# Patient Record
Sex: FEMALE | Race: BLACK OR AFRICAN AMERICAN | Employment: UNEMPLOYED | URBAN - METROPOLITAN AREA
[De-identification: names, ages, dates, MRNs, and addresses within clinical notes are randomized per-mention and may not be internally consistent; named-entity substitution may affect disease eponyms.]

---

## 2017-09-13 ENCOUNTER — OFFICE VISIT (OUTPATIENT)
Dept: FAMILY MEDICINE CLINIC | Age: 47
End: 2017-09-13
Payer: COMMERCIAL

## 2017-09-13 VITALS
HEART RATE: 94 BPM | TEMPERATURE: 98.2 F | HEIGHT: 59 IN | DIASTOLIC BLOOD PRESSURE: 84 MMHG | BODY MASS INDEX: 29.88 KG/M2 | WEIGHT: 148.2 LBS | SYSTOLIC BLOOD PRESSURE: 127 MMHG

## 2017-09-13 DIAGNOSIS — Z79.4 DIABETES MELLITUS TYPE 2, INSULIN DEPENDENT (HCC): Primary | ICD-10-CM

## 2017-09-13 DIAGNOSIS — E11.9 DIABETES MELLITUS TYPE 2, INSULIN DEPENDENT (HCC): Primary | ICD-10-CM

## 2017-09-13 DIAGNOSIS — E78.00 HYPERCHOLESTEREMIA: ICD-10-CM

## 2017-09-13 DIAGNOSIS — K21.9 GASTROESOPHAGEAL REFLUX DISEASE WITHOUT ESOPHAGITIS: ICD-10-CM

## 2017-09-13 DIAGNOSIS — Z00.00 HEALTH CARE MAINTENANCE: ICD-10-CM

## 2017-09-13 DIAGNOSIS — I10 HTN (HYPERTENSION), BENIGN: ICD-10-CM

## 2017-09-13 LAB
CREATININE URINE POCT: NORMAL
HBA1C MFR BLD: 14 %
MICROALBUMIN/CREAT 24H UR: NORMAL MG/G{CREAT}
MICROALBUMIN/CREAT UR-RTO: NORMAL

## 2017-09-13 PROCEDURE — 90471 IMMUNIZATION ADMIN: CPT | Performed by: FAMILY MEDICINE

## 2017-09-13 PROCEDURE — 83036 HEMOGLOBIN GLYCOSYLATED A1C: CPT | Performed by: FAMILY MEDICINE

## 2017-09-13 PROCEDURE — 99214 OFFICE O/P EST MOD 30 MIN: CPT | Performed by: FAMILY MEDICINE

## 2017-09-13 PROCEDURE — 90688 IIV4 VACCINE SPLT 0.5 ML IM: CPT | Performed by: FAMILY MEDICINE

## 2017-09-13 PROCEDURE — 82044 UR ALBUMIN SEMIQUANTITATIVE: CPT | Performed by: FAMILY MEDICINE

## 2017-09-13 RX ORDER — UBIQUINOL 100 MG
CAPSULE ORAL
Qty: 100 EACH | Refills: 11 | Status: SHIPPED | OUTPATIENT
Start: 2017-09-13 | End: 2021-04-02 | Stop reason: SDUPTHER

## 2017-09-13 RX ORDER — IBUPROFEN 200 MG
200 TABLET ORAL EVERY 8 HOURS PRN
Qty: 120 TABLET | Refills: 3 | Status: SHIPPED | OUTPATIENT
Start: 2017-09-13 | End: 2018-02-13 | Stop reason: ALTCHOICE

## 2017-09-13 RX ORDER — INSULIN GLARGINE 100 [IU]/ML
50 INJECTION, SOLUTION SUBCUTANEOUS NIGHTLY
Qty: 1 VIAL | Refills: 3 | Status: SHIPPED | OUTPATIENT
Start: 2017-09-13 | End: 2017-09-20 | Stop reason: ALTCHOICE

## 2017-09-13 RX ORDER — ASPIRIN 81 MG/1
81 TABLET ORAL DAILY
Qty: 30 TABLET | Refills: 11 | Status: SHIPPED | OUTPATIENT
Start: 2017-09-13 | End: 2021-05-05 | Stop reason: SDUPTHER

## 2017-09-13 RX ORDER — GABAPENTIN 300 MG/1
300 CAPSULE ORAL 3 TIMES DAILY
Qty: 90 CAPSULE | Refills: 11 | Status: SHIPPED | OUTPATIENT
Start: 2017-09-13 | End: 2021-04-02 | Stop reason: SDUPTHER

## 2017-09-13 RX ORDER — ALBUTEROL SULFATE 90 UG/1
2 AEROSOL, METERED RESPIRATORY (INHALATION) EVERY 6 HOURS PRN
Qty: 1 INHALER | Refills: 11 | Status: SHIPPED | OUTPATIENT
Start: 2017-09-13 | End: 2021-04-02 | Stop reason: SDUPTHER

## 2017-09-13 RX ORDER — SYRINGE-NEEDLE,INSULIN,0.5 ML 27GX1/2"
SYRINGE, EMPTY DISPOSABLE MISCELLANEOUS
Qty: 100 EACH | Refills: 11 | Status: ON HOLD | OUTPATIENT
Start: 2017-09-13 | End: 2018-02-27 | Stop reason: SDUPTHER

## 2017-09-13 RX ORDER — LOSARTAN POTASSIUM 50 MG/1
50 TABLET ORAL DAILY
Qty: 30 TABLET | Refills: 6 | Status: SHIPPED | OUTPATIENT
Start: 2017-09-13 | End: 2021-04-02 | Stop reason: SDUPTHER

## 2017-09-13 RX ORDER — SIMVASTATIN 80 MG
80 TABLET ORAL NIGHTLY
Qty: 30 TABLET | Refills: 6 | Status: SHIPPED | OUTPATIENT
Start: 2017-09-13 | End: 2021-04-02 | Stop reason: SDUPTHER

## 2017-09-13 RX ORDER — LANSOPRAZOLE 30 MG/1
30 CAPSULE, DELAYED RELEASE ORAL DAILY
Qty: 30 CAPSULE | Refills: 3 | Status: SHIPPED | OUTPATIENT
Start: 2017-09-13 | End: 2018-03-19 | Stop reason: SDUPTHER

## 2017-09-13 ASSESSMENT — ENCOUNTER SYMPTOMS
SHORTNESS OF BREATH: 0
WHEEZING: 0
GASTROINTESTINAL NEGATIVE: 1

## 2017-09-14 ENCOUNTER — TELEPHONE (OUTPATIENT)
Dept: FAMILY MEDICINE CLINIC | Age: 47
End: 2017-09-14

## 2017-09-14 NOTE — TELEPHONE ENCOUNTER
Hello we recevied a PA , do not have all the info, like the key number PA paper, could not answer the question, thanks writer.

## 2017-09-25 ENCOUNTER — TELEPHONE (OUTPATIENT)
Dept: FAMILY MEDICINE CLINIC | Age: 47
End: 2017-09-25

## 2017-10-03 DIAGNOSIS — E11.9 DIABETES MELLITUS TYPE 2, INSULIN DEPENDENT (HCC): Primary | ICD-10-CM

## 2017-10-03 DIAGNOSIS — Z79.4 DIABETES MELLITUS TYPE 2, INSULIN DEPENDENT (HCC): Primary | ICD-10-CM

## 2017-10-12 ENCOUNTER — OFFICE VISIT (OUTPATIENT)
Dept: OBGYN | Age: 47
End: 2017-10-12
Payer: COMMERCIAL

## 2017-10-12 ENCOUNTER — HOSPITAL ENCOUNTER (OUTPATIENT)
Age: 47
Setting detail: SPECIMEN
Discharge: HOME | End: 2017-10-12
Payer: COMMERCIAL

## 2017-10-12 VITALS
RESPIRATION RATE: 16 BRPM | WEIGHT: 149.1 LBS | TEMPERATURE: 98.2 F | HEART RATE: 95 BPM | HEIGHT: 59 IN | SYSTOLIC BLOOD PRESSURE: 127 MMHG | BODY MASS INDEX: 30.06 KG/M2 | DIASTOLIC BLOOD PRESSURE: 79 MMHG

## 2017-10-12 DIAGNOSIS — N93.9 ABNORMAL UTERINE BLEEDING (AUB): ICD-10-CM

## 2017-10-12 DIAGNOSIS — Z12.39 BREAST CANCER SCREENING: ICD-10-CM

## 2017-10-12 DIAGNOSIS — Z01.419 WOMEN'S ANNUAL ROUTINE GYNECOLOGICAL EXAMINATION: Primary | ICD-10-CM

## 2017-10-12 LAB
ABSOLUTE EOS #: 0.1 K/UL (ref 0–0.4)
ABSOLUTE LYMPH #: 2.1 K/UL (ref 1–4.8)
ABSOLUTE MONO #: 0.3 K/UL (ref 0.1–1.2)
BASOPHILS # BLD: 0 %
BASOPHILS ABSOLUTE: 0 K/UL (ref 0–0.2)
DIFFERENTIAL TYPE: ABNORMAL
EOSINOPHILS RELATIVE PERCENT: 1 %
HCT VFR BLD CALC: 38.1 % (ref 36–46)
HEMOGLOBIN: 12.4 G/DL (ref 12–16)
LYMPHOCYTES # BLD: 23 %
MCH RBC QN AUTO: 26.6 PG (ref 26–34)
MCHC RBC AUTO-ENTMCNC: 32.4 G/DL (ref 31–37)
MCV RBC AUTO: 81.9 FL (ref 80–100)
MONOCYTES # BLD: 4 %
PDW BLD-RTO: 16.1 % (ref 12.5–15.4)
PLATELET # BLD: 297 K/UL (ref 140–450)
PLATELET ESTIMATE: ABNORMAL
PMV BLD AUTO: 9.3 FL (ref 6–12)
RBC # BLD: 4.66 M/UL (ref 4–5.2)
RBC # BLD: ABNORMAL 10*6/UL
SEG NEUTROPHILS: 72 %
SEGMENTED NEUTROPHILS ABSOLUTE COUNT: 6.5 K/UL (ref 1.8–7.7)
TSH SERPL DL<=0.05 MIU/L-ACNC: 0.97 MIU/L (ref 0.3–5)
WBC # BLD: 9.1 K/UL (ref 3.5–11)
WBC # BLD: ABNORMAL 10*3/UL

## 2017-10-12 PROCEDURE — G0145 SCR C/V CYTO,THINLAYER,RESCR: HCPCS

## 2017-10-12 PROCEDURE — 87624 HPV HI-RISK TYP POOLED RSLT: CPT

## 2017-10-12 PROCEDURE — 36415 COLL VENOUS BLD VENIPUNCTURE: CPT

## 2017-10-12 PROCEDURE — 85025 COMPLETE CBC W/AUTO DIFF WBC: CPT

## 2017-10-12 PROCEDURE — 84443 ASSAY THYROID STIM HORMONE: CPT

## 2017-10-12 PROCEDURE — 99396 PREV VISIT EST AGE 40-64: CPT | Performed by: OBSTETRICS & GYNECOLOGY

## 2017-10-12 NOTE — PROGRESS NOTES
OB/GYN Annual Visit    Arvin Wilson  10/12/2017                       Primary Care Physician: Andee Babinski, MD    CC:   Annual Gyn exam and heavy menses      HPI: Arvin Wilson is a 42 y.o. female [id number] Patient's last menstrual period was 10/08/2017. she reports menses have been monthly for more than 1 year but period are heavy lasting 7 -14 days. Uses 10 pads or more daily, passes small clots at times. There is pelvic pain prior to and during menses. Denies CP, SOB, dizziness or palpitations. There is a hx of tubal ligation for permanent sterilization. Denies urinary or GI complaints. Denies vaginal discharge.              REVIEW OF SYSTEMS:   An 11 point review of systems was completed    Constitutional: negative fever, negative chills  HEENT: negative visual disturbances, negative headaches  Respiratory: negative dyspnea, negative cough  Cardiovascular: negative chest pain,  negative palpitations  Gastrointestinal: positive abdominal pain, negative RUQ pain, negative N/V/D, negative constipation  Genitourinary: negative dysuria, negative vaginal discharge  Dermatological: negative rash, negative wounds  Hematologic: negative bleeding/clotting disorder  Immunologic: negative recent illness, negative recent sick contact,  Lymphatic: negative lymph nodes  Musculoskeletal: negative back pain, negative myalgias, negative arthralgias  Neurological:  negative dizziness, negative weakness  Behavior/Psych: negative depression  ________________________________________________________________________    GYNECOLOGICAL HISTORY:  Age of Menarche: 17  Age of Menopause: n/a     Sexually Active: has sex with one male  STD History: no past history    Pap History: Patient does not recall date of the last Pap test but reports the results as normal.  Colposcopy History: n/a    Permanent Sterilization: yes - tubal ligation 5/2008  Reversible Birth Control: no  Hormone Replacement Exposure: no    OBSTETRICAL HISTORY:  OB History    Para Term  AB Living   6 5 3 2 1 5   SAB TAB Ectopic Molar Multiple Live Births   1         5      # Outcome Date GA Lbr Jose Eduardo/2nd Weight Sex Delivery Anes PTL Lv   6 Term 08   6 lb 10 oz (3.005 kg) M Vag-Spont   RUBEN   5  06   4 lb (1.814 kg) M    RUBEN   4 SAB 2006           3  02   3 lb 3 oz (1.446 kg) M Vag-Spont   RUBEN   2 Term 01 38w0d  6 lb 4 oz (2.835 kg) M Vag-Spont   RUBEN   1 Term 93 40w0d  5 lb 6 oz (2.438 kg) M Vag-Spont   RUBEN          PAST MEDICAL HISTORY:  Past Medical History:   Diagnosis Date    Asthma     Chronic back pain     GERD (gastroesophageal reflux disease)     HLD (hyperlipidemia) 2014    Hypertension     Neuropathy ([facility] [address]     Obesity     Type II or unspecified type diabetes mellitus without mention of complication, not stated as uncontrolled      PAST SURGICAL HISTORY:                                                                Past Surgical History:   Procedure Laterality Date    TUBAL LIGATION       MEDICATIONS:  Current Outpatient Prescriptions on File Prior to Visit   Medication Sig Dispense Refill    insulin glargine (BASAGLAR KWIKPEN) 100 UNIT/ML injection pen Inject 50 Units into the skin nightly 5 Pen 3    albuterol sulfate  (90 Base) MCG/ACT inhaler Inhale 2 puffs into the lungs every 6 hours as needed for Wheezing or Shortness of Breath 1 Inhaler 11    Alcohol Swabs (ALCOHOL PREP) 70 % PADS Use__3___times per day Diagnosis:  Diabetes ___Insulin-Dependent_ 100 each 11    aspirin EC 81 MG EC tablet Take 1 tablet by mouth daily 30 tablet 11    beclomethasone (QVAR) 80 MCG/ACT inhaler Inhale 1 puff into the lungs 2 times daily 1 Inhaler 3    gabapentin (NEURONTIN) 300 MG capsule Take 1 capsule by mouth 3 times daily 90 capsule 11    glucose blood VI test strips (TRUE METRIX BLOOD GLUCOSE TEST) strip 1 each by In Vitro route daily As needed.  100 each 3    ibuprofen Diabetes Maternal Grandmother      SOCIAL HISTORY:  Social History     Social History    Marital status: Single     Spouse name: N/A    Number of children: N/A    Years of education: N/A     Occupational History    Day Care      [employer] - [employer]     Social History Main Topics    Smoking status: Never Smoker    Smokeless tobacco: Never Used      Comment: pt is around smokers a lot    Alcohol use Yes      Comment: occasionally    Drug use: No    Sexual activity: Yes     Partners: Male     Other Topics Concern    Not on file     Social History Narrative    No narrative on file       HEALTH MAINTENANCE:  Immunization status: up to date and documented  Garidisil immunization: n/a    Date of Last Mammogram:   Date of Last Colonoscopy: denies  Date of Last Bone Density: denies    Last pap: 2014 BRUNILDA  Colposcopy ECC, EMB negative    VITALS:  There were no vitals filed for this visit. PHYSICAL EXAM:     General Appearance: Appears healthy. Alert; in no acute distress. Pleasant. Skin: Skin color, texture, turgor normal. No rashes or lesions. Lymphatic: No abnormally enlarged lymph nodes. Neck and EENT: normal atraumatic, no neck masses, normal thyroid  Respiratory: Normal expansion. Clear to auscultation. No rales, rhonchi, or wheezing. Cardiovascular: normal rate, regular rhythm, normal S1 and S2 and no murmurs  Breast:  (Chest): normal appearance, no masses or tenderness of soft tissue. Reproducible pain with rib palpation. Abdomen: soft, non-tender; bowel sounds normal; no masses,  no organomegaly. No inguinal hernias palpated with standing and lying positions. Abdominal Scars: Yes, suprapubic  Pelvic Exam:   External genitalia: normal general appearance, decreased hair.  , perineum inflamed  Urinary system: urethral meatus normal, tenderness with palpation to bladder  Vaginal: menstrual blood  Cervix: nabothian cyst at 10 o'clock, tenderness in left adnexa with cervical deviation to right  Adnexa : not palpable  Uterus: Enlarged, approx 10 cm  Musculosk: negative  Extremities: non-tender BLE and non-edematous  Psych:  mood and affect are within normal limits     ASSESSMENT & PLAN:    Cristal Leon is a 42 y.o. female [id number]  1. Women's annual routine gynecological examination    - PAP Smear    2. Abnormal uterine bleeding (AUB)    - US Pelvis Complete; Future  - CBC Auto Differential; Future  - TSH with Reflex; Future  - return for office H-scope and EMG in about  2 weeks. 3. Breast cancer screening    - [facility] DIGITAL SCREEN W CAD BILATERAL; Future      Patient Active Problem List    Diagnosis Date Noted    Abnormal glandular Papanicolaou smear of cervix - NOS 11/15/2014     Priority: High     Negative HPV  ECC & Colposcopy w/ biopsy performed 11/17/2014; pathology negative for malignancy. Will perform Pap w/ co-testing and 12 and 24 months. Approx. 11/2015.  Abnormal uterine bleeding 11/03/2014     Priority: High     Irregular bleeding, heavy and intermenstrual bleeding for 2 years. Gc/C negative from ED visit 10/12/14. Risk factors: HTN, Obesity, DM. TV US 10/12/14: ovaries normal, endometrial stripe 10-19mm  TV US 2013: 1.2mm anterior uterine fibroid    EMB performed 11/17/2014; pathology negative for malignancy; Will repeat Pap w/ co-testing in 12 and 24 months. Consider cyclic progesterone, discuss at follow up visit. Provera 10mg x 10 days monthly given 12/08/2014. Will have patient repeat TSH; re-ordered 04/17/2015. Poorly controlled DM likely contributory, ordered hgb a1c 04/17/2015 and recommended follow up with IM. Patient to follow up in 3 months with possible discussion of Mirena at that time.        HTN (hypertension), benign 04/16/2011     Priority: High    [facility] type [facility], [facility] 04/16/2011     Priority: High    Abdominal pain 04/16/2011     Priority: High     Pelvic pain, left and right inguinal area, No hernia appreciated. Bladder area on pelvic exam tender. Referred to PCP for possible lumbar spine pathology as pain is bilateral and exacerbated by walking/movement in setting of low back pain. UA positive for Klebsiella, treated. Urine culture performed 12/08/2014: negative    Pap and ultrasound records not available from Dr. Renita Garza as office has closed.  Breast pain, left 11/03/2014     Priority: Medium     Last mammo 2014 wnl. Soft tissue is non-tender. Reproducible over ribs and patient states her pain is exacerbated by coughing. She denies other cardiac symptoms. Recommended ice/hot or icy hot liniment therapy and stretching. If pain does not resolve with this treatment recommended f/u with PCP for possible PT.  Annual physical exam 10/25/2016    Screening for condition 10/25/2016     Updating deleted diagnoses      Immunization due 10/25/2016    HLD (hyperlipidemia) 01/27/2014    Back pain 12/01/2011    Peripheral neuropathy 12/01/2011    Obesity 04/16/2011    GERD (gastroesophageal reflux disease) 04/16/2011    RAD (reactive airway disease) 04/16/2011    Bilateral knee pain 04/16/2011    Pain in joint of left shoulder 04/16/2011    Patellofemoral syndrome 04/16/2011       No Follow-up on file. Counseling Completed:    discussed need for repeat pap as per American Society for Colposcopy and Cervical Pathology guidelines. discussed need for mammograms every 1 year, If >44 yo and last mammogram was negative. discussed Calcium and Vitamin D dosing. discussed need for colonoscopy screening as well as onset for bone density testing. discussed birth control and barrier recommendations. discussed STD counseling and prevention.   Gardisil counseling for all patients 9-25 yo n/a  Hereditary Breast, Ovarian, Colon and Uterine Cancer

## 2017-10-12 NOTE — PATIENT INSTRUCTIONS
Patient Education        Hysteroscopy: Before Your Procedure  What is a hysteroscopy? A hysteroscopy is a procedure to find and treat problems with your uterus. It may be done to remove growths from the uterus. Or it may be done to diagnose or treat fertility problems. It can also be done to diagnose or treat abnormal bleeding. The doctor will guide a lighted tube through the cervix and into the uterus. This tube is called a hysteroscope, or scope. It lets your doctor see inside your body. The doctor will fill your uterus with air or liquid. This makes it easier to see the inside of your uterus with the scope. The doctor may also put tools through the scope to treat a problem. During this procedure, the doctor may take out a small piece of tissue for study. This is called a biopsy. Or the doctor may gently scrape tissue from the inner wall of the uterus. This is called a dilation and curettage, or D&C. If your doctor filled your uterus with liquid, most it will flow out when the scope is removed. You will most likely go home the same day. Many women are able to go back to work the next day. But it depends on what was done and the type of work you do. Follow-up care is a key part of your treatment and safety. Be sure to make and go to all appointments, and call your doctor if you are having problems. It's also a good idea to know your test results and keep a list of the medicines you take. What happens before the procedure? Procedures can be stressful. This information will help you understand what you can expect. And it will help you safely prepare for your procedure. Preparing for the procedure  · Understand exactly what procedure is planned, along with the risks, benefits, and other options. · Tell your doctors ALL the medicines, vitamins, supplements, and herbal remedies you take. Some of these can increase the risk of bleeding or interact with anesthesia.   · If you take aspirin or some other blood thinner, be sure to talk to your doctor. He or she will tell you if you should stop taking it before your procedure. Make sure that you understand exactly what your doctor wants you to do. · Your doctor will tell you which medicines to take or stop before your procedure. You may need to stop taking certain medicines a week or more before the procedure. So talk to your doctor as soon as you can. · If you have an advance directive, let your doctor know. It may include a living will and a durable power of  for health care. Bring a copy to the hospital. If you don't have one, you may want to prepare one. It lets your doctor and loved ones know your health care wishes. Doctors advise that everyone prepare these papers before any type of surgery or procedure. What happens on the day of the procedure? · Follow the instructions exactly about when to stop eating and drinking. If you don't, your procedure may be canceled. If your doctor has instructed you to take your medicines on the day of the procedure, take them with only a sip of water. · Take a bath or shower before you come in for your procedure. Do not apply lotions, perfumes, deodorants, or nail polish. · Take off all jewelry and piercings. And take out contact lenses, if you wear them. At the hospital or surgery center  · Bring a picture ID. · You will be kept comfortable and safe by your anesthesia provider. The anesthesia may make you sleep. Or it may just numb the area being worked on. · The procedure usually takes less than 30 minutes. Going home  · Be sure you have someone to drive you home. Anesthesia and pain medicine make it unsafe for you to drive. · You will be given more specific instructions about recovering from your procedure. They will cover things like diet, wound care, follow-up care, driving, and getting back to your normal routine. When should you call your doctor? · You have questions or concerns.   · You don't understand how good idea to keep a list of the medicines you take. Ask your doctor when you can expect to have your test results. Where can you learn more? Go to https://[email or web address].[email or web address]. org and sign in to your [employer] account. Enter [phone] in the [facility] box to learn more about \"Endometrial Biopsy: About This Test.\"     If you do not have an account, please click on the \"Sign Up Now\" link. Current as of: October 13, 2016  Content Version: 11.3  © 4466-9311 [employer]. Care instructions adapted under license by [facility] If you have questions about a medical condition or this instruction, always ask your healthcare professional. [address] any warranty or liability for your use of this information.

## 2017-10-13 LAB
HPV SAMPLE: NORMAL
HPV SOURCE: NORMAL
HPV, GENOTYPE 16: NOT DETECTED
HPV, GENOTYPE 18: NOT DETECTED
HPV, HIGH RISK OTHER: NOT DETECTED
HPV, INTERPRETATION: NORMAL

## 2017-10-17 ENCOUNTER — TELEPHONE (OUTPATIENT)
Dept: PHARMACY | Age: 47
End: 2017-10-17

## 2017-10-17 ENCOUNTER — OFFICE VISIT (OUTPATIENT)
Dept: FAMILY MEDICINE CLINIC | Age: 47
End: 2017-10-17
Payer: COMMERCIAL

## 2017-10-17 VITALS
BODY MASS INDEX: 31 KG/M2 | TEMPERATURE: 97.9 F | SYSTOLIC BLOOD PRESSURE: 112 MMHG | WEIGHT: 153.8 LBS | HEIGHT: 59 IN | HEART RATE: 94 BPM | DIASTOLIC BLOOD PRESSURE: 78 MMHG

## 2017-10-17 DIAGNOSIS — Z00.00 PERIODIC HEALTH ASSESSMENT, GENERAL SCREENING, ADULT: ICD-10-CM

## 2017-10-17 DIAGNOSIS — Z79.4 DIABETES MELLITUS TYPE 2, INSULIN DEPENDENT (HCC): Primary | ICD-10-CM

## 2017-10-17 DIAGNOSIS — E11.9 DIABETES MELLITUS TYPE 2, INSULIN DEPENDENT (HCC): Primary | ICD-10-CM

## 2017-10-17 PROCEDURE — 99213 OFFICE O/P EST LOW 20 MIN: CPT | Performed by: FAMILY MEDICINE

## 2017-10-17 ASSESSMENT — ENCOUNTER SYMPTOMS
ABDOMINAL PAIN: 0
DIARRHEA: 0
VOMITING: 0
NAUSEA: 1
SHORTNESS OF BREATH: 0
BLOOD IN STOOL: 0

## 2017-10-17 NOTE — PROGRESS NOTES
benefit, and side effects of prescribed medications. Barriers to medication compliance addressed. Patient given educational materials - see patient instructions  Was a self-tracking handout given in paper form or via [employer]? No:     Requested Prescriptions      No prescriptions requested or ordered in this encounter       All patient questions answered. Patient voiced understanding. Quality Measures    Body mass index is 31.42 kg/m². Elevated. Weight control planned discussed Healthy diet and regular exercise. BP: 112/78 Blood pressure is normal. Treatment plan consists of No treatment change needed. Lab Results   Component Value Date    LDLCHOLESTEROL 171 (H) 04/28/2015    (goal LDL reduction with dx if diabetes is 50% LDL reduction)      PHQ Scores 8/28/2014   PHQ2 Score 0   PHQ9 Score 0     Interpretation of Total Score Depression Severity: 1-4 = Minimal depression, 5-9 = Mild depression, 10-14 = Moderate depression, 15-19 = Moderately severe depression, 20-27 = Severe depression    Requested Prescriptions      No prescriptions requested or ordered in this encounter       There are no discontinued medications.

## 2017-10-17 NOTE — PATIENT INSTRUCTIONS
Thank you for letting us take care of you today. We hope all your questions were addressed. If a question was overlooked or something else comes to mind after you return home, please contact a member of your Care Team listed below. Please make sure you have a routine office visit set up to follow-up on [address]. Your Care Team at [place] [address] is Team #2  Swapna Dorsey,  (Faculty)  Pradip Harrison MD (Resindent)  Paulina Neri MD (Resident)  Kimmy Garcia MD (Resident)  Shawnee Badillo MD (Resident)  Beatriz Blanton MD (Resident)  Silvia Zarate, PETER Araiza., A  Kayleigh Barksdale, NEDA Garcia ([address] [facility])  Yvette Cervantes RN, ([id number] [facility])  Delio Cueva, Ph.D., (Behavioral Services)  Griselda Nanas, [facility] (Clinical Pharmacist)     Office phone number: [phone]    If you need to get in right away due to illness, please be advised we have \"Same Day\" appointments available Monday-Friday. Please call us at [phone] option #1 to schedule your \"Same Day\" appointment.

## 2017-10-18 ENCOUNTER — HOSPITAL ENCOUNTER (OUTPATIENT)
Age: 47
Discharge: HOME | End: 2017-10-18
Payer: COMMERCIAL

## 2017-10-18 DIAGNOSIS — Z00.00 PERIODIC HEALTH ASSESSMENT, GENERAL SCREENING, ADULT: ICD-10-CM

## 2017-10-18 LAB
CHOLESTEROL/HDL RATIO: 4.1
CHOLESTEROL: 226 MG/DL
HDLC SERPL-MCNC: 55 MG/DL
HIV AG/AB: NONREACTIVE
LDL CHOLESTEROL: 151 MG/DL (ref 0–130)
TRIGL SERPL-MCNC: 100 MG/DL
VLDLC SERPL CALC-MCNC: ABNORMAL MG/DL (ref 1–30)

## 2017-10-18 PROCEDURE — 80061 LIPID PANEL: CPT

## 2017-10-18 PROCEDURE — 36415 COLL VENOUS BLD VENIPUNCTURE: CPT

## 2017-10-18 PROCEDURE — 87389 HIV-1 AG W/HIV-1&-2 AB AG IA: CPT

## 2017-10-23 LAB — CYTOLOGY REPORT: NORMAL

## 2017-11-14 ENCOUNTER — HOSPITAL ENCOUNTER (OUTPATIENT)
Dept: MAMMOGRAPHY | Age: 47
Discharge: HOME | End: 2017-11-14
Payer: COMMERCIAL

## 2017-11-14 DIAGNOSIS — Z12.39 BREAST CANCER SCREENING: ICD-10-CM

## 2017-11-14 PROCEDURE — G0202 SCR MAMMO BI INCL CAD: HCPCS

## 2017-11-28 ENCOUNTER — PROCEDURE VISIT (OUTPATIENT)
Dept: OBGYN | Age: 47
End: 2017-11-28
Payer: COMMERCIAL

## 2017-11-28 ENCOUNTER — HOSPITAL ENCOUNTER (OUTPATIENT)
Age: 47
Setting detail: SPECIMEN
Discharge: HOME | End: 2017-11-28
Payer: COMMERCIAL

## 2017-11-28 VITALS
HEIGHT: 59 IN | RESPIRATION RATE: 16 BRPM | TEMPERATURE: 98.1 F | WEIGHT: 152 LBS | DIASTOLIC BLOOD PRESSURE: 91 MMHG | HEART RATE: 93 BPM | BODY MASS INDEX: 30.64 KG/M2 | SYSTOLIC BLOOD PRESSURE: 121 MMHG

## 2017-11-28 DIAGNOSIS — B37.31 CANDIDAL VULVOVAGINITIS: ICD-10-CM

## 2017-11-28 DIAGNOSIS — N93.9 ABNORMAL UTERINE BLEEDING (AUB): Primary | ICD-10-CM

## 2017-11-28 DIAGNOSIS — R87.615 UNSATISFACTORY CERVICAL PAPANICOLAOU SMEAR: ICD-10-CM

## 2017-11-28 LAB
CONTROL: NORMAL
PREGNANCY TEST URINE, POC: NEGATIVE

## 2017-11-28 PROCEDURE — 81025 URINE PREGNANCY TEST: CPT | Performed by: OBSTETRICS & GYNECOLOGY

## 2017-11-28 PROCEDURE — 58558 HYSTEROSCOPY BIOPSY: CPT | Performed by: OBSTETRICS & GYNECOLOGY

## 2017-11-28 RX ORDER — FLUCONAZOLE 150 MG/1
150 TABLET ORAL ONCE
Qty: 1 TABLET | Refills: 2 | Status: SHIPPED | OUTPATIENT
Start: 2017-11-28 | End: 2017-11-28

## 2017-11-28 RX ORDER — CLOTRIMAZOLE AND BETAMETHASONE DIPROPIONATE 10; .64 MG/G; MG/G
CREAM TOPICAL
Qty: 1 TUBE | Refills: 1 | Status: SHIPPED | OUTPATIENT
Start: 2017-11-28 | End: 2018-02-13 | Stop reason: ALTCHOICE

## 2017-11-28 NOTE — PROGRESS NOTES
METRIX AIR GLUCOSE METER) ANA Use daily 1 Device 0    Insulin Syringes, Disposable, U-100 0.3 ML MISC 1 each by Does not apply route daily 100 each 3    Lancets MISC Use 3 times daily Insulin dependent diabetes mellitus 100 each 11    albuterol (PROVENTIL) (5 MG/ML) 0.5% nebulizer solution Take 0.5 mLs by nebulization every 6 hours as needed for Wheezing. 120 Bottle 11    ibuprofen (ADVIL;MOTRIN) 200 MG tablet Take 1 tablet by mouth every 8 hours as needed for Pain 120 tablet 3     No current facility-administered medications on file prior to visit. ALLERGIES:  Allergies as of 11/28/2017 - Review Complete 11/28/2017   Allergen Reaction Noted    Morphine Swelling 12/01/2011       Blood pressure (!) 121/91, pulse 93, temperature 98.1 °F (36.7 °C), temperature source Oral, resp. rate 16, height 4' 11\" (1.499 m), weight 152 lb (68.9 kg), last menstrual period 11/14/2017, not currently breastfeeding. Vulva inflamed consistent with candida      The hysteroscopy with the Endosee device and endometrial biopsy were explained to the patient. Indications, risk and alternatives reviewed. Informed consent was obtained. A preoperative timeout was taken. The patient was placed in the lithotomy position on the exam table. Bimanual exam was performed. The uterus was found to be 10 cn. A sterile speculum was placed in the vagina and the cervix was visualized. The cervix was cleansed with Betadine. Paracervical block with 10 ml of 1% Lidocaine administered. The cervix was  grasped with a tenaculum. The cervix was not dilated. The uterus was sound with a plastic sound to 10 cm. .  The Endosee catheter was prepped with normal saline in the usual fashion. The catheter was inserted into the external cervical and advanced into the endometrial cavity. The catheter was attached to the monitor. The cavity was distended with normal saline in the usual fashion.  The endometrial cavity was examined and representative images captured. The endometrium was lush in appearance. The tubal ostia were not visualized. Upon completion of the hysteroscopy exam the saline was evacuated from the uterus. The endometrial sampler was placed into the external cervical os and advanced into endometrial cavity and biopsy was performed in the usual fashion. The specimen was submitted for routine histology. The tenaculum was removed from the cervix , hemostasis was affirmed on the cervix. The speculum was removed and the patient was given post procedural instructions. The hysteroscopic findings were discussed with the patient. She will return to the clinic to discuss the results of the histology of the endometrial biopsy and treatment options        Lab Results:  Results for orders placed or performed during the hospital encounter of 10/18/17   Lipid Panel   Result Value Ref Range    Cholesterol 226 (H) <200 mg/dL    HDL 55 >40 mg/dL    LDL Cholesterol 151 (H) 0 - 130 mg/dL    Chol/HDL Ratio 4.1 <5    Triglycerides 100 <150 mg/dL    VLDL NOT REPORTED 1 - 30 mg/dL   HIV Screen   Result Value Ref Range    HIV Ag/Ab NONREACTIVE NR         Assessment:  1. Abnormal uterine bleeding (AUB)  POCT urine pregnancy    SC HYSTEROSCOPY,W/ENDO BX   2. Candidal vulvovaginitis  fluconazole (DIFLUCAN) 150 MG tablet    clotrimazole-betamethasone (LOTRISONE) 1-0.05 % cream   3. Unsatisfactory cervical Papanicolaou smear             PLAN:  1. Abnormal uterine bleeding (AUB)    - POCT urine pregnancy  - SC HYSTEROSCOPY,W/ENDO BX  - return in 1 week for results and to discuss treatment options    2. Candidal vulvovaginitis    - fluconazole (DIFLUCAN) 150 MG tablet; Take 1 tablet by mouth once for 1 dose  Dispense: 1 tablet; Refill: 2  - clotrimazole-betamethasone (LOTRISONE) 1-0.05 % cream; Apply to affected area bid externally x 4 days then daily for 3 days  Dispense: 1 Tube; Refill: 1    3.  Unsatisfactory cervical Papanicolaou smear  - repeat pap at follow

## 2017-11-28 NOTE — PATIENT INSTRUCTIONS
Patient Education        Hysteroscopy: What to Expect at [facility] 31 hysteroscopy is a procedure to find and treat problems with your uterus. It may have been done to remove growths from the uterus. Or it may have been done to diagnose or treat fertility problems. It can also be used to diagnose or treat abnormal bleeding. If the doctor filled your uterus with air, you may have gas pains or your belly may feel full. You may have cramps and light vaginal bleeding for a few days to several weeks. The cramps and bleeding may last longer if the hysteroscopy was used for treatment. You may also have shoulder pain right after the procedure. If the doctor filled your uterus with liquid, you may have watery vaginal discharge for several weeks. This care sheet gives you a general idea about how long it will take for you to recover. But each person recovers at a different pace. Follow the steps below to get better as quickly as possible. How can you care for yourself at home? Activity  · Rest when you feel tired. · You can do your normal activities when it feels okay to do so. · You may shower and take baths as usual.  · Ask your doctor when it is okay for you to have sex. Diet  · You can eat your normal diet. If your stomach is upset, try bland, low-fat foods like plain rice, broiled chicken, toast, and yogurt. · If your bowel movements are not regular right after surgery, try to avoid constipation and straining. Drink plenty of water. Your doctor may suggest fiber, a stool softener, or a mild laxative. Medicines  · Your doctor will tell you if and when you can restart your medicines. He or she will also give you instructions about taking any new medicines. · If you take aspirin or some other blood thinner, be sure to talk to your doctor. He or she will tell you if and when to start taking this medicine again. Make sure that you understand exactly what your doctor wants you to do.   · Be safe with medicines. Read and follow all instructions on the label. ¨ If the doctor gave you a prescription medicine for pain, take it as prescribed. ¨ If you are not taking a prescription pain medicine, ask your doctor if you can take an over-the-counter medicine. · If your doctor prescribed antibiotics, take them as directed. Do not stop taking them just because you feel better. You need to take the full course of antibiotics. Other instructions  · You may have some light vaginal bleeding. Wear sanitary pads if needed. Do not use tampons until your doctor says it is okay. · You may want to use a heating pad on your belly to help with pain. Use a low heat setting. · Talk about birth control with your doctor. Do not try to become pregnant until your doctor says it is okay. Follow-up care is a key part of your treatment and safety. Be sure to make and go to all appointments, and call your doctor if you are having problems. It's also a good idea to know your test results and keep a list of the medicines you take. When should you call for help? Call 911 anytime you think you may need emergency care. For example, call if:  · You passed out (lost consciousness). · You have symptoms of a blood clot in your lung (called a pulmonary embolism). These may include:  ¨ Sudden chest pain. ¨ Trouble breathing. ¨ Coughing up blood. · You have sudden, severe pain in your belly. Call your doctor now or seek immediate medical care if:  · You have severe vaginal bleeding. This means that you are soaking through your usual pads every hour for 2 or more hours. · You are too sick to your stomach to drink any fluids. · You have new or worse pain. · You have a fever. · You have new or worse vaginal symptoms, such as pain, itching, or a discharge. · You have trouble passing urine or stool, especially if you have pain or swelling in your lower belly.   · You have symptoms of a blood clot in your leg (called a deep vein thrombosis), call your doctor if you are having problems. It's also a good idea to keep a list of the medicines you take. Ask your doctor when you can expect to have your test results. Where can you learn more? Go to https://rosio.[employer]. org and sign in to your Invested.in account. Enter [phone] in the [facility] box to learn more about \"Endometrial Biopsy: About This Test.\"     If you do not have an account, please click on the \"Sign Up Now\" link. Current as of: October 13, 2016  Content Version: 11.3  © 1264-4799 [employer], Incorporated. Care instructions adapted under license by [facility] ([facility]). If you have questions about a medical condition or this instruction, always ask your healthcare professional. [address] any warranty or liability for your use of this information.

## 2017-11-30 LAB — SURGICAL PATHOLOGY REPORT: NORMAL

## 2017-12-06 ENCOUNTER — OFFICE VISIT (OUTPATIENT)
Dept: OBGYN | Age: 47
End: 2017-12-06
Payer: COMMERCIAL

## 2017-12-06 VITALS
DIASTOLIC BLOOD PRESSURE: 89 MMHG | TEMPERATURE: 97.8 F | WEIGHT: 151.2 LBS | BODY MASS INDEX: 30.48 KG/M2 | SYSTOLIC BLOOD PRESSURE: 129 MMHG | HEART RATE: 90 BPM | HEIGHT: 59 IN

## 2017-12-06 DIAGNOSIS — N93.9 ABNORMAL UTERINE BLEEDING (AUB): Primary | ICD-10-CM

## 2017-12-06 DIAGNOSIS — N71.0 ACUTE ENDOMETRITIS: ICD-10-CM

## 2017-12-06 PROCEDURE — 1036F TOBACCO NON-USER: CPT | Performed by: OBSTETRICS & GYNECOLOGY

## 2017-12-06 PROCEDURE — G8417 CALC BMI ABV UP PARAM F/U: HCPCS | Performed by: OBSTETRICS & GYNECOLOGY

## 2017-12-06 PROCEDURE — G8484 FLU IMMUNIZE NO ADMIN: HCPCS | Performed by: OBSTETRICS & GYNECOLOGY

## 2017-12-06 PROCEDURE — G8427 DOCREV CUR MEDS BY ELIG CLIN: HCPCS | Performed by: OBSTETRICS & GYNECOLOGY

## 2017-12-06 PROCEDURE — 99213 OFFICE O/P EST LOW 20 MIN: CPT | Performed by: OBSTETRICS & GYNECOLOGY

## 2017-12-06 RX ORDER — DOXYCYCLINE HYCLATE 100 MG
100 TABLET ORAL 2 TIMES DAILY
Qty: 14 TABLET | Refills: 0 | Status: SHIPPED | OUTPATIENT
Start: 2017-12-06 | End: 2017-12-13

## 2017-12-06 RX ORDER — METRONIDAZOLE 500 MG/1
TABLET ORAL
Qty: 14 TABLET | Refills: 0 | Status: SHIPPED | OUTPATIENT
Start: 2017-12-06 | End: 2018-02-13 | Stop reason: ALTCHOICE

## 2017-12-06 NOTE — PROGRESS NOTES
Kathleen Sharp  2017    YOB: 1970          The patient was seen today. She is here regarding follow up after office H-scope and EMB . Her bowels are regular and she is voiding without difficulty. HPI:  Kathleen Sharp is a 42 y.o. female [id number] Patient's last menstrual period was 2017. She report spotting since her procedure. There has also been cramping that has been present since then. Cramping is relieved with tylenol. Denies N/V/F/C. No voiding complaints.       Obstetric History       T3      L5     SAB0   TAB0   Ectopic0   Molar0   Multiple0   Live Births5       # Outcome Date GA Lbr Jose Eduardo/2nd Weight Sex Delivery Anes PTL Lv   6 Term 08   6 lb 10 oz (3.005 kg) M Vag-Spont   RUBEN   5  06   4 lb (1.814 kg) M    RUBEN   4 SAB 2006           3  02   3 lb 3 oz (1.446 kg) M Vag-Spont   RUBEN   2 Term 01 38w0d  6 lb 4 oz (2.835 kg) M Vag-Spont   RUBEN   1 Term 93 40w0d  5 lb 6 oz (2.438 kg) M Vag-Spont   RUBEN          Past Medical History:   Diagnosis Date    Asthma     Chronic back pain     GERD (gastroesophageal reflux disease)     HLD (hyperlipidemia) 2014    Hypertension     Neuropathy ([address]     Obesity     Type II or unspecified type diabetes mellitus without mention of complication, not stated as uncontrolled        Past Surgical History:   Procedure Laterality Date    TUBAL LIGATION         Family History   Problem Relation Age of Onset    Diabetes Mother     High Cholesterol Mother     High Blood Pressure Mother     Asthma Mother     Diabetes Maternal Aunt     Diabetes Maternal Uncle     Diabetes Maternal Grandmother        Social History     Social History    Marital status: Single     Spouse name: N/A    Number of children: N/A    Years of education: N/A     Occupational History    Day Care      [employer]     Social History Main Topics    Smoking status: Never Smoker    Smokeless tobacco: Never Used      Comment: pt is around smokers a lot    Alcohol use Yes      Comment: occasionally    Drug use: No    Sexual activity: Yes     Partners: Male     Other Topics Concern    Not on file     Social History Narrative    No narrative on file         MEDICATIONS:  Current Outpatient Prescriptions on File Prior to Visit   Medication Sig Dispense Refill    clotrimazole-betamethasone (LOTRISONE) 1-0.05 % cream Apply to affected area bid externally x 4 days then daily for 3 days 1 Tube 1    insulin glargine (BASAGLAR KWIKPEN) 100 UNIT/ML injection pen Inject 50 Units into the skin nightly 5 Pen 3    albuterol sulfate  (90 Base) MCG/ACT inhaler Inhale 2 puffs into the lungs every 6 hours as needed for Wheezing or Shortness of Breath 1 Inhaler 11    Alcohol Swabs (ALCOHOL PREP) 70 % PADS Use__3___times per day Diagnosis:  Diabetes ___Insulin-Dependent_ 100 each 11    aspirin EC 81 MG EC tablet Take 1 tablet by mouth daily 30 tablet 11    beclomethasone (QVAR) 80 MCG/ACT inhaler Inhale 1 puff into the lungs 2 times daily 1 Inhaler 3    gabapentin (NEURONTIN) 300 MG capsule Take 1 capsule by mouth 3 times daily 90 capsule 11    glucose blood VI test strips (TRUE METRIX BLOOD GLUCOSE TEST) strip 1 each by In Vitro route daily As needed. 100 each 3    ibuprofen (ADVIL;MOTRIN) 200 MG tablet Take 1 tablet by mouth every 8 hours as needed for Pain 120 tablet 3    insulin aspart (NOVOLOG) 100 UNIT/ML injection vial Less than 70-----0 units and hypoglycemia protocol      -----2 units,151-200-----6 units, 201-250-----8 units, 251-300-----10 units, 301-350-----12 units, 351-400-----14 units, Greater than 400--15 units and contact physician. 2 vial 3    Insulin Syringe-Needle U-100 (INSULIN SYRINGE 1CC/30GX1/2\") 30G X 1/2\" 1 ML MISC Use as directed, Dx; DM 2 insulin dependent.  100 each 11    Insulin Syringe-Needle U-100 30G X 1/2\" 1 ML MISC 1 each by Does not apply route daily Rec: [id number]  Path Number: [id number]  Collected: 11/28/2017  Received: 11/29/2017  Reported: 11/30/2017 13:57    -- Diagnosis --    ENDOMETRIAL BIOPSY:        DISORDERED PROLIFERATIVE ENDOMETRIUM WITHOUT ATYPIA. GILDA Rodas  **Electronically Signed Out**         rdd/11/30/2017      Clinical Information  Pre-op Diagnosis:  ABNORMAL UTERINE BLEEDING    Operative Findings:  EMB  Operation Performed:  HYSTEROSCOPY, W/ENDO BX     Source of Specimen  1: EMB- ENDOMETRIAL BIOPSY    Gross Description  \"TEJ Ceola Emperor, EMB\" Tan-brown fragments, 5.0 x 4.0 x up to 0.3 cm in  aggregate. Entirely 2cs. jg tm      Microscopic Description  Microscopic examination performed. Assessment:  1. Abnormal uterine bleeding (AUB)     2. Acute endometritis  doxycycline hyclate (VIBRA-TABS) 100 MG tablet    metroNIDAZOLE (FLAGYL) 500 MG tablet         PLAN:    1. Abnormal uterine bleeding (AUB)  - I reviewed the results of EMB and H-scope  - treatment options including hormonal therapy, oral progestin, progesterone IUD, minimally invasive surgery with ablation. The option of no treatment was also discussed. 2. Acute endometritis    - Considering uterine tenderness I feel it is prudent to start treatment with oral antibiotics. - doxycycline hyclate (VIBRA-TABS) 100 MG tablet; Take 1 tablet by mouth 2 times daily for 7 days  Dispense: 14 tablet; Refill: 0  - metroNIDAZOLE (FLAGYL) 500 MG tablet; 1 tablet twice daily for 7 days  Dispense: 14 tablet; Refill: 0      Return to the office in 4 weeks. Counseled on preventative health maintenance follow-up. No orders of the defined types were placed in this encounter. Patient was seen with total face to face time of 15 minutes. More than 50% of this visit was counseling and education regarding The primary encounter diagnosis was Abnormal uterine bleeding (AUB). A diagnosis of Acute endometritis was also pertinent to this visit.  and Follow-up   as well as counseling on preventative health maintenance follow-up.

## 2017-12-06 NOTE — PATIENT INSTRUCTIONS
Patient Education          levonorgestrel intrauterine system  Pronunciation:  OSIRIS villarreal IN tra UE ter ine SIS tem  Brand:  Barby Marin  What is the most important information I should know about levonorgestrel intrauterine system? You should not use this intrauterine device if you have abnormal vaginal bleeding, a pelvic infection, certain other problems with your uterus or cervix, or if you have breast or uterine cancer, liver disease or liver tumor, or a weak immune system. Do not use during pregnancy. What is levonorgestrel intrauterine system? Levonorgestrel is a female hormone that can cause changes in your cervix, making it harder for sperm to reach the uterus and harder for a fertilized egg to attach to the uterus. Levonorgestrel intrauterine system is a plastic device that is placed in the uterus where it slowly releases the hormone to prevent pregnancy for 3 to 5 years. Mirena is meant for use in a woman who has had at least one child. Priscilla or Alonso Kussmaul can be used whether you have children or not. Mirena is also used in women who have heavy menstrual bleeding and choose to use an intrauterine form of birth control. Levonorgestrel is a progestin hormone and does not contain estrogen. The intrauterine device (IUD) releases levonorgestrel in the uterus, but only small amounts of the hormone reach the bloodstream. Levonorgestrel intrauterine system should not be used as emergency birth control. Levonorgestrel intrauterine system may also be used for purposes not listed in this medication guide. What should I discuss with my healthcare provider before taking levonorgestrel intrauterine system? An intrauterine device can increase your risk of developing a serious pelvic infection, which may threaten your life or your future ability to have children. Ask your doctor about your personal risk.   You should not use this device if you are allergic to levonorgestrel, silicone, silica, silver, barium, iron oxide, or polyethylene, or if you have:  · abnormal vaginal bleeding that has not been checked by a doctor;  · an untreated or uncontrolled pelvic infection (vaginal, cervical uterine, or bladder);  · endometriosis or a serious pelvic infection following a pregnancy or  within the past 3 months;  · a history of pelvic inflammatory disease (PID), unless you have had a normal pregnancy after the infection was treated and cleared;  · uterine fibroid tumors or other conditions that affect the shape of the uterus;  · past or present breast cancer, known or suspected cervical or uterine cancer;  · liver disease or liver tumor (benign or malignant);  · a recent abnormal Pap smear that has not yet been diagnosed or treated;  · a disease or condition that weakens your immune system, such as AIDS, leukemia, or IV drug abuse; or  · if you have another intrauterine device (IUD) in place. You may need special tests to safely use this device if you have:  · high blood pressure, heart disease or a heart valve disorder;  · history of heart attack or stroke;  · a bleeding or blood-clotting disorder;  · migraine headaches;  · a vaginal infection, pelvic infection, or sexually transmitted disease; or  · diabetes. Do not use this IUD during pregnancy. This device can cause severe infection, miscarriage, premature birth, or death of the mother if left in place during pregnancy. Tell your doctor right away if you become pregnant. If you choose to continue a pregnancy that occurs while using a levonorgestrel intrauterine system, watch for signs of infection such as fever, chills, flu symptoms, cramps, vaginal bleeding or discharge. You should not use this IUD if you are breast-feeding a baby younger than 7 weeks old. This IUD may be more likely to form a hole or get embedded in the wall of your uterus if you have the device inserted while you are breast-feeding.   How is levonorgestrel intrauterine the end of the 5-year wearing time. The Priscilla or Liletta device must be removed after 3 years. Your doctor can insert a new device at that time if you wish to continue using this form of birth control. Only your doctor should remove the IUD. Do not attempt to remove the device yourself. If you wish to continue preventing pregnancy, you may need to start using another birth control method a week before your levonorgestrel intrauterine system is removed. What happens if I miss a dose? Since the IUD continuously releases a low dose of levonorgestrel, missing a dose does not occur when using this form of levonorgestrel. What happens if I overdose? An overdose of levonorgestrel released from the intrauterine system is very unlikely to occur. What should I avoid while using levonorgestrel intrauterine system? Avoid having more than one sexual partner. The IUD can increase your risk of developing a serious pelvic infection, which is often caused by sexually transmitted disease. Levonorgestrel intrauterine system will not protect you from sexually transmitted diseases, including HIV and AIDS. Using a condom is the only way to help protect yourself from these diseases. Call your doctor if your sexual partner develops HIV or a sexually transmitted disease, or if you have any change in sexual relationships. What are the possible side effects of levonorgestrel intrauterine system? Get emergency medical help if you have severe pain in your lower stomach or side. This could be a sign of a tubal pregnancy (a pregnancy that implants in the fallopian tube instead of the uterus). A tubal pregnancy is a medical emergency. The levonorgestrel IUD may become embedded into the wall of the uterus, or may perforate (form a hole) in the uterus. If this occurs, the device may no longer prevent pregnancy, or it may move outside the uterus and cause scarring, infection, or damage to other organs.  Your doctor may need to surgically aren't sure if your sex partner might have an STI, use a condom to protect against disease. · The shot may cause bone loss in some women. You shouldn't use this method for more than 2 years without talking to your doctor first about the risks and benefits. · Any side effects may last up to 3 months. ¨ The shot may cause irregular periods, or you may have spotting between periods. You may also stop getting a period. Some women see having no period as an advantage. ¨ It may cause mood changes, less interest in sex, or weight gain. · You must go to the doctor every 3 months to get the shot. · If you want to get pregnant, it may take 9 to 10 months after you stop getting the shot. This is because the hormones the shot provided have to leave your system, and your body has to readjust.  · If you have severe side effects, you have to wait for the hormones to get out of your system. This may take up to 3 months. Where can you learn more? Go to https://Gradible (formerly gradsavers).[employer]. org and sign in to your My Single Point account. Enter Q473 in the [employer] box to learn more about \"Learning About Birth Control: The Shot. \"     If you do not have an account, please click on the \"Sign Up Now\" link. Current as of: March 16, 2017  Content Version: 11.4  © 7617-1355 [employer]. Care instructions adapted under license by [facility] ([facility]). If you have questions about a medical condition or this instruction, always ask your healthcare professional. [place] [address] any warranty or liability for your use of this information. Patient Education        Endometrial Ablation: Before Your Procedure  What is endometrial ablation? Endometrial ablation is a type of procedure that's often used to treat heavy menstrual bleeding. It can also be used for other types of bleeding in the uterus. It's not recommended if you plan to get pregnant.   Ablation works by destroying the lining of your a living will and a durable power of  for health care. Bring a copy to the hospital. If you don't have one, you may want to prepare one. It lets your doctor and loved ones know your health care wishes. Doctors advise that everyone prepare these papers before any type of surgery or procedure. Procedures can be stressful. This information will help you understand what you can expect. And it will help you safely prepare for your procedure. What happens on the day of the procedure? · Follow the instructions exactly about when to stop eating and drinking. If you don't, your procedure may be canceled. If your doctor told you to take your medicines on the day of the procedure, take them with only a sip of water. ? · Take a bath or shower before you come in for your procedure. Do not apply lotions, perfumes, deodorants, or nail polish. ? · Take off all jewelry and piercings. And take out contact lenses, if you wear them. ? At the [address] [place] [address] or hospital   · Bring a picture ID. ? · You will be kept comfortable and safe by your anesthesia provider. You may get medicine that relaxes you or puts you in a light sleep. ? · The procedure will take less than an hour. Going home   · Be sure you have someone to drive you home. Anesthesia and pain medicine make it unsafe for you to drive. ? · You will be given more specific instructions about recovering from your procedure. They will cover things like diet, wound care, follow-up care, driving, and getting back to your normal routine. When should you call your doctor? · You have questions or concerns. ? · You do not understand how to prepare for your procedure. ? · You become ill before the procedure (such as fever, flu, or a cold). ? · You need to reschedule or have changed your mind about having the procedure. Where can you learn more? Go to https://rosio.FastPay. org and sign in to your [employer] account.  Enter Q832 in the Search Health Information box to learn more about \"Endometrial Ablation: Before Your Procedure. \"     If you do not have an account, please click on the \"Sign Up Now\" link. Current as of: October 13, 2016  Content Version: 11.4  © 4127-2400 Healthwise, Incorporated. Care instructions adapted under license by [facility] ([facility]). If you have questions about a medical condition or this instruction, always ask your healthcare professional. [address] any warranty or liability for your use of this information.

## 2017-12-15 NOTE — PROGRESS NOTES
Please notify patient that request for basaglar which is accepted by his insurance was e scribed to his pharmacy. Thank you.
Strong peripheral pulses

## 2017-12-19 ENCOUNTER — OFFICE VISIT (OUTPATIENT)
Dept: FAMILY MEDICINE CLINIC | Age: 47
End: 2017-12-19
Payer: COMMERCIAL

## 2017-12-19 VITALS
BODY MASS INDEX: 29.92 KG/M2 | HEART RATE: 107 BPM | HEIGHT: 59 IN | TEMPERATURE: 97.9 F | DIASTOLIC BLOOD PRESSURE: 88 MMHG | WEIGHT: 148.4 LBS | SYSTOLIC BLOOD PRESSURE: 128 MMHG

## 2017-12-19 DIAGNOSIS — E11.9 DIABETES MELLITUS TYPE 2, INSULIN DEPENDENT (HCC): Primary | ICD-10-CM

## 2017-12-19 DIAGNOSIS — Z79.4 DIABETES MELLITUS TYPE 2, INSULIN DEPENDENT (HCC): Primary | ICD-10-CM

## 2017-12-19 DIAGNOSIS — E78.00 HYPERCHOLESTEREMIA: ICD-10-CM

## 2017-12-19 DIAGNOSIS — I10 HTN (HYPERTENSION), BENIGN: ICD-10-CM

## 2017-12-19 LAB — HBA1C MFR BLD: 14 %

## 2017-12-19 PROCEDURE — G8417 CALC BMI ABV UP PARAM F/U: HCPCS | Performed by: HOSPITALIST

## 2017-12-19 PROCEDURE — 83036 HEMOGLOBIN GLYCOSYLATED A1C: CPT | Performed by: HOSPITALIST

## 2017-12-19 PROCEDURE — G8427 DOCREV CUR MEDS BY ELIG CLIN: HCPCS | Performed by: HOSPITALIST

## 2017-12-19 PROCEDURE — 3046F HEMOGLOBIN A1C LEVEL >9.0%: CPT | Performed by: HOSPITALIST

## 2017-12-19 PROCEDURE — 99213 OFFICE O/P EST LOW 20 MIN: CPT | Performed by: HOSPITALIST

## 2017-12-19 PROCEDURE — G8484 FLU IMMUNIZE NO ADMIN: HCPCS | Performed by: HOSPITALIST

## 2017-12-19 PROCEDURE — 1036F TOBACCO NON-USER: CPT | Performed by: HOSPITALIST

## 2017-12-19 ASSESSMENT — ENCOUNTER SYMPTOMS
WHEEZING: 0
COUGH: 0
SHORTNESS OF BREATH: 0
BACK PAIN: 0
ABDOMINAL PAIN: 0
SORE THROAT: 0
RHINORRHEA: 0
NAUSEA: 0
CONSTIPATION: 0
VOMITING: 0
DIARRHEA: 0

## 2017-12-19 NOTE — PATIENT INSTRUCTIONS
Thank you for letting us take care of you today. We hope all your questions were addressed. If a question was overlooked or something else comes to mind after you return home, please contact a member of your Care Team listed below. Please make sure you have a routine office visit set up to follow-up on [address]. Your Care Team at [place] [address] is Team #2  Lindsey Wise DO (Faculty)  Spencer Orozco MD (Resident)  Renetta Rodriguez MD (Resident)  Paddy Garcia MD (Resident)  Iván Sanabria MD (Resident)  Nadege Bolden MD (Resident)  PETER Tejadaie Meth., RMA  Jeferson Moncada, NEDA Mccain No ([facility])  Bahman Benavidez RN, ([id number] )  Tim Mixon, Ph.D., (Behavioral Services)  Cash Spann, [address] [place] [address] (Clinical Pharmacist)     Office phone number: [phone]    If you need to get in right away due to illness, please be advised we have \"Same Day\" appointments available Monday-Friday. Please call us at [phone] option #1 to schedule your \"Same Day\" appointment.

## 2017-12-19 NOTE — PROGRESS NOTES
Attending Physician Statement  I have discussed the care of Diane Chavez, including pertinent history and exam findings,  with the resident. I have reviewed the key elements of all parts of the encounter with the resident. I agree with the assessment, plan and orders as documented by the resident.   (GE Modifier)    Trevor Guerin
Visit Information    Have you changed or started any medications since your last visit including any over-the-counter medicines, vitamins, or herbal medicines? no   Have you stopped taking any of your medications? Is so, why? -  no  Are you having any side effects from any of your medications? - no    Have you seen any other physician or provider since your last visit? yes  Have you had any other diagnostic tests since your last visit? Yes had an infection dx from OB  Have you been seen in the emergency room and/or had an admission in a hospital since we last saw you?  no   Have you had your routine dental cleaning in the past 6 months?  no     Do you have an active MyChart account? If no, what is the barrier?   No:     Patient Care Team:  Shayla Mendiola MD as PCP - General (Family Medicine)  Mirna Blas (Optometry)  Timmy Christine MD as Consulting Physician (Endocrinology)  Yaneli Ibarra DO as Resident (Obstetrics & Gynecology)    Medical History Review  Past Medical, Family, and Social History reviewed and does not contribute to the patient presenting condition    Health Maintenance   Topic Date Due    Diabetic retinal exam  05/17/2015    Diabetic foot exam  10/25/2017    Diabetic hemoglobin A1C test  12/13/2017    Diabetic microalbuminuria test  09/13/2018    Lipid screen  10/18/2018    Cervical cancer screen  10/12/2022    DTaP/Tdap/Td vaccine (2 - Td) 01/27/2024    Flu vaccine  Completed    Pneumococcal med risk  Completed    HIV screen  Completed
injection pen Reorder       Return in about 4 weeks (around 1/16/2018) for T2DM.

## 2018-01-09 ENCOUNTER — TELEPHONE (OUTPATIENT)
Dept: OBGYN | Age: 48
End: 2018-01-09

## 2018-01-09 ENCOUNTER — OFFICE VISIT (OUTPATIENT)
Dept: OBGYN | Age: 48
End: 2018-01-09
Payer: COMMERCIAL

## 2018-01-09 VITALS
RESPIRATION RATE: 16 BRPM | DIASTOLIC BLOOD PRESSURE: 94 MMHG | SYSTOLIC BLOOD PRESSURE: 128 MMHG | WEIGHT: 148.7 LBS | TEMPERATURE: 98.4 F | HEART RATE: 90 BPM | BODY MASS INDEX: 29.98 KG/M2 | HEIGHT: 59 IN

## 2018-01-09 DIAGNOSIS — Z01.818 PREOP TESTING: ICD-10-CM

## 2018-01-09 DIAGNOSIS — N93.9 ABNORMAL UTERINE BLEEDING (AUB): ICD-10-CM

## 2018-01-09 DIAGNOSIS — Z01.818 PREOP EXAMINATION: Primary | ICD-10-CM

## 2018-01-09 NOTE — PROGRESS NOTES
 lansoprazole (PREVACID) 30 MG delayed release capsule Take 1 capsule by mouth daily 30 capsule 3    metFORMIN (GLUCOPHAGE) 500 MG tablet Take 1 tablet by mouth 2 times daily (with meals) 60 tablet 3    Blood Glucose Monitoring Suppl (TRUE METRIX AIR GLUCOSE METER) ANA Use daily 1 Device 0    Insulin Syringes, Disposable, U-100 0.3 ML MISC 1 each by Does not apply route daily 100 each 3    Lancets MISC Use 3 times daily Insulin dependent diabetes mellitus 100 each 11    albuterol (PROVENTIL) (5 MG/ML) 0.5% nebulizer solution Take 0.5 mLs by nebulization every 6 hours as needed for Wheezing. 120 Bottle 11    metroNIDAZOLE (FLAGYL) 500 MG tablet 1 tablet twice daily for 7 days 14 tablet 0    clotrimazole-betamethasone (LOTRISONE) 1-0.05 % cream Apply to affected area bid externally x 4 days then daily for 3 days 1 Tube 1    gabapentin (NEURONTIN) 300 MG capsule Take 1 capsule by mouth 3 times daily 90 capsule 11    ibuprofen (ADVIL;MOTRIN) 200 MG tablet Take 1 tablet by mouth every 8 hours as needed for Pain 120 tablet 3    simvastatin (ZOCOR) 80 MG tablet Take 1 tablet by mouth nightly 30 tablet 6    losartan (COZAAR) 50 MG tablet Take 1 tablet by mouth daily 30 tablet 6     No current facility-administered medications on file prior to visit. ALLERGIES:  Allergies as of 01/09/2018 - Review Complete 01/09/2018   Allergen Reaction Noted    Morphine Swelling 12/01/2011           REVIEW OF SYSTEMS:       General appearance:Appears healthy. Alert; in no acute distress. Pleasant. CARDIOVASCULAR: Denies: chest pain, dyspnea on exertion, palpitations  RESPIRATORY: Denies: cough, shortness of breath, wheezing  GI: Denies: abdominal pain, flank pain, nausea, vomiting, diarrhea  : Denies: dysuria, frequency/urgency, hematuria, pelvic pain,  .    vaginal bleeding;  Yes  MUSCULOSKELETAL: Denies: back pain, joint swelling, muscle pain  SKIN: Denies: rash, hives,   Hematologic: Denies Bleeding Disorder,

## 2018-01-11 ENCOUNTER — OFFICE VISIT (OUTPATIENT)
Dept: FAMILY MEDICINE CLINIC | Age: 48
End: 2018-01-11
Payer: COMMERCIAL

## 2018-01-11 VITALS
SYSTOLIC BLOOD PRESSURE: 123 MMHG | WEIGHT: 148 LBS | HEIGHT: 59 IN | TEMPERATURE: 96.3 F | DIASTOLIC BLOOD PRESSURE: 82 MMHG | BODY MASS INDEX: 29.84 KG/M2 | HEART RATE: 98 BPM

## 2018-01-11 DIAGNOSIS — I10 HTN (HYPERTENSION), BENIGN: ICD-10-CM

## 2018-01-11 DIAGNOSIS — Z79.4 DIABETES MELLITUS TYPE 2, INSULIN DEPENDENT (HCC): Primary | ICD-10-CM

## 2018-01-11 DIAGNOSIS — Z23 IMMUNIZATION DUE: ICD-10-CM

## 2018-01-11 DIAGNOSIS — E11.9 DIABETES MELLITUS TYPE 2, INSULIN DEPENDENT (HCC): Primary | ICD-10-CM

## 2018-01-11 PROCEDURE — 90471 IMMUNIZATION ADMIN: CPT | Performed by: HOSPITALIST

## 2018-01-11 PROCEDURE — G8427 DOCREV CUR MEDS BY ELIG CLIN: HCPCS | Performed by: HOSPITALIST

## 2018-01-11 PROCEDURE — G8484 FLU IMMUNIZE NO ADMIN: HCPCS | Performed by: HOSPITALIST

## 2018-01-11 PROCEDURE — 3046F HEMOGLOBIN A1C LEVEL >9.0%: CPT | Performed by: HOSPITALIST

## 2018-01-11 PROCEDURE — 1036F TOBACCO NON-USER: CPT | Performed by: HOSPITALIST

## 2018-01-11 PROCEDURE — 90715 TDAP VACCINE 7 YRS/> IM: CPT | Performed by: HOSPITALIST

## 2018-01-11 PROCEDURE — 99213 OFFICE O/P EST LOW 20 MIN: CPT | Performed by: HOSPITALIST

## 2018-01-11 PROCEDURE — G8417 CALC BMI ABV UP PARAM F/U: HCPCS | Performed by: HOSPITALIST

## 2018-01-11 ASSESSMENT — ENCOUNTER SYMPTOMS
WHEEZING: 0
DIARRHEA: 0
CONSTIPATION: 0
COUGH: 0
VOMITING: 0
SHORTNESS OF BREATH: 0
BACK PAIN: 0
ABDOMINAL PAIN: 0
NAUSEA: 0

## 2018-01-11 NOTE — PROGRESS NOTES
Subjective:      Patient ID: Stephani Leigh is a 41 y.o. female. Pt here for form to be filled out and immunizations to be given. 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 Diabetes   She presents for her follow-up diabetic visit. She has type 2 diabetes mellitus. There are no hypoglycemic associated symptoms. Pertinent negatives for hypoglycemia include no confusion, dizziness, headaches, nervousness/anxiousness or speech difficulty. There are no diabetic associated symptoms. Pertinent negatives for diabetes include no chest pain, no fatigue, no polydipsia and no polyphagia. There are no hypoglycemic complications. There are no diabetic complications. Current diabetic treatment includes insulin injections and oral agent (monotherapy). She is compliant with treatment some of the time. She is following a generally healthy diet. When asked about meal planning, she reported none. She has not had a previous visit with a dietitian. She participates in exercise intermittently. Her overall blood glucose range is 70-90 mg/dl. She does not see a podiatrist.Eye exam is not current. Review of Systems   Constitutional: Negative for activity change, appetite change, diaphoresis, fatigue and fever. Respiratory: Negative for cough, shortness of breath and wheezing. Cardiovascular: Negative for chest pain, palpitations and leg swelling. Gastrointestinal: Negative for abdominal pain, constipation, diarrhea, nausea and vomiting. Endocrine: Negative for cold intolerance, heat intolerance, polydipsia and polyphagia. Genitourinary: Negative for difficulty urinating, dysuria and flank pain. Musculoskeletal: Negative for arthralgias, back pain, myalgias and neck pain. Neurological: Negative for dizziness, syncope, speech difficulty, numbness and headaches. Psychiatric/Behavioral: Negative for confusion, decreased concentration, self-injury, sleep disturbance and suicidal ideas.  The patient is not
Visit Information    Have you changed or started any medications since your last visit including any over-the-counter medicines, vitamins, or herbal medicines? yes - see med list please thanks   Have you stopped taking any of your medications? Is so, why? -  yes - see med list please thanks  Are you having any side effects from any of your medications? - no    Have you seen any other physician or provider since your last visit? yes - Ob GYN   Have you had any other diagnostic tests since your last visit? yes - labs    Have you been seen in the emergency room and/or had an admission in a hospital since we last saw you?  no   Have you had your routine dental cleaning in the past 6 months?  no     Do you have an active MyChart account? If no, what is the barrier?   Yes    Patient Care Team:  Jyotsna Reynolds MD as PCP - General (Family Medicine)  Shruthi Morales (Optometry)  Dara Estes MD as Consulting Physician (Endocrinology)  Elizabeth Mcginnis DO as Resident (Obstetrics & Gynecology)    Medical History Review  Past Medical, Family, and Social History reviewed and does contribute to the patient presenting condition    Health Maintenance   Topic Date Due    Diabetic retinal exam  05/17/2015    Potassium monitoring  12/21/2016    Creatinine monitoring  12/21/2016    Diabetic foot exam  10/25/2017    A1C test (Diabetic or Prediabetic)  03/19/2018    Diabetic microalbuminuria test  09/13/2018    Lipid screen  10/18/2018    Cervical cancer screen  10/12/2022    DTaP/Tdap/Td vaccine (2 - Td) 01/27/2024    Flu vaccine  Completed    Pneumococcal med risk  Completed    HIV screen  Completed       Lab Frequency Next Occurrence   Basic Metabolic Panel Once 17/62/1453   CBC Auto Differential Once 01/30/2018   EKG 12 lead unit performed Once 01/23/2018
reviewed and Tdap given. Return in about 2 months (around 3/11/2018) for a1c.    (Valeri Bhat ) Dr. Nhi MD

## 2018-01-15 ENCOUNTER — TELEPHONE (OUTPATIENT)
Dept: OBGYN | Age: 48
End: 2018-01-15

## 2018-01-15 ENCOUNTER — TELEPHONE (OUTPATIENT)
Dept: FAMILY MEDICINE CLINIC | Age: 48
End: 2018-01-15

## 2018-01-15 NOTE — TELEPHONE ENCOUNTER
----- Message from Chandrakant Zhang MD sent at 1/9/2018  9:22 AM EST -----  Schedule endometrial ablation with Aleja  Pt will need preop medical clearance .  She will be see [facility] 1/18/18

## 2018-01-15 NOTE — LETTER
[facility]  [address] [id number]  Phone: [phone]  Fax: [phone]    Radames Riuz      January 15, 2018      Ms. Mahamed Franklin is medically cleared for surgery-endometrial ablation and anesthesia. Pt will be seen post operatively with us.     Sincerely,    Electronically signed by Su Lee MD on 1/15/2018 at 2:54 PM  Su Lee MD

## 2018-01-15 NOTE — TELEPHONE ENCOUNTER
Lee Ann from Dr Marlo Ledbetter office calling pt is in need of medical clearance for endometrial ablation can not schedule without clearance Need a not saying she is cleared or not just seen on 1/111/18? Can you please review and advise a plan. Thanks if you want to see the pt at a office appt send us a message.  Thanks

## 2018-01-16 NOTE — TELEPHONE ENCOUNTER
Called pt left  for pt to call office. Patient surgery appointment's has been scheduled on 2/12/18 at 8:40am, PAT appointment scheduled on 1/29/18 at 9:00am, and post operative appointment scheduled on 2/28/18 at 10:30am. Surgery instructions mailed to patient.

## 2018-01-23 NOTE — TELEPHONE ENCOUNTER
Blayne Montgomery MD  Jf Rolling             The patient needs to return for repeat pap due to last testing was unsatisfactory for evaluation. Please call pt and schedule appointment. She was seen by FP on 1/11/18 . Her medical condition are under control and stable. Ok to proceed with ablation as planned.

## 2018-02-13 ENCOUNTER — HOSPITAL ENCOUNTER (OUTPATIENT)
Dept: PREADMISSION TESTING | Age: 48
Discharge: HOME | End: 2018-02-17
Payer: COMMERCIAL

## 2018-02-13 VITALS
RESPIRATION RATE: 18 BRPM | HEART RATE: 94 BPM | DIASTOLIC BLOOD PRESSURE: 94 MMHG | SYSTOLIC BLOOD PRESSURE: 136 MMHG | WEIGHT: 148 LBS | HEIGHT: 59 IN | OXYGEN SATURATION: 100 % | TEMPERATURE: 98.3 F | BODY MASS INDEX: 29.84 KG/M2

## 2018-02-13 LAB
ABSOLUTE EOS #: 0.15 K/UL (ref 0–0.44)
ABSOLUTE IMMATURE GRANULOCYTE: 0.04 K/UL (ref 0–0.3)
ABSOLUTE LYMPH #: 2.63 K/UL (ref 1.1–3.7)
ABSOLUTE MONO #: 0.51 K/UL (ref 0.1–1.2)
ANION GAP SERPL CALCULATED.3IONS-SCNC: 10 MMOL/L (ref 9–17)
BASOPHILS # BLD: 0 % (ref 0–2)
BASOPHILS ABSOLUTE: 0.04 K/UL (ref 0–0.2)
BUN BLDV-MCNC: 8 MG/DL (ref 6–20)
BUN/CREAT BLD: ABNORMAL (ref 9–20)
CALCIUM SERPL-MCNC: 8.7 MG/DL (ref 8.6–10.4)
CHLORIDE BLD-SCNC: 98 MMOL/L (ref 98–107)
CO2: 28 MMOL/L (ref 20–31)
CREAT SERPL-MCNC: 0.52 MG/DL (ref 0.5–0.9)
DIFFERENTIAL TYPE: ABNORMAL
EKG ATRIAL RATE: 92 BPM
EKG P AXIS: 49 DEGREES
EKG P-R INTERVAL: 134 MS
EKG Q-T INTERVAL: 364 MS
EKG QRS DURATION: 72 MS
EKG QTC CALCULATION (BAZETT): 450 MS
EKG R AXIS: 10 DEGREES
EKG T AXIS: 34 DEGREES
EKG VENTRICULAR RATE: 92 BPM
EOSINOPHILS RELATIVE PERCENT: 1 % (ref 1–4)
GFR AFRICAN AMERICAN: >60 ML/MIN
GFR NON-AFRICAN AMERICAN: >60 ML/MIN
GFR SERPL CREATININE-BSD FRML MDRD: ABNORMAL ML/MIN/{1.73_M2}
GFR SERPL CREATININE-BSD FRML MDRD: ABNORMAL ML/MIN/{1.73_M2}
GLUCOSE BLD-MCNC: 253 MG/DL (ref 70–99)
HCT VFR BLD CALC: 38.7 % (ref 36.3–47.1)
HEMOGLOBIN: 12 G/DL (ref 11.9–15.1)
IMMATURE GRANULOCYTES: 0 %
LYMPHOCYTES # BLD: 23 % (ref 24–43)
MCH RBC QN AUTO: 26.4 PG (ref 25.2–33.5)
MCHC RBC AUTO-ENTMCNC: 31 G/DL (ref 28.4–34.8)
MCV RBC AUTO: 85.1 FL (ref 82.6–102.9)
MONOCYTES # BLD: 5 % (ref 3–12)
NRBC AUTOMATED: 0 PER 100 WBC
PDW BLD-RTO: 14.6 % (ref 11.8–14.4)
PLATELET # BLD: 331 K/UL (ref 138–453)
PLATELET ESTIMATE: ABNORMAL
PMV BLD AUTO: 10.4 FL (ref 8.1–13.5)
POTASSIUM SERPL-SCNC: 4.1 MMOL/L (ref 3.7–5.3)
RBC # BLD: 4.55 M/UL (ref 3.95–5.11)
RBC # BLD: ABNORMAL 10*6/UL
SEG NEUTROPHILS: 71 % (ref 36–65)
SEGMENTED NEUTROPHILS ABSOLUTE COUNT: 8 K/UL (ref 1.5–8.1)
SODIUM BLD-SCNC: 136 MMOL/L (ref 135–144)
WBC # BLD: 11.4 K/UL (ref 3.5–11.3)
WBC # BLD: ABNORMAL 10*3/UL

## 2018-02-13 PROCEDURE — 93005 ELECTROCARDIOGRAM TRACING: CPT

## 2018-02-13 PROCEDURE — 36415 COLL VENOUS BLD VENIPUNCTURE: CPT

## 2018-02-13 PROCEDURE — 85025 COMPLETE CBC W/AUTO DIFF WBC: CPT

## 2018-02-13 PROCEDURE — 80048 BASIC METABOLIC PNL TOTAL CA: CPT

## 2018-02-13 RX ORDER — SODIUM CHLORIDE, SODIUM LACTATE, POTASSIUM CHLORIDE, CALCIUM CHLORIDE 600; 310; 30; 20 MG/100ML; MG/100ML; MG/100ML; MG/100ML
1000 INJECTION, SOLUTION INTRAVENOUS CONTINUOUS
Status: CANCELLED | OUTPATIENT
Start: 2018-02-13

## 2018-02-13 ASSESSMENT — PAIN DESCRIPTION - LOCATION: LOCATION: ABDOMEN

## 2018-02-13 ASSESSMENT — PAIN DESCRIPTION - PAIN TYPE: TYPE: CHRONIC PAIN

## 2018-02-13 ASSESSMENT — PAIN DESCRIPTION - PROGRESSION: CLINICAL_PROGRESSION: GRADUALLY WORSENING

## 2018-02-13 ASSESSMENT — PAIN DESCRIPTION - ORIENTATION: ORIENTATION: LOWER

## 2018-02-13 ASSESSMENT — PAIN SCALES - GENERAL: PAINLEVEL_OUTOF10: 7

## 2018-02-13 ASSESSMENT — PAIN DESCRIPTION - FREQUENCY: FREQUENCY: CONTINUOUS

## 2018-02-13 ASSESSMENT — PAIN DESCRIPTION - DESCRIPTORS: DESCRIPTORS: CONSTANT;CRAMPING

## 2018-02-13 ASSESSMENT — PAIN DESCRIPTION - ONSET: ONSET: ON-GOING

## 2018-02-13 NOTE — ANESTHESIA PRE-OP
Anesthesia Focused Assessment    STOP-BANG Sleep Apnea Questionnaire    Obstructive Sleep Apnea:                                                                 No     Machine used:                                                                                  No            SNORE loudly (heard through closed doors)? No      TIRED, fatigued, sleepy during daytime? No      OBSERVED stopping breathing during sleep? No      High blood PRESSURE being treated? Yes      BMI over 35? No  AGE over 48? No  NECK circumference over 16\"? No  GENDER (male)? No                High risk 5-8  Intermediate risk 3-4  Low risk 0-2    Total 3  High risk 5-8  Intermediate risk 3-4  Low risk 0-2      Type 1 DM:                                                                                        No    TYPE 2:                                                                                             Yes    If yes, insulin dependent? Yes    Coronary Artery Disease :                                                                No        Active smoker                                                                                   No    Drinks Alcohol                                                                                   Yes    Dentition: Dentures                                                                            No              Partial                                                                                                 No    Any loose or missing teeth                                                                 Yes    Defib / AICD / Pacemaker:                                                               No    Renal Failure: No    If Yes, On dialysis?       Hx of anesthesia complications with Patient                               No    Hx of anesthesia complications with family

## 2018-02-27 ENCOUNTER — HOSPITAL ENCOUNTER (OUTPATIENT)
Age: 48
Setting detail: OUTPATIENT SURGERY
Discharge: HOME | End: 2018-02-27
Attending: OBSTETRICS & GYNECOLOGY | Admitting: OBSTETRICS & GYNECOLOGY
Payer: COMMERCIAL

## 2018-02-27 ENCOUNTER — TELEPHONE (OUTPATIENT)
Dept: OBGYN | Age: 48
End: 2018-02-27

## 2018-02-27 ENCOUNTER — ANESTHESIA EVENT (OUTPATIENT)
Dept: OPERATING ROOM | Age: 48
End: 2018-02-27
Payer: COMMERCIAL

## 2018-02-27 ENCOUNTER — ANESTHESIA (OUTPATIENT)
Dept: OPERATING ROOM | Age: 48
End: 2018-02-27
Payer: COMMERCIAL

## 2018-02-27 VITALS
BODY MASS INDEX: 29.84 KG/M2 | WEIGHT: 148 LBS | SYSTOLIC BLOOD PRESSURE: 145 MMHG | OXYGEN SATURATION: 100 % | HEIGHT: 59 IN | RESPIRATION RATE: 14 BRPM | HEART RATE: 78 BPM | TEMPERATURE: 97 F | DIASTOLIC BLOOD PRESSURE: 102 MMHG

## 2018-02-27 VITALS — OXYGEN SATURATION: 95 % | TEMPERATURE: 95.5 F | DIASTOLIC BLOOD PRESSURE: 72 MMHG | SYSTOLIC BLOOD PRESSURE: 116 MMHG

## 2018-02-27 PROBLEM — Z98.890 S/P ENDOMETRIAL ABLATION: Status: ACTIVE | Noted: 2018-02-27

## 2018-02-27 LAB
GLUCOSE BLD-MCNC: 241 MG/DL (ref 65–105)
GLUCOSE BLD-MCNC: 306 MG/DL (ref 65–105)
GLUCOSE BLD-MCNC: 315 MG/DL (ref 65–105)
GLUCOSE BLD-MCNC: 337 MG/DL (ref 65–105)
HCG, PREGNANCY URINE (POC): NEGATIVE

## 2018-02-27 PROCEDURE — 7100000010 HC PHASE II RECOVERY - FIRST 15 MIN: Performed by: OBSTETRICS & GYNECOLOGY

## 2018-02-27 PROCEDURE — 3600000004 HC SURGERY LEVEL 4 BASE: Performed by: OBSTETRICS & GYNECOLOGY

## 2018-02-27 PROCEDURE — 7100000000 HC PACU RECOVERY - FIRST 15 MIN: Performed by: OBSTETRICS & GYNECOLOGY

## 2018-02-27 PROCEDURE — 7100000001 HC PACU RECOVERY - ADDTL 15 MIN: Performed by: OBSTETRICS & GYNECOLOGY

## 2018-02-27 PROCEDURE — 2580000003 HC RX 258: Performed by: ANESTHESIOLOGY

## 2018-02-27 PROCEDURE — 82947 ASSAY GLUCOSE BLOOD QUANT: CPT

## 2018-02-27 PROCEDURE — 6360000002 HC RX W HCPCS: Performed by: NURSE ANESTHETIST, CERTIFIED REGISTERED

## 2018-02-27 PROCEDURE — 2500000003 HC RX 250 WO HCPCS: Performed by: NURSE ANESTHETIST, CERTIFIED REGISTERED

## 2018-02-27 PROCEDURE — 3700000000 HC ANESTHESIA ATTENDED CARE: Performed by: OBSTETRICS & GYNECOLOGY

## 2018-02-27 PROCEDURE — 3700000001 HC ADD 15 MINUTES (ANESTHESIA): Performed by: OBSTETRICS & GYNECOLOGY

## 2018-02-27 PROCEDURE — 84703 CHORIONIC GONADOTROPIN ASSAY: CPT

## 2018-02-27 PROCEDURE — 2720000010 HC SURG SUPPLY STERILE: Performed by: OBSTETRICS & GYNECOLOGY

## 2018-02-27 PROCEDURE — 3600000014 HC SURGERY LEVEL 4 ADDTL 15MIN: Performed by: OBSTETRICS & GYNECOLOGY

## 2018-02-27 PROCEDURE — 6370000000 HC RX 637 (ALT 250 FOR IP)

## 2018-02-27 PROCEDURE — 6370000000 HC RX 637 (ALT 250 FOR IP): Performed by: ANESTHESIOLOGY

## 2018-02-27 RX ORDER — HYDROCODONE BITARTRATE AND ACETAMINOPHEN 5; 325 MG/1; MG/1
2 TABLET ORAL PRN
Status: COMPLETED | OUTPATIENT
Start: 2018-02-27 | End: 2018-02-27

## 2018-02-27 RX ORDER — MIDAZOLAM HYDROCHLORIDE 1 MG/ML
INJECTION INTRAMUSCULAR; INTRAVENOUS PRN
Status: DISCONTINUED | OUTPATIENT
Start: 2018-02-27 | End: 2018-02-27 | Stop reason: SDUPTHER

## 2018-02-27 RX ORDER — PROPOFOL 10 MG/ML
INJECTION, EMULSION INTRAVENOUS PRN
Status: DISCONTINUED | OUTPATIENT
Start: 2018-02-27 | End: 2018-02-27 | Stop reason: SDUPTHER

## 2018-02-27 RX ORDER — DEXAMETHASONE SODIUM PHOSPHATE 10 MG/ML
INJECTION INTRAMUSCULAR; INTRAVENOUS PRN
Status: DISCONTINUED | OUTPATIENT
Start: 2018-02-27 | End: 2018-02-27 | Stop reason: SDUPTHER

## 2018-02-27 RX ORDER — LIDOCAINE HYDROCHLORIDE 10 MG/ML
INJECTION, SOLUTION EPIDURAL; INFILTRATION; INTRACAUDAL; PERINEURAL PRN
Status: DISCONTINUED | OUTPATIENT
Start: 2018-02-27 | End: 2018-02-27 | Stop reason: SDUPTHER

## 2018-02-27 RX ORDER — KETOROLAC TROMETHAMINE 30 MG/ML
INJECTION, SOLUTION INTRAMUSCULAR; INTRAVENOUS PRN
Status: DISCONTINUED | OUTPATIENT
Start: 2018-02-27 | End: 2018-02-27 | Stop reason: SDUPTHER

## 2018-02-27 RX ORDER — ONDANSETRON 2 MG/ML
INJECTION INTRAMUSCULAR; INTRAVENOUS PRN
Status: DISCONTINUED | OUTPATIENT
Start: 2018-02-27 | End: 2018-02-27 | Stop reason: SDUPTHER

## 2018-02-27 RX ORDER — IBUPROFEN 600 MG/1
600 TABLET ORAL EVERY 6 HOURS PRN
Qty: 30 TABLET | Refills: 0 | Status: SHIPPED | OUTPATIENT
Start: 2018-02-27 | End: 2021-04-02

## 2018-02-27 RX ORDER — HYDROCODONE BITARTRATE AND ACETAMINOPHEN 5; 325 MG/1; MG/1
1 TABLET ORAL PRN
Status: COMPLETED | OUTPATIENT
Start: 2018-02-27 | End: 2018-02-27

## 2018-02-27 RX ORDER — FENTANYL CITRATE 50 UG/ML
INJECTION, SOLUTION INTRAMUSCULAR; INTRAVENOUS PRN
Status: DISCONTINUED | OUTPATIENT
Start: 2018-02-27 | End: 2018-02-27 | Stop reason: SDUPTHER

## 2018-02-27 RX ORDER — FENTANYL CITRATE 50 UG/ML
25 INJECTION, SOLUTION INTRAMUSCULAR; INTRAVENOUS EVERY 5 MIN PRN
Status: DISCONTINUED | OUTPATIENT
Start: 2018-02-27 | End: 2018-02-27 | Stop reason: HOSPADM

## 2018-02-27 RX ORDER — SODIUM CHLORIDE, SODIUM LACTATE, POTASSIUM CHLORIDE, CALCIUM CHLORIDE 600; 310; 30; 20 MG/100ML; MG/100ML; MG/100ML; MG/100ML
1000 INJECTION, SOLUTION INTRAVENOUS CONTINUOUS
Status: DISCONTINUED | OUTPATIENT
Start: 2018-02-27 | End: 2018-02-27 | Stop reason: HOSPADM

## 2018-02-27 RX ADMIN — INSULIN LISPRO 3 UNITS: 100 INJECTION, SOLUTION INTRAVENOUS; SUBCUTANEOUS at 06:58

## 2018-02-27 RX ADMIN — SODIUM CHLORIDE, POTASSIUM CHLORIDE, SODIUM LACTATE AND CALCIUM CHLORIDE: 600; 310; 30; 20 INJECTION, SOLUTION INTRAVENOUS at 07:29

## 2018-02-27 RX ADMIN — HYDROCODONE BITARTRATE AND ACETAMINOPHEN 2 TABLET: 5; 325 TABLET ORAL at 08:38

## 2018-02-27 RX ADMIN — LIDOCAINE HYDROCHLORIDE 50 MG: 10 INJECTION, SOLUTION EPIDURAL; INFILTRATION; INTRACAUDAL; PERINEURAL at 07:32

## 2018-02-27 RX ADMIN — ONDANSETRON 4 MG: 2 INJECTION INTRAMUSCULAR; INTRAVENOUS at 07:40

## 2018-02-27 RX ADMIN — FENTANYL CITRATE 5 MCG: 50 INJECTION INTRAMUSCULAR; INTRAVENOUS at 07:32

## 2018-02-27 RX ADMIN — KETOROLAC TROMETHAMINE 30 MG: 30 INJECTION, SOLUTION INTRAMUSCULAR; INTRAVENOUS at 08:10

## 2018-02-27 RX ADMIN — INSULIN HUMAN 3 UNITS: 100 INJECTION, SOLUTION PARENTERAL at 08:43

## 2018-02-27 RX ADMIN — DEXAMETHASONE SODIUM PHOSPHATE 10 MG: 10 INJECTION INTRAMUSCULAR; INTRAVENOUS at 07:40

## 2018-02-27 RX ADMIN — PROPOFOL 200 MG: 10 INJECTION, EMULSION INTRAVENOUS at 07:32

## 2018-02-27 RX ADMIN — MIDAZOLAM HYDROCHLORIDE 2 MG: 1 INJECTION, SOLUTION INTRAMUSCULAR; INTRAVENOUS at 07:29

## 2018-02-27 RX ADMIN — SODIUM CHLORIDE, POTASSIUM CHLORIDE, SODIUM LACTATE AND CALCIUM CHLORIDE 1000 ML: 600; 310; 30; 20 INJECTION, SOLUTION INTRAVENOUS at 06:56

## 2018-02-27 RX ADMIN — LIDOCAINE HYDROCHLORIDE 50 MG: 10 INJECTION, SOLUTION EPIDURAL; INFILTRATION; INTRACAUDAL; PERINEURAL at 08:19

## 2018-02-27 ASSESSMENT — PULMONARY FUNCTION TESTS
PIF_VALUE: 17
PIF_VALUE: 22
PIF_VALUE: 17
PIF_VALUE: 19
PIF_VALUE: 17
PIF_VALUE: 18
PIF_VALUE: 2
PIF_VALUE: 17
PIF_VALUE: 2
PIF_VALUE: 2
PIF_VALUE: 0
PIF_VALUE: 18
PIF_VALUE: 19
PIF_VALUE: 17
PIF_VALUE: 2
PIF_VALUE: 18
PIF_VALUE: 18
PIF_VALUE: 19
PIF_VALUE: 15
PIF_VALUE: 19
PIF_VALUE: 19
PIF_VALUE: 0
PIF_VALUE: 19
PIF_VALUE: 4
PIF_VALUE: 17
PIF_VALUE: 18
PIF_VALUE: 17
PIF_VALUE: 17
PIF_VALUE: 16
PIF_VALUE: 19
PIF_VALUE: 18
PIF_VALUE: 18
PIF_VALUE: 19
PIF_VALUE: 19
PIF_VALUE: 21
PIF_VALUE: 18
PIF_VALUE: 20
PIF_VALUE: 18
PIF_VALUE: 17
PIF_VALUE: 18
PIF_VALUE: 17
PIF_VALUE: 2
PIF_VALUE: 19
PIF_VALUE: 19
PIF_VALUE: 17
PIF_VALUE: 17
PIF_VALUE: 19

## 2018-02-27 ASSESSMENT — PAIN SCALES - GENERAL
PAINLEVEL_OUTOF10: 7
PAINLEVEL_OUTOF10: 7
PAINLEVEL_OUTOF10: 10

## 2018-02-27 ASSESSMENT — ENCOUNTER SYMPTOMS: SHORTNESS OF BREATH: 0

## 2018-02-27 ASSESSMENT — PAIN SCALES - WONG BAKER: WONGBAKER_NUMERICALRESPONSE: 4

## 2018-02-27 ASSESSMENT — PAIN DESCRIPTION - DESCRIPTORS: DESCRIPTORS: CRAMPING

## 2018-02-27 ASSESSMENT — PAIN DESCRIPTION - LOCATION: LOCATION: VAGINA

## 2018-02-27 ASSESSMENT — PAIN DESCRIPTION - PAIN TYPE
TYPE: SURGICAL PAIN
TYPE: SURGICAL PAIN

## 2018-02-27 NOTE — ANESTHESIA POSTPROCEDURE EVALUATION
Department of Anesthesiology  Postprocedure Note    Patient: Andrey Lechuga  MRN: [id number]  [place]: 1970  Date of evaluation: 2/27/2018  Time:  11:58 AM     Procedure Summary     Date:  02/27/18 Room / Location:  [facility] OR  / [facility] OR    Anesthesia Start:  0596 Anesthesia Stop:  0827    Procedure:  ENDOMETRIAL ABLATION WITH SHAHEED (N/A Vagina ) Diagnosis:  (ABNORMAL UTERINE BLEEDING )    Surgeon:  Chloe Cha MD Responsible Provider:  Joshua Skiff, MD    Anesthesia Type:  general ASA Status:  2          Anesthesia Type: general    Dominique Phase I: Dominique Score: 9    Dominique Phase II: Dominique Score: 9    Last vitals: Reviewed and per EMR flowsheets.        Anesthesia Post Evaluation    Patient location during evaluation: PACU  Patient participation: complete - patient participated  Level of consciousness: awake and alert  Pain score: 1  Airway patency: patent  Nausea & Vomiting: no nausea and no vomiting  Complications: no  Cardiovascular status: hemodynamically stable  Respiratory status: acceptable  Hydration status: euvolemic

## 2018-02-27 NOTE — ANESTHESIA PRE PROCEDURE
intravenous. Anesthetic plan and risks discussed with patient. Plan discussed with CRNA.                   Luis Parrish MD   2/27/2018

## 2018-03-13 ENCOUNTER — OFFICE VISIT (OUTPATIENT)
Dept: OBGYN | Age: 48
End: 2018-03-13
Payer: COMMERCIAL

## 2018-03-13 VITALS
SYSTOLIC BLOOD PRESSURE: 133 MMHG | WEIGHT: 146.4 LBS | HEART RATE: 92 BPM | DIASTOLIC BLOOD PRESSURE: 94 MMHG | TEMPERATURE: 97.3 F | BODY MASS INDEX: 29.52 KG/M2 | HEIGHT: 59 IN | RESPIRATION RATE: 16 BRPM

## 2018-03-13 DIAGNOSIS — B37.31 CANDIDAL VAGINITIS: ICD-10-CM

## 2018-03-13 DIAGNOSIS — Z09 POSTOPERATIVE EXAMINATION: Primary | ICD-10-CM

## 2018-03-13 PROCEDURE — 99212 OFFICE O/P EST SF 10 MIN: CPT

## 2018-03-13 PROCEDURE — 99024 POSTOP FOLLOW-UP VISIT: CPT | Performed by: OBSTETRICS & GYNECOLOGY

## 2018-03-13 RX ORDER — CLOTRIMAZOLE AND BETAMETHASONE DIPROPIONATE 10; .64 MG/G; MG/G
CREAM TOPICAL
Qty: 1 TUBE | Refills: 1 | Status: SHIPPED | OUTPATIENT
Start: 2018-03-13 | End: 2021-03-29

## 2018-03-13 RX ORDER — FLUCONAZOLE 150 MG/1
TABLET ORAL
Qty: 2 TABLET | Refills: 1 | Status: SHIPPED | OUTPATIENT
Start: 2018-03-13 | End: 2021-03-29

## 2018-03-13 NOTE — PATIENT INSTRUCTIONS
Patient Education        Endometrial Ablation: What to Expect at Home  Your Recovery  Endometrial ablation is a procedure to treat very heavy menstrual bleeding or other abnormal bleeding in the uterus. During ablation, the lining of the uterus is destroyed. The lining heals by scarring. The scarring reduces or prevents bleeding. You may have cramps and vaginal bleeding for several days. You may also have watery vaginal discharge mixed with blood for a few days. It may take a few days to 2 weeks to recover. This care sheet gives you a general idea about how long it will take for you to recover. But each person recovers at a different pace. Follow the steps below to feel better as quickly as possible. How can you care for yourself at home? Activity  ? · Rest when you feel tired. Getting enough sleep will help you recover. ? · Most women are able to return to work on the day after the procedure. ? · You may shower and take baths as usual.   ? · Ask your doctor when it is okay for you to have sex. Diet  ? · You can eat your normal diet. If your stomach is upset, try bland, low-fat foods like plain rice, broiled chicken, toast, and yogurt. ? · You may notice that your bowel movements are not regular right after the procedure. This is common. Try to avoid constipation and straining with bowel movements. You may want to take a fiber supplement every day. If you have not had a bowel movement after a couple of days, ask your doctor about taking a mild laxative. Medicines  ? · Your doctor will tell you if and when you can restart your medicines. He or she will also give you instructions about taking any new medicines. ? · If you take blood thinners, such as warfarin (Coumadin), clopidogrel (Plavix), or aspirin, be sure to talk to your doctor. He or she will tell you if and when to start taking those medicines again. Make sure that you understand exactly what your doctor wants you to do.    ? · Take pain

## 2018-03-13 NOTE — PROGRESS NOTES
Mer Gar  3/13/2018  3:00 PM      Mer Gar  Procedure: Endometrial ablation with Vicente Gr is a 41 y.o. female [id number]      The patient was seen, she denies any complaints. She denied any shortness of breath, chest pain or dizziness. She denied any nausea, vomiting, or diarrhea. There is no fever, chills, or rigors. The patient denies any vaginal bleeding, discharge or odor. All of her pre-operative complaints are now resolved. Blood pressure (!) 133/94, pulse 92, temperature 97.3 °F (36.3 °C), temperature source Oral, resp. rate 16, height 4' 11\" (1.499 m), weight 146 lb 6.4 oz (66.4 kg), last menstrual period 02/04/2018, not currently breastfeeding. Abdominal Exam: soft non-tender. Good bowel sounds. No guarding, rebound or rigidity. No costal vertebral angle tenderness bilateral. No hernias        Extremities: No edema or calf pain noted bilaterally. Pelvic Exam:External genitalia: inflamed. Small amount of white discharge. Cervix normal. Cervix probed with graduated plastic dilator to confirm patency of internal os. Os is patent. Assessment:     POD# 14      Stable   Pathology reviewed and found to be benign. NA    Plan:  1. Postoperative examination      2. Candidal vaginitis    - fluconazole (DIFLUCAN) 150 MG tablet; Take on tablet by mouth today,. Repeat second dose on 3/16/18  Dispense: 2 tablet;  Refill: 1  - clotrimazole-betamethasone (LOTRISONE) 1-0.05 % cream; Apply to affected area bid externally x 4 days then daily for 3 days  Dispense: 1 Tube; Refill: 1       Return in about 7 months (around 10/13/2018) for Annual exam.   resume regular activities

## 2018-03-19 DIAGNOSIS — Z79.4 DIABETES MELLITUS TYPE 2, INSULIN DEPENDENT (HCC): ICD-10-CM

## 2018-03-19 DIAGNOSIS — E11.9 DIABETES MELLITUS TYPE 2, INSULIN DEPENDENT (HCC): ICD-10-CM

## 2018-03-19 DIAGNOSIS — K21.9 GASTROESOPHAGEAL REFLUX DISEASE WITHOUT ESOPHAGITIS: ICD-10-CM

## 2018-03-19 NOTE — TELEPHONE ENCOUNTER
Pharmacy requesting refill on Prevacid 30mg #30    Next Visit Date:  No future appointments. Health Maintenance   Topic Date Due    Diabetic retinal exam  05/17/2015    Diabetic foot exam  10/25/2017    A1C test (Diabetic or Prediabetic)  03/19/2018    Diabetic microalbuminuria test  09/13/2018    Lipid screen  10/18/2018    Potassium monitoring  02/13/2019    Creatinine monitoring  02/13/2019    Cervical cancer screen  10/12/2022    DTaP/Tdap/Td vaccine (3 - Td) 01/11/2028    Flu vaccine  Completed    Pneumococcal med risk  Completed    HIV screen  Completed       Hemoglobin A1C (%)   Date Value   12/19/2017 14.0   09/13/2017 14.0 (A)   10/07/2016 12.6             ( goal A1C is < 7)   Microalb/Crt.  Ratio (mcg/mg creat)   Date Value   04/28/2015 10     LDL Cholesterol (mg/dL)   Date Value   10/18/2017 151 (H)       (goal LDL is <100)   AST (U/L)   Date Value   04/28/2015 11     ALT (U/L)   Date Value   04/28/2015 13     BUN (mg/dL)   Date Value   02/13/2018 8     BP Readings from Last 3 Encounters:   03/13/18 (!) 133/94   02/27/18 (!) 145/102   02/27/18 116/72          (goal 120/80)    All Future Testing planned in [facility]  Lab Frequency Next Occurrence   Basic Metabolic Panel Once 67/26/2330   CBC Auto Differential Once 01/09/2019   EKG 12 lead unit performed Once 01/09/2019               Patient Active Problem List:     Obesity     GERD (gastroesophageal reflux disease)     HTN (hypertension), benign     RAD (reactive airway disease)     Diabetes mellitus type 2, insulin dependent (HCC)     Abdominal pain     Bilateral knee pain     Pain in joint of left shoulder     Patellofemoral syndrome     Back pain     Peripheral neuropathy     Hypercholesteremia     Abnormal uterine bleeding (AUB)     Breast pain, left     Abnormal glandular Papanicolaou smear of cervix - NOS     Annual physical exam     Screening for condition     Immunization due     Periodic health assessment, general screening, adult

## 2018-03-26 RX ORDER — LANSOPRAZOLE 30 MG/1
30 CAPSULE, DELAYED RELEASE ORAL DAILY
Qty: 30 CAPSULE | Refills: 3 | Status: SHIPPED | OUTPATIENT
Start: 2018-03-26 | End: 2021-04-02 | Stop reason: SDUPTHER

## 2018-04-12 PROBLEM — Z00.00 PERIODIC HEALTH ASSESSMENT, GENERAL SCREENING, ADULT: Status: RESOLVED | Noted: 2017-10-17 | Resolved: 2018-04-12

## 2020-03-30 ENCOUNTER — TELEPHONE (OUTPATIENT)
Dept: FAMILY MEDICINE CLINIC | Age: 50
End: 2020-03-30

## 2020-03-30 NOTE — TELEPHONE ENCOUNTER
Pt is calling to have asthma meds refilled-is going to another office, bur won't get in until [facility]-ER like a call back

## 2020-03-30 NOTE — TELEPHONE ENCOUNTER
Unable to give patient any medication refills unless seen in office has not been seen since 2018, advised patient to make an appointment.

## 2021-03-29 ENCOUNTER — OFFICE VISIT (OUTPATIENT)
Dept: OBGYN | Age: 51
End: 2021-03-29
Payer: COMMERCIAL

## 2021-03-29 ENCOUNTER — HOSPITAL ENCOUNTER (OUTPATIENT)
Age: 51
Setting detail: SPECIMEN
Discharge: HOME | End: 2021-03-29
Payer: COMMERCIAL

## 2021-03-29 VITALS
SYSTOLIC BLOOD PRESSURE: 143 MMHG | HEART RATE: 100 BPM | WEIGHT: 163.2 LBS | BODY MASS INDEX: 32.9 KG/M2 | DIASTOLIC BLOOD PRESSURE: 91 MMHG | HEIGHT: 59 IN

## 2021-03-29 DIAGNOSIS — N95.1 PERIMENOPAUSAL: ICD-10-CM

## 2021-03-29 DIAGNOSIS — Z01.818 PREOP TESTING: ICD-10-CM

## 2021-03-29 DIAGNOSIS — N93.9 ABNORMAL UTERINE BLEEDING (AUB): ICD-10-CM

## 2021-03-29 DIAGNOSIS — N76.3 CHRONIC VULVITIS: ICD-10-CM

## 2021-03-29 DIAGNOSIS — N88.2 CERVICAL OS STENOSIS: ICD-10-CM

## 2021-03-29 DIAGNOSIS — Z12.4 CERVICAL CANCER SCREENING: ICD-10-CM

## 2021-03-29 DIAGNOSIS — Z12.31 SCREENING MAMMOGRAM FOR HIGH-RISK PATIENT: ICD-10-CM

## 2021-03-29 DIAGNOSIS — Z01.419 WELL WOMAN EXAM WITH ROUTINE GYNECOLOGICAL EXAM: Primary | ICD-10-CM

## 2021-03-29 LAB
ABSOLUTE EOS #: 0.12 K/UL (ref 0–0.44)
ABSOLUTE IMMATURE GRANULOCYTE: 0.08 K/UL (ref 0–0.3)
ABSOLUTE LYMPH #: 3.13 K/UL (ref 1.1–3.7)
ABSOLUTE MONO #: 0.42 K/UL (ref 0.1–1.2)
BASOPHILS # BLD: 1 % (ref 0–2)
BASOPHILS ABSOLUTE: 0.06 K/UL (ref 0–0.2)
DIFFERENTIAL TYPE: ABNORMAL
EOSINOPHILS RELATIVE PERCENT: 1 % (ref 1–4)
ESTRADIOL LEVEL: 29 PG/ML (ref 27–314)
FOLLICLE STIMULATING HORMONE: 17.1 U/L (ref 1.7–21.5)
HCT VFR BLD CALC: 38.9 % (ref 36.3–47.1)
HEMOGLOBIN: 11.8 G/DL (ref 11.9–15.1)
IMMATURE GRANULOCYTES: 1 %
LYMPHOCYTES # BLD: 30 % (ref 24–43)
MCH RBC QN AUTO: 26.7 PG (ref 25.2–33.5)
MCHC RBC AUTO-ENTMCNC: 30.3 G/DL (ref 28.4–34.8)
MCV RBC AUTO: 88 FL (ref 82.6–102.9)
MONOCYTES # BLD: 4 % (ref 3–12)
NRBC AUTOMATED: 0 PER 100 WBC
PDW BLD-RTO: 13.9 % (ref 11.8–14.4)
PLATELET # BLD: 483 K/UL (ref 138–453)
PLATELET ESTIMATE: ABNORMAL
PMV BLD AUTO: 10.1 FL (ref 8.1–13.5)
RBC # BLD: 4.42 M/UL (ref 3.95–5.11)
RBC # BLD: ABNORMAL 10*6/UL
SEG NEUTROPHILS: 63 % (ref 36–65)
SEGMENTED NEUTROPHILS ABSOLUTE COUNT: 6.81 K/UL (ref 1.5–8.1)
TSH SERPL DL<=0.05 MIU/L-ACNC: 1.8 MIU/L (ref 0.3–5)
WBC # BLD: 10.6 K/UL (ref 3.5–11.3)
WBC # BLD: ABNORMAL 10*3/UL

## 2021-03-29 PROCEDURE — 99211 OFF/OP EST MAY X REQ PHY/QHP: CPT | Performed by: OBSTETRICS & GYNECOLOGY

## 2021-03-29 PROCEDURE — 99396 PREV VISIT EST AGE 40-64: CPT | Performed by: OBSTETRICS & GYNECOLOGY

## 2021-03-29 RX ORDER — CLOTRIMAZOLE AND BETAMETHASONE DIPROPIONATE 10; .64 MG/G; MG/G
CREAM TOPICAL
Qty: 1 TUBE | Refills: 1 | Status: SHIPPED | OUTPATIENT
Start: 2021-03-29 | End: 2021-04-02

## 2021-03-29 NOTE — PROGRESS NOTES
Christine Bettencourt  3/29/2021              52 y.o. Chief Complaint   Patient presents with   [employer] Gynecologic Exam     c/o abnormal uterine bleeding       Patient's last menstrual period was 2021 (approximate). Primary Care Physician: Melany Elam MD    The patient was seen and examined. She has no chief complaint today and is here for her annual exam.  Her bowels are regular. There are no voiding complaints. She denies any bloating. She denies vaginal discharge and was counseled on STD's and the need for barrier contraception. HPI : Christine Bettencourt is a 52 y.o. female [id number]  S/P endometrial ablation , no bleeding until last summer. LMP 3/1/2021.  first week of January. Bleeding has been coming every 2 weeks or so during the past 6 months and lasting for 7 -14 days. Bleeding is heavy at times, changes many times per hours, not sure how many. Admits that she goes 2 months at times without period. Cramps are sever when they come on. Uses tylenol  For cramps.  Does not use motrin to upset GI.     ________________________________________________________________________  OB History    Para Term  AB Living   6 5 3 2 1 5   SAB TAB Ectopic Molar Multiple Live Births   1 0 0 0 0 5      # Outcome Date GA Lbr Jose Eduardo/2nd Weight Sex Delivery Anes PTL Lv   6 Term 08   6 lb 10 oz (3.005 kg) M Vag-Spont   RUBEN   5  06   4 lb (1.814 kg) M    RUBEN   4 SAB 2006           3  02   3 lb 3 oz (1.446 kg) M Vag-Spont   RUBEN   2 Term 01 38w0d  6 lb 4 oz (2.835 kg) M Vag-Spont   RUBEN   1 Term 93 40w0d  5 lb 6 oz (2.438 kg) M Vag-Spont   RUBEN     Past Medical History:   Diagnosis Date    Asthma     Chronic back pain     GERD (gastroesophageal reflux disease)     HLD (hyperlipidemia) 2014    Hypertension     Irregular periods/menstrual cycles 2017    Neuropathy     Obesity     Type II or unspecified type diabetes mellitus without mention of complication, not stated as uncontrolled 2007    On Insulin, Lantus nightly & Novolog 3 times with meals    Wears glasses                                                                    Past Surgical History:   Procedure Laterality Date    ENDOMETRIAL ABLATION  02/27/2018    AK HYSTEROSCOPY,W/ENDOMETRIAL ABLATION N/A 2/27/2018    ENDOMETRIAL ABLATION WITH SHAHEED performed by Alex Bishop MD at [address] [place] [address]  2008     Family History   Problem Relation Age of Onset    Diabetes Mother     High Cholesterol Mother     High Blood Pressure Mother     Asthma Mother     No Known Problems Father     Diabetes Maternal Aunt     Diabetes Maternal Uncle     Diabetes Maternal Grandmother     Asthma Sister     No Known Problems Paternal Grandmother     No Known Problems Paternal Grandfather     Asthma Son      Social History     Socioeconomic History    Marital status:      Spouse name: Not on file    Number of children: 11    Years of education: Not on file    Highest education level: Not on file   Occupational History    Occupation: Day Care     Comment: DAY CARE - [employer]   Social Needs    Financial resource strain: Not on file    Food insecurity     Worry: Not on file     Inability: Not on file    Transportation needs     Medical: Not on file     Non-medical: Not on file   Tobacco Use    Smoking status: Never Smoker    Smokeless tobacco: Never Used    Tobacco comment: pt is around smokers a lot   Substance and Sexual Activity    Alcohol use: Not Currently     Comment: occasionally    Drug use: No    Sexual activity: Yes     Partners: Male   Lifestyle    Physical activity     Days per week: Not on file     Minutes per session: Not on file    Stress: Not on file   Relationships    Social connections     Talks on phone: Not on file     Gets together: Not on file     Attends Yazidism service: Not on file     Active member of club or organization: Not on file Attends meetings of clubs or organizations: Not on file     Relationship status: Not on file    Intimate partner violence     Fear of current or ex partner: Not on file     Emotionally abused: Not on file     Physically abused: Not on file     Forced sexual activity: Not on file   Other Topics Concern    Not on file   Social History Narrative    Not on file       MEDICATIONS:  Current Outpatient Medications on File Prior to Visit   Medication Sig Dispense Refill    albuterol (PROVENTIL) (5 MG/ML) 0.5% nebulizer solution Take 0.5 mLs by nebulization every 6 hours as needed for Wheezing 120 Bottle 11    lansoprazole (PREVACID) 30 MG delayed release capsule Take 1 capsule by mouth daily 30 capsule 3    insulin glargine (BASAGLAR KWIKPEN) 100 UNIT/ML injection pen Inject 60 Units into the skin nightly 5 pen 3    albuterol sulfate  (90 Base) MCG/ACT inhaler Inhale 2 puffs into the lungs every 6 hours as needed for Wheezing or Shortness of Breath 1 Inhaler 11    Alcohol Swabs (ALCOHOL PREP) 70 % PADS Use__3___times per day Diagnosis:  Diabetes ___Insulin-Dependent_ 100 each 11    aspirin EC 81 MG EC tablet Take 1 tablet by mouth daily 30 tablet 11    beclomethasone (QVAR) 80 MCG/ACT inhaler Inhale 1 puff into the lungs 2 times daily 1 Inhaler 3    gabapentin (NEURONTIN) 300 MG capsule Take 1 capsule by mouth 3 times daily 90 capsule 11    glucose blood VI test strips (TRUE METRIX BLOOD GLUCOSE TEST) strip 1 each by In Vitro route daily As needed. 100 each 3    insulin aspart (NOVOLOG) 100 UNIT/ML injection vial Less than 70-----0 units and hypoglycemia protocol      -----2 units,151-200-----6 units, 201-250-----8 units, 251-300-----10 units, 301-350-----12 units, 351-400-----14 units, Greater than 400--15 units and contact physician.  (Patient taking differently: Inject into the skin 3 times daily (before meals) Less than 70-----0 units and hypoglycemia protocol      -----2 units,151-200-----6 units, 201-250-----8 units, 251-300-----10 units, 301-350-----12 units, 351-400-----14 units, Greater than 400--15 units and contact physician.) 2 vial 3    Insulin Syringe-Needle U-100 30G X 1/2\" 1 ML MISC 1 each by Does not apply route daily 100 each 3    simvastatin (ZOCOR) 80 MG tablet Take 1 tablet by mouth nightly 30 tablet 6    losartan (COZAAR) 50 MG tablet Take 1 tablet by mouth daily 30 tablet 6    Blood Glucose Monitoring Suppl (TRUE METRIX AIR GLUCOSE METER) ANA Use daily 1 Device 0    ibuprofen (ADVIL;MOTRIN) 600 MG tablet Take 1 tablet by mouth every 6 hours as needed for Pain (Patient not taking: Reported on 3/29/2021) 30 tablet 0     No current facility-administered medications on file prior to visit. ALLERGIES:  Allergies as of 03/29/2021 - Review Complete 03/29/2021   Allergen Reaction Noted    Morphine Swelling 12/01/2011     Immunization status: stated as current, COVID vaccine discussed      Gynecologic History:  Menarche: 17 yo       Patient's last menstrual period was 03/01/2021 (approximate). Sexually Active: Yes,  of 1 years. STD History: No     Permanent Sterilization: Yes BTL   Reversible Birth Control: no    Hormone Replacement Exposure: No      Genetic Qualified Family History of Breast, Ovarian , Colon or Uterine Cancer: No     If YES see scanned worksheet. Preventative Health Testing:  Date of Last Pap Smear: 2018 unsat, HPV neg  Abnormal Pap Smear History: no  Colposcopy History: na  Date of Last Mammogram: 2017  Date of Last Colonoscopy: no  Date of Last Bone Density:      ________________________________________________________________________  REVIEW OF SYSTEMS:      A minimum of an eleven point review of systems was completed. Review Of Systems (11 point):  Constitutional: No fever, chills or malaise;  No weight change or fatigue  Head and Eyes: No vision, Headache, Dizziness or trauma in last 12 months  ENT ROS: No hearing, Tinnitis, sinus or taste problems  Hematological and Lymphatic ROS:No Lymphoma, Von Willebrand's, Hemophillia or Bleeding History  Psych ROS: No Depression, Homicidal thoughts,suicidal thoughts, or anxiety  Breast ROS: No prior breast abnormalities or lumps  Respiratory ROS: No SOB, Pneumoniae,Cough, or Pulmonary Embolism History  Cardiovascular ROS: No Chest Pain with Exertion, Palpitations, Syncope, Edema, Arrhythmia  Gastrointestinal ROS: No Indigestion, Heartburn, Nausea, vomiting, Diarrhea, Constipation,or Bowel Changes; No Bloody Stools or melena  Genito-Urinary ROS: No Dysuria, Hematuria or Nocturia. No Urinary Incontinence or Vaginal Discharge  Musculoskeletal ROS: No Arthralgia, Arthritis,Gout,Osteoporosis or Rheumatism  Neurological ROS: No CVA, Migraines, Epilepsy, Seizure Hx, or Limb Weakness  Dermatological ROS: No Rash, Itching, Hives, Mole Changes or Cancer                                                                                                                                                                                                                                  PHYSICAL Exam:     Constitutional:  Blood pressure (!) 143/91, pulse 100, height 4' 11\" (1.499 m), weight 163 lb 3.2 oz (74 kg), last menstrual period 03/01/2021, not currently breastfeeding. General Appearance: This  is a well Developed, well Nourished, well groomed female. Her BMI was reviewed. Nutritional decision making was discussed. Skin:  There was a Normal Inspection of the skin without rashes or lesions. There were no rashes. (Papular, Maculopapular, Hives, Pustular, Macular)     There were no lesions (Ulcers, Erythema, Abn. Appearing Nevi)            Lymphatic:  No Lymph Nodes were Palpable in the neck , axilla or groin. Neck and EENT:  The neck was supple. There were no masses   The thyroid was not enlarged and had no masses.    Throat inspected-No exudates or Masses, Nares Patent No Masses Respiratory: The lungs were auscultated and found to be clear. There were no rales, rhonchi or wheezes. There was a good respiratory effort. Cardiovascular: The heart was in a regular rate and rhythm. . No S3 or S4. There was no murmur appreciated. Extremities: The patients extremities were without calf tenderness, edema, or varicosities. Abdomen: The abdomen was soft and non-tender. There were good bowel sounds in all quadrants and there was no guarding, rebound or rigidity. On evaluation there was no evidence of hepatosplenomegaly and there was no costal vertebral gypsy tenderness bilaterally. No hernias were appreciated. Psych: The patient had a normal Orientation to: Time, Place, Person, and Situation  There is no Mood / Affect changes    Breast:  (Chest)  normal appearance, no masses or tenderness      Pelvic Exam:  Vulva : mild;y inflamed  Vagina normal   Cervix without visible lesions, with the patient's permission the internal os was probed with a plastic sound. Sound did not pass through the internal os  Uterus not grossly enlarged  Adnexa  Not palpable    Chaperone for Intimate Exam   Chaperone: Je RED          Rectovaginal exam: nagative            Musculosk:  Normal Gait and station was noted. Digits were evaluated without abnormal findings. ASSESSMENT/ PLAN:      52 y.o. Annual  1. Well woman exam with routine gynecological exam    - PAP Smear; Future    2. Abnormal uterine bleeding (AUB)    - US PELVIS COMPLETE; Future  - TSH with Reflex; Future  - CBC Auto Differential; Future  - Needs H-scope and D&C in OR due to cervical stenosis secondary to prior ablation  - consent for H-scope D&C  - pt will need medical clearance    3. Cervical os stenosis  - needs dilation of cervix in OR with H-scope and endometrial sampling to evaluate AUB    4. Cervical cancer screening    - PAP Smear; Future    5.  Screening mammogram for high-risk patient    - [facility] DIGITAL SCREEN W OR WO CAD BILATERAL; Future    6. Perimenopausal    - Follicle Stimulating Hormone; Future  - Estradiol; Future    7.  Chronic vulvitis    - clotrimazole-betamethasone (LOTRISONE) 1-0.05 % cream; Apply to affected area bid externally x 4 days then daily for 3 days  Dispense: 1 Tube; Refill: 1           Chief Complaint   Patient presents with    Gynecologic Exam     c/o abnormal uterine bleeding          Past Medical History:   Diagnosis Date    Asthma     Chronic back pain     GERD (gastroesophageal reflux disease)     HLD (hyperlipidemia) 1/27/2014    Hypertension     Irregular periods/menstrual cycles 2017    Neuropathy     Obesity     Type II or unspecified type diabetes mellitus without mention of complication, not stated as uncontrolled 2007    On Insulin, Lantus nightly & Novolog 3 times with meals    Wears glasses          Patient Active Problem List   Diagnosis    Obesity    GERD (gastroesophageal reflux disease)    HTN (hypertension), benign    RAD (reactive airway disease)    Diabetes mellitus type 2, insulin dependent (HCC)    Abdominal pain    Bilateral knee pain    Pain in joint of left shoulder    Patellofemoral syndrome    Back pain    Peripheral neuropathy    Hypercholesteremia    Abnormal uterine bleeding (AUB)    Breast pain, left    Abnormal glandular Papanicolaou smear of cervix - NOS    Immunization due    S/p Endometrial Ablation 2/27/18

## 2021-03-29 NOTE — PATIENT INSTRUCTIONS
Patient Education        Hysteroscopy: Before Your Procedure  What is a hysteroscopy? A hysteroscopy is a procedure to find and treat problems with your uterus. It may be done to remove growths from the uterus, such as fibroids or polyps. It may also be used to diagnose and treat abnormal bleeding or fertility problems. The doctor will guide a lighted tube through the cervix and into the uterus. This tube is called a hysteroscope, or scope. The doctor will fill your uterus with air or liquid. This makes it easier to see the inside of your uterus with the scope. The doctor may also put tools through the scope to treat a problem. During this procedure, the doctor may take out a small piece of tissue for study. This is called a biopsy. Or the doctor may gently scrape tissue from the inner wall of the uterus. This is called a dilation and curettage, or D&C. If your doctor filled your uterus with liquid, most of it will flow out when the scope is removed. You will most likely go home the same day. Many women are able to go back to work the next day. But it depends on what was done and the type of work you do. Follow-up care is a key part of your treatment and safety. Be sure to make and go to all appointments, and call your doctor if you are having problems. It's also a good idea to know your test results and keep a list of the medicines you take. How do you prepare for the procedure? Preparing for the procedure  · You may be asked not to douche, use tampons, or use vaginal medicines for 24 hours before the hysteroscopy. · Be sure you have someone to take you home. Anesthesia and pain medicine will make it unsafe for you to drive or get home on your own. · Understand exactly what procedure is planned, along with the risks, benefits, and other options. · Tell your doctor ALL the medicines, vitamins, supplements, and herbal remedies you take. Some may increase the risk of problems during your procedure. Your doctor will tell you if you should stop taking any of them before the procedure and how soon to do it. · If you take aspirin or some other blood thinner, ask your doctor if you should stop taking it before your procedure. Make sure that you understand exactly what your doctor wants you to do. These medicines increase the risk of bleeding. · Make sure your doctor and the hospital have a copy of your advance directive. If you don't have one, you may want to prepare one. It lets others know your health care wishes. It's a good thing to have before any type of surgery or procedure. What happens on the day of the procedure? · Follow the instructions exactly about when to stop eating and drinking. If you don't, your procedure may be canceled. If your doctor has instructed you to take your medicines on the day of the procedure, take them with only a sip of water.     · Take a bath or shower before you come in for your procedure. Do not apply lotions, perfumes, deodorants, or nail polish.     · Take off all jewelry and piercings. And take out contact lenses, if you wear them. At the hospital or surgery center   · Bring a picture ID.     · You will be kept comfortable and safe by your anesthesia provider. The anesthesia may make you sleep. Or it may just numb the area being worked on.     · The procedure usually takes less than 30 minutes. When should you call your doctor? · You have questions or concerns.     · You don't understand how to prepare for your procedure.     · You become ill before the procedure (such as fever, flu, or a cold).     · You need to reschedule or have changed your mind about having the procedure. Where can you learn more? Go to [email or web address] org and sign in to your [employer] account. Enter [id number] in the [employer] box to learn more about \"Hysteroscopy: Before Your Procedure. \"     If you do not have an account, please click on the \"Sign Up Now\" link.  Current as of: July 17, 2020               Content Version: 12.8  © 7092-1659 [employer]. Care instructions adapted under license by [facility] ([facility]). If you have questions about a medical condition or this instruction, always ask your healthcare professional. [address] any warranty or liability for your use of this information.

## 2021-03-29 NOTE — Clinical Note
Surgeon: Dr. Emanuel Cushing    Resident: ANY if possible. Schedule in April if possible.     2nd provider needed: No    OR time needed: 1 hour    Procedure(s): Hysteroscopy D&C    Diagnosis: AUB    PATs needed: Yes    Labs needed: EKG,BMP     Clearance needed: Medical    Is patient aware they need clearance and/or referral sent: Yes    Surgery consent: Surgical consent done in office today    Pre op appt needed: None needed    Post op appt needed: 2 weeks    Proposed surgery date: April 2021

## 2021-03-30 DIAGNOSIS — N93.9 ABNORMAL UTERINE BLEEDING (AUB): Primary | Chronic | ICD-10-CM

## 2021-03-30 NOTE — PROGRESS NOTES
Patient is scheduled for pcp appointment on 04.01.21 and needs clearance for surgery. Referral placed for clearance.

## 2021-03-31 LAB
HPV SAMPLE: NORMAL
HPV, GENOTYPE 16: NOT DETECTED
HPV, GENOTYPE 18: NOT DETECTED
HPV, HIGH RISK OTHER: NOT DETECTED
HPV, INTERPRETATION: NORMAL
SPECIMEN DESCRIPTION: NORMAL

## 2021-04-01 LAB — CYTOLOGY REPORT: NORMAL

## 2021-04-02 ENCOUNTER — HOSPITAL ENCOUNTER (OUTPATIENT)
Age: 51
Discharge: HOME | End: 2021-04-02
Payer: COMMERCIAL

## 2021-04-02 ENCOUNTER — OFFICE VISIT (OUTPATIENT)
Dept: PRIMARY CARE CLINIC | Age: 51
End: 2021-04-02
Payer: COMMERCIAL

## 2021-04-02 VITALS
DIASTOLIC BLOOD PRESSURE: 101 MMHG | SYSTOLIC BLOOD PRESSURE: 149 MMHG | HEART RATE: 101 BPM | WEIGHT: 162 LBS | OXYGEN SATURATION: 96 % | TEMPERATURE: 97.7 F | BODY MASS INDEX: 32.72 KG/M2

## 2021-04-02 DIAGNOSIS — E11.9 DIABETES MELLITUS TYPE 2, INSULIN DEPENDENT (HCC): ICD-10-CM

## 2021-04-02 DIAGNOSIS — M51.36 DEGENERATION OF L4-L5 INTERVERTEBRAL DISC: ICD-10-CM

## 2021-04-02 DIAGNOSIS — M54.50 LUMBAR PAIN: ICD-10-CM

## 2021-04-02 DIAGNOSIS — K21.9 GASTROESOPHAGEAL REFLUX DISEASE WITHOUT ESOPHAGITIS: ICD-10-CM

## 2021-04-02 DIAGNOSIS — Z13.220 SCREENING FOR HYPERLIPIDEMIA: Primary | ICD-10-CM

## 2021-04-02 DIAGNOSIS — J45.21 RAD (REACTIVE AIRWAY DISEASE), MILD INTERMITTENT, WITH ACUTE EXACERBATION: ICD-10-CM

## 2021-04-02 DIAGNOSIS — Z11.59 ENCOUNTER FOR HEPATITIS C SCREENING TEST FOR LOW RISK PATIENT: ICD-10-CM

## 2021-04-02 DIAGNOSIS — I10 HTN (HYPERTENSION), BENIGN: ICD-10-CM

## 2021-04-02 DIAGNOSIS — Z13.29 SCREENING FOR THYROID DISORDER: ICD-10-CM

## 2021-04-02 DIAGNOSIS — Z79.4 DIABETES MELLITUS TYPE 2, INSULIN DEPENDENT (HCC): ICD-10-CM

## 2021-04-02 DIAGNOSIS — Z76.89 ENCOUNTER TO ESTABLISH CARE: ICD-10-CM

## 2021-04-02 DIAGNOSIS — Z13.220 SCREENING FOR HYPERLIPIDEMIA: ICD-10-CM

## 2021-04-02 LAB
ALBUMIN SERPL-MCNC: 4.2 G/DL (ref 3.5–5.2)
ALBUMIN/GLOBULIN RATIO: 1.2 (ref 1–2.5)
ALP BLD-CCNC: 104 U/L (ref 35–104)
ALT SERPL-CCNC: 17 U/L (ref 5–33)
ANION GAP SERPL CALCULATED.3IONS-SCNC: 7 MMOL/L (ref 9–17)
AST SERPL-CCNC: 13 U/L
BILIRUB SERPL-MCNC: 0.32 MG/DL (ref 0.3–1.2)
BUN BLDV-MCNC: 9 MG/DL (ref 6–20)
BUN/CREAT BLD: ABNORMAL (ref 9–20)
CALCIUM SERPL-MCNC: 9.5 MG/DL (ref 8.6–10.4)
CHLORIDE BLD-SCNC: 100 MMOL/L (ref 98–107)
CHOLESTEROL, FASTING: 265 MG/DL
CHOLESTEROL/HDL RATIO: 5.1
CO2: 29 MMOL/L (ref 20–31)
CREAT SERPL-MCNC: 0.68 MG/DL (ref 0.5–0.9)
CREATININE URINE: 180.6 MG/DL (ref 28–217)
GFR AFRICAN AMERICAN: >60 ML/MIN
GFR NON-AFRICAN AMERICAN: >60 ML/MIN
GFR SERPL CREATININE-BSD FRML MDRD: ABNORMAL ML/MIN/{1.73_M2}
GFR SERPL CREATININE-BSD FRML MDRD: ABNORMAL ML/MIN/{1.73_M2}
GLUCOSE BLD-MCNC: 173 MG/DL (ref 70–99)
HBA1C MFR BLD: 11.1 %
HCT VFR BLD CALC: 37.3 % (ref 36.3–47.1)
HDLC SERPL-MCNC: 52 MG/DL
HEMOGLOBIN: 11.6 G/DL (ref 11.9–15.1)
HEPATITIS C ANTIBODY: NONREACTIVE
LDL CHOLESTEROL: 169 MG/DL (ref 0–130)
MCH RBC QN AUTO: 27.1 PG (ref 25.2–33.5)
MCHC RBC AUTO-ENTMCNC: 31.1 G/DL (ref 28.4–34.8)
MCV RBC AUTO: 87.1 FL (ref 82.6–102.9)
MICROALBUMIN/CREAT 24H UR: 252 MG/L
MICROALBUMIN/CREAT UR-RTO: 140 MCG/MG CREAT
NRBC AUTOMATED: 0 PER 100 WBC
PDW BLD-RTO: 14 % (ref 11.8–14.4)
PLATELET # BLD: 409 K/UL (ref 138–453)
PMV BLD AUTO: 9.7 FL (ref 8.1–13.5)
POTASSIUM SERPL-SCNC: 4 MMOL/L (ref 3.7–5.3)
RBC # BLD: 4.28 M/UL (ref 3.95–5.11)
SODIUM BLD-SCNC: 136 MMOL/L (ref 135–144)
TOTAL PROTEIN: 7.7 G/DL (ref 6.4–8.3)
TRIGLYCERIDE, FASTING: 218 MG/DL
TSH SERPL DL<=0.05 MIU/L-ACNC: 1.78 MIU/L (ref 0.3–5)
VLDLC SERPL CALC-MCNC: ABNORMAL MG/DL (ref 1–30)
WBC # BLD: 10.9 K/UL (ref 3.5–11.3)

## 2021-04-02 PROCEDURE — 86803 HEPATITIS C AB TEST: CPT

## 2021-04-02 PROCEDURE — 85027 COMPLETE CBC AUTOMATED: CPT

## 2021-04-02 PROCEDURE — 84443 ASSAY THYROID STIM HORMONE: CPT

## 2021-04-02 PROCEDURE — 82570 ASSAY OF URINE CREATININE: CPT

## 2021-04-02 PROCEDURE — 99204 OFFICE O/P NEW MOD 45 MIN: CPT | Performed by: NURSE PRACTITIONER

## 2021-04-02 PROCEDURE — 36415 COLL VENOUS BLD VENIPUNCTURE: CPT

## 2021-04-02 PROCEDURE — 80061 LIPID PANEL: CPT

## 2021-04-02 PROCEDURE — 82043 UR ALBUMIN QUANTITATIVE: CPT

## 2021-04-02 PROCEDURE — 80053 COMPREHEN METABOLIC PANEL: CPT

## 2021-04-02 PROCEDURE — 83036 HEMOGLOBIN GLYCOSYLATED A1C: CPT | Performed by: NURSE PRACTITIONER

## 2021-04-02 RX ORDER — UBIQUINOL 100 MG
CAPSULE ORAL
Qty: 100 EACH | Refills: 11 | Status: SHIPPED | OUTPATIENT
Start: 2021-04-02 | End: 2022-06-01

## 2021-04-02 RX ORDER — LANSOPRAZOLE 30 MG/1
30 CAPSULE, DELAYED RELEASE ORAL DAILY
Qty: 30 CAPSULE | Refills: 2 | Status: SHIPPED | OUTPATIENT
Start: 2021-04-02 | End: 2021-05-19 | Stop reason: SDUPTHER

## 2021-04-02 RX ORDER — POTASSIUM CHLORIDE 20 MEQ/1
20 TABLET, EXTENDED RELEASE ORAL DAILY
Qty: 60 TABLET | Refills: 2 | Status: CANCELLED | OUTPATIENT
Start: 2021-04-02

## 2021-04-02 RX ORDER — INSULIN GLARGINE 100 [IU]/ML
60 INJECTION, SOLUTION SUBCUTANEOUS NIGHTLY
Qty: 5 PEN | Refills: 2 | Status: SHIPPED | OUTPATIENT
Start: 2021-04-02 | End: 2021-05-05 | Stop reason: SDUPTHER

## 2021-04-02 RX ORDER — EMPAGLIFLOZIN 10 MG/1
1 TABLET, FILM COATED ORAL DAILY
Qty: 90 TABLET | Refills: 1 | Status: SHIPPED | OUTPATIENT
Start: 2021-04-02 | End: 2021-05-05 | Stop reason: SDUPTHER

## 2021-04-02 RX ORDER — LOSARTAN POTASSIUM 50 MG/1
50 TABLET ORAL DAILY
Qty: 30 TABLET | Refills: 2 | Status: SHIPPED | OUTPATIENT
Start: 2021-04-02 | End: 2021-05-05 | Stop reason: SDUPTHER

## 2021-04-02 RX ORDER — TIZANIDINE 4 MG/1
4 TABLET ORAL NIGHTLY PRN
Qty: 40 TABLET | Refills: 1 | Status: SHIPPED | OUTPATIENT
Start: 2021-04-02 | End: 2021-05-19 | Stop reason: SDUPTHER

## 2021-04-02 RX ORDER — CALCIUM CITRATE/VITAMIN D3 200MG-6.25
1 TABLET ORAL DAILY
Qty: 100 EACH | Refills: 3 | Status: SHIPPED | OUTPATIENT
Start: 2021-04-02 | End: 2022-06-01

## 2021-04-02 RX ORDER — GABAPENTIN 400 MG/1
400 CAPSULE ORAL 3 TIMES DAILY
Qty: 90 CAPSULE | Refills: 2 | Status: SHIPPED | OUTPATIENT
Start: 2021-04-02 | End: 2021-10-05 | Stop reason: SDUPTHER

## 2021-04-02 RX ORDER — POTASSIUM CHLORIDE 20 MEQ/1
20 TABLET, EXTENDED RELEASE ORAL DAILY
COMMUNITY
Start: 2020-05-16

## 2021-04-02 RX ORDER — SIMVASTATIN 80 MG
80 TABLET ORAL NIGHTLY
Qty: 30 TABLET | Refills: 2 | Status: SHIPPED | OUTPATIENT
Start: 2021-04-02 | End: 2021-05-05 | Stop reason: SDUPTHER

## 2021-04-02 RX ORDER — PEN NEEDLE, DIABETIC 31 G X1/4"
1 NEEDLE, DISPOSABLE MISCELLANEOUS DAILY
Qty: 100 EACH | Refills: 3 | Status: SHIPPED | OUTPATIENT
Start: 2021-04-02 | End: 2021-05-05 | Stop reason: SDUPTHER

## 2021-04-02 RX ORDER — ALBUTEROL SULFATE 90 UG/1
2 AEROSOL, METERED RESPIRATORY (INHALATION) EVERY 6 HOURS PRN
Qty: 1 INHALER | Refills: 11 | Status: SHIPPED | OUTPATIENT
Start: 2021-04-02 | End: 2021-05-05 | Stop reason: SDUPTHER

## 2021-04-02 SDOH — ECONOMIC STABILITY: TRANSPORTATION INSECURITY
IN THE PAST 12 MONTHS, HAS THE LACK OF TRANSPORTATION KEPT YOU FROM MEDICAL APPOINTMENTS OR FROM GETTING MEDICATIONS?: NO

## 2021-04-02 SDOH — ECONOMIC STABILITY: FOOD INSECURITY: WITHIN THE PAST 12 MONTHS, THE FOOD YOU BOUGHT JUST DIDN'T LAST AND YOU DIDN'T HAVE MONEY TO GET MORE.: NEVER TRUE

## 2021-04-02 ASSESSMENT — PATIENT HEALTH QUESTIONNAIRE - PHQ9
SUM OF ALL RESPONSES TO PHQ QUESTIONS 1-9: 0
SUM OF ALL RESPONSES TO PHQ9 QUESTIONS 1 & 2: 0
1. LITTLE INTEREST OR PLEASURE IN DOING THINGS: 0
2. FEELING DOWN, DEPRESSED OR HOPELESS: 0
SUM OF ALL RESPONSES TO PHQ QUESTIONS 1-9: 0

## 2021-04-02 ASSESSMENT — ENCOUNTER SYMPTOMS
BACK PAIN: 1
RHINORRHEA: 0
CONSTIPATION: 0
SHORTNESS OF BREATH: 0
COUGH: 0
CHEST TIGHTNESS: 0
BLOOD IN STOOL: 0
EYE DISCHARGE: 0
ABDOMINAL PAIN: 0
SINUS PRESSURE: 0
DIARRHEA: 0

## 2021-04-02 NOTE — PROGRESS NOTES
[facility]  [address], [address]  [phone]    Etsher Godwin is a 52 y.o. female who presents today for her  medical conditions/complaintsas noted below. Esther Godwin is c/o of Establish Care and Leg Pain (For a few month )  . HPI:     HPI   Pt is here to establish. Asthma:  Current treatment includes beta agonist inhalers, inhaled steroids. Using preventive medication(s) consistently: yes. Residual symptoms: none. Patient denies any other symptoms. She requires her rescue inhaler hasn't used in a few months    Diabetes Mellitus Type 2: Current symptoms/problems include none. Last a1c unknown to patient  Medication compliance:  compliant all of the time  Diabetic diet compliance:  compliant most of the time,  Weight trend: stable  Current exercise: tries to be active but pain prevents sometimes  Barriers: none    Home blood sugar records: pt reports sugars are between 100-200's- checking four times a day  Any episodes of hypoglycemia? no  Eye exam current (within one year): she is due   reports that she has never smoked. She has never used smokeless tobacco.   Uses 40 units of basaglar at night unless sugars are \"high\" (greater than 120) then she uses 50     Lab Results   Component Value Date    LABA1C 11.1 04/02/2021    LABA1C 14.0 12/19/2017    LABA1C 14.0 (A) 09/13/2017     Lab Results   Component Value Date    LABMICR 10 04/28/2015    CREATININE 0.52 02/13/2018     Lab Results   Component Value Date    ALT 13 04/28/2015    AST 11 04/28/2015     Lab Results   Component Value Date    CHOL 226 (H) 10/18/2017    TRIG 100 10/18/2017    HDL 55 10/18/2017        Hypertension:  Home blood pressure monitoring: No.  She is adherent to a low sodium diet. Patient denies chest pain and shortness of breath. Antihypertensive medication side effects: no medication side effects noted. Use of agents associated with hypertension: none.   Did not take mention of complication, not stated as uncontrolled 2007    On Insulin, Lantus nightly & Novolog 3 times with meals    Wears glasses       Past Surgical History:   Procedure Laterality Date    ENDOMETRIAL ABLATION  02/27/2018    OR HYSTEROSCOPY,W/ENDOMETRIAL ABLATION N/A 2/27/2018    ENDOMETRIAL ABLATION WITH SHAHEED performed by Geoffrey Gtz MD at [address]  2008     Family History   Problem Relation Age of Onset    Diabetes Mother     High Cholesterol Mother     High Blood Pressure Mother     Asthma Mother     No Known Problems Father     Diabetes Maternal Aunt     Diabetes Maternal Uncle     Diabetes Maternal Grandmother     Asthma Sister     No Known Problems Paternal Grandmother     No Known Problems Paternal Grandfather     Asthma Son      Social History     Tobacco Use    Smoking status: Never Smoker    Smokeless tobacco: Never Used    Tobacco comment: pt is around smokers a lot   Substance Use Topics    Alcohol use: Not Currently     Comment: occasionally      Allergies   Allergen Reactions    Morphine Swelling     When given IV morphine pt had swelling in her arm       Subjective:     Review of Systems   Constitutional: Negative for activity change, appetite change, chills, fatigue and fever. HENT: Negative for congestion, ear pain, rhinorrhea and sinus pressure. Eyes: Negative for discharge and visual disturbance. Respiratory: Negative for cough, chest tightness and shortness of breath. Cardiovascular: Negative for chest pain, palpitations and leg swelling. Gastrointestinal: Negative for abdominal pain, blood in stool, constipation and diarrhea. Endocrine: Negative for cold intolerance and heat intolerance. Genitourinary: Negative for difficulty urinating and hematuria. Musculoskeletal: Positive for back pain and myalgias. Negative for arthralgias. Skin: Negative for rash. Neurological: Negative for dizziness, light-headedness and headaches. Psychiatric/Behavioral: Negative for dysphoric mood and self-injury. Objective:     BP (!) 149/101   Pulse 101   Temp 97.7 °F (36.5 °C)   Wt 162 lb (73.5 kg)   SpO2 96%   BMI 32.72 kg/m²    Physical Exam  Constitutional:       Appearance: She is well-developed. HENT:      Right Ear: External ear normal.      Left Ear: External ear normal.      Nose: Nose normal.   Neck:      Musculoskeletal: Full passive range of motion without pain and normal range of motion. Cardiovascular:      Rate and Rhythm: Normal rate and regular rhythm. Heart sounds: Normal heart sounds, S1 normal and S2 normal.   Pulmonary:      Effort: Pulmonary effort is normal. No respiratory distress. Breath sounds: Normal breath sounds. Musculoskeletal: Normal range of motion. General: No deformity. Lumbar back: She exhibits normal range of motion and no tenderness. Skin:     General: Skin is warm and dry. Neurological:      Mental Status: She is alert and oriented to person, place, and time. Visual inspection:  Deformity/amputation: absent  Skin lesions/pre-ulcerative calluses: absent  Edema: right- negative, left- negative    Sensory exam:  Monofilament sensation: normal  (minimum of 5 random plantar locations tested, avoiding callused areas - > 1 area with absence of sensation is + for neuropathy)    Plus at least one of the following:  Pulses: normal,   Pinprick: Intact    Assessment/Plan         1. Diabetes mellitus type 2, insulin dependent (HCC)  Add metformin and jardiance  Goals of a1c discussed. Increase gabapentin   -  DIABETES FOOT EXAM  - POCT glycosylated hemoglobin (Hb A1C)  - Microalbumin, Ur; Future  - Lipid, Fasting; Future  - CBC; Future  - Comprehensive Metabolic Panel; Future  - metFORMIN (GLUCOPHAGE) 500 MG tablet; Take 1 tablet by mouth 2 times daily (with meals)  Dispense: 180 tablet; Refill: 1  - empagliflozin (JARDIANCE) 10 MG tablet;  Take 1 tablet by mouth daily Antibody; Future    8. Screening for hyperlipidemia    - Lipid, Fasting; Future    9. HTN (hypertension), benign  Needs to take meds before next visit. - losartan (COZAAR) 50 MG tablet; Take 1 tablet by mouth daily  Dispense: 30 tablet; Refill: 2    10. Encounter to establish care      RTO if symptoms worsen or fail to improve  Pt agreeable with plan      Patient given educationalmaterials - see patient instructions. Discussed use, benefit, and side effectsof prescribed medications. All patient questions answered. Pt voiced understanding. Reviewed health maintenance. Instructed to continue current medications, diet andexercise. 1.  Lexie received counseling on the following healthy behaviors: nutrition, exercise and medication adherence  2. Patient given educational materials when available - see patient instructionswhen applicable  3. Discussed use, benefit, and side effects of prescribed medications. Barriersto medication compliance addressed. All patient questions answered. Pt voicedunderstanding. 4.  Reviewed prior labs and health maintenance  5. Continuecurrent medications, diet and exercise. Completed Refills   Requested Prescriptions     Signed Prescriptions Disp Refills    metFORMIN (GLUCOPHAGE) 500 MG tablet 180 tablet 1     Sig: Take 1 tablet by mouth 2 times daily (with meals)    empagliflozin (JARDIANCE) 10 MG tablet 90 tablet 1     Sig: Take 1 tablet by mouth daily    gabapentin (NEURONTIN) 400 MG capsule 90 capsule 2     Sig: Take 1 capsule by mouth 3 times daily for 30 days.     tiZANidine (ZANAFLEX) 4 MG tablet 40 tablet 1     Sig: Take 1 tablet by mouth nightly as needed (back/leg pain)    albuterol (PROVENTIL) (5 MG/ML) 0.5% nebulizer solution 120 Bottle 11     Sig: Take 0.5 mLs by nebulization every 6 hours as needed for Wheezing    lansoprazole (PREVACID) 30 MG delayed release capsule 30 capsule 2     Sig: Take 1 capsule by mouth daily    insulin glargine (BASAGLAR KWIKPEN) 100 UNIT/ML injection pen 5 pen 2     Sig: Inject 60 Units into the skin nightly    albuterol sulfate  (90 Base) MCG/ACT inhaler 1 Inhaler 11     Sig: Inhale 2 puffs into the lungs every 6 hours as needed for Wheezing or Shortness of Breath    Alcohol Swabs (ALCOHOL PREP) 70 % PADS 100 each 11     Sig: Use__3___times per day Diagnosis:  Diabetes ___Insulin-Dependent_    beclomethasone (QVAR) 80 MCG/ACT inhaler 1 Inhaler 2     Sig: Inhale 1 puff into the lungs 2 times daily    blood glucose test strips (TRUE METRIX BLOOD GLUCOSE TEST) strip 100 each 3     Si each by In Vitro route daily As needed.  simvastatin (ZOCOR) 80 MG tablet 30 tablet 2     Sig: Take 1 tablet by mouth nightly    losartan (COZAAR) 50 MG tablet 30 tablet 2     Sig: Take 1 tablet by mouth daily    Insulin Pen Needle ([employer] PEN NEEDLES) 31G X 6 MM MISC 100 each 3     Si each by Does not apply route daily         This note was transcribed using dictation with Dragon services. Efforts were made to correct any errors but some words may be misinterpreted.     Electronically signed by Hamzah Nielsen CNP on 2021 at 9:49 AM

## 2021-04-02 NOTE — PROGRESS NOTES
Visit Information    Have you changed or started any medications since your last visit including any over-the-counter medicines, vitamins, or herbal medicines? no   Are you having any side effects from any of your medications? -  no  Have you stopped taking any of your medications? Is so, why? -  no    Have you seen any other physician or provider since your last visit? No  Have you had any other diagnostic tests since your last visit? No  Have you been seen in the emergency room and/or had an admission to a hospital since we last saw you? No  Have you had your routine dental cleaning in the past 6 months? no    Have you activated your [employer] account? If not, what are your barriers?  Yes     Patient Care Team:  KELLEN Rodas CNP as PCP - General (Family Nurse Practitioner)  Marko More (Optometry)  Donna Perrin MD as Consulting Physician (Endocrinology)  Mark Domínguez DO as Resident (Obstetrics & Gynecology)    Medical History Review  Past Medical, Family, and Social History reviewed and does contribute to the patient presenting condition    Health Maintenance   Topic Date Due    Hepatitis C screen  Never done    COVID-19 Vaccine (1) Never done    Hepatitis B vaccine (1 of 3 - Risk 3-dose series) Never done    Diabetic retinal exam  05/17/2015    Diabetic foot exam  10/25/2017    Diabetic microalbuminuria test  09/13/2018    Lipid screen  10/18/2018    A1C test (Diabetic or Prediabetic)  12/19/2018    Potassium monitoring  02/13/2019    Creatinine monitoring  02/13/2019    Flu vaccine (Season Ended) 09/01/2021    Cervical cancer screen  03/29/2026    DTaP/Tdap/Td vaccine (3 - Td) 01/11/2028    Pneumococcal 0-64 years Vaccine  Completed    HIV screen  Completed    Hepatitis A vaccine  Aged Out    Hib vaccine  Aged Out    Meningococcal (ACWY) vaccine  Aged Out

## 2021-04-06 DIAGNOSIS — Z01.419 WELL WOMAN EXAM WITH ROUTINE GYNECOLOGICAL EXAM: ICD-10-CM

## 2021-04-06 DIAGNOSIS — Z12.4 CERVICAL CANCER SCREENING: ICD-10-CM

## 2021-04-23 ENCOUNTER — ANCILLARY PROCEDURE (OUTPATIENT)
Dept: OBGYN | Age: 51
End: 2021-04-23
Payer: COMMERCIAL

## 2021-04-23 DIAGNOSIS — N93.9 ABNORMAL UTERINE BLEEDING (AUB): ICD-10-CM

## 2021-04-23 PROCEDURE — 76830 TRANSVAGINAL US NON-OB: CPT | Performed by: RADIOLOGY

## 2021-04-23 PROCEDURE — 76856 US EXAM PELVIC COMPLETE: CPT | Performed by: RADIOLOGY

## 2021-04-28 ENCOUNTER — HOSPITAL ENCOUNTER (OUTPATIENT)
Dept: MAMMOGRAPHY | Age: 51
Discharge: HOME | End: 2021-04-30
Payer: COMMERCIAL

## 2021-04-28 DIAGNOSIS — Z12.31 SCREENING MAMMOGRAM FOR HIGH-RISK PATIENT: ICD-10-CM

## 2021-04-28 PROCEDURE — 77063 BREAST TOMOSYNTHESIS BI: CPT

## 2021-05-05 ENCOUNTER — OFFICE VISIT (OUTPATIENT)
Dept: PRIMARY CARE CLINIC | Age: 51
End: 2021-05-05
Payer: COMMERCIAL

## 2021-05-05 VITALS
DIASTOLIC BLOOD PRESSURE: 103 MMHG | WEIGHT: 160 LBS | TEMPERATURE: 98 F | OXYGEN SATURATION: 97 % | BODY MASS INDEX: 32.32 KG/M2 | HEART RATE: 114 BPM | SYSTOLIC BLOOD PRESSURE: 150 MMHG

## 2021-05-05 DIAGNOSIS — E11.9 DIABETES MELLITUS TYPE 2, INSULIN DEPENDENT (HCC): ICD-10-CM

## 2021-05-05 DIAGNOSIS — I10 HTN (HYPERTENSION), BENIGN: ICD-10-CM

## 2021-05-05 DIAGNOSIS — J45.21 RAD (REACTIVE AIRWAY DISEASE), MILD INTERMITTENT, WITH ACUTE EXACERBATION: ICD-10-CM

## 2021-05-05 DIAGNOSIS — Z79.4 DIABETES MELLITUS TYPE 2, INSULIN DEPENDENT (HCC): ICD-10-CM

## 2021-05-05 PROCEDURE — 99214 OFFICE O/P EST MOD 30 MIN: CPT | Performed by: NURSE PRACTITIONER

## 2021-05-05 RX ORDER — INSULIN GLARGINE 100 [IU]/ML
60 INJECTION, SOLUTION SUBCUTANEOUS NIGHTLY
Qty: 5 PEN | Refills: 2 | Status: SHIPPED | OUTPATIENT
Start: 2021-05-05 | End: 2021-07-30 | Stop reason: SDUPTHER

## 2021-05-05 RX ORDER — ASPIRIN 81 MG/1
81 TABLET ORAL DAILY
Qty: 30 TABLET | Refills: 11 | Status: SHIPPED | OUTPATIENT
Start: 2021-05-05 | End: 2022-02-25 | Stop reason: SDUPTHER

## 2021-05-05 RX ORDER — ALBUTEROL SULFATE 90 UG/1
2 AEROSOL, METERED RESPIRATORY (INHALATION) EVERY 6 HOURS PRN
Qty: 1 INHALER | Refills: 11 | Status: SHIPPED | OUTPATIENT
Start: 2021-05-05 | End: 2021-10-04 | Stop reason: SDUPTHER

## 2021-05-05 RX ORDER — SIMVASTATIN 80 MG
80 TABLET ORAL NIGHTLY
Qty: 30 TABLET | Refills: 2 | Status: SHIPPED | OUTPATIENT
Start: 2021-05-05 | End: 2021-11-12 | Stop reason: SDUPTHER

## 2021-05-05 RX ORDER — PEN NEEDLE, DIABETIC 31 G X1/4"
1 NEEDLE, DISPOSABLE MISCELLANEOUS DAILY
Qty: 100 EACH | Refills: 3 | Status: SHIPPED | OUTPATIENT
Start: 2021-05-05 | End: 2022-06-01

## 2021-05-05 RX ORDER — LOSARTAN POTASSIUM 50 MG/1
50 TABLET ORAL DAILY
Qty: 30 TABLET | Refills: 2 | Status: SHIPPED | OUTPATIENT
Start: 2021-05-05 | End: 2021-09-15

## 2021-05-05 RX ORDER — EMPAGLIFLOZIN 10 MG/1
1 TABLET, FILM COATED ORAL DAILY
Qty: 90 TABLET | Refills: 1 | Status: SHIPPED | OUTPATIENT
Start: 2021-05-05 | End: 2021-11-12 | Stop reason: SDUPTHER

## 2021-05-05 NOTE — PROGRESS NOTES
Visit Information    Have you changed or started any medications since your last visit including any over-the-counter medicines, vitamins, or herbal medicines? no   Are you having any side effects from any of your medications? -  no  Have you stopped taking any of your medications? Is so, why? -  no    Have you seen any other physician or provider since your last visit? No  Have you had any other diagnostic tests since your last visit? No  Have you been seen in the emergency room and/or had an admission to a hospital since we last saw you? No  Have you had your routine dental cleaning in the past 6 months? no    Have you activated your [employer] account? If not, what are your barriers?  Yes     Patient Care Team:  KELLEN Joseph CNP as PCP - General (Family Nurse Practitioner)  KELLEN Joseph CNP as PCP - [facility]  Li Roger (Optometry)  Sunday Mathis MD as Consulting Physician (Endocrinology)  Gume Mitchell DO as Resident (Obstetrics & Gynecology)    Medical History Review  Past Medical, Family, and Social History reviewed and does not contribute to the patient presenting condition    Health Maintenance   Topic Date Due    COVID-19 Vaccine (1) Never done    Hepatitis B vaccine (1 of 3 - Risk 3-dose series) Never done    Diabetic retinal exam  05/17/2015    A1C test (Diabetic or Prediabetic)  07/02/2021    Flu vaccine (Season Ended) 09/01/2021    Diabetic foot exam  04/02/2022    Diabetic microalbuminuria test  04/02/2022    Lipid screen  04/02/2022    Potassium monitoring  04/02/2022    Creatinine monitoring  04/02/2022    Cervical cancer screen  03/29/2026    DTaP/Tdap/Td vaccine (3 - Td) 01/11/2028    Pneumococcal 0-64 years Vaccine  Completed    Hepatitis C screen  Completed    HIV screen  Completed    Hepatitis A vaccine  Aged Out    Hib vaccine  Aged Out    Meningococcal (ACWY) vaccine  Aged Out

## 2021-05-05 NOTE — PROGRESS NOTES
[facility]  [address], [address]  [phone]    Jillian Croft is a 52 y.o. female who presents today for her  medical conditions/complaintsas noted below. Jillian Croft is c/o of Post-Op Check (surgery clearance)  . HPI:     HPI   Pt has not had bp meds, states couldn't afford to  and now [facility]'t [facility] scripts- she doesn't know how much the meds are    Dm- has some insulin at home- we increased dose at last visit but pt states she is taking 40 units intead of 60- hasnt gotten the other two meds. Has been trying to make dietary changes. Cut out pop. Still drinking some juices. Hr is elevated, she has been using albuterol frequently. Would like a refill on neb med, feels this works better. Wt Readings from Last 3 Encounters:   05/05/21 160 lb (72.6 kg)   04/02/21 162 lb (73.5 kg)   03/29/21 163 lb 3.2 oz (74 kg)       Nursing note reviewedand discussed with patient. Patient'smedications, allergies, past medical, surgical, social and family histories werereviewed and updated as appropriate. Current Outpatient Medications on File Prior to Visit   Medication Sig Dispense Refill    potassium chloride (KLOR-CON M) 20 MEQ extended release tablet Take 20 mEq by mouth daily      tiZANidine (ZANAFLEX) 4 MG tablet Take 1 tablet by mouth nightly as needed (back/leg pain) 40 tablet 1    lansoprazole (PREVACID) 30 MG delayed release capsule Take 1 capsule by mouth daily 30 capsule 2    Alcohol Swabs (ALCOHOL PREP) 70 % PADS Use__3___times per day Diagnosis:  Diabetes ___Insulin-Dependent_ 100 each 11    blood glucose test strips (TRUE METRIX BLOOD GLUCOSE TEST) strip 1 each by In Vitro route daily As needed.  100 each 3    Insulin Syringe-Needle U-100 30G X 1/2\" 1 ML MISC 1 each by Does not apply route daily 100 each 3    Blood Glucose Monitoring Suppl (TRUE METRIX AIR GLUCOSE METER) ANA Use daily 1 Device 0    gabapentin (NEURONTIN) 400 MG capsule Take 1 capsule by mouth 3 times daily for 30 days. 90 capsule 2     No current facility-administered medications on file prior to visit. Past Medical History:   Diagnosis Date    Asthma     Chronic back pain     GERD (gastroesophageal reflux disease)     HLD (hyperlipidemia) 1/27/2014    Hypertension     Irregular periods/menstrual cycles 2017    Neuropathy     Obesity     Type II or unspecified type diabetes mellitus without mention of complication, not stated as uncontrolled 2007    On Insulin, Lantus nightly & Novolog 3 times with meals    Wears glasses       Past Surgical History:   Procedure Laterality Date    ENDOMETRIAL ABLATION  02/27/2018    SC HYSTEROSCOPY,W/ENDOMETRIAL ABLATION N/A 2/27/2018    ENDOMETRIAL ABLATION WITH SHAHEED performed by Lucia Farmer MD at [address] [place] [address]  2008     Family History   Problem Relation Age of Onset    Diabetes Mother     High Cholesterol Mother     High Blood Pressure Mother     Asthma Mother     No Known Problems Father     Diabetes Maternal Aunt     Diabetes Maternal Uncle     Diabetes Maternal Grandmother     Asthma Sister     No Known Problems Paternal Grandmother     No Known Problems Paternal Grandfather     Asthma Son      Social History     Tobacco Use    Smoking status: Never Smoker    Smokeless tobacco: Never Used    Tobacco comment: pt is around smokers a lot   Substance Use Topics    Alcohol use: Not Currently     Comment: occasionally      Allergies   Allergen Reactions    Morphine Swelling     When given IV morphine pt had swelling in her arm       Subjective:     Review of Systems   Constitutional: Negative for chills and fever. Respiratory: Negative for cough and shortness of breath. Cardiovascular: Negative for chest pain, palpitations and leg swelling.      Objective:     BP (!) 150/103   Pulse 114   Temp 98 °F (36.7 °C) (Temporal)   Wt 160 lb (72.6 kg)   SpO2 97% BMI 32.32 kg/m²    Physical Exam  Constitutional:       Appearance: She is well-developed. HENT:      Right Ear: External ear normal.      Left Ear: External ear normal.      Nose: Nose normal.   Cardiovascular:      Rate and Rhythm: Normal rate and regular rhythm. Heart sounds: Normal heart sounds, S1 normal and S2 normal.   Pulmonary:      Effort: Pulmonary effort is normal. No respiratory distress. Breath sounds: Normal breath sounds. Musculoskeletal:         General: No deformity. Normal range of motion. Cervical back: Full passive range of motion without pain and normal range of motion. Skin:     General: Skin is warm and dry. Neurological:      Mental Status: She is alert and oriented to person, place, and time. Assessment/Plan       Cant give pre op clearance until a1c is controlled   1. Diabetes mellitus type 2, insulin dependent ([facility]  Will get meds today, if she can't afford or pharmacy doesn't give them to her she will let me know. - metFORMIN (GLUCOPHAGE) 500 MG tablet; Take 1 tablet by mouth 2 times daily (with meals)  Dispense: 180 tablet; Refill: 1  - empagliflozin (JARDIANCE) 10 MG tablet; Take 1 tablet by mouth daily  Dispense: 90 tablet; Refill: 1  - insulin glargine (BASAGLAR KWIKPEN) 100 UNIT/ML injection pen; Inject 60 Units into the skin nightly  Dispense: 5 pen; Refill: 2  - simvastatin (ZOCOR) 80 MG tablet; Take 1 tablet by mouth nightly  Dispense: 30 tablet; Refill: 2    2. RAD (reactive airway disease), mild intermittent, with acute exacerbation    - beclomethasone (QVAR) 80 MCG/ACT inhaler; Inhale 1 puff into the lungs 2 times daily  Dispense: 1 Inhaler; Refill: 2  - albuterol sulfate  (90 Base) MCG/ACT inhaler; Inhale 2 puffs into the lungs every 6 hours as needed for Wheezing or Shortness of Breath  Dispense: 1 Inhaler; Refill: 11  - albuterol (PROVENTIL) (5 MG/ML) 0.5% nebulizer solution;  Take 0.5 mLs by nebulization every 6 hours as needed for Wheezing  Dispense: 120 Bottle; Refill: 11    3. HTN (hypertension), benign    - losartan (COZAAR) 50 MG tablet; Take 1 tablet by mouth daily  Dispense: 30 tablet; Refill: 2  - aspirin EC 81 MG EC tablet; Take 1 tablet by mouth daily  Dispense: 30 tablet; Refill: 11    RTO if symptoms worsen or fail to improve  Pt agreeable with plan      Patient given educationalmaterials - see patient instructions. Discussed use, benefit, and side effectsof prescribed medications. All patient questions answered. Pt voiced understanding. Reviewed health maintenance. Instructed to continue current medications, diet andexercise. 1.  Lexie received counseling on the following healthy behaviors: nutrition, exercise and medication adherence  2. Patient given educational materials when available - see patient instructionswhen applicable  3. Discussed use, benefit, and side effects of prescribed medications. Barriersto medication compliance addressed. All patient questions answered. Pt voicedunderstanding. 4.  Reviewed prior labs and health maintenance  5. Continuecurrent medications, diet and exercise.     Completed Refills   Requested Prescriptions     Signed Prescriptions Disp Refills    metFORMIN (GLUCOPHAGE) 500 MG tablet 180 tablet 1     Sig: Take 1 tablet by mouth 2 times daily (with meals)    empagliflozin (JARDIANCE) 10 MG tablet 90 tablet 1     Sig: Take 1 tablet by mouth daily    insulin glargine (BASAGLAR KWIKPEN) 100 UNIT/ML injection pen 5 pen 2     Sig: Inject 60 Units into the skin nightly    beclomethasone (QVAR) 80 MCG/ACT inhaler 1 Inhaler 2     Sig: Inhale 1 puff into the lungs 2 times daily    losartan (COZAAR) 50 MG tablet 30 tablet 2     Sig: Take 1 tablet by mouth daily    Insulin Pen Needle ([employer] PEN NEEDLES) 31G X 6 MM MISC 100 each 3     Si each by Does not apply route daily    aspirin EC 81 MG EC tablet 30 tablet 11     Sig: Take 1 tablet by mouth daily    simvastatin (ZOCOR) 80 MG tablet 30 tablet 2     Sig: Take 1 tablet by mouth nightly    albuterol sulfate  (90 Base) MCG/ACT inhaler 1 Inhaler 11     Sig: Inhale 2 puffs into the lungs every 6 hours as needed for Wheezing or Shortness of Breath    albuterol (PROVENTIL) (5 MG/ML) 0.5% nebulizer solution 120 Bottle 11     Sig: Take 0.5 mLs by nebulization every 6 hours as needed for Wheezing         This note was transcribed using dictation with Dragon services. Efforts were made to correct any errors but some words may be misinterpreted.     Electronically signed by KELLEN Alvarez CNP, CNP on 5/19/2021 at 8:36 AM

## 2021-05-17 DIAGNOSIS — K21.9 GASTROESOPHAGEAL REFLUX DISEASE WITHOUT ESOPHAGITIS: ICD-10-CM

## 2021-05-17 DIAGNOSIS — M54.50 LUMBAR PAIN: ICD-10-CM

## 2021-05-17 DIAGNOSIS — M51.36 DEGENERATION OF L4-L5 INTERVERTEBRAL DISC: ICD-10-CM

## 2021-05-17 NOTE — TELEPHONE ENCOUNTER
Patient Active Problem List:     Obesity     GERD (gastroesophageal reflux disease)     HTN (hypertension), benign     RAD (reactive airway disease)     Diabetes mellitus type 2, insulin dependent (HCC)     Abdominal pain     Bilateral knee pain     Pain in joint of left shoulder     Patellofemoral syndrome     Back pain     Peripheral neuropathy     Hypercholesteremia     Abnormal uterine bleeding (AUB)     Breast pain, left     Abnormal glandular Papanicolaou smear of cervix - NOS     Immunization due     S/p Endometrial Ablation 2/27/18

## 2021-05-19 RX ORDER — TIZANIDINE 4 MG/1
4 TABLET ORAL NIGHTLY PRN
Qty: 40 TABLET | Refills: 1 | Status: SHIPPED | OUTPATIENT
Start: 2021-05-19 | End: 2021-09-15 | Stop reason: SDUPTHER

## 2021-05-19 RX ORDER — LANSOPRAZOLE 30 MG/1
30 CAPSULE, DELAYED RELEASE ORAL DAILY
Qty: 30 CAPSULE | Refills: 2 | Status: SHIPPED | OUTPATIENT
Start: 2021-05-19 | End: 2021-07-30

## 2021-05-19 ASSESSMENT — ENCOUNTER SYMPTOMS
SHORTNESS OF BREATH: 0
COUGH: 0

## 2021-06-02 ENCOUNTER — TELEPHONE (OUTPATIENT)
Dept: PRIMARY CARE CLINIC | Age: 51
End: 2021-06-02

## 2021-06-02 NOTE — TELEPHONE ENCOUNTER
Patient wants To know  were you recommend she get a nebulizer machine from. Patient states that her pharmacy directed her to ask her pcp because they don't carry it . Please advise

## 2021-07-30 ENCOUNTER — OFFICE VISIT (OUTPATIENT)
Dept: PRIMARY CARE CLINIC | Age: 51
End: 2021-07-30
Payer: COMMERCIAL

## 2021-07-30 VITALS
OXYGEN SATURATION: 97 % | BODY MASS INDEX: 32.56 KG/M2 | DIASTOLIC BLOOD PRESSURE: 101 MMHG | WEIGHT: 161.2 LBS | HEART RATE: 83 BPM | TEMPERATURE: 97.3 F | SYSTOLIC BLOOD PRESSURE: 154 MMHG

## 2021-07-30 DIAGNOSIS — I10 HTN (HYPERTENSION), BENIGN: ICD-10-CM

## 2021-07-30 DIAGNOSIS — J45.30 MILD PERSISTENT REACTIVE AIRWAY DISEASE WITHOUT COMPLICATION: ICD-10-CM

## 2021-07-30 DIAGNOSIS — Z79.4 DIABETES MELLITUS TYPE 2, INSULIN DEPENDENT (HCC): Primary | ICD-10-CM

## 2021-07-30 DIAGNOSIS — E11.9 DIABETES MELLITUS TYPE 2, INSULIN DEPENDENT (HCC): Primary | ICD-10-CM

## 2021-07-30 LAB — HBA1C MFR BLD: 13.6 %

## 2021-07-30 PROCEDURE — 99214 OFFICE O/P EST MOD 30 MIN: CPT | Performed by: NURSE PRACTITIONER

## 2021-07-30 PROCEDURE — 83036 HEMOGLOBIN GLYCOSYLATED A1C: CPT | Performed by: NURSE PRACTITIONER

## 2021-07-30 RX ORDER — INSULIN GLARGINE 100 [IU]/ML
50 INJECTION, SOLUTION SUBCUTANEOUS NIGHTLY
Qty: 5 PEN | Refills: 2 | Status: SHIPPED | OUTPATIENT
Start: 2021-07-30 | End: 2021-10-05 | Stop reason: SDUPTHER

## 2021-07-30 RX ORDER — FAMOTIDINE 20 MG/1
20 TABLET, FILM COATED ORAL DAILY
Qty: 30 TABLET | Refills: 3 | Status: SHIPPED | OUTPATIENT
Start: 2021-07-30 | End: 2021-11-12 | Stop reason: SDUPTHER

## 2021-07-30 ASSESSMENT — ENCOUNTER SYMPTOMS
COUGH: 0
SHORTNESS OF BREATH: 0

## 2021-07-30 NOTE — PROGRESS NOTES
Visit Information    Have you changed or started any medications since your last visit including any over-the-counter medicines, vitamins, or herbal medicines? no   Are you having any side effects from any of your medications? -  no  Have you stopped taking any of your medications? Is so, why? -  no    Have you seen any other physician or provider since your last visit? No  Have you had any other diagnostic tests since your last visit? No  Have you been seen in the emergency room and/or had an admission to a hospital since we last saw you? No  Have you had your routine dental cleaning in the past 6 months? no    Have you activated your [employer] account? If not, what are your barriers?  Yes     Patient Care Team:  KELLEN Oh CNP as PCP - General (Family Nurse Practitioner)  KELLEN Oh CNP as PCP - [facility]  Alex Barton (Optometry)  Kimberli Cantu MD as Consulting Physician (Endocrinology)  Alejandro Munoz DO as Resident (Obstetrics & Gynecology)  KELLEN Herman CNP (Family Medicine)    Medical History Review  Past Medical, Family, and Social History reviewed and does not contribute to the patient presenting condition    Health Maintenance   Topic Date Due    Hepatitis B vaccine (1 of 3 - Risk 3-dose series) Never done    Diabetic retinal exam  05/17/2015    Colon cancer screen colonoscopy  Never done    COVID-19 Vaccine (2 - Moderna 2-dose series) 06/15/2021    A1C test (Diabetic or Prediabetic)  07/02/2021    Flu vaccine (1) 09/01/2021    Diabetic foot exam  04/02/2022    Diabetic microalbuminuria test  04/02/2022    Lipid screen  04/02/2022    Potassium monitoring  04/02/2022    Creatinine monitoring  04/02/2022    Cervical cancer screen  03/29/2026    DTaP/Tdap/Td vaccine (3 - Td or Tdap) 01/11/2028    Pneumococcal 0-64 years Vaccine (2 of 2 - PPSV23) 12/20/2037    Hepatitis C screen  Completed    HIV screen  Completed    Hepatitis A vaccine  Aged Out    Hib vaccine  Aged Out    Meningococcal (ACWY) vaccine  Aged Out

## 2021-07-30 NOTE — PROGRESS NOTES
[facility]  [address], [address]  [phone]    Max Capone is a 52 y.o. female who presents today for her  medical conditions/complaintsas noted below. Max Capone is c/o of Surgery (Pre-Op Clearance) and Discuss Medications (Prevacid)  . HPI:     HPI   John Kelley, is here for dm follow up.  a1c is 13.6- up from 11.1-not checking sugars. She does say she is using basaglar, metformin and jardiance. She states she is using 40 units of basaglar nightly, we discussed increasing last visit. States her eating habits are bad. She has apt for eye exam on Wednesday. John Kelley is in need of she thinks a d&c- she is in need of pre-op. It is been discussed with her that I cannot give her clearance until her a1c and htn are at least of reasonable control. Wt Readings from Last 3 Encounters:   07/30/21 161 lb 3.2 oz (73.1 kg)   05/05/21 160 lb (72.6 kg)   04/02/21 162 lb (73.5 kg)       htn- states she ran out of meds, just picked up today. Not checking bp at home. gerd- has been on prevacid, doesn't feel it is helping. Takes med before eating, still having some irritation, heart burn. Never been on any other med. Wt Readings from Last 3 Encounters:   07/30/21 161 lb 3.2 oz (73.1 kg)   05/05/21 160 lb (72.6 kg)   04/02/21 162 lb (73.5 kg)       Nursing note reviewedand discussed with patient. Patient'smedications, allergies, past medical, surgical, social and family histories werereviewed and updated as appropriate.   Current Outpatient Medications on File Prior to Visit   Medication Sig Dispense Refill    tiZANidine (ZANAFLEX) 4 MG tablet Take 1 tablet by mouth nightly as needed (back/leg pain) 40 tablet 1    empagliflozin (JARDIANCE) 10 MG tablet Take 1 tablet by mouth daily 90 tablet 1    beclomethasone (QVAR) 80 MCG/ACT inhaler Inhale 1 puff into the lungs 2 times daily 1 Inhaler 2    losartan (COZAAR) 50 MG tablet Take 1 tablet by mouth daily 30 tablet 2    Insulin Pen Needle ([employer] PEN NEEDLES) 31G X 6 MM MISC 1 each by Does not apply route daily 100 each 3    aspirin EC 81 MG EC tablet Take 1 tablet by mouth daily 30 tablet 11    simvastatin (ZOCOR) 80 MG tablet Take 1 tablet by mouth nightly 30 tablet 2    albuterol sulfate  (90 Base) MCG/ACT inhaler Inhale 2 puffs into the lungs every 6 hours as needed for Wheezing or Shortness of Breath 1 Inhaler 11    albuterol (PROVENTIL) (5 MG/ML) 0.5% nebulizer solution Take 0.5 mLs by nebulization every 6 hours as needed for Wheezing 120 Bottle 11    potassium chloride (KLOR-CON M) 20 MEQ extended release tablet Take 20 mEq by mouth daily      gabapentin (NEURONTIN) 400 MG capsule Take 1 capsule by mouth 3 times daily for 30 days. 90 capsule 2    Alcohol Swabs (ALCOHOL PREP) 70 % PADS Use__3___times per day Diagnosis:  Diabetes ___Insulin-Dependent_ 100 each 11    blood glucose test strips (TRUE METRIX BLOOD GLUCOSE TEST) strip 1 each by In Vitro route daily As needed. 100 each 3    Insulin Syringe-Needle U-100 30G X 1/2\" 1 ML MISC 1 each by Does not apply route daily 100 each 3    Blood Glucose Monitoring Suppl (TRUE METRIX AIR GLUCOSE METER) ANA Use daily 1 Device 0     No current facility-administered medications on file prior to visit.      Past Medical History:   Diagnosis Date    Asthma     Chronic back pain     GERD (gastroesophageal reflux disease)     HLD (hyperlipidemia) 1/27/2014    Hypertension     Irregular periods/menstrual cycles 2017    Neuropathy     Obesity     Type II or unspecified type diabetes mellitus without mention of complication, not stated as uncontrolled 2007    On Insulin, Lantus nightly & Novolog 3 times with meals    Wears glasses       Past Surgical History:   Procedure Laterality Date    ENDOMETRIAL ABLATION  02/27/2018    NM HYSTEROSCOPY,W/ENDOMETRIAL ABLATION N/A 2/27/2018    ENDOMETRIAL ABLATION WITH SHAHEED performed by Rafa Bryan MD at [address] [facility]  2008     Family History   Problem Relation Age of Onset    Diabetes Mother     High Cholesterol Mother     High Blood Pressure Mother     Asthma Mother     No Known Problems Father     Diabetes Maternal Aunt     Diabetes Maternal Uncle     Diabetes Maternal Grandmother     Asthma Sister     No Known Problems Paternal Grandmother     No Known Problems Paternal Grandfather     Asthma Son      Social History     Tobacco Use    Smoking status: Never Smoker    Smokeless tobacco: Never Used    Tobacco comment: pt is around smokers a lot   Substance Use Topics    Alcohol use: Not Currently     Comment: occasionally      Allergies   Allergen Reactions    Morphine Swelling     When given IV morphine pt had swelling in her arm       Subjective:     Review of Systems   Constitutional: Negative for chills and fever. Respiratory: Negative for cough and shortness of breath. Cardiovascular: Negative for chest pain, palpitations and leg swelling. Objective:     BP (!) 154/101   Pulse 83   Temp 97.3 °F (36.3 °C) (Temporal)   Wt 161 lb 3.2 oz (73.1 kg)   SpO2 97%   BMI 32.56 kg/m²    Physical Exam  Constitutional:       Appearance: She is well-developed. HENT:      Right Ear: External ear normal.      Left Ear: External ear normal.      Nose: Nose normal.   Cardiovascular:      Rate and Rhythm: Normal rate and regular rhythm. Heart sounds: Normal heart sounds, S1 normal and S2 normal.   Pulmonary:      Effort: Pulmonary effort is normal. No respiratory distress. Breath sounds: Normal breath sounds. Musculoskeletal:         General: No deformity. Normal range of motion. Cervical back: Full passive range of motion without pain and normal range of motion. Skin:     General: Skin is warm and dry. Neurological:      Mental Status: She is alert and oriented to person, place, and time. Assessment/Plan         1.  Diabetes mellitus type 2, insulin dependent (HCC)    - POCT glycosylated hemoglobin (Hb A1C)  - insulin glargine (BASAGLAR KWIKPEN) 100 UNIT/ML injection pen; Inject 50 Units into the skin nightly  Dispense: 5 pen; Refill: 2  - metFORMIN (GLUCOPHAGE) 1000 MG tablet; Take 1 tablet by mouth 2 times daily (with meals)  Dispense: 60 tablet; Refill: 2  - [address]    2. Mild persistent reactive airway disease without complication    - DME Order for Nebulizer as OP    3. HTN (hypertension), benign  Needs to take meds daily    rto in 6 weeks. RTO if symptoms worsen or fail to improve  Pt agreeable with plan      Patient given educationalmaterials - see patient instructions. Discussed use, benefit, and side effectsof prescribed medications. All patient questions answered. Pt voiced understanding. Reviewed health maintenance. Instructed to continue current medications, diet andexercise. 1.  Lexie received counseling on the following healthy behaviors: nutrition, exercise and medication adherence  2. Patient given educational materials when available - see patient instructionswhen applicable  3. Discussed use, benefit, and side effects of prescribed medications. Barriersto medication compliance addressed. All patient questions answered. Pt voicedunderstanding. 4.  Reviewed prior labs and health maintenance  5. Continuecurrent medications, diet and exercise. Completed Refills   Requested Prescriptions     Signed Prescriptions Disp Refills    insulin glargine (BASAGLAR KWIKPEN) 100 UNIT/ML injection pen 5 pen 2     Sig: Inject 50 Units into the skin nightly    metFORMIN (GLUCOPHAGE) 1000 MG tablet 60 tablet 2     Sig: Take 1 tablet by mouth 2 times daily (with meals)    famotidine (PEPCID) 20 MG tablet 30 tablet 3     Sig: Take 1 tablet by mouth daily         This note was transcribed using dictation with Dragon services.  Efforts were made to correct any errors but some words may be misinterpreted.     Electronically signed by KELLEN Srinivasan - CNP, CNP on 7/30/2021 at 8:59 AM

## 2021-09-15 ENCOUNTER — OFFICE VISIT (OUTPATIENT)
Dept: PRIMARY CARE CLINIC | Age: 51
End: 2021-09-15

## 2021-09-15 VITALS
OXYGEN SATURATION: 99 % | DIASTOLIC BLOOD PRESSURE: 106 MMHG | HEIGHT: 59 IN | WEIGHT: 162 LBS | SYSTOLIC BLOOD PRESSURE: 175 MMHG | HEART RATE: 93 BPM | BODY MASS INDEX: 32.66 KG/M2 | TEMPERATURE: 97.3 F

## 2021-09-15 DIAGNOSIS — M51.36 DEGENERATION OF L4-L5 INTERVERTEBRAL DISC: ICD-10-CM

## 2021-09-15 DIAGNOSIS — Z79.4 DIABETES MELLITUS TYPE 2, INSULIN DEPENDENT (HCC): Primary | ICD-10-CM

## 2021-09-15 DIAGNOSIS — E11.9 DIABETES MELLITUS TYPE 2, INSULIN DEPENDENT (HCC): Primary | ICD-10-CM

## 2021-09-15 DIAGNOSIS — I10 ESSENTIAL HYPERTENSION: ICD-10-CM

## 2021-09-15 DIAGNOSIS — M79.642 BILATERAL HAND PAIN: ICD-10-CM

## 2021-09-15 DIAGNOSIS — M79.641 BILATERAL HAND PAIN: ICD-10-CM

## 2021-09-15 DIAGNOSIS — M54.50 LUMBAR PAIN: ICD-10-CM

## 2021-09-15 PROCEDURE — 99214 OFFICE O/P EST MOD 30 MIN: CPT | Performed by: NURSE PRACTITIONER

## 2021-09-15 RX ORDER — TIZANIDINE 4 MG/1
4 TABLET ORAL NIGHTLY PRN
Qty: 40 TABLET | Refills: 1 | Status: SHIPPED | OUTPATIENT
Start: 2021-09-15 | End: 2022-02-25 | Stop reason: SDUPTHER

## 2021-09-15 RX ORDER — LOSARTAN POTASSIUM AND HYDROCHLOROTHIAZIDE 12.5; 5 MG/1; MG/1
1 TABLET ORAL DAILY
Qty: 90 TABLET | Refills: 1 | Status: SHIPPED | OUTPATIENT
Start: 2021-09-15 | End: 2022-02-25 | Stop reason: SDUPTHER

## 2021-09-15 NOTE — PATIENT INSTRUCTIONS
Get a pill organizer, take pills everyday  bp med Losartan changed to losartan and hydrochlorothiazide (still one pill)

## 2021-09-15 NOTE — PROGRESS NOTES
Visit Information    Have you changed or started any medications since your last visit including any over-the-counter medicines, vitamins, or herbal medicines? no   Are you having any side effects from any of your medications? -  no  Have you stopped taking any of your medications? Is so, why? -  no    Have you seen any other physician or provider since your last visit? No  Have you had any other diagnostic tests since your last visit? no  Have you been seen in the emergency room and/or had an admission to a hospital since we last saw you? No  Have you had your routine dental cleaning in the past 6 months? no    Have you activated your [employer] account? If not, what are your barriers?  No:      Patient Care Team:  KELLEN Vázquez CNP as PCP - General (Family Nurse Practitioner)  KELLEN Vázquez CNP as PCP - [facility]  Otto Clemente (Optometry)  Libby Ibarra MD as Consulting Physician (Endocrinology)  Luisa Yen,  as Resident (Obstetrics & Gynecology)  KELLEN Jeffries CNP (Family Medicine)    Medical History Review  Past Medical, Family, and Social History reviewed and does contribute to the patient presenting condition    Health Maintenance   Topic Date Due    Hepatitis B vaccine (1 of 3 - Risk 3-dose series) Never done    Diabetic retinal exam  05/17/2015    Colon cancer screen colonoscopy  Never done    COVID-19 Vaccine (2 - Moderna 2-dose series) 06/15/2021    Flu vaccine (1) 09/01/2021    A1C test (Diabetic or Prediabetic)  10/30/2021    Diabetic foot exam  04/02/2022    Diabetic microalbuminuria test  04/02/2022    Lipid screen  04/02/2022    Potassium monitoring  04/02/2022    Creatinine monitoring  04/02/2022    Cervical cancer screen  03/29/2026    DTaP/Tdap/Td vaccine (3 - Td or Tdap) 01/11/2028    Pneumococcal 0-64 years Vaccine (2 of 2 - PPSV23) 12/20/2037    Hepatitis C screen  Completed    HIV screen  Completed    Hepatitis A vaccine  Aged Out    Hib vaccine  Aged Out    Meningococcal (ACWY) vaccine  Aged Out

## 2021-09-15 NOTE — PROGRESS NOTES
[facility]  [address], [address]  [phone]    Nneka Baldwin is a 52 y.o. female who presents today for her  medical conditions/complaintsas noted below. Nneka Baldwin is c/o of Hand Pain (Weakness in hands ) and Diabetes  . HPI:     HPI   bp- didn't take meds today. Dm- not checking sugars. States she is taking her meds. She acknowledges that she is taking 50 units of basaglar. Bilateral hand pain- right hand feels weak at times. No numbness or tingling. This has been occurring for a \"while\". The gabapentin and tizanidine helps her pain. The weakness in her hand has been occurring for a few months. Nothing seems to make the pain worse. The pain happens in the middle of the night a lot. No other weakness or neurological complaints. Wt Readings from Last 3 Encounters:   09/15/21 162 lb (73.5 kg)   07/30/21 161 lb 3.2 oz (73.1 kg)   05/05/21 160 lb (72.6 kg)       Nursing note reviewedand discussed with patient. Patient'smedications, allergies, past medical, surgical, social and family histories werereviewed and updated as appropriate.   Current Outpatient Medications on File Prior to Visit   Medication Sig Dispense Refill    insulin glargine (BASAGLAR KWIKPEN) 100 UNIT/ML injection pen Inject 50 Units into the skin nightly 5 pen 2    metFORMIN (GLUCOPHAGE) 1000 MG tablet Take 1 tablet by mouth 2 times daily (with meals) 60 tablet 2    famotidine (PEPCID) 20 MG tablet Take 1 tablet by mouth daily 30 tablet 3    empagliflozin (JARDIANCE) 10 MG tablet Take 1 tablet by mouth daily 90 tablet 1    beclomethasone (QVAR) 80 MCG/ACT inhaler Inhale 1 puff into the lungs 2 times daily 1 Inhaler 2    Insulin Pen Needle ([employer] PEN NEEDLES) 31G X 6 MM MISC 1 each by Does not apply route daily 100 each 3    aspirin EC 81 MG EC tablet Take 1 tablet by mouth daily 30 tablet 11    simvastatin (ZOCOR) 80 MG tablet Take 1 tablet by mouth Maternal Uncle     Diabetes Maternal Grandmother     Asthma Sister     No Known Problems Paternal Grandmother     No Known Problems Paternal Grandfather     Asthma Son      Social History     Tobacco Use    Smoking status: Never Smoker    Smokeless tobacco: Never Used    Tobacco comment: pt is around smokers a lot   Substance Use Topics    Alcohol use: Not Currently     Comment: occasionally      Allergies   Allergen Reactions    Morphine Swelling     When given IV morphine pt had swelling in her arm       Subjective:     Review of Systems   Constitutional: Negative for chills and fever. Respiratory: Negative for cough and shortness of breath. Cardiovascular: Negative for chest pain, palpitations and leg swelling. Musculoskeletal: Positive for arthralgias. Objective:     BP (!) 175/106   Pulse 93   Temp 97.3 °F (36.3 °C)   Ht 4' 11\" (1.499 m)   Wt 162 lb (73.5 kg)   SpO2 99%   BMI 32.72 kg/m²    Physical Exam  Constitutional:       Appearance: She is well-developed. HENT:      Right Ear: External ear normal.      Left Ear: External ear normal.      Nose: Nose normal.   Cardiovascular:      Rate and Rhythm: Normal rate and regular rhythm. Heart sounds: Normal heart sounds, S1 normal and S2 normal.   Pulmonary:      Effort: Pulmonary effort is normal. No respiratory distress. Breath sounds: Normal breath sounds. Musculoskeletal:         General: No deformity. Normal range of motion. Right hand: Normal. No tenderness or bony tenderness. Normal range of motion. Normal strength. There is no disruption of two-point discrimination. Left hand: Normal. No tenderness or bony tenderness. Normal range of motion. Normal strength. There is no disruption of two-point discrimination. Cervical back: Full passive range of motion without pain and normal range of motion. Skin:     General: Skin is warm and dry.    Neurological:      Mental Status: She is alert and oriented to person, place, and time. Assessment/Plan         1. Lumbar pain  Med refill   - tiZANidine (ZANAFLEX) 4 MG tablet; Take 1 tablet by mouth nightly as needed (back/leg pain)  Dispense: 40 tablet; Refill: 1    2. Degeneration of L4-L5 intervertebral disc    - tiZANidine (ZANAFLEX) 4 MG tablet; Take 1 tablet by mouth nightly as needed (back/leg pain)  Dispense: 40 tablet; Refill: 1    3. Diabetes mellitus type 2, insulin dependent (HCC)    - Hemoglobin A1C; Future    4. Essential hypertension  Reminded needs to take meds everyday   Will add hctz to losartan     5. Bilateral hand pain    - Elastic Bandages & Supports (WRIST SPLINT) MISC; Apply 1 each topically continuous Cock up wrist splint  Dispense: 2 each; Refill: 0    RTO if symptoms worsen or fail to improve  Pt agreeable with plan      Patient given educationalmaterials - see patient instructions. Discussed use, benefit, and side effectsof prescribed medications. All patient questions answered. Pt voiced understanding. Reviewed health maintenance. Instructed to continue current medications, diet andexercise. 1.  Lexie received counseling on the following healthy behaviors: nutrition, exercise and medication adherence  2. Patient given educational materials when available - see patient instructionswhen applicable  3. Discussed use, benefit, and side effects of prescribed medications. Barriersto medication compliance addressed. All patient questions answered. Pt voicedunderstanding. 4.  Reviewed prior labs and health maintenance  5. Continuecurrent medications, diet and exercise.     Completed Refills   Requested Prescriptions     Signed Prescriptions Disp Refills    losartan-hydroCHLOROthiazide (HYZAAR) 50-12.5 MG per tablet 90 tablet 1     Sig: Take 1 tablet by mouth daily    Elastic Bandages & Supports (WRIST SPLINT) MISC 2 each 0     Sig: Apply 1 each topically continuous Cock up wrist splint    tiZANidine (ZANAFLEX) 4 MG tablet 40 tablet 1     Sig: Take 1 tablet by mouth nightly as needed (back/leg pain)         This note was transcribed using dictation with Dragon services. Efforts were made to correct any errors but some words may be misinterpreted.     Electronically signed by KELLEN Fox CNP, CNP on 9/16/2021 at 10:01 AM

## 2021-09-16 ASSESSMENT — ENCOUNTER SYMPTOMS
COUGH: 0
SHORTNESS OF BREATH: 0

## 2021-10-04 DIAGNOSIS — E11.9 DIABETES MELLITUS TYPE 2, INSULIN DEPENDENT (HCC): ICD-10-CM

## 2021-10-04 DIAGNOSIS — Z79.4 DIABETES MELLITUS TYPE 2, INSULIN DEPENDENT (HCC): ICD-10-CM

## 2021-10-04 DIAGNOSIS — J45.21 RAD (REACTIVE AIRWAY DISEASE), MILD INTERMITTENT, WITH ACUTE EXACERBATION: ICD-10-CM

## 2021-10-04 NOTE — TELEPHONE ENCOUNTER
Hemoglobin A1C (%)   Date Value   07/30/2021 13.6   04/02/2021 11.1   12/19/2017 14.0             ( goal A1C is < 7)   Microalb/Crt.  Ratio (mcg/mg creat)   Date Value   04/02/2021 140 (H)     LDL Cholesterol (mg/dL)   Date Value   04/02/2021 169 (H)       (goal LDL is <100)   AST (U/L)   Date Value   04/02/2021 13     ALT (U/L)   Date Value   04/02/2021 17     BUN (mg/dL)   Date Value   04/02/2021 9     BP Readings from Last 3 Encounters:   09/15/21 (!) 175/106   07/30/21 (!) 154/101   05/05/21 (!) 150/103          (goal 120/80)        Patient Active Problem List:     Obesity     GERD (gastroesophageal reflux disease)     HTN (hypertension), benign     RAD (reactive airway disease)     Diabetes mellitus type 2, insulin dependent (HCC)     Abdominal pain     Bilateral knee pain     Pain in joint of left shoulder     Patellofemoral syndrome     Back pain     Peripheral neuropathy     Hypercholesteremia     Abnormal uterine bleeding (AUB)     Breast pain, left     Abnormal glandular Papanicolaou smear of cervix - NOS     Immunization due     S/p Endometrial Ablation 2/27/18      ----Jacinta Mederos

## 2021-10-05 RX ORDER — ALBUTEROL SULFATE 90 UG/1
2 AEROSOL, METERED RESPIRATORY (INHALATION) EVERY 6 HOURS PRN
Qty: 1 EACH | Refills: 5 | Status: SHIPPED | OUTPATIENT
Start: 2021-10-05 | End: 2022-07-12 | Stop reason: SDUPTHER

## 2021-10-05 RX ORDER — INSULIN GLARGINE 100 [IU]/ML
50 INJECTION, SOLUTION SUBCUTANEOUS NIGHTLY
Qty: 5 PEN | Refills: 2 | Status: SHIPPED | OUTPATIENT
Start: 2021-10-05 | End: 2021-11-12 | Stop reason: SDUPTHER

## 2021-10-05 RX ORDER — GABAPENTIN 400 MG/1
400 CAPSULE ORAL 3 TIMES DAILY
Qty: 90 CAPSULE | Refills: 2 | Status: SHIPPED | OUTPATIENT
Start: 2021-10-05 | End: 2022-02-25 | Stop reason: SDUPTHER

## 2021-11-12 ENCOUNTER — OFFICE VISIT (OUTPATIENT)
Dept: PRIMARY CARE CLINIC | Age: 51
End: 2021-11-12
Payer: MEDICAID

## 2021-11-12 VITALS
WEIGHT: 157 LBS | HEART RATE: 103 BPM | DIASTOLIC BLOOD PRESSURE: 89 MMHG | SYSTOLIC BLOOD PRESSURE: 131 MMHG | BODY MASS INDEX: 31.71 KG/M2 | OXYGEN SATURATION: 98 % | TEMPERATURE: 97.3 F

## 2021-11-12 DIAGNOSIS — J45.21 RAD (REACTIVE AIRWAY DISEASE), MILD INTERMITTENT, WITH ACUTE EXACERBATION: ICD-10-CM

## 2021-11-12 DIAGNOSIS — I10 HTN (HYPERTENSION), BENIGN: Primary | ICD-10-CM

## 2021-11-12 DIAGNOSIS — R29.898 BILATERAL LEG WEAKNESS: ICD-10-CM

## 2021-11-12 DIAGNOSIS — M79.642 BILATERAL HAND PAIN: ICD-10-CM

## 2021-11-12 DIAGNOSIS — Z79.4 DIABETES MELLITUS TYPE 2, INSULIN DEPENDENT (HCC): ICD-10-CM

## 2021-11-12 DIAGNOSIS — J45.909 REACTIVE AIRWAY DISEASE WITHOUT COMPLICATION, UNSPECIFIED ASTHMA SEVERITY, UNSPECIFIED WHETHER PERSISTENT: ICD-10-CM

## 2021-11-12 DIAGNOSIS — M79.641 BILATERAL HAND PAIN: ICD-10-CM

## 2021-11-12 DIAGNOSIS — E11.9 DIABETES MELLITUS TYPE 2, INSULIN DEPENDENT (HCC): ICD-10-CM

## 2021-11-12 LAB — HBA1C MFR BLD: 14 %

## 2021-11-12 PROCEDURE — 99214 OFFICE O/P EST MOD 30 MIN: CPT | Performed by: NURSE PRACTITIONER

## 2021-11-12 PROCEDURE — 83036 HEMOGLOBIN GLYCOSYLATED A1C: CPT | Performed by: NURSE PRACTITIONER

## 2021-11-12 RX ORDER — EMPAGLIFLOZIN 10 MG/1
1 TABLET, FILM COATED ORAL DAILY
Qty: 90 TABLET | Refills: 1 | Status: SHIPPED | OUTPATIENT
Start: 2021-11-12 | End: 2022-06-01 | Stop reason: DRUGHIGH

## 2021-11-12 RX ORDER — ORAL SEMAGLUTIDE 3 MG/1
3 TABLET ORAL DAILY
Qty: 90 TABLET | Refills: 1 | Status: SHIPPED | OUTPATIENT
Start: 2021-11-12 | End: 2022-02-25 | Stop reason: SDUPTHER

## 2021-11-12 RX ORDER — METFORMIN HYDROCHLORIDE 500 MG/1
1000 TABLET, EXTENDED RELEASE ORAL 2 TIMES DAILY WITH MEALS
Qty: 360 TABLET | Refills: 1 | Status: SHIPPED | OUTPATIENT
Start: 2021-11-12 | End: 2022-06-01 | Stop reason: SINTOL

## 2021-11-12 RX ORDER — BLOOD-GLUCOSE METER
1 KIT MISCELLANEOUS DAILY
Qty: 1 KIT | Refills: 0 | Status: SHIPPED | OUTPATIENT
Start: 2021-11-12 | End: 2022-06-01

## 2021-11-12 RX ORDER — INSULIN GLARGINE 100 [IU]/ML
55 INJECTION, SOLUTION SUBCUTANEOUS NIGHTLY
Qty: 5 PEN | Refills: 2 | Status: SHIPPED | OUTPATIENT
Start: 2021-11-12 | End: 2022-05-02 | Stop reason: SDUPTHER

## 2021-11-12 RX ORDER — FLASH GLUCOSE SENSOR
1 KIT MISCELLANEOUS
Qty: 6 EACH | Refills: 2 | Status: SHIPPED | OUTPATIENT
Start: 2021-11-12 | End: 2022-06-01

## 2021-11-12 RX ORDER — SIMVASTATIN 80 MG
80 TABLET ORAL NIGHTLY
Qty: 90 TABLET | Refills: 1 | Status: SHIPPED | OUTPATIENT
Start: 2021-11-12

## 2021-11-12 RX ORDER — FAMOTIDINE 20 MG/1
20 TABLET, FILM COATED ORAL DAILY
Qty: 90 TABLET | Refills: 1 | Status: SHIPPED | OUTPATIENT
Start: 2021-11-12

## 2021-11-12 ASSESSMENT — ENCOUNTER SYMPTOMS
SHORTNESS OF BREATH: 0
COUGH: 0

## 2021-11-12 NOTE — PROGRESS NOTES
Shakeel Walker (:  1970) is a 52 y.o. female,Established patient, here for evaluation of the following chief complaint(s):  Diabetes         ASSESSMENT/PLAN:  1. HTN (hypertension), benign  Assessment & Plan:   Well-controlled, continue current medications  2. Diabetes mellitus type 2, insulin dependent ([facility].)  Assessment & Plan:  Add rybelsus- stop metformin d/t pt states giving her upset stomach and causing her to eat bad foods and drink ginger ale. Orders:  -     simvastatin (ZOCOR) 80 MG tablet; Take 1 tablet by mouth nightly, Disp-90 tablet, R-1Normal  -     empagliflozin (JARDIANCE) 10 MG tablet; Take 1 tablet by mouth daily, Disp-90 tablet, R-1Normal  -     POCT glycosylated hemoglobin (Hb A1C)  -     metFORMIN (GLUCOPHAGE-XR) 500 MG extended release tablet; Take 2 tablets by mouth 2 times daily (with meals), Disp-360 tablet, R-1Normal  -     insulin glargine (BASAGLAR KWIKPEN) 100 UNIT/ML injection pen; Inject 55 Units into the skin nightly, Disp-5 pen, R-2Normal  -     Semaglutide (RYBELSUS) 3 MG TABS; Take 3 mg by mouth daily, Disp-90 tablet, R-1Normal  -     glucose monitoring (FREESTYLE FREEDOM) kit; DAILY Starting 2021, Disp-1 kit, R-0, Normal  -     Continuous Blood Gluc Sensor (FREESTYLE DAVIE 2 SENSOR) MISC; 1 Dose by Does not apply route every 14 days, Disp-6 each, R-2Normal  -      DIABETES FOOT EXAM  3. RAD (reactive airway disease), mild intermittent, with acute exacerbation  -     beclomethasone (QVAR) 80 MCG/ACT inhaler; Inhale 1 puff into the lungs 2 times daily, Disp-3 each, R-1Normal  4. Reactive airway disease without complication, unspecified asthma severity, unspecified whether persistent  Assessment & Plan:   Well-controlled, continue current medications  5. Bilateral leg weakness  -     [facility]  -     Handicap Placard MISC; Starting 2021, Disp-1 each, R-0, Print  6.  Bilateral hand pain  -     EMG; Future      No follow-ups Pulses:           Dorsalis pedis pulses are 2+ on the right side and 2+ on the left side. Posterior tibial pulses are 2+ on the right side and 2+ on the left side. Heart sounds: Normal heart sounds, S1 normal and S2 normal.   Pulmonary:      Effort: Pulmonary effort is normal. No respiratory distress. Breath sounds: Normal breath sounds. Musculoskeletal:         General: No deformity. Normal range of motion. Cervical back: Full passive range of motion without pain and normal range of motion. Feet:      Right foot:      Skin integrity: Callus and dry skin present. No skin breakdown. Left foot:      Skin integrity: Callus and dry skin present. No skin breakdown. Comments: Visual inspection:  Deformity/amputation: absent  Skin lesions/pre-ulcerative calluses: absent  Edema: right- negative, left- negative    Sensory exam:  Monofilament sensation: normal  (minimum of 5 random plantar locations tested, avoiding callused areas - > 1 area with absence of sensation is + for neuropathy)    Plus at least one of the following:  Pulses: normal,   Pinprick: Intact      Skin:     General: Skin is warm and dry. Neurological:      Mental Status: She is alert and oriented to person, place, and time. On this date 11/12/2021 I have spent 25 minutes reviewing previous notes, test results and face to face with the patient discussing the diagnosis and importance of compliance with the treatment plan as well as documenting on the day of the visit. An electronic signature was used to authenticate this note.     --KELLEN Viveros - CNP

## 2021-11-12 NOTE — ASSESSMENT & PLAN NOTE
Add rybelsus- stop metformin d/t pt states giving her upset stomach and causing her to eat bad foods and drink ginger ale.

## 2021-11-12 NOTE — PROGRESS NOTES
Visit Information    Have you changed or started any medications since your last visit including any over-the-counter medicines, vitamins, or herbal medicines? no   Are you having any side effects from any of your medications? -  yes - Nausea   Have you stopped taking any of your medications? Is so, why? -  no    Have you seen any other physician or provider since your last visit? No  Have you had any other diagnostic tests since your last visit? No  Have you been seen in the emergency room and/or had an admission to a hospital since we last saw you? No  Have you had your routine dental cleaning in the past 6 months? no    Have you activated your [employer] account? If not, what are your barriers?  Yes     Patient Care Team:  Clinton Severin, APRN - CNP as PCP - General (Family Nurse Practitioner)  Clinton Severin, APRN - CNP as PCP - Sammie Keenan Provider  Terrance Young (Optometry)  Anita Freeman MD as Consulting Physician (Endocrinology)  Yandy Winkler DO as Resident (Obstetrics & Gynecology)  KELLEN Box CNP (Family Medicine)    Medical History Review  Past Medical, Family, and Social History reviewed and does contribute to the patient presenting condition    Health Maintenance   Topic Date Due    Hepatitis B vaccine (1 of 3 - Risk 3-dose series) Never done    Diabetic retinal exam  05/17/2015    Colon cancer screen colonoscopy  Never done    COVID-19 Vaccine (2 - Moderna 2-dose series) 06/15/2021    Flu vaccine (1) 09/01/2021    A1C test (Diabetic or Prediabetic)  10/30/2021    Diabetic foot exam  04/02/2022    Diabetic microalbuminuria test  04/02/2022    Lipid screen  04/02/2022    Potassium monitoring  04/02/2022    Creatinine monitoring  04/02/2022    Cervical cancer screen  03/29/2026    DTaP/Tdap/Td vaccine (3 - Td or Tdap) 01/11/2028    Pneumococcal 0-64 years Vaccine (2 of 2 - PPSV23) 12/20/2037    Hepatitis C screen  Completed    HIV screen  Completed    Hepatitis A vaccine  Aged Out    Hib vaccine  Aged Out    Meningococcal (ACWY) vaccine  Aged Out

## 2021-12-10 ENCOUNTER — TELEPHONE (OUTPATIENT)
Dept: PRIMARY CARE CLINIC | Age: 51
End: 2021-12-10

## 2021-12-26 ENCOUNTER — TELEPHONE (OUTPATIENT)
Dept: PRIMARY CARE CLINIC | Age: 51
End: 2021-12-26

## 2021-12-27 ENCOUNTER — HOSPITAL ENCOUNTER (OUTPATIENT)
Dept: PHYSICAL THERAPY | Age: 51
Setting detail: THERAPIES SERIES
Discharge: HOME | End: 2021-12-27
Payer: COMMERCIAL

## 2021-12-27 PROCEDURE — 97161 PT EVAL LOW COMPLEX 20 MIN: CPT

## 2021-12-27 NOTE — CONSULTS
[x] Refer to full medical chart  In EPIC     Comorbidities:   [x] Obesity [] Dialysis  [] N/A   [x] Asthma/COPD [] Dementia [x] Other: COVID, pneumonia, on O2 after discharge at home (was taken off late 2020), and resumed normal activities July 2020. [] Stroke [] Sleep apnea [] Other:   [] Vascular disease [] Rheumatic disease [] Other:     Tests: [] X-Ray: [] MRI:  [] Other:    Medications: [x] Refer to full medical record [] None [] Other:  Allergies:      [x] Refer to full medical record [] None [] Other:    Function:  Hand Dominance  [x] Right  [] Left  Patient lives with: , and teenage children   In what type of home []  One story   [x] Two story -bed up, does has bath on each level   [] Split level   Number of stairs to enter 2-3   With handrail on the []  Right to enter   [x] No handrail   Bathroom has a []  Tub only  [x] Tub/shower combo (downstairs)  [] Walk in shower (upstairs)    []  Grab bars   Washing machine is on []  Main level   [] Second level   [x] Basement   Employer    Job Status []  Normal duty   [] Light duty   [x] Off due to condition    []  Retired   [] Not employed   [] Disability  [] Other:  []  Return to work:    Work activities/duties      ADL/IADL Previous level of function Current level of function Who currently assists the patient with task   Bathing  [x] Independent  [] Assist [] Independent  [x] Assist As needed by    Dress/grooming [x] Independent  [] Assist [] Independent  [x] Assist As needed by /children   Transfer/mobility [x] Independent  [] Assist [] Independent  [x] Assist As needed by /children   Feeding [x] Independent  [] Assist [x] Independent  [] Assist    Toileting [x] Independent  [] Assist [x] Independent  [] Assist    Driving [] Independent  [x] Assist [] Independent  [x] Assist    Housekeeping [x] Independent  [] Assist [] Independent  [x] Assist As needed by /children   Grocery shop/meal prep [x] Independent  [] Assist [] Independent  [x] Assist As needed by /children    works 2pm to [employer] Prior level of function Current level of function    [x] Independent  [] Assist [x] Independent  [] Assist   Device: [x] Independent [x] Independent    [] Straight Cane [] Quad cane [] Straight Cane [] Quad cane    [] Standard walker [] Rolling walker   [] 4 wheeled walker [] Standard walker [] Rolling walker   [] 4 wheeled walker    [] Wheelchair [] Wheelchair     Pain:  [x] Yes  [] No Location: Bilateral legs   Pain Rating: (0-10 scale) 7-10/10  Pain altered Tx:  [] Yes  [x] No  Action:    Symptoms:  [] Improving [] Worsening [x] Same  Better:  [] AM    [] PM    [] Sit    [] Rise/Sit    []Stand    [] Walk    [] Lying    [x] Other: medication helps when the pain is not intense. Has gabapentin and a muscle relaxor. Worse: [] AM    [] PM    [] Sit    [] Rise/Sit    []Stand    [] Walk    [] Lying    [] Bend                      [] Valsalva    [x] Other: the pain just comes and goes as it pleases. Sleep: [] OK    [x] Disturbed -- does wake her from sleeping.     Objective:    STRENGTH ROM    Left Right Left Right   Hip Flex 3+ 3+ Functional Functional   Ext       ER       IR       ABD 3+ 3+ Functional Functional   ADD 3+ 3+ Functional Functional   Knee Flex 3+ 3+ Functional Functional   Ext 3+ 3+ Functional Functional   Ankle DF 3+ 3+ Functional Functional   PF   Functional Functional     OBSERVATION No Deficit Deficit Not Tested Comments   Posture       Forward Head [x] [] []    Rounded Shoulders [] [x] []    Kyphosis [x] [] []    Lordosis [] [x] [] Decreased    Lateral Shift [] [] [x]    Scoliosis [] [] [x]    Iliac Crest [x] [] []    PSIS [x] [] []    ASIS [x] [] []    Genu Valgus [x] [] []    Genu Varus [x] [] []    Genu Recurvatum [x] [] []    Pronation [x] [] []    Supination [x] [] []    Leg Length Discrp [x] [] []    Slumped Sitting [] [x] []    Palpation [] [x] [] Tender throughout bilateral legs   Sensation [] [] [] Hypersensitivity throughout B LE; able to distinguish left/right legs   Edema [x] [] []    Neurological [] [x] [] Difficulty controlling active movements in right>left legs   Patellar Mobility [] [] [x]    Patellar Orientation [] [] [x]    Gait [] [x] [] Analysis: wide DELBERT, right leg buckling with each step, hands out wide     FUNCTION Normal Difficult Unable   Sitting [] [x] []   Standing [] [x] []   Ambulation [] [x] []   Groom/Dress [] [x] []   Lift/Carry [] [x] []   Stairs [] [x] []   Bending [] [x] []   Squat [] [x] []   Kneel [] [x] []     BALANCE/PROPRIOCEPTION              [] Not tested   Single leg stance       R                     L                                PAIN   Eyes open                   0          Sec.       0           Sec                  . []      Functional Test: LEFS Score: 80% functionally impaired     Comments: TUG 24.19 seconds    Assessment:  Patient would benefit from skilled physical therapy services in order to: improve balance, gait, B LE strength, functional abilities, decrease risk of falls. Problems:    [x] ? Pain:  [x] ? ROM:  [x] ? Strength:  [x] ? Function:  [] Other:       STG: (to be met in 9 treatments)  1. ? Pain: B LE pain improve to 7/10 at max throughout the day  2. ? Strength: B LE strength improve to grossly 4-/5  3. ? Function: Patient to report improved sleep without waking due to B LE pain  4. Patient able to walk without right knee buckling 50% of the steps or less  5. Patient to be independent with home exercise program as demonstrated by performance with correct form without cues. 6. Demonstrate Knowledge of fall prevention  LTG: (to be met in 18 treatments)  1. B LE pain improve to 3/10 at max after strenuous day  2. B LE strength improve to grossly 4/5  3. TUG score improve to 18.0 seconds or less  4. Patient to report no falls x 3 weeks or greater  5. Patient to ambulate with more narrow DELBERT, no right knee buckling, equal stride lengths.   6. LEFS score improve to 40% functionally impaired or less  7. Patient to report improved ability to do functional tasks around her home without having to rely on  as much. Patient goals: \"To see why I'm having pain\"    Rehab Potential:  [] Good  [x] Fair  [] Poor   Suggested Professional Referral:  [x] No  [] Yes:  Barriers to Goal Achievement:  [] No  [x] Yes: over 1 year h/o pain  Domestic Concerns:  [x] No  [] Yes:    Pt. Education:  [x] Plans/Goals, Risks/Benefits discussed  [x] Home exercise program    Method of Education: [x] Verbal  [x] Demo  [x] Written -- seated LAQ, long sitting quad set (in bed), supine glut sets  Comprehension of Education:  [] Verbalizes understanding. [] Demonstrates understanding. [x] Needs Review. [] Demonstrates/verbalizes understanding of HEP/Ed previously given. Treatment Plan:  [x] Therapeutic Exercise   38857  [] Iontophoresis: 4 mg/mL Dexamethasone Sodium Phosphate  mAmin  23613   [x] Therapeutic Activity  91666 [] Vasopneumatic cold with compression  09418    [x] Gait Training   19394 [] Ultrasound   50017   [x] Neuromuscular Re-education  23075 [x] Electrical Stimulation Unattended  49442   [x] Manual Therapy  08419 [x] Electrical Stimulation Attended  04592   [x] Instruction in HEP  [] Lumbar/Cervical Traction  98561   [] Aquatic Therapy   70044 [x] Cold/hotpack    [] Massage   81080      [] Dry Needling, 1 or 2 muscles  12265   [] Biofeedback, first 15 minutes   98949  [] Biofeedback, additional 15 minutes   77666 [] Dry Needling, 3 or more muscles  11078     []  Medication allergies reviewed for use of    Dexamethasone Sodium Phosphate 4mg/ml     with iontophoresis treatments. Pt is not allergic.     Frequency:  2 x/week for 18 visits    Todays Treatment:  Modalities:   Precautions:  Exercises:  Exercise Reps/ Time Weight/ Level Comments   Supine quad sets 10 x 3 sec Struggled more right versus left, HEP   Glut sets supine 10 x 3 sec HEP   Bridge 3 x  Painful    Seated LAQ 5 x ea  HEP   Prone knee flexion 3 x ea     Other: Patient very sore/painful with exercises. Sensitive to the touch. Specific Instructions for next treatment: Desensitization to B LE (tapping, rubbing, stroking, etc). Mat exs. Progress to standing exs. Can add modalities as needed. May need manual estim for quad activation (right>left). Balance exercises. Needs gait training to control right knee from buckling. Evaluation Complexity:  History (Personal factors, comorbidities) [] 0 [] 1-2 [x] 3+   Exam (limitations, restrictions) [] 1-2 [x] 3 [] 4+   Clinical presentation (progression) [x] Stable [] Evolving  [] Unstable   Decision Making [x] Low [] Moderate [] High    [x] Low Complexity [] Moderate Complexity [] High Complexity       Treatment Charges: Mins Units   [x] Evaluation       [x]  Low       []  Moderate       []  High 25 1   []  Modalities     []  Ther Exercise     []  Manual Therapy     []  Ther Activities     []  Aquatics     []  Vasocompression     []  Other       TOTAL TREATMENT TIME: 25    Time in:1010   Time Out:1040    Electronically signed by: David Mo PT        Physician Signature:________________________________Date:__________________  By signing above or cosigning this note, I have reviewed this plan of care and certify a need for medically necessary rehabilitation services.      *PLEASE SIGN ABOVE AND FAX BACK ALL PAGES*

## 2021-12-27 NOTE — FLOWSHEET NOTE
[facility] Fall Risk Assessment    Patient Name:  Shakeel Walker  : 1970        Risk Factor Scale  Score   History of Falls [x] Yes  [] No 25  0 25   Secondary Diagnosis [] Yes  [x] No 15  0 0   Ambulatory Aid [] Furniture  [] Crutches/cane/walker  [x] None/bedrest/wheelchair/nurse 30  15  0 0   IV/Heparin Lock [] Yes  [x] No 20  0 0   Gait/Transferring [] Impaired  [x] Weak  [] Normal/bedrest/immobile 20  10  0 10   Mental Status [] Forgets limitations  [x] Oriented to own ability 15  0 0      Total: 35     Based on the Assessment score: check the appropriate box.     []  No intervention needed   Low =   Score of 0-24    [x]  Use standard prevention interventions Moderate =  Score of 24-44   [x] Give patient handout and discuss fall prevention strategies   [x] Establish goal of education for patient/family RE: fall prevention strategies    []  Use high risk prevention interventions High = Score of 45 and higher   [] Give patient handout and discuss fall prevention strategies   [] Establish goal of education for patient/family Re: fall prevention strategies   [] Discuss lifeline / other resources    Electronically signed by:   Shirley Coyne, PT  Date: 2021

## 2021-12-30 ENCOUNTER — TELEPHONE (OUTPATIENT)
Dept: PRIMARY CARE CLINIC | Age: 51
End: 2021-12-30

## 2021-12-30 ENCOUNTER — HOSPITAL ENCOUNTER (OUTPATIENT)
Dept: PHYSICAL THERAPY | Age: 51
Setting detail: THERAPIES SERIES
Discharge: HOME | End: 2021-12-30
Payer: COMMERCIAL

## 2021-12-30 PROCEDURE — 97110 THERAPEUTIC EXERCISES: CPT

## 2021-12-30 NOTE — TELEPHONE ENCOUNTER
----- Message from Raul Frey sent at 12/30/2021 11:19 AM EST -----  Subject: Medication Problem    QUESTIONS  Name of Medication? glucose monitoring (FREESTYLE FREEDOM) kit  Patient-reported dosage and instructions? KIT  What question or problem do you have with the medication? Also has   Chad Benton to the pharmacy to get these, and they are saying her   insurance will not cover these. Pharmacy says she has to contact PCP   office to see if she can get something different. Preferred Pharmacy? [facility], [address] [facility] [place] [phone]  Pharmacy phone number (if available)? [phone]  Additional Information for Provider? Says she also did not receive her   blood pressure meds at the pharmacy. Was just switched to Losartan. She   believes this is the new one.   ---------------------------------------------------------------------------  --------------  CALL BACK INFO  What is the best way for the office to contact you? OK to leave message on   voicemail  Preferred Call Back Phone Number? [phone]  ---------------------------------------------------------------------------  --------------  SCRIPT ANSWERS  Relationship to Patient?  Self

## 2021-12-30 NOTE — TELEPHONE ENCOUNTER
Her insurance may not cover, she can call them to inquire about a continuous glucose monitor kit they may cover. The rybelsus isn't covered?   Did we get a denial?

## 2021-12-30 NOTE — LETTER
[address] [facility]  [address] [address]  Phone: [phone]  Fax: [phone] [facility], APRN - CNP        January 4, 2022    [address] [facility]      Dear Joelle Israel:    Please contact office regarding follow up to phone call you recently made to the office. We were unsuccessful in reaching you via phone. If you have any questions or concerns, please don't hesitate to call.     Sincerely,        KELLEN Luo CNP

## 2021-12-30 NOTE — FLOWSHEET NOTE
[x] [facility])  CENTER &  Therapy  [address]  [phone]  F: [phone] [] [address]  [place] [address]   [address]  P: [phone]  F: [phone] [] [place]  [address]  P: [phone]  F: [phone] [] [address]  P: [phone]  F: [phone] [] [address]  [facility]   [address]   [place]: [phone]  F: [phone]      Physical Therapy Daily Treatment Note    Date:  2021  Patient Name:  Doris Bonilla    :  1970  MRN: [id number]  Physician: Dr. Gretchen Baumann, APRN-CNP                               Insurance: [facility]- need to verify insurance on 21 when [facility] opens up  Medical Diagnosis: Bilateral leg weakness                Rehab Codes: R 26.0, R 26.81, R 26.89, M 62.81, Z 91.81, R 53.81  Onset date: 21               Next 's appt.: Not scheduled  Visit# / total visits:      Cancels/No Shows: 0/0    Subjective:    Pain:  [x] Yes  [] No Location: B legs Pain Rating: (0-10 scale) 4/10 Left, 0/10 Right  Pain altered Tx:  [x] No  [] Yes  Action:  Comments: Patient arrives stating pain in legs is 4/10 today. Notes increased pain yesterday last night noting 10/10 in legs.      Objective:  Modalities:   Precautions:  Exercises:  Exercise Reps/ Time Weight/ Level Comments   Nustep 5 mins L3          Standing      HR 10x     Hip abduction 5x           Seated      LAQs 10x2     R hamstring stretch 3x 20 sec Stool-- attempted left pain         Supine      Quad sets  10x 3 sec Bolster under knee   Hamstring sets 10x R/ 3x L 3 sec Bolster under knee   SAQs 10x R/ 3x L     Glute sets 10x 3 sec    Bridge   Unable to bent left knee   Adduction sets 6x 3 sec Legs straight ball between knees         Sidelying 5 mins  To rest from pain   Other: Patient very sore/painful with exercises. Sensitive to the touch. Education on purpose of PT sessions, and to take meds prior to arrival to be able to complete PT. Treatment Charges: Mins Units   []  Modalities     [x]  Ther Exercise 43 3   []  Manual Therapy     []  Ther Activities     []  Aquatics     []  Vasocompression     []  Other     Total Treatment time 43 mins        Assessment: [x] Progressing toward goals. Initiated exercises as tolerated, poor tolerance to treatment secondary to noted pain. Reviewed exercises previously given to patient. Patient ambulating in clinic without assistive device and each step knee buckling. Difficulty with knee and hip flexion exercises this date. Patient noted she needed to lay sidelying to help with the pain. [] No change. [] Other:  [x] Patient would continue to benefit from skilled physical therapy services in order to: improve balance, gait, B LE strength, functional abilities, decrease risk of falls. STG: (to be met in 9 treatments)  1. ? Pain: B LE pain improve to 7/10 at max throughout the day  2. ? Strength: B LE strength improve to grossly 4-/5  3. ? Function: Patient to report improved sleep without waking due to B LE pain  4. Patient able to walk without right knee buckling 50% of the steps or less  5. Patient to be independent with home exercise program as demonstrated by performance with correct form without cues. 6. Demonstrate Knowledge of fall prevention  LTG: (to be met in 18 treatments)  1. B LE pain improve to 3/10 at max after strenuous day  2. B LE strength improve to grossly 4/5  3. TUG score improve to 18.0 seconds or less  4. Patient to report no falls x 3 weeks or greater  5. Patient to ambulate with more narrow DELBERT, no right knee buckling, equal stride lengths. 6. LEFS score improve to 40% functionally impaired or less  7.  Patient to report improved ability to do

## 2022-01-05 ENCOUNTER — HOSPITAL ENCOUNTER (OUTPATIENT)
Dept: PHYSICAL THERAPY | Age: 52
Setting detail: THERAPIES SERIES
Discharge: HOME | End: 2022-01-05
Payer: COMMERCIAL

## 2022-01-05 PROCEDURE — 97110 THERAPEUTIC EXERCISES: CPT

## 2022-01-07 ENCOUNTER — HOSPITAL ENCOUNTER (OUTPATIENT)
Dept: PHYSICAL THERAPY | Age: 52
Setting detail: THERAPIES SERIES
Discharge: HOME | End: 2022-01-07
Payer: COMMERCIAL

## 2022-01-07 PROCEDURE — 97110 THERAPEUTIC EXERCISES: CPT

## 2022-01-07 NOTE — FLOWSHEET NOTE
[x] [facility] CENTER &  Therapy  [address]  [phone]  F: [phone] [] [address]  [place] [address]   [address]  P: [phone]  F: [phone] [] [address]  Therapy  [address]  P: [phone]  F: [phone] [] [address]  P: [phone]  F: [phone] [] [address]  [facility]   [address]   [place]: [phone]  F: [phone]      Physical Therapy Daily Treatment Note    Date:  2022  Patient Name:  Claude Lundborg    :  1970  MRN: [id number]  Physician: Dr. Gus Singh, APRN-CNP                               Insurance: [facility]- need to verify insurance on 21 when [facility] opens up  Medical Diagnosis: Bilateral leg weakness                Rehab Codes: R 26.0, R 26.81, R 26.89, M 62.81, Z 91.81, R 53.81  Onset date: 21               Next 's appt.: Not scheduled  Visit# / total visits:      Cancels/No Shows: 0/0    Subjective:    Pain:  [x] Yes  [] No Location: B legs Pain Rating: (0-10 scale) 4/10 Left, 0/10 Right  Pain altered Tx:  [x] No  [] Yes  Action:  Comments: Notes pain is better today, took Tylenol  prior to arrival.    Objective:  Modalities:   Precautions:  Exercises:  Exercise Reps/ Time Weight/ Level Comments   Nustep 5 mins L3          Standing   // bars   HR  10x     Hip abduction  10x R/ 5x     Marching 5x  added   Hamstring curls 5x  added         Seated      LAQs 10x2 R, 13x L     Marching 10xR / 5x L  Pain on the left    R hamstring stretch 3x 20 sec Stool --left leg painful this date         Supine      Quad sets    10x R/ 7x L 3 sec Bolster under knee   Hamstring sets   10x R/ 3x L 3 sec Bolster under knee   SAQs    10x R/ 5x L     Glute sets      10x 3 sec Bridge   Unable to bent left knee   Adduction sets   10x 3 sec Legs straight ball between knees   Left heel slides 2x  Orange slide tube         Sidelying      Other: Patient very sore/painful with exercises. Sensitive to the touch. Treatment Charges: Mins Units   []  Modalities     [x]  Ther Exercise 36 2   []  Manual Therapy     []  Ther Activities     []  Aquatics     []  Vasocompression     []  Other     Total Treatment time 36 mins        Assessment: [x] Progressing toward goals. Completed standing exercises in parallel bars this date instead of bars on wall. Able to add standing marching and standing within tolerance to increase LE strength. Limited exercises on mat with left secondary to pain. [] No change. [] Other:  [x] Patient would continue to benefit from skilled physical therapy services in order to: improve balance, gait, B LE strength, functional abilities, decrease risk of falls. STG: (to be met in 9 treatments)  1. ? Pain: B LE pain improve to 7/10 at max throughout the day  2. ? Strength: B LE strength improve to grossly 4-/5  3. ? Function: Patient to report improved sleep without waking due to B LE pain  4. Patient able to walk without right knee buckling 50% of the steps or less  5. Patient to be independent with home exercise program as demonstrated by performance with correct form without cues. 6. Demonstrate Knowledge of fall prevention  LTG: (to be met in 18 treatments)  1. B LE pain improve to 3/10 at max after strenuous day  2. B LE strength improve to grossly 4/5  3. TUG score improve to 18.0 seconds or less  4. Patient to report no falls x 3 weeks or greater  5. Patient to ambulate with more narrow DELBERT, no right knee buckling, equal stride lengths. 6. LEFS score improve to 40% functionally impaired or less  7. Patient to report improved ability to do functional tasks around her home without having to rely on  as much. Patient goals:  \"To see why I'm having pain\"    Pt. Education:  [x] Yes  [] No  [x] Reviewed Prior HEP/Ed  Method of Education: [x] Verbal  [x] Demo  [] Written  Comprehension of Education:  [x] Verbalizes understanding. [x] Demonstrates understanding. [x] Needs review. [] Demonstrates/verbalizes HEP/Ed previously given. Plan: [x] Continue current frequency toward long and short term goals. [x] Specific Instructions for subsequent treatments: Desensitization to B LE (tapping, rubbing, stroking, etc). Mat exs. Progress to standing exs. Can add modalities as needed. May need manual estim for quad activation (right>left). Balance exercises. Needs gait training to control right knee from buckling.       Time In: 9:05 am            Time Out: 9:46 am    Electronically signed by:  Monik Ray PTA

## 2022-01-11 ENCOUNTER — HOSPITAL ENCOUNTER (OUTPATIENT)
Dept: PHYSICAL THERAPY | Age: 52
Setting detail: THERAPIES SERIES
Discharge: HOME | End: 2022-01-11
Payer: COMMERCIAL

## 2022-01-11 NOTE — FLOWSHEET NOTE
[x] [facility] [place] &  Therapy  [address]    [phone]  F: [phone]   [] [address]  [facility]   [address]  P: [phone]  F: [phone]  [] [address]  [address]  P: [phone]  F: [phone] [] [email or web address]  P: [phone]  F: [phone]  [] [address]  [address] [facility] [address]   [address]   [place]: [phone]  F: [phone]   [] [place] [id number] [facility] [address]  [place]: [phone]   F: [phone]     Physical Therapy Cancel/No Show note    Date: 2022  Patient: Danielle Camara  : 1970  MRN: [id number]    Cancels/No Shows to date:     For today's appointment patient:    [x]  Cancelled    [] Rescheduled appointment    [] No-show     Reason given by patient:    [x]  Patient ill    []  Conflicting appointment    [] No transportation      [] Conflict with work    [] No reason given    [] Weather related    [] [id number]    [] Other:      Comments:        [x] Next appointment was confirmed    Electronically signed by: Genesis Echols PTA

## 2022-01-14 ENCOUNTER — HOSPITAL ENCOUNTER (OUTPATIENT)
Dept: PHYSICAL THERAPY | Age: 52
Setting detail: THERAPIES SERIES
Discharge: HOME | End: 2022-01-14
Payer: COMMERCIAL

## 2022-01-14 PROCEDURE — 97110 THERAPEUTIC EXERCISES: CPT

## 2022-01-14 NOTE — FLOWSHEET NOTE
[x] [address] [facility] TWELVESTEP [facility] CENTER &  Therapy  [address]  [phone]  F: [phone] [] [address]  [place] [address]   [address]  P: [phone]  F: [phone] [] [place]  [address]  P: [phone]  F: [phone] [] [address]  P: [phone]  F: [phone] [] [address]  [facility]   [address]   [place]: [phone]  F: [phone]      Physical Therapy Daily Treatment Note    Date:  2022  Patient Name:  Artemio Yang    :  1970  MRN: [id number]  Physician: Dr. Ty Buchanan, APRN-CNP                               Insurance: [facility]- need to verify insurance on 21 when [facility] opens up  Medical Diagnosis: Bilateral leg weakness                Rehab Codes: R 26.0, R 26.81, R 26.89, M 62.81, Z 91.81, R 53.81  Onset date: 21               Next 's appt.: Not scheduled  Visit# / total visits:      Cancels/No Shows: 1/0    Subjective:    Pain:  [x] Yes  [] No Location: B legs Pain Rating: (0-10 scale) 5/10 Left, 5/10 Right  Pain altered Tx:  [x] No  [] Yes  Action:  Comments: States the other day (Tuesday) had increased pain and swelling in both legs, more in left than right.       Objective:  Modalities:   Precautions:  Exercises:  Exercise Reps/ Time Weight/ Level Comments   Nustep 5 mins L3          Standing   // bars   HR       Hip abduction       Marching      Hamstring curls            Seated      LAQs 15x R, 7x L     Marching 15xR / 5x L  Pain on the left    Heel slides 15x R/ 5x L  added   R hamstring stretch            Supine      Quad sets   20x R/ 10x L 3 sec Bolster under knee   Hamstring sets     20x R/ 5x L 3 sec Bolster under knee   SAQs      15x R/ 10x L     Glute sets 10x 3 sec    Bridge   Unable to bent left knee   Adduction sets     10x 3 sec Legs straight ball between knees   Left heel slides 1x  Orange slide tube, pain   SLR  10x R           Sidelying      Other: Patient very sore/painful with exercises. Sensitive to the touch. Patient ambulated with cane and rolling walker as she in interested in having one. Treatment Charges: Mins Units   []  Modalities     [x]  Ther Exercise 38 3   []  Manual Therapy     []  Ther Activities     []  Aquatics     []  Vasocompression     []  Other     Total Treatment time 38 mins        Assessment: [x] Progressing toward goals. Writer noted upon arrival, flexed hips and knees with antalgic gait pattern. Held standing exercises this date secondary to noted ambulation and weakness. Educated patient on ambulation with cane and rolling walker as she is interested in getting one. Patient fatigues easily, completes exercises as tolerated. [] No change. [] Other:  [x] Patient would continue to benefit from skilled physical therapy services in order to: improve balance, gait, B LE strength, functional abilities, decrease risk of falls. STG: (to be met in 9 treatments)  1. ? Pain: B LE pain improve to 7/10 at max throughout the day  2. ? Strength: B LE strength improve to grossly 4-/5  3. ? Function: Patient to report improved sleep without waking due to B LE pain  4. Patient able to walk without right knee buckling 50% of the steps or less  5. Patient to be independent with home exercise program as demonstrated by performance with correct form without cues. 6. Demonstrate Knowledge of fall prevention  LTG: (to be met in 18 treatments)  1. B LE pain improve to 3/10 at max after strenuous day  2. B LE strength improve to grossly 4/5  3. TUG score improve to 18.0 seconds or less  4. Patient to report no falls x 3 weeks or greater  5. Patient to ambulate with more narrow DELBERT, no right knee buckling, equal stride lengths.   6. LEFS score improve to 40% functionally impaired or less  7. Patient to report improved ability to do functional tasks around her home without having to rely on  as much. Patient goals: \"To see why I'm having pain\"    Pt. Education:  [x] Yes  [] No  [x] Reviewed Prior HEP/Ed  Method of Education: [x] Verbal  [x] Demo  [] Written  Comprehension of Education:  [x] Verbalizes understanding. [x] Demonstrates understanding. [x] Needs review. [] Demonstrates/verbalizes HEP/Ed previously given. Plan: [x] Continue current frequency toward long and short term goals. [x] Specific Instructions for subsequent treatments: Desensitization to B LE (tapping, rubbing, stroking, etc). Mat exs. Progress to standing exs. Can add modalities as needed. May need manual estim for quad activation (right>left). Balance exercises. Needs gait training to control right knee from buckling.       Time In: 9:07 am            Time Out: 9:50 am    Electronically signed by:  Genesis Echols PTA

## 2022-01-19 ENCOUNTER — HOSPITAL ENCOUNTER (OUTPATIENT)
Dept: PHYSICAL THERAPY | Age: 52
Setting detail: THERAPIES SERIES
Discharge: HOME | End: 2022-01-19
Payer: COMMERCIAL

## 2022-01-19 PROCEDURE — 97110 THERAPEUTIC EXERCISES: CPT

## 2022-01-19 PROCEDURE — G0283 ELEC STIM OTHER THAN WOUND: HCPCS

## 2022-01-19 NOTE — FLOWSHEET NOTE
[x] [facility]) [facility] CENTER &  Therapy  [address]  [phone]  F: [phone] [] [address]  [facility]   [address]  P: [phone]  F: [phone] [] [address]  [address]  P: [phone]  F: [phone] [] [address]  P: [phone]  F: [phone] [] [address]  [facility]   [address]   [place]: [phone]  F: [phone]      Physical Therapy Daily Treatment Note    Date:  2022  Patient Name:  Josey Leal    :  1970  MRN: [id number]  Physician: Dr. Bernice Espitia, APRN-CNP                               Insurance: [facility]- need to verify insurance on 21 when [facility] opens up  Medical Diagnosis: Bilateral leg weakness                Rehab Codes: R 26.0, R 26.81, R 26.89, M 62.81, Z 91.81, R 53.81  Onset date: 21               Next 's appt.: Not scheduled  Visit# / total visits:      Cancels/No Shows: 1/0    Subjective:    Pain:  [x] Yes  [] No Location: B legs Pain Rating: (0-10 scale) 7/10 Left, 7/10 Right  Pain altered Tx:  [x] No  [] Yes  Action:  Comments: Patient arrives stating both legs are painful from knees down to 7/10.       Objective:  Modalities: IFC (opiate) to left knee to decrease pain with exercises for 20 mins   Precautions:  Exercises:  Exercise Reps/ Time Weight/ Level Comments   Nustep 5 mins L3          Standing   // bars   HR       Hip abduction       Marching      Hamstring curls            Seated      LAQs 10x R, 8x L     Marching 10xR / 5x L  Pain on the left    Heel slides 15x R/ 6x L  reviewed   R hamstring stretch            Supine      Quad sets    20x R/ 10x L 3 sec Bolster under knee   Hamstring sets     20x R/ 4x L 3 sec Bolster under knee SAQs      15x R/ 4x L     Glute sets       10x 3 sec    Bridge   Unable to bent left knee   Adduction sets      10x 3 sec Legs straight ball between knees   Left heel slides 10xR/ 3x L  Orange slide tube, pain   SLR  15x R/  3x L           Sidelying      Other: Patient very sore/painful with exercises. Sensitive to the touch. Treatment Charges: Mins Units   [x]  Modalities--IFC with exercise 20 1   [x]  Ther Exercise 32 2   []  Manual Therapy     []  Ther Activities     []  Aquatics     []  Vasocompression     []  Other     Total Treatment time 32 mins        Assessment: [x] Progressing toward goals. Trial IFC (opiate) with exercises this date to decrease left knee pain and tolerate exercises better. Completed exercises reps per log this date. Patient noted better tolerance with exercises with use of IFC this date. [] No change. [] Other:  [x] Patient would continue to benefit from skilled physical therapy services in order to: improve balance, gait, B LE strength, functional abilities, decrease risk of falls. STG: (to be met in 9 treatments)  1. ? Pain: B LE pain improve to 7/10 at max throughout the day  2. ? Strength: B LE strength improve to grossly 4-/5  3. ? Function: Patient to report improved sleep without waking due to B LE pain  4. Patient able to walk without right knee buckling 50% of the steps or less  5. Patient to be independent with home exercise program as demonstrated by performance with correct form without cues. 6. Demonstrate Knowledge of fall prevention  LTG: (to be met in 18 treatments)  1. B LE pain improve to 3/10 at max after strenuous day  2. B LE strength improve to grossly 4/5  3. TUG score improve to 18.0 seconds or less  4. Patient to report no falls x 3 weeks or greater  5. Patient to ambulate with more narrow DELBERT, no right knee buckling, equal stride lengths. 6. LEFS score improve to 40% functionally impaired or less  7.  Patient to report improved ability to do functional tasks around her home without having to rely on  as much. Patient goals: \"To see why I'm having pain\"    Pt. Education:  [x] Yes  [] No  [x] Reviewed Prior HEP/Ed  Method of Education: [x] Verbal  [x] Demo  [] Written  Comprehension of Education:  [x] Verbalizes understanding. [x] Demonstrates understanding. [x] Needs review. [] Demonstrates/verbalizes HEP/Ed previously given. Plan: [x] Continue current frequency toward long and short term goals. [x] Specific Instructions for subsequent treatments: Desensitization to B LE (tapping, rubbing, stroking, etc). Mat exs. Progress to standing exs. May need manual estim for quad activation (right>left). Balance exercises. Needs gait training to control right knee from buckling.       Time In: 8:54 am            Time Out: 9:31 am    Electronically signed by:  Monik Ray PTA

## 2022-01-21 ENCOUNTER — HOSPITAL ENCOUNTER (OUTPATIENT)
Dept: PHYSICAL THERAPY | Age: 52
Setting detail: THERAPIES SERIES
Discharge: HOME | End: 2022-01-21
Payer: COMMERCIAL

## 2022-01-21 PROCEDURE — G0283 ELEC STIM OTHER THAN WOUND: HCPCS

## 2022-01-21 PROCEDURE — 97110 THERAPEUTIC EXERCISES: CPT

## 2022-01-21 NOTE — FLOWSHEET NOTE
[x] [facility]) [facility] &  Therapy  [address]  [phone]  F: [phone] [] [address]  [facility]   [address]  P: [phone]  F: [phone] [] [place]  [address] [facility]  P: [phone]  F: [phone] [] [address]  P: [phone]  F: [phone] [] [address]  [facility]   [address]   [place]: [phone]  F: [phone]      Physical Therapy Daily Treatment Note    Date:  2022  Patient Name:  Reno Ramirez    :  1970  MRN: [id number]  Physician: Dr. Rhonda Fairchild, APRN-CNP                               Insurance: [facility]- need to verify insurance on 21 when [facility] opens up  Medical Diagnosis: Bilateral leg weakness                Rehab Codes: R 26.0, R 26.81, R 26.89, M 62.81, Z 91.81, R 53.81  Onset date: 21               Next 's appt.: Not scheduled  Visit# / total visits:      Cancels/No Shows: 1/0    Subjective:    Pain:  [x] Yes  [] No Location: B legs Pain Rating: (0-10 scale) 4/10 Left, 4/10 Right  Pain altered Tx:  [x] No  [] Yes  Action:  Comments: Patient arrives stating pain in legs is 4/10 today.      Objective:  Modalities: IFC (opiate) to left knee to decrease pain with exercises for 26 mins   Precautions:  Exercises:  Exercise Reps/ Time Weight/ Level Comments   Nustep 5 mins L3          Standing   // bars   HR       Hip abduction       Marching      Hamstring curls            Seated      LAQs   20x R, 6x L     Marching   10xR / 5x L  Pain on the left    Heel slides    10x R/ 6x L     R hamstring stretch            Supine      Quad sets   20x R/6x L 3 sec Towel under knee   Hamstring sets      10x R/ 4x L 3 sec Towel under knee   SAQs       10x R/ 6x L Glute sets        10x 3 sec    Bridge   Unable to bent left knee   Adduction sets       10x 3 sec Legs straight ball between knees   Left heel slides   Unable sharp pain down leg   SLR    10x R/  5x L           Sidelying      Other: Patient very sore/painful with exercises. Sensitive to the touch. Treatment Charges: Mins Units   [x]  Modalities--IFC with exercise 26 1   [x]  Ther Exercise 34 2   []  Manual Therapy     []  Ther Activities     []  Aquatics     []  Vasocompression     []  Other     Total Treatment time 34 mins        Assessment: [x] Progressing toward goals. Continued treatment with IFC this date to help with pain during exercises. Patient with fair tolerance this date. During treatment has shooting pain in left leg and now having increase pain 7-8/10. Overall, patient feels like today's treatment was better with IFC than the last session. Patient still having trouble with completed full exercise reps on left side. Also noted increase weakness on bilateral legs still remains. [] No change. [] Other:  [x] Patient would continue to benefit from skilled physical therapy services in order to: improve balance, gait, B LE strength, functional abilities, decrease risk of falls. STG: (to be met in 9 treatments)  1. ? Pain: B LE pain improve to 7/10 at max throughout the day  2. ? Strength: B LE strength improve to grossly 4-/5  3. ? Function: Patient to report improved sleep without waking due to B LE pain  4. Patient able to walk without right knee buckling 50% of the steps or less  5. Patient to be independent with home exercise program as demonstrated by performance with correct form without cues. 6. Demonstrate Knowledge of fall prevention  LTG: (to be met in 18 treatments)  1. B LE pain improve to 3/10 at max after strenuous day  2. B LE strength improve to grossly 4/5  3. TUG score improve to 18.0 seconds or less  4. Patient to report no falls x 3 weeks or greater  5.  Patient to ambulate with more narrow DELBERT, no right knee buckling, equal stride lengths. 6. LEFS score improve to 40% functionally impaired or less  7. Patient to report improved ability to do functional tasks around her home without having to rely on  as much. Patient goals: \"To see why I'm having pain\"    Pt. Education:  [x] Yes  [] No  [x] Reviewed Prior HEP/Ed  Method of Education: [x] Verbal  [x] Demo  [] Written  Comprehension of Education:  [x] Verbalizes understanding. [x] Demonstrates understanding. [x] Needs review. [] Demonstrates/verbalizes HEP/Ed previously given. Plan: [x] Continue current frequency toward long and short term goals. [x] Specific Instructions for subsequent treatments: Desensitization to B LE (tapping, rubbing, stroking, etc). Mat exs. Progress to standing exs. May need manual estim for quad activation (right>left). Balance exercises. Needs gait training to control right knee from buckling.       Time In: 8:48 am            Time Out: 9:27 am    Electronically signed by:  Marcella Ogden PTA

## 2022-01-26 ENCOUNTER — HOSPITAL ENCOUNTER (OUTPATIENT)
Dept: PHYSICAL THERAPY | Age: 52
Setting detail: THERAPIES SERIES
Discharge: HOME | End: 2022-01-26
Payer: COMMERCIAL

## 2022-01-26 PROCEDURE — 97110 THERAPEUTIC EXERCISES: CPT

## 2022-01-26 PROCEDURE — G0283 ELEC STIM OTHER THAN WOUND: HCPCS

## 2022-01-26 NOTE — FLOWSHEET NOTE
[x] [address] [facility] TWELVESTEP [facility] CENTER &  Therapy  [address]  [phone]  F: [phone] [] [address]  [place] [address]   [address]  P: [phone]  F: [phone] [] [place]  [address]  P: [phone]  F: [phone] [] [address]  P: [phone]  F: [phone] [] [address]  [facility]   [address]   [place]: [phone]  F: [phone]      Physical Therapy Daily Treatment Note    Date:  2022  Patient Name:  Cary Aguero    :  1970  MRN: [id number]  Physician: Dr. Daniela Coleman, APRN-CNP                               Insurance: [facility]- need to verify insurance on 21 when [facility] opens up  Medical Diagnosis: Bilateral leg weakness                Rehab Codes: R 26.0, R 26.81, R 26.89, M 62.81, Z 91.81, R 53.81  Onset date: 21               Next 's appt.: Not scheduled  Visit# / total visits:      Cancels/No Shows: 1/0    Subjective:    Pain:  [x] Yes  [] No Location: B legs Pain Rating: (0-10 scale) 5/10 Left, 0/10 Right  Pain altered Tx:  [x] No  [] Yes  Action:  Comments: States that pain in left knee is 5/10 today. Feels like IFC is helping with pain while she is here.        Objective:  Modalities: IFC (opiate) to left knee to decrease pain with exercises for 26 mins   Precautions:  Exercises:  Exercise Reps/ Time Weight/ Level Comments   Nustep 5 mins L3          Standing   // bars   HR       Hip abduction       Marching      Hamstring curls            Seated      LAQs   20x R, 10x L     Marching   15xR / 10x L  Pain on the left    Heel slides  15x R/ 8x L     R hamstring stretch            Supine      Quad sets    20x R/  10x L 3 sec Towel under knee   Hamstring sets     15x R/ 10x L 3 sec Towel under knee   SAQs   20x R/ 10x L     Glute sets       10x 3 sec    Bridge   Unable to bent left knee   Adduction sets      10x 3 sec Legs straight ball between knees   Left heel slides 10x R/ 5x L  Unable sharp pain down leg   SLR     15x R/  6x L     Hip abduction 10x R/ 5x L  Orange slide tube added         Sidelying      Other: Patient very sore/painful with exercises. Sensitive to the touch. Treatment Charges: Mins Units   [x]  Modalities--IFC with exercise 21 1   [x]  Ther Exercise 30 2   []  Manual Therapy     []  Ther Activities     []  Aquatics     []  Vasocompression     []  Other     Total Treatment time 30 mins        Assessment: [x] Progressing toward goals. Added hip abduction slides supine this date to increase LE strength. Noted patient able to complete exercises a little better this date. Cues for exercise progressions with fairly good carryover of techniques. Weakness is still present in left LE especially noted with gait. [] No change. [] Other:  [x] Patient would continue to benefit from skilled physical therapy services in order to: improve balance, gait, B LE strength, functional abilities, decrease risk of falls. STG: (to be met in 9 treatments)  1. ? Pain: B LE pain improve to 7/10 at max throughout the day  2. ? Strength: B LE strength improve to grossly 4-/5  3. ? Function: Patient to report improved sleep without waking due to B LE pain  4. Patient able to walk without right knee buckling 50% of the steps or less  5. Patient to be independent with home exercise program as demonstrated by performance with correct form without cues. 6. Demonstrate Knowledge of fall prevention  LTG: (to be met in 18 treatments)  1. B LE pain improve to 3/10 at max after strenuous day  2. B LE strength improve to grossly 4/5  3. TUG score improve to 18.0 seconds or less  4. Patient to report no falls x 3 weeks or greater  5.  Patient to ambulate with more narrow DELBERT, no right knee buckling, equal stride lengths. 6. LEFS score improve to 40% functionally impaired or less  7. Patient to report improved ability to do functional tasks around her home without having to rely on  as much. Patient goals: \"To see why I'm having pain\"    Pt. Education:  [x] Yes  [] No  [x] Reviewed Prior HEP/Ed  Method of Education: [x] Verbal  [x] Demo  [] Written  Comprehension of Education:  [x] Verbalizes understanding. [x] Demonstrates understanding. [x] Needs review. [] Demonstrates/verbalizes HEP/Ed previously given. Plan: [x] Continue current frequency toward long and short term goals. [x] Specific Instructions for subsequent treatments: Desensitization to B LE (tapping, rubbing, stroking, etc). Mat exs. Progress to standing exs. May need manual estim for quad activation (right>left). Balance exercises. Needs gait training to control right knee from buckling. Assess goals next session.       Time In: 8:55 am            Time Out: 9:30 am    Electronically signed by:  Amirah Kelly PTA

## 2022-01-28 ENCOUNTER — HOSPITAL ENCOUNTER (OUTPATIENT)
Dept: PHYSICAL THERAPY | Age: 52
Setting detail: THERAPIES SERIES
Discharge: HOME | End: 2022-01-28
Payer: COMMERCIAL

## 2022-01-28 PROCEDURE — 97110 THERAPEUTIC EXERCISES: CPT

## 2022-01-28 PROCEDURE — G0283 ELEC STIM OTHER THAN WOUND: HCPCS

## 2022-01-28 NOTE — FLOWSHEET NOTE
[x] [facility]) [facility] CENTER &  Therapy  [address]  [phone]  F: [phone] [] [address]  [place] [address]   [address]  P: [phone]  F: [phone] [] [place]  [address]  P: [phone]  F: [phone] [] [address]  P: [phone]  F: [phone] [] [address]  [facility]   [address]   [place]: [phone]  F: [phone]      Physical Therapy Daily Treatment Note    Date:  2022  Patient Name:  Sonia Loza    :  1970  MRN: [id number]  Physician: Dr. Harsha Murray, APRN-CNP                               Insurance: [facility]- need to verify insurance on 21 when [facility] opens up  Medical Diagnosis: Bilateral leg weakness                Rehab Codes: R 26.0, R 26.81, R 26.89, M 62.81, Z 91.81, R 53.81  Onset date: 21               Next 's appt.: Not scheduled  Visit# / total visits:      Cancels/No Shows: 1/0    Subjective:    Pain:  [x] Yes  [] No Location: B legs Pain Rating: (0-10 scale) 3-4/10 Left, 0/10 Right  Pain altered Tx:  [x] No  [] Yes  Action:  Comments: States that today is a good day for pain. Reports pain increased a little bit after last session but was tolerable.        Objective:  Modalities: IFC (opiate) to left knee to decrease pain with exercises for 26 mins   Precautions:  Exercises:  Exercise Reps/ Time Weight/ Level Comments   Nustep 5 mins L3          Standing   // bars   HR       Hip abduction       Marching      Hamstring curls            Seated      LAQs    15x R, 10x L     Marching   15xR / 10x L  Pain on the left    Heel slides 15x R/10x L     R hamstring stretch            Supine      Quad sets    15x R/  10x L 3 sec Towel under knee   Hamstring sets    15x R/ 10x L 3 sec Towel under knee   SAQs  15x R/ 10x L     Glute sets      20x 3 sec    Bridge   Unable to bent left knee   Adduction sets      10x 3 sec Legs straight ball between knees   Left heel slides   15x R/ 10x L     SLR    15x R/  10x L     Hip abduction   15x R/ 10x L  Orange slide tube reviewed         Sidelying      Other: Patient very sore/painful with exercises. Sensitive to the touch. Treatment Charges: Mins Units   [x]  Modalities--IFC with exercise 26 1   [x]  Ther Exercise 44 3   []  Manual Therapy     []  Ther Activities     []  Aquatics     []  Vasocompression     []  Other     Total Treatment time 44 mins        Assessment: [x] Progressing toward goals. Noted increased tolerance to exercises this date, able to complete more reps on left leg. Assessed goals this date, patient still very sensitive to touch on left leg still. Weakness still noted in left leg, increased strength on right leg. Continued IFC during exercises to increase tolerance to exercises. [] No change. [] Other:  [x] Patient would continue to benefit from skilled physical therapy services in order to: improve balance, gait, B LE strength, functional abilities, decrease risk of falls. STG: (to be met in 9 treatments) 1/28/22  1. ? Pain: B LE pain improve to 7/10 at max throughout the day--L LE 10/10, R LE 5-6/10   2. ? Strength: B LE strength improve to grossly 4-/5--R LE 4+/5, L 3+/5  3. ? Function: Patient to report improved sleep without waking due to B LE pain--NOT MET, no change  4. Patient able to walk without right knee buckling 50% of the steps or less--buckling more in left leg than right leg  5. Patient to be independent with home exercise program as demonstrated by performance with correct form without cues. --progress  6. Demonstrate Knowledge of fall prevention--MET, given 1/28/22  LTG: (to be met in 18 treatments)  1. B LE pain improve to 3/10 at max after strenuous day  2.  B LE strength improve to grossly 4/5  3. TUG score improve to 18.0 seconds or less  4. Patient to report no falls x 3 weeks or greater  5. Patient to ambulate with more narrow DELBERT, no right knee buckling, equal stride lengths. 6. LEFS score improve to 40% functionally impaired or less  7. Patient to report improved ability to do functional tasks around her home without having to rely on  as much. Patient goals: \"To see why I'm having pain\"    Pt. Education:  [x] Yes  [] No  [x] Reviewed Prior HEP/Ed  Method of Education: [x] Verbal  [x] Demo  [] Written  Comprehension of Education:  [x] Verbalizes understanding. [x] Demonstrates understanding. [x] Needs review. [] Demonstrates/verbalizes HEP/Ed previously given. Plan: [x] Continue current frequency toward long and short term goals. [x] Specific Instructions for subsequent treatments: Desensitization to B LE (tapping, rubbing, stroking, etc). Mat exs. Progress to standing exs. May need manual estim for quad activation (right>left). Balance exercises.   Needs gait training to control right knee from buckling      Time In: 8:58 am            Time Out: 9:47 am    Electronically signed by:  Lety Kaye PTA

## 2022-02-02 ENCOUNTER — HOSPITAL ENCOUNTER (OUTPATIENT)
Dept: PHYSICAL THERAPY | Age: 52
Setting detail: THERAPIES SERIES
Discharge: HOME | End: 2022-02-02
Payer: COMMERCIAL

## 2022-02-02 NOTE — FLOWSHEET NOTE
[x] [address]  [address]    [phone]  F: [phone]   [] [address]  [place] [address]   [address]  P: [phone]  F: [phone]  [] [place]  [address]  P: [phone]  F: [phone] [] [address]  P: [phone]  F: [phone]  [] [address]  [address] [address] [facility] [address]   [address]   [place]: [phone]  F: [phone]   [] [place] [address] [facility] [address]  [place]: [phone]   F: [phone]     Physical Therapy Cancel/No Show note    Date: 2022  Patient: Ely Jenkins  : 1970  MRN: [id number]    Cancels/No Shows to date:     For today's appointment patient:    [x]  Cancelled    [] Rescheduled appointment    [] No-show     Reason given by patient:    [x]  Patient ill    []  Conflicting appointment    [] No transportation      [] Conflict with work    [] No reason given    [] Weather related    [] [id number]    [] Other:      Comments:        [x] Next appointment was confirmed    Electronically signed by: Mayra Kruger PTA

## 2022-02-04 ENCOUNTER — HOSPITAL ENCOUNTER (OUTPATIENT)
Dept: PHYSICAL THERAPY | Age: 52
Setting detail: THERAPIES SERIES
Discharge: HOME | End: 2022-02-04
Payer: COMMERCIAL

## 2022-02-04 NOTE — FLOWSHEET NOTE
[x] [facility]) [facility] &  Therapy  [address]    [phone]  F: [phone]   [] [address]  [facility]   [address]  P: [phone]  F: [phone]  [] [place]  [address]  P: [phone]  F: [phone] [] [address]  P: [phone]  F: [phone]  [] [address]  [address] [address] [facility] [address]   [address]   [place]: [phone]  F: [phone]   [] [facility]  [address] [facility] [address]  [place]: [phone]   F: [phone]     Physical Therapy Cancel/No Show note    Date: 2022  Patient: Fortino Rosen  : 1970  MRN: [id number]    Cancels/No Shows to date:     For today's appointment patient:    [x]  Cancelled    [] Rescheduled appointment    [] No-show     Reason given by patient:    []  Patient ill    []  Conflicting appointment    [] No transportation      [] Conflict with work    [] No reason given    [x] Weather related    [] COVID-19    [] Other:      Comments:        [x] Next appointment was confirmed    Electronically signed by: Janit Fleischer, PTA

## 2022-02-09 ENCOUNTER — HOSPITAL ENCOUNTER (OUTPATIENT)
Dept: PHYSICAL THERAPY | Age: 52
Setting detail: THERAPIES SERIES
Discharge: HOME | End: 2022-02-09
Payer: COMMERCIAL

## 2022-02-09 NOTE — FLOWSHEET NOTE
[x] [facility]) [facility] &  Therapy  [address]    [phone]  F: [phone]   [] [address]  [facility]   [address]  P: [phone]  F: [phone]  [] [address]  [address]  P: [phone]  F: [phone] [] [address]  P: [phone]  F: [phone]  [] [address]  [address] [address] [facility] [address]   [address]   [place]: [phone]  F: [phone]   [] [facility]  [address] [facility] [address]  [place]: [phone]   F: [phone]     Physical Therapy Cancel/No Show note    Date: 2022  Patient: Abimbola Rodriguez  : 1970  MRN: [id number]    Cancels/No Shows to date: 30    For today's appointment patient:    [x]  Cancelled    [] Rescheduled appointment    [] No-show     Reason given by patient:    []  Patient ill    [x]  Conflicting appointment    [] No transportation      [] Conflict with work    [] No reason given    [] Weather related    [] [id number]    [] Other:      Comments:        [x] Next appointment was confirmed    Electronically signed by: Adilene Johnson PTA

## 2022-02-11 ENCOUNTER — HOSPITAL ENCOUNTER (OUTPATIENT)
Dept: PHYSICAL THERAPY | Age: 52
Setting detail: THERAPIES SERIES
Discharge: HOME | End: 2022-02-11
Payer: COMMERCIAL

## 2022-02-11 PROCEDURE — 97110 THERAPEUTIC EXERCISES: CPT

## 2022-02-11 PROCEDURE — G0283 ELEC STIM OTHER THAN WOUND: HCPCS

## 2022-02-11 NOTE — FLOWSHEET NOTE
[x] [facility]) [facility] CENTER &  Therapy  [address]  [phone]  F: [phone] [] [address]  [place] [address]   [address]  P: [phone]  F: [phone] [] [place]  [address]  P: [phone]  F: [phone] [] [address]  P: [phone]  F: [phone] [] [address]  [facility]   [address]   [place]: [phone]  F: [phone]      Physical Therapy Daily Treatment Note    Date:  2022  Patient Name:  Eduarda Pearce    :  1970  MRN: [id number]  Physician: Dr. Sharyle Fluke, APRN-CNP                               Insurance: [facility]- need to verify insurance on 21 when [facility] opens up  Medical Diagnosis: Bilateral leg weakness                Rehab Codes: R 26.0, R 26.81, R 26.89, M 62.81, Z 91.81, R 53.81  Onset date: 21               Next 's appt.: Not scheduled  Visit# / total visits: 10/18     Cancels/No Shows: 3/0    Subjective:    Pain:  [x] Yes  [] No Location: B legs Pain Rating: (0-10 scale) 5/10 Left, 0/10 Right  Pain altered Tx:  [x] No  [] Yes  Action:  Comments: Patient states pain in left knee is 5/10 today. Notes last week this is why she cancelled because of so much pain.        Objective:  Modalities: IFC (opiate) to left knee to decrease pain with exercises for 34 mins   Precautions:  Exercises:  Exercise Reps/ Time Weight/ Level Comments   Nustep 6 mins L3          Standing   At table with IFC on left knee   HR  10x     Hip abduction       Marching 6x     Hamstring curls      Sit to stands 5x           Seated      LAQs     15x R, 10x L     Marching  15xR / 10x L  Pain on the left    Heel slides  15x R/10x L     R hamstring stretch            Supine      Quad sets   15x R/  10x L 3 sec Towel under knee   Hamstring sets     15x R/5x L 3 sec Towel under knee pain after 5 reps   SAQs  15x R/ 10x L     Glute sets      20x 3 sec    Bridge   Unable to bent left knee   Adduction sets      7x 3 sec Legs straight ball between knees   Left heel slides   15x R/5x L     SLR    15x R/  5x L     Hip abduction    15x R/ 5x L  Orange slide tube          Sidelying      Other: Patient very sore/painful with exercises. Sensitive to the touch. Treatment Charges: Mins Units   [x]  Modalities--IFC with exercise 34 1   [x]  Ther Exercise 40 3   []  Manual Therapy     []  Ther Activities     []  Aquatics     []  Vasocompression     []  Other     Total Treatment time 40 mins        Assessment: [x] Progressing toward goals. Completed exercises per tolerance this date, pain in left knee with some exercises therefore decreased reps. Trial standing exercises with IFC to increase functional mobility. Cues for exercise techniques and progressions of exercises with fairly good carryover. Patient notes some increased pain in left knee after exercises, rating 6-7/10. [] No change. [] Other:  [x] Patient would continue to benefit from skilled physical therapy services in order to: improve balance, gait, B LE strength, functional abilities, decrease risk of falls. STG: (to be met in 9 treatments) 1/28/22  1. ? Pain: B LE pain improve to 7/10 at max throughout the day--L LE 10/10, R LE 5-6/10   2. ? Strength: B LE strength improve to grossly 4-/5--R LE 4+/5, L 3+/5  3. ? Function: Patient to report improved sleep without waking due to B LE pain--NOT MET, no change  4. Patient able to walk without right knee buckling 50% of the steps or less--buckling more in left leg than right leg  5. Patient to be independent with home exercise program as demonstrated by performance with correct form without cues. --progress  6.  Demonstrate Knowledge of fall prevention--MET, given 1/28/22  LTG: (to be met in 18 treatments)  1. B LE pain improve to 3/10 at max after strenuous day  2. B LE strength improve to grossly 4/5  3. TUG score improve to 18.0 seconds or less  4. Patient to report no falls x 3 weeks or greater  5. Patient to ambulate with more narrow DELBERT, no right knee buckling, equal stride lengths. 6. LEFS score improve to 40% functionally impaired or less  7. Patient to report improved ability to do functional tasks around her home without having to rely on  as much. Patient goals: \"To see why I'm having pain\"    Pt. Education:  [x] Yes  [] No  [x] Reviewed Prior HEP/Ed  Method of Education: [x] Verbal  [x] Demo  [] Written  Comprehension of Education:  [x] Verbalizes understanding. [x] Demonstrates understanding. [x] Needs review. [] Demonstrates/verbalizes HEP/Ed previously given. Plan: [x] Continue current frequency toward long and short term goals. [x] Specific Instructions for subsequent treatments: Desensitization to B LE (tapping, rubbing, stroking, etc). Mat exs. Progress to standing exs. May need manual estim for quad activation (right>left). Balance exercises.   Needs gait training to control right knee from buckling      Time In: 8:51 am            Time Out: 9:37 am    Electronically signed by:  Jenny Watkins PTA

## 2022-02-16 ENCOUNTER — HOSPITAL ENCOUNTER (OUTPATIENT)
Dept: PHYSICAL THERAPY | Age: 52
Setting detail: THERAPIES SERIES
Discharge: HOME | End: 2022-02-16
Payer: COMMERCIAL

## 2022-02-16 PROCEDURE — 97110 THERAPEUTIC EXERCISES: CPT

## 2022-02-16 PROCEDURE — G0283 ELEC STIM OTHER THAN WOUND: HCPCS

## 2022-02-16 NOTE — FLOWSHEET NOTE
[x] [facility]) [facility] CENTER &  Therapy  [address]  [phone]  F: [phone] [] [address]  [place] [address]   [address]  P: [phone]  F: [phone] [] [address]  [address] [facility] [address] [facility] [address]  P: [phone]  F: [phone] [] [address]  P: [phone]  F: [phone] [] [address]  [facility]   [address]   [place]: [phone]  F: [phone]      Physical Therapy Daily Treatment Note    Date:  2022  Patient Name:  Reno Ramirez    :  1970  MRN: [id number]  Physician: Dr. Rhonda Fairchild, APRN-CNP                               Insurance: [facility]- need to verify insurance on 21 when [facility] opens up  Medical Diagnosis: Bilateral leg weakness                Rehab Codes: R 26.0, R 26.81, R 26.89, M 62.81, Z 91.81, R 53.81  Onset date: 21               Next 's appt.: Not scheduled  Visit# / total visits:      Cancels/No Shows: 3/0    Subjective:    Pain:  [x] Yes  [] No Location: B legs Pain Rating: (0-10 scale) 7/10 Left, 0/10 Right  Pain altered Tx:  [x] No  [] Yes  Action:  Comments: Patient arrives noting increase pain in left knee. Took Aleve prior to coming but is not helping.       Objective:  Modalities: IFC (opiate) to left knee to decrease pain with exercises for 26 mins   Precautions:  Exercises:  Exercise Reps/ Time Weight/ Level Comments   Nustep 6 mins L3          Standing   At table with IFC on left knee   HR       Hip abduction       Marching      Hamstring curls      Sit to stands            Seated      LAQs     20x R,5x L     Marching   20xR / 5x L  Pain on the left    Heel slides   20x R/3x L     R hamstring stretch            Supine      Quad sets    20x R/5x L 3 sec Towel under knee   Hamstring sets    20x R/4x L 3 sec Towel under knee pain after 4 reps   SAQs 15x R/2x L     Glute sets      20x 3 sec    Bridge   Unable to bent left knee   Adduction sets      5x 3 sec Legs straight ball between knees   Left heel slides     20x R/1x L     SLR    15x R/  5x L     Hip abduction    20x R/ 3x L  Orange slide tube          Sidelying      Other: Patient very sore/painful with exercises. Sensitive to the touch. Treatment Charges: Mins Units   [x]  Modalities--IFC with exercise 26 1   [x]  Ther Exercise 33 2   []  Manual Therapy     []  Ther Activities     []  Aquatics     []  Vasocompression     []  Other     Total Treatment time 33 mins        Assessment: [x] Progressing toward goals. Completed exercises per log this date as patient tolerated. Poor tolerance to exercises on left leg this date secondary to pain. Cues for exercise techniques and progressions with fairly good carryover. No standing exercises secondary to pain. [] No change. [] Other:  [x] Patient would continue to benefit from skilled physical therapy services in order to: improve balance, gait, B LE strength, functional abilities, decrease risk of falls. STG: (to be met in 9 treatments) 1/28/22  1. ? Pain: B LE pain improve to 7/10 at max throughout the day--L LE 10/10, R LE 5-6/10   2. ? Strength: B LE strength improve to grossly 4-/5--R LE 4+/5, L 3+/5  3. ? Function: Patient to report improved sleep without waking due to B LE pain--NOT MET, no change  4. Patient able to walk without right knee buckling 50% of the steps or less--buckling more in left leg than right leg  5. Patient to be independent with home exercise program as demonstrated by performance with correct form without cues. --progress  6. Demonstrate Knowledge of fall prevention--MET, given 1/28/22  LTG: (to be met in 18 treatments)  1. B LE pain improve to 3/10 at max after strenuous day  2. B LE strength improve to grossly 4/5  3.  TUG score improve to 18.0 seconds or less  4. Patient to report no falls x 3 weeks or greater  5. Patient to ambulate with more narrow DELBERT, no right knee buckling, equal stride lengths. 6. LEFS score improve to 40% functionally impaired or less  7. Patient to report improved ability to do functional tasks around her home without having to rely on  as much. Patient goals: \"To see why I'm having pain\"    Pt. Education:  [x] Yes  [] No  [x] Reviewed Prior HEP/Ed  Method of Education: [x] Verbal  [x] Demo  [] Written  Comprehension of Education:  [x] Verbalizes understanding. [x] Demonstrates understanding. [x] Needs review. [] Demonstrates/verbalizes HEP/Ed previously given. Plan: [x] Continue current frequency toward long and short term goals. [x] Specific Instructions for subsequent treatments: Desensitization to B LE (tapping, rubbing, stroking, etc). Mat exs. Progress to standing exs. May need manual estim for quad activation (right>left). Balance exercises.   Needs gait training to control right knee from buckling      Time In: 9:00 am            Time Out: 9:39 am    Electronically signed by:  Vinson Gowers, PTA

## 2022-02-18 ENCOUNTER — HOSPITAL ENCOUNTER (OUTPATIENT)
Dept: PHYSICAL THERAPY | Age: 52
Setting detail: THERAPIES SERIES
Discharge: HOME | End: 2022-02-18
Payer: COMMERCIAL

## 2022-02-18 NOTE — FLOWSHEET NOTE
[x] [facility]) [facility] &  Therapy  [address]    [phone]  F: [phone]   [] [address]  [facility]   [address]  P: [phone]  F: [phone]  [] [address]  [address]  P: [phone]  F: [phone] [] [address]  P: [phone]  F: [phone]  [] [address]  [address] [address] [facility] [address]   [address]   [place]: [phone]  F: [phone]   [] [facility]  [address] [facility] [address]  [place]: [phone]   F: [phone]     Physical Therapy Cancel/No Show note    Date: 2022  Patient: Donette Frankel  : 1970  MRN: [id number]    Cancels/No Shows to date: 4    Totals: 5/0    For today's appointment patient:    [x]  Cancelled    [] Rescheduled appointment    [] No-show     Reason given by patient:    [x]  Patient ill    []  Conflicting appointment    [] No transportation      [] Conflict with work    [] No reason given    [] Weather related    [] [id number]    [] Other:      Comments:        [x] Next appointment was confirmed    Electronically signed by: Humberto Herzog PTA

## 2022-02-23 ENCOUNTER — HOSPITAL ENCOUNTER (OUTPATIENT)
Dept: PHYSICAL THERAPY | Age: 52
Setting detail: THERAPIES SERIES
Discharge: HOME | End: 2022-02-23
Payer: COMMERCIAL

## 2022-02-23 PROCEDURE — 97110 THERAPEUTIC EXERCISES: CPT

## 2022-02-23 NOTE — FLOWSHEET NOTE
[x] [facility]) [facility] CENTER &  Therapy  [address]  [phone]  F: [phone] [] [address]  [place] [address]   [address]  P: [phone]  F: [phone] [] [place]  [address]  P: [phone]  F: [phone] [] [address]  P: [phone]  F: [phone] [] [address]  [facility]   [address]   [place]: [phone]  F: [phone]      Physical Therapy Daily Treatment Note    Date:  2022  Patient Name:  Ze Aguirre    :  1970  MRN: [id number]  Physician: Dr. Joel Murillo, APRN-CNP                               Insurance: [facility]- need to verify insurance on 21 when [facility] opens up  Medical Diagnosis: Bilateral leg weakness                Rehab Codes: R 26.0, R 26.81, R 26.89, M 62.81, Z 91.81, R 53.81  Onset date: 21               Next 's appt.: Not scheduled  Visit# / total visits:      Cancels/No Shows: 4/0    Subjective:  Pt arrives over 10 minutes late but able to accommodate this date. Presents with reduced L knee pain compared to last session stating she took Advil prior to appt. Pt notes pain remains about the same depending on the day. Reports she has an appt Friday and will discuss getting a rollator to improve stability.    Pain:  [x] Yes  [] No Location: B legs Pain Rating: (0-10 scale) 5/10 Left, 0/10 Right  Pain altered Tx:  [x] No  [] Yes  Action:  Comments:     Objective:  Modalities: IFC (opiate) to left knee to decrease pain with exercises for 25 mins   Precautions:  Exercises:  Exercise Reps/ Time Weight/ Level Comments   Nustep 5 mins L3          Standing   At table with IFC on left knee   HR       Hip abduction       Marching      Hamstring curls      Sit to stands            Seated      LAQs     20x R,7x L     Marching   20xR / 5x L  Pain on the left    Heel slides   20x R/3x L     R hamstring stretch            Supine      Quad sets    20x R/5x L 3 sec Towel under knee   Hamstring sets    20x R/4x L 3 sec Towel under knee pain & ms spasm after 4 reps   SAQs 15x R/2x L     Glute sets      20x 3 sec    Bridge   Unable to bent left knee   Adduction sets      6x 3 sec Legs straight ball between knees   Left heel slides     20x R/1x L     SLR    10x2 R/  5x L     Hip abduction    20x R/ 3x L  Orange slide tube          Sidelying      Other: Patient very sore/painful with exercises. Sensitive to the touch. Treatment Charges: Mins Units   [x]  Modalities--IFC with exercise 25 -   [x]  Ther Exercise 33 2   []  Manual Therapy     []  Ther Activities     []  Aquatics     []  Vasocompression     []  Other     Total Treatment time 33 mins 2       Assessment: [x] Progressing toward goals. Continued with application of IFC to L knee during exercises for pain management. Attempted to add more reps with LLE, however, limited d/t increased pain. Progressed reps for RLE to tolerance w/o complaints. Pt notes irritation in L knee post treatment. [] No change. [] Other:  [x] Patient would continue to benefit from skilled physical therapy services in order to: improve balance, gait, B LE strength, functional abilities, decrease risk of falls. STG: (to be met in 9 treatments) 1/28/22  1. ? Pain: B LE pain improve to 7/10 at max throughout the day--L LE 10/10, R LE 5-6/10   2. ? Strength: B LE strength improve to grossly 4-/5--R LE 4+/5, L 3+/5  3. ? Function: Patient to report improved sleep without waking due to B LE pain--NOT MET, no change  4. Patient able to walk without right knee buckling 50% of the steps or less--buckling more in left leg than right leg  5.  Patient to be independent with home exercise program as demonstrated by performance with correct form without cues.--progress  6. Demonstrate Knowledge of fall prevention--MET, given 1/28/22  LTG: (to be met in 18 treatments)  1. B LE pain improve to 3/10 at max after strenuous day  2. B LE strength improve to grossly 4/5  3. TUG score improve to 18.0 seconds or less  4. Patient to report no falls x 3 weeks or greater  5. Patient to ambulate with more narrow DELBERT, no right knee buckling, equal stride lengths. 6. LEFS score improve to 40% functionally impaired or less   7. Patient to report improved ability to do functional tasks around her home without having to rely on  as much. Patient goals: \"To see why I'm having pain\"     Pt. Education:  [x] Yes  [] No  [x] Reviewed Prior HEP/Ed  Method of Education: [x] Verbal  [x] Demo  [] Written  Comprehension of Education:  [x] Verbalizes understanding. [x] Demonstrates understanding. [x] Needs review. [] Demonstrates/verbalizes HEP/Ed previously given. Plan: [x] Continue current frequency toward long and short term goals. [x] Specific Instructions for subsequent treatments: Desensitization to B LE (tapping, rubbing, stroking, etc). Mat exs. Progress to standing exs. May need manual estim for quad activation (right>left). Balance exercises.   Needs gait training to control right knee from buckling      Time In: 9:12 am            Time Out: 9:45 am    Electronically signed by:  Carrie Montoya PTA

## 2022-02-25 ENCOUNTER — OFFICE VISIT (OUTPATIENT)
Dept: PRIMARY CARE CLINIC | Age: 52
End: 2022-02-25
Payer: COMMERCIAL

## 2022-02-25 ENCOUNTER — HOSPITAL ENCOUNTER (OUTPATIENT)
Dept: PHYSICAL THERAPY | Age: 52
Setting detail: THERAPIES SERIES
Discharge: HOME | End: 2022-02-25
Payer: COMMERCIAL

## 2022-02-25 VITALS
WEIGHT: 163 LBS | DIASTOLIC BLOOD PRESSURE: 102 MMHG | SYSTOLIC BLOOD PRESSURE: 147 MMHG | HEART RATE: 99 BPM | OXYGEN SATURATION: 99 % | BODY MASS INDEX: 32.92 KG/M2

## 2022-02-25 DIAGNOSIS — M54.50 LUMBAR PAIN: ICD-10-CM

## 2022-02-25 DIAGNOSIS — R29.898 BILATERAL LEG WEAKNESS: Primary | ICD-10-CM

## 2022-02-25 DIAGNOSIS — Z79.4 DIABETES MELLITUS TYPE 2, INSULIN DEPENDENT (HCC): ICD-10-CM

## 2022-02-25 DIAGNOSIS — I10 HTN (HYPERTENSION), BENIGN: ICD-10-CM

## 2022-02-25 DIAGNOSIS — E11.9 DIABETES MELLITUS TYPE 2, INSULIN DEPENDENT (HCC): ICD-10-CM

## 2022-02-25 DIAGNOSIS — M51.36 DEGENERATION OF L4-L5 INTERVERTEBRAL DISC: ICD-10-CM

## 2022-02-25 LAB — HBA1C MFR BLD: 11.3 %

## 2022-02-25 PROCEDURE — 3046F HEMOGLOBIN A1C LEVEL >9.0%: CPT | Performed by: NURSE PRACTITIONER

## 2022-02-25 PROCEDURE — 83036 HEMOGLOBIN GLYCOSYLATED A1C: CPT | Performed by: NURSE PRACTITIONER

## 2022-02-25 PROCEDURE — G8417 CALC BMI ABV UP PARAM F/U: HCPCS | Performed by: NURSE PRACTITIONER

## 2022-02-25 PROCEDURE — 99214 OFFICE O/P EST MOD 30 MIN: CPT | Performed by: NURSE PRACTITIONER

## 2022-02-25 PROCEDURE — G8427 DOCREV CUR MEDS BY ELIG CLIN: HCPCS | Performed by: NURSE PRACTITIONER

## 2022-02-25 PROCEDURE — G8484 FLU IMMUNIZE NO ADMIN: HCPCS | Performed by: NURSE PRACTITIONER

## 2022-02-25 PROCEDURE — 2022F DILAT RTA XM EVC RTNOPTHY: CPT | Performed by: NURSE PRACTITIONER

## 2022-02-25 PROCEDURE — 1036F TOBACCO NON-USER: CPT | Performed by: NURSE PRACTITIONER

## 2022-02-25 PROCEDURE — 97110 THERAPEUTIC EXERCISES: CPT

## 2022-02-25 RX ORDER — ASPIRIN 81 MG/1
81 TABLET ORAL DAILY
Qty: 30 TABLET | Refills: 11 | Status: SHIPPED | OUTPATIENT
Start: 2022-02-25

## 2022-02-25 RX ORDER — GABAPENTIN 400 MG/1
400 CAPSULE ORAL 3 TIMES DAILY
Qty: 90 CAPSULE | Refills: 2 | Status: SHIPPED | OUTPATIENT
Start: 2022-02-25 | End: 2022-09-07 | Stop reason: SDUPTHER

## 2022-02-25 RX ORDER — ORAL SEMAGLUTIDE 3 MG/1
3 TABLET ORAL DAILY
Qty: 90 TABLET | Refills: 1 | Status: SHIPPED | OUTPATIENT
Start: 2022-02-25 | End: 2022-06-01 | Stop reason: SDDI

## 2022-02-25 RX ORDER — TIZANIDINE 4 MG/1
4 TABLET ORAL NIGHTLY PRN
Qty: 40 TABLET | Refills: 1 | Status: SHIPPED | OUTPATIENT
Start: 2022-02-25 | End: 2022-05-25

## 2022-02-25 RX ORDER — LOSARTAN POTASSIUM AND HYDROCHLOROTHIAZIDE 12.5; 5 MG/1; MG/1
1 TABLET ORAL DAILY
Qty: 90 TABLET | Refills: 1 | Status: SHIPPED | OUTPATIENT
Start: 2022-02-25 | End: 2022-09-07 | Stop reason: SDUPTHER

## 2022-02-25 ASSESSMENT — ENCOUNTER SYMPTOMS
COUGH: 0
SHORTNESS OF BREATH: 0

## 2022-02-25 NOTE — PROGRESS NOTES
Visit Information    Have you changed or started any medications since your last visit including any over-the-counter medicines, vitamins, or herbal medicines? no   Are you having any side effects from any of your medications? -  no  Have you stopped taking any of your medications? Is so, why? -  no    Have you seen any other physician or provider since your last visit? No  Have you had any other diagnostic tests since your last visit? No  Have you been seen in the emergency room and/or had an admission to a hospital since we last saw you? No  Have you had your routine dental cleaning in the past 6 months? no    Have you activated your [employer] account? If not, what are your barriers?  Yes     Patient Care Team:  KELLEN Vincent CNP as PCP - General (Family Nurse Practitioner)  KELLEN Vincent CNP as PCP - [facility]  Mode Viramontes (Optometry)  Oswaldo Gomez MD as Consulting Physician (Endocrinology)  Bryan Caal DO as Resident (Obstetrics & Gynecology)  KELLEN Lorenzo CNP (Family Medicine)    Medical History Review  Past Medical, Family, and Social History reviewed and does contribute to the patient presenting condition    Health Maintenance   Topic Date Due    Hepatitis B vaccine (1 of 3 - Risk 3-dose series) Never done    Diabetic retinal exam  05/17/2015    Colorectal Cancer Screen  Never done    COVID-19 Vaccine (2 - Moderna 3-dose series) 06/15/2021    Flu vaccine (1) 09/01/2021    A1C test (Diabetic or Prediabetic)  02/12/2022    Diabetic microalbuminuria test  04/02/2022    Lipid screen  04/02/2022    Depression Screen  04/02/2022    Potassium monitoring  04/02/2022    Creatinine monitoring  04/02/2022    Diabetic foot exam  11/12/2022    Cervical cancer screen  03/29/2026    DTaP/Tdap/Td vaccine (3 - Td or Tdap) 01/11/2028    Pneumococcal 0-64 years Vaccine (2 of 2 - PPSV23) 12/20/2037    Hepatitis C screen  Completed    HIV screen Completed    Hepatitis A vaccine  Aged Out    Hib vaccine  Aged Out    Meningococcal (ACWY) vaccine  Aged Out

## 2022-02-25 NOTE — PROGRESS NOTES
Jhoan Harris (:  1970) is a 54 y.o. female,Established patient, here for evaluation of the following chief complaint(s):  Check-Up (Need walker )         ASSESSMENT/PLAN:  1. Bilateral leg weakness  -     DME Order for Micah Mckeon as [address], CNP, Neurosurgery, Laureen Kennedy  2. HTN (hypertension), benign  -     aspirin EC 81 MG EC tablet; Take 1 tablet by mouth daily, Disp-30 tablet, R-11Normal  3. Diabetes mellitus type 2, insulin dependent (HCC)  -     gabapentin (NEURONTIN) 400 MG capsule; Take 1 capsule by mouth 3 times daily for 30 days. , Disp-90 capsule, R-2Normal  -     POCT glycosylated hemoglobin (Hb A1C)  -     Semaglutide (RYBELSUS) 3 MG TABS; Take 3 mg by mouth daily, Disp-90 tablet, R-1Normal  4. Lumbar pain  -     tiZANidine (ZANAFLEX) 4 MG tablet; Take 1 tablet by mouth nightly as needed (back/leg pain), Disp-40 tablet, R-1Normal  5. Degeneration of L4-L5 intervertebral disc  -     tiZANidine (ZANAFLEX) 4 MG tablet; Take 1 tablet by mouth nightly as needed (back/leg pain), Disp-40 tablet, R-1Normal    Pharmacy called twice to check on rybelsus- unable to get through- will reorder- discussed with patient if they do not give her from pharmacy, she needs ot let me know, pt agreeable. No follow-ups on file. Subjective   SUBJECTIVE/OBJECTIVE:  LADONNA   Arnold Stringer states she is still having leg pain, sometimes the pain causes her to fall, she is asking for a walker. She is completeing pt and states her therapist her right leg is getting stronger. Arnold Stringer states the left leg spasms sometimes. Per last office note:  Pt reports bilateral legs weakness. Pain occasionally from hips to feet. No color- no swollen. No back pain. No numbness- sometimes tingling in feet/legs. This has been occurring since September, it has gotten worse. Gabapentin, tizanidine and ibuprofen help the pain. The pain is more frequent than the weakness.   Her weight is down but her sugars are very uncontrolled. Pt states this problem has actually been going on since 2020. Dm- not checking sugars. Just won't eat certain things if she thinks her sugar is high. Insurance wouldn't cover freestyle julissa. a1c is down to 11. 3.      htn- has been out of bp meds for two days. Not checking bp at home. Review of Systems   Constitutional: Negative for chills and fever. Respiratory: Negative for cough and shortness of breath. Cardiovascular: Negative for chest pain, palpitations and leg swelling. Wt Readings from Last 3 Encounters:   02/25/22 163 lb (73.9 kg)   11/12/21 157 lb (71.2 kg)   09/15/21 162 lb (73.5 kg)         Objective   Physical Exam  Constitutional:       Appearance: She is well-developed. HENT:      Right Ear: External ear normal.      Left Ear: External ear normal.      Nose: Nose normal.   Cardiovascular:      Rate and Rhythm: Normal rate and regular rhythm. Heart sounds: Normal heart sounds, S1 normal and S2 normal.   Pulmonary:      Effort: Pulmonary effort is normal. No respiratory distress. Breath sounds: Normal breath sounds. Musculoskeletal:         General: No deformity. Normal range of motion. Cervical back: Full passive range of motion without pain and normal range of motion. Skin:     General: Skin is warm and dry. Neurological:      Mental Status: She is alert and oriented to person, place, and time. An electronic signature was used to authenticate this note.     --KELLEN Ricardo - CNP

## 2022-02-25 NOTE — FLOWSHEET NOTE
[x] [facility]) [facility] &  Therapy  [address]  [phone]  F: [phone] [] [address]  [address]   [address]  P: [phone]  F: [phone] [] [address] &  Therapy  [address]  P: [phone]  F: [phone] [] [address]  P: [phone]  F: [phone] [] [address]  [facility]   [address]   [place]: [phone]  F: [phone]      Physical Therapy Daily Treatment Note    Date:  2022  Patient Name:  Fortino Rosen    :  1970  MRN: [id number]  Physician: Dr. Hubert Zapata, APRN-CNP                               Insurance: [facility]- need to verify insurance on 21 when [facility] opens up  Medical Diagnosis: Bilateral leg weakness                Rehab Codes: R 26.0, R 26.81, R 26.89, M 62.81, Z 91.81, R 53.81  Onset date: 21               Next 's appt.: Not scheduled  Visit# / total visits:      Cancels/No Shows: 4/0    Subjective:  Pt arrives reporting no changes in pain levels. Pt reports she did take Advil prior to treatment. Pt has appointment with MD later today to discuss a rolling walker for mobility.        Pain:  [x] Yes  [] No Location: B legs Pain Rating: (0-10 scale) 5/10 Left, 0/10 Right  Pain altered Tx:  [x] No  [] Yes  Action:  Comments:     Objective:  Modalities: IFC (opiate) to left knee to decrease pain with exercises for 25 mins   Precautions:  Exercises:  Exercise Reps/ Time Weight/ Level Comments   Nustep 5 mins L3          Standing   Added 2/25     HR  10x      Hip abduction  10xea      Marching 10x RLE   5x  LLE       Hamstring curls 10x RLE   5x  LLE     Sit to stands            Seated    At table with IFC on left knee   LAQs     20x R,7x L     Marching   20xR / 5x L  Pain on the left    Heel slides   20x R/3x L     R hamstring stretch            Supine    At table with IFC on left knee   Quad sets    20x R/5x L 3 sec Towel under knee   Hamstring sets    20x R/4x L 3 sec Towel under knee pain & ms spasm after 4 reps   SAQs 15x R/2x L     Glute sets      20x 3 sec    Bridge   Unable to bent left knee   Adduction sets      6x 3 sec Legs straight ball between knees   Left heel slides     20x R/1x L     SLR    10x2 R/  5x L     Hip abduction    20x R/ 3x L  Orange slide tube          Sidelying      Other: Patient very sore/painful with exercises. Sensitive to the touch. Treatment Charges: Mins Units   [x]  Modalities--IFC with exercise 25 -   [x]  Ther Exercise 45 3   []  Manual Therapy     []  Ther Activities     []  Aquatics     []  Vasocompression     []  Other     Total Treatment time 45 mins 3       Assessment: [x] Progressing toward goals. Continued with IFC during supine and seated exercises for pain control. Pt continues to have increase sensativitive and irrtation in L knee throughout treatment. Able to add some standing exercises with limited reps completed with L LE. Pt requires two seated rest bresks secondary to muscular spasms and \"buckling\" in LLE during standing exercises. Will continue to monitor pt response to treatment and progress as able. [] No change. [] Other:   [x] Patient would continue to benefit from skilled physical therapy services in order to: improve balance, gait, B LE strength, functional abilities, decrease risk of falls. STG: (to be met in 9 treatments) 1/28/22  1. ? Pain: B LE pain improve to 7/10 at max throughout the day--L LE 10/10, R LE 5-6/10   2. ? Strength: B LE strength improve to grossly 4-/5--R LE 4+/5, L 3+/5  3. ? Function: Patient to report improved sleep without waking due to B LE pain--NOT MET, no change  4.  Patient able to walk without right knee buckling 50% of the steps or less--buckling more in left leg than right leg  5. Patient to be independent with home exercise program as demonstrated by performance with correct form without cues. --progress  6. Demonstrate Knowledge of fall prevention--MET, given 1/28/22  LTG: (to be met in 18 treatments)  1. B LE pain improve to 3/10 at max after strenuous day  2. B LE strength improve to grossly 4/5  3. TUG score improve to 18.0 seconds or less  4. Patient to report no falls x 3 weeks or greater  5. Patient to ambulate with more narrow DELBERT, no right knee buckling, equal stride lengths. 6. LEFS score improve to 40% functionally impaired or less   7. Patient to report improved ability to do functional tasks around her home without having to rely on  as much. Patient goals: \"To see why I'm having pain\"     Pt. Education:  [] Yes  [x] No  [] Reviewed Prior HEP/Ed  Method of Education: [] Verbal  [] Demo  [] Written  Comprehension of Education:  [] Verbalizes understanding. [] Demonstrates understanding. [] Needs review. [] Demonstrates/verbalizes HEP/Ed previously given. Plan: [x] Continue current frequency toward long and short term goals. [x] Specific Instructions for subsequent treatments: Desensitization to B LE (tapping, rubbing, stroking, etc). Mat exs. Progress to standing exs. May need manual estim for quad activation (right>left). Balance exercises.   Needs gait training to control right knee from buckling      Time In: 9:00 am            Time Out: 9:50 am    Electronically signed by:  Malou Omalley PTA

## 2022-03-02 ENCOUNTER — HOSPITAL ENCOUNTER (OUTPATIENT)
Dept: PHYSICAL THERAPY | Age: 52
Setting detail: THERAPIES SERIES
Discharge: HOME | End: 2022-03-02
Payer: COMMERCIAL

## 2022-03-02 PROCEDURE — G0283 ELEC STIM OTHER THAN WOUND: HCPCS

## 2022-03-02 PROCEDURE — 97110 THERAPEUTIC EXERCISES: CPT

## 2022-03-02 NOTE — FLOWSHEET NOTE
[x] [facility]) [facility] &  Therapy  [address]  [phone]  F: [phone] [] [address]  [address]   [address]  P: [phone]  F: [phone] [] [place]  [address]  P: [phone]  F: [phone] [] [address]  P: [phone]  F: [phone] [] [address]  [facility]   [address]   [place]: [phone]  F: [phone]      Physical Therapy Daily Treatment Note    Date:  3/2/2022  Patient Name:  Cary Aguero    :  1970  MRN: [id number]  Physician: Dr. Daniela Coleman, APRN-CNP                               Insurance: [facility]- need to verify insurance on 21 when [facility] opens up  Medical Diagnosis: Bilateral leg weakness                Rehab Codes: R 26.0, R 26.81, R 26.89, M 62.81, Z 91.81, R 53.81  Onset date: 21               Next 's appt.: Not scheduled  Visit# / total visits:      Cancels/No Shows: 4/0    Subjective:  Pain:  [x] Yes  [] No Location: B legs Pain Rating: (0-10 scale) 6/10 Left, 0/10 Right  Pain altered Tx:  [x] No  [] Yes  Action:  Comments: Patient states left knee pain is 6/10 today. Notes she seen doctor and got an order for a rolling walker but has not picked one up yet.       Objective:  Modalities: IFC (opiate) to left knee to decrease pain with exercises for 27 mins; 11 mins with hot pack on left knee   Precautions:  Exercises:  Exercise Reps/ Time Weight/ Level Comments   Nustep  L3          Standing       HR       Hip abduction       Marching      Hamstring curls      Sit to stands            Seated    At table with IFC on left knee   LAQs   20x R,10x L     Marching   20xR / 5x L  Pain on the left    Heel slides  20x R/5x L     R hamstring stretch Supine    At table with IFC on left knee   Quad sets    20x R/5x L 3 sec Towel under knee   Hamstring sets     20x R/5x L 3 sec Towel under knee pain & ms spasm after 4 reps   SAQs   20x R/10x L     Glute sets     20x 3 sec    Bridge   Unable to bent left knee   Adduction sets       10x 3 sec Legs straight ball between knees   Left heel slides    15x R/5x L     SLR     15 R/  5x L     Hip abduction   15x R/ 5x L  Orange slide tube          Sidelying      Other: Patient very sore/painful with exercises. Sensitive to the touch. Treatment Charges: Mins Units   [x]  Modalities--IFC with exercise 32 1   [x]  Ther Exercise 27 2   []  Manual Therapy     []  Ther Activities     []  Aquatics     []  Vasocompression     []  Other     Total Treatment time 27 mins        Assessment: [x] Progressing toward goals. Completed exercises per log as patient tolerated. Difficulty completing full reps on left leg. Patient reports left knee is irritated after exercises. Trial hot pack and IFC for 11 minutes after treatment to decrease pain. [] No change. [] Other:   [x] Patient would continue to benefit from skilled physical therapy services in order to: improve balance, gait, B LE strength, functional abilities, decrease risk of falls. STG: (to be met in 9 treatments) 1/28/22  1. ? Pain: B LE pain improve to 7/10 at max throughout the day--L LE 10/10, R LE 5-6/10   2. ? Strength: B LE strength improve to grossly 4-/5--R LE 4+/5, L 3+/5  3. ? Function: Patient to report improved sleep without waking due to B LE pain--NOT MET, no change  4. Patient able to walk without right knee buckling 50% of the steps or less--buckling more in left leg than right leg  5. Patient to be independent with home exercise program as demonstrated by performance with correct form without cues. --progress  6. Demonstrate Knowledge of fall prevention--MET, given 1/28/22  LTG: (to be met in 18 treatments)  1.  B LE pain improve to 3/10 at max after strenuous day  2. B LE strength improve to grossly 4/5  3. TUG score improve to 18.0 seconds or less  4. Patient to report no falls x 3 weeks or greater  5. Patient to ambulate with more narrow DELBERT, no right knee buckling, equal stride lengths. 6. LEFS score improve to 40% functionally impaired or less   7. Patient to report improved ability to do functional tasks around her home without having to rely on  as much. Patient goals: \"To see why I'm having pain\"     Pt. Education:  [] Yes  [x] No  [] Reviewed Prior HEP/Ed  Method of Education: [] Verbal  [] Demo  [] Written  Comprehension of Education:  [] Verbalizes understanding. [] Demonstrates understanding. [] Needs review. [] Demonstrates/verbalizes HEP/Ed previously given. Plan: [x] Continue current frequency toward long and short term goals. [x] Specific Instructions for subsequent treatments: Desensitization to B LE (tapping, rubbing, stroking, etc). Mat exs. Progress to standing exs. May need manual estim for quad activation (right>left). Balance exercises.   Needs gait training to control right knee from buckling      Time In: 9:15 am            Time Out: 9:55 am    Electronically signed by:  Nancy Sawyer PTA

## 2022-03-04 ENCOUNTER — HOSPITAL ENCOUNTER (OUTPATIENT)
Dept: PHYSICAL THERAPY | Age: 52
Setting detail: THERAPIES SERIES
Discharge: HOME | End: 2022-03-04
Payer: COMMERCIAL

## 2022-03-04 ENCOUNTER — TELEPHONE (OUTPATIENT)
Dept: PRIMARY CARE CLINIC | Age: 52
End: 2022-03-04

## 2022-03-04 PROCEDURE — 97110 THERAPEUTIC EXERCISES: CPT

## 2022-03-04 PROCEDURE — G0283 ELEC STIM OTHER THAN WOUND: HCPCS

## 2022-03-04 NOTE — FLOWSHEET NOTE
[x] [facility]) [facility] &  Therapy  [address]  [phone]  F: [phone] [] [address]  [address]   [address]  P: [phone]  F: [phone] [] [place]  [address]  P: [phone]  F: [phone] [] [address]  P: [phone]  F: [phone] [] [address]  [facility]   [address]   [place]: [phone]  F: [phone]      Physical Therapy Daily Treatment Note    Date:  3/4/2022  Patient Name:  Reno Ramirez    :  1970  MRN: [id number]  Physician: Dr. Rhonda Fairchild, APRN-CNP                               Insurance: [facility]- need to verify insurance on 21 when [facility] opens up  Medical Diagnosis: Bilateral leg weakness                Rehab Codes: R 26.0, R 26.81, R 26.89, M 62.81, Z 91.81, R 53.81  Onset date: 21               Next 's appt.: Not scheduled  Visit# / total visits: 15/18     Cancels/No Shows: 4/0    Subjective:  Pain:  [x] Yes  [] No Location: B legs Pain Rating: (0-10 scale) 7/10 Left, 7/10 Right  Pain altered Tx:  [] No  [x] Yes  Action: exercises/reps per tolerance  Comments: Patient states pain is in both knees is 7/10 today. Arrives with bilateral knees buckling this date.     Objective:  Modalities: IFC (opiate) to left knee to decrease pain with exercises for 24 mins; 6 mins with hot pack on left knee   Precautions:  Exercises:  Exercise Reps/ Time Weight/ Level Comments   Nustep  L3          Standing       HR       Hip abduction       Marching      Hamstring curls      Sit to stands            Seated    At table with IFC on left knee   LAQs   8x R,5x L     Marching   10xR / 5x L  Pain on the left    Heel slides   6x R/3x L     R hamstring stretch Supine    At table with IFC on left knee   Quad sets    10x R/5x L 3 sec Towel under knee   Hamstring sets     15x R/5x L 3 sec Towel under knee pain & ms spasm after 4 reps   SAQs   10x R/5x L     Glute sets     15x 3 sec    Bridge   Unable to bent left knee   Adduction sets    10x 3 sec Legs straight ball between knees   Left heel slides   10x R/5x L     SLR     10x R/  5x L     Hip abduction   10x R/ 5x L  Orange slide tube          Sidelying      Other: Patient very sore/painful with exercises. Sensitive to the touch. Treatment Charges: Mins Units   [x]  Modalities--IFC with exercise 30 1   [x]  Ther Exercise 24 2   []  Manual Therapy     []  Ther Activities     []  Aquatics     []  Vasocompression     []  Other     Total Treatment time 24 mins        Assessment: [x] Progressing toward goals. Patient arrives with increased pain in both knees this date. Again, completed exercises reps per patient tolerance this date secondary to pain. Pain is limiting exercises progressions and strengthening. Patient noted hot pack helped with the pain a little bit. [] No change. [] Other:   [x] Patient would continue to benefit from skilled physical therapy services in order to: improve balance, gait, B LE strength, functional abilities, decrease risk of falls. STG: (to be met in 9 treatments) 1/28/22  1. ? Pain: B LE pain improve to 7/10 at max throughout the day--L LE 10/10, R LE 5-6/10   2. ? Strength: B LE strength improve to grossly 4-/5--R LE 4+/5, L 3+/5  3. ? Function: Patient to report improved sleep without waking due to B LE pain--NOT MET, no change  4. Patient able to walk without right knee buckling 50% of the steps or less--buckling more in left leg than right leg  5. Patient to be independent with home exercise program as demonstrated by performance with correct form without cues. --progress  6. Demonstrate Knowledge of fall prevention--MET, given 1/28/22  LTG: (to be met in 18 treatments)  1.  B LE pain improve to 3/10 at max after strenuous day  2. B LE strength improve to grossly 4/5  3. TUG score improve to 18.0 seconds or less  4. Patient to report no falls x 3 weeks or greater  5. Patient to ambulate with more narrow DELBERT, no right knee buckling, equal stride lengths. 6. LEFS score improve to 40% functionally impaired or less   7. Patient to report improved ability to do functional tasks around her home without having to rely on  as much. Patient goals: \"To see why I'm having pain\"     Pt. Education:  [] Yes  [x] No  [] Reviewed Prior HEP/Ed  Method of Education: [] Verbal  [] Demo  [] Written  Comprehension of Education:  [] Verbalizes understanding. [] Demonstrates understanding. [] Needs review. [] Demonstrates/verbalizes HEP/Ed previously given. Plan: [x] Continue current frequency toward long and short term goals. [x] Specific Instructions for subsequent treatments: Desensitization to B LE (tapping, rubbing, stroking, etc). Mat exs. Progress to standing exs. May need manual estim for quad activation (right>left). Balance exercises.   Needs gait training to control right knee from buckling      Time In: 9:10 am            Time Out: 9:48 am    Electronically signed by:  Mauro Desai PTA

## 2022-03-04 NOTE — TELEPHONE ENCOUNTER
Need script for four wheel walker sent to [facility]. [facility] Established Patient Visit    Reason for Visit:  F/u hepatitis B    PCP: Yves Serrano    HPI:  This is a 57 y.o. male here for f/u of E Ag (-), E Ab (+) chronic hepatitis B. He started Viread on 2/25/14 and was switched to Vemlidy at last appt 11/2018. His transaminases remain normal to minimally elevated d/t NAFLD. His HBV DNA had been undetectable. No masses on recent u/s. AFP remains normal. His synthetic liver functioning is normal. He is doing well and denies any complaints today. He denies any symptoms of advanced chronic liver disease including jaundice, dark urine, hematemesis, melena, slowed mentation, abdominal distention.     Liver fibrosis staging:  - Fibrosure 7/2014 - F1-F2, only mild fibrosis  - Elastography scan 1/2016 = F0-F1  - Fibroscan 11/2018 - F2, S3  - spleen is normal size, and plts are also normal.       ROS:   GENERAL: Denies fever, chills, weight change, fatigue  HEENT: Denies headaches, dizziness, vision/hearing changes  CARDIOVASCULAR: Denies chest pain, palpitations, or edema  RESPIRATORY: Denies dyspnea, cough  GI: Denies abdominal pain, rectal bleeding, nausea, vomiting. No change in bowel pattern or color  : Denies dysuria, hematuria   SKIN: Denies rash, itching   NEURO: Denies confusion, memory loss, or mood changes  PSYCH: Denies depression or anxiety  HEME/LYMPH: Denies easy bruising or bleeding      PMHX:  has a past medical history of Anticoagulant long-term use, Dermatitis, Hepatitis B, Hypertension, and NAFLD (nonalcoholic fatty liver disease).    PSHX:  has a past surgical history that includes Shoulder surgery; Colonoscopy; PTA/stenting of distal right SFA ; stents; and Carpal tunnel release.    The patient's social and family histories were reviewed by me and updated in the appropriate section of the electronic medical record.    Review of patient's allergies indicates:  No Known Allergies     Current Outpatient Medications on File Prior to  "Visit   Medication Sig Dispense Refill    atorvastatin (LIPITOR) 20 MG tablet Take 1 tablet (20 mg total) by mouth once daily. 90 tablet 3    clopidogrel (PLAVIX) 75 mg tablet Take 1 tablet (75 mg total) by mouth once daily. 30 tablet 3    diclofenac sodium (VOLTAREN) 1 % Gel Apply 2 g topically 3 (three) times daily as needed. 2 Tube 6    econazole nitrate 1 % cream       fluticasone (FLONASE) 50 mcg/actuation nasal spray 2 sprays (100 mcg total) by Each Nare route once daily. 1 Bottle 5    nystatin-triamcinolone (MYCOLOG II) cream Apply topically 2 (two) times daily. 30 g 0    tenofovir alafenamide (VEMLIDY) 25 mg Tab Take 1 tablet (25 mg total) by mouth once daily. 30 tablet 11    [DISCONTINUED] tenofovir (VIREAD) 300 mg Tab Take 1 tablet (300 mg total) by mouth once daily. TAKE 1 TABLET BY MOUTH    ONCE DAILY 30 tablet 0     No current facility-administered medications on file prior to visit.           Objective Findings:    PHYSICAL EXAM:   Friendly Black or  male, in no acute distress; alert and oriented to person, place and time  VITALS:   BP (!) 167/88 (BP Location: Right arm, Patient Position: Sitting, BP Method: Medium (Automatic))   Pulse 67   Ht 5' 9" (1.753 m)   Wt 87.1 kg (192 lb 0.3 oz)   SpO2 96%   BMI 28.36 kg/m²    HENT: Normocephalic, without obvious abnormality. Oral mucosa pink and moist. Dentition good.  EYES: Sclerae anicteric.   CARDIOVASCULAR: Regular rate and rhythm. No murmurs.  RESPIRATORY: Normal respiratory effort. BBS CTA. No wheezes or crackles.  GI: Soft, non-tender, non-distended. No hepatosplenomegaly. No masses palpable. No ascites.  EXTREMITIES:  No clubbing, cyanosis or edema.  SKIN: Warm and dry. No jaundice. No rashes noted to exposed skin. No telangectasias noted. No palmar erythema.  NEURO:  Normal gate. No asterixis.  PSYCH:  Memory intact. Thought and speech pattern appropriate. Behavior normal. No depression or anxiety noted.      Labs:  Lab " Results   Component Value Date    WBC 5.93 06/05/2019    HGB 15.4 06/05/2019    HCT 47.3 06/05/2019     06/05/2019    CHOL 160 05/18/2019    TRIG 83 05/18/2019    HDL 34 (L) 05/18/2019     06/05/2019    K 4.1 06/05/2019    CREATININE 0.9 06/05/2019    ALT 45 (H) 06/05/2019    AST 33 06/05/2019    ALKPHOS 65 06/05/2019    BILITOT 0.6 06/05/2019    ALBUMIN 3.8 06/05/2019    INR 0.9 06/05/2019    AFP 2.1 06/05/2019         ASSESSMENT:  57 y.o. Black or  male with:  1.  Chronic hepatitis B, E antigen (-), E antibody (+)   -- started on Viread 2/25/14, and switched to Vemlidy 11/2018  -- transaminases normal to minimally elevated, suspect due to NAFLD, normalized with weight loss in past  -- HCC screening- up to date, next due 12/2019  -- (+) immunity to hepatitis A   2. Fatty liver   -- risk factor includes overweight, HTN, HLD   -- Fibrosure 7/2014 = F1-F2  -- u/s elastography scan 1/2016 = F0-F1, no fibrosis  -- Fibroscan 11/2018 - F2, S3      EDUCATION:   -- Avoid alcohol. Avoid raw seafood.   -- Discussed transmission of HBV, including sexual transmission. Avoid sharing razors/toothbrushes, etc.   -- Natural history of HBV including progression to cirrhosis reviewed.   -- We discussed the increased risk of hepatocellular carcinoma due to active hepatitis B infection. Continued screening every six months with ultrasound and AFP is recommended.   -- Discussed potential outcomes of cirrhosis, including liver cancer, liver failure, liver transplant, death.   -- Limit acetaminophen to 2000mg daily.  -- Advised immunization of all household members.  -- Discussed importance of compliance with medication regimen and risk of viral mutation or flare if treatment is stopped prematurely.      We discussed the manifestations of NAFLD. At this time there is no FDA approved therapy for NAFLD.   The patient and I discussed the importance of diet, exercise, and weight loss for management of NAFLD. We  discussed a low fat, low carb/low sugar, high fiber diet, and a goal of 30 minutes of exercise 5 times per week.   Pt is aware that fatty liver can progress to steatohepatitis and possibly to cirrhosis, putting one at increased risk for liver cancer, liver failure, and death.          PLAN:  1. Continue Vemlidy  2. Fibroscan to reassess fibrosis with next appt  3. Continue efforts at weight loss  4. Low fat, low cholesterol, low carb, high fiber diet  5. Exercise, 5 days a week, 30 min a day  6. Recommend good control of cholesterol, blood pressure, blood sugar levels  7. HCC screening Q 6 months with AFP and abd. U/S, next due 12/2019  8. Follow up in 12/2019 with labs and u/s 1 week prior to appt         Thank you for allowing me to participate in the care of Ramiro Frye NP-C

## 2022-03-09 ENCOUNTER — HOSPITAL ENCOUNTER (OUTPATIENT)
Dept: PHYSICAL THERAPY | Age: 52
Setting detail: THERAPIES SERIES
Discharge: HOME | End: 2022-03-09
Payer: COMMERCIAL

## 2022-03-09 PROCEDURE — 97110 THERAPEUTIC EXERCISES: CPT

## 2022-03-09 PROCEDURE — G0283 ELEC STIM OTHER THAN WOUND: HCPCS

## 2022-03-09 NOTE — FLOWSHEET NOTE
[x] [facility]) [facility] CENTER &  Therapy  [address]  [phone]  F: [phone] [] [address]  [facility]   [address]  P: [phone]  F: [phone] [] [address]  [address]  P: [phone]  F: [phone] [] [address]  P: [phone]  F: [phone] [] [address]  [facility]   [address]   [place]: [phone]  F: [phone]      Physical Therapy Daily Treatment Note    Date:  3/9/2022  Patient Name:  Jose Melgar    :  1970  MRN: [id number]  Physician: Dr. Danette Alonzo, APRN-CNP                               Insurance: [facility]- need to verify insurance on 21 when [facility] opens up  Medical Diagnosis: Bilateral leg weakness                Rehab Codes: R 26.0, R 26.81, R 26.89, M 62.81, Z 91.81, R 53.81  Onset date: 21               Next 's appt.: Not scheduled  Visit# / total visits:      Cancels/No Shows: 4/0    Subjective:  Pain:  [x] Yes  [] No Location: B legs Pain Rating: (0-10 scale) 4/10 Left, 0/10 Right  Pain altered Tx:  [] No  [x] Yes  Action: exercises/reps per tolerance  Comments: Patient arrives with bilateral knee still flexed and antalgic gait pattern. States pain is better today than last session.      Objective:  Modalities: IFC (opiate) to left knee to decrease pain with exercises for 34 mins; 10 mins with hot pack on left knee after treatment  Precautions:  Exercises:  Exercise Reps/ Time Weight/ Level Comments   Nustep  L3          Standing       HR  20x     Hip abduction  20x R/5x L     Marching 20x R/ 5x L     Hamstring curls      Sit to stands            Seated    At table with IFC on left knee   LAQs   20x R,10x L     Marching    20xR / 10x L  Pain on the left    Heel slides   20x R/10x L     R hamstring stretch            Supine    At table with IFC on left knee   Quad sets   20x R/10x L 3 sec Towel under knee   Hamstring sets     20x R/10x L 3 sec Towel under knee pain & ms spasm after 4 reps   SAQs   20x R/5x L     Glute sets   20x 3 sec    Bridge   Unable to bent left knee   Adduction sets     10x 3 sec Legs straight ball between knees   Left heel slides  20x R/5x L     SLR    20x R/10x L     Hip abduction    20x R/ 10x L  Orange slide tube          Sidelying      Other: Patient very sore/painful with exercises. Sensitive to the touch. Treatment Charges: Mins Units   [x]  Modalities--IFC with exercise 34 1   [x]  Ther Exercise 42 3   []  Manual Therapy     []  Ther Activities     []  Aquatics     []  Vasocompression     [x]  Other--HP 10 0   Total Treatment time         Assessment: [x] Progressing toward goals. Continued IFC with exercises to decrease left knee pain. Resumed standing exercises this date to increase LE strength and standing tolerance. Able to complete exercises reps as normal this date as patient tolerated. A little better tolerance this date. Ended with hot pack to left knee to decrease pain. [] No change. [] Other:   [x] Patient would continue to benefit from skilled physical therapy services in order to: improve balance, gait, B LE strength, functional abilities, decrease risk of falls. STG: (to be met in 9 treatments) 1/28/22  1. ? Pain: B LE pain improve to 7/10 at max throughout the day--L LE 10/10, R LE 5-6/10   2. ? Strength: B LE strength improve to grossly 4-/5--R LE 4+/5, L 3+/5  3. ? Function: Patient to report improved sleep without waking due to B LE pain--NOT MET, no change  4. Patient able to walk without right knee buckling 50% of the steps or less--buckling more in left leg than right leg  5.  Patient to be independent with home exercise program as demonstrated by performance with correct form without cues.--progress  6. Demonstrate Knowledge of fall prevention--MET, given 1/28/22  LTG: (to be met in 18 treatments)  1. B LE pain improve to 3/10 at max after strenuous day  2. B LE strength improve to grossly 4/5  3. TUG score improve to 18.0 seconds or less  4. Patient to report no falls x 3 weeks or greater  5. Patient to ambulate with more narrow DELBERT, no right knee buckling, equal stride lengths. 6. LEFS score improve to 40% functionally impaired or less   7. Patient to report improved ability to do functional tasks around her home without having to rely on  as much. Patient goals: \"To see why I'm having pain\"     Pt. Education:  [] Yes  [x] No  [] Reviewed Prior HEP/Ed  Method of Education: [] Verbal  [] Demo  [] Written  Comprehension of Education:  [] Verbalizes understanding. [] Demonstrates understanding. [] Needs review. [] Demonstrates/verbalizes HEP/Ed previously given. Plan: [x] Continue current frequency toward long and short term goals. [x] Specific Instructions for subsequent treatments: Desensitization to B LE (tapping, rubbing, stroking, etc). Mat exs. Progress to standing exs. May need manual estim for quad activation (right>left). Balance exercises.   Needs gait training to control right knee from buckling      Time In: 9:00 am            Time Out: 9:53 am    Electronically signed by:  Lety Kaye PTA

## 2022-03-10 ENCOUNTER — TELEPHONE (OUTPATIENT)
Dept: PRIMARY CARE CLINIC | Age: 52
End: 2022-03-10

## 2022-03-10 NOTE — TELEPHONE ENCOUNTER
Left patient a voicemail to inquire on DME pharmacy that she would like RX for walker sent to, [employer] is not able to [employer].

## 2022-03-11 ENCOUNTER — HOSPITAL ENCOUNTER (OUTPATIENT)
Dept: PHYSICAL THERAPY | Age: 52
Setting detail: THERAPIES SERIES
Discharge: HOME | End: 2022-03-11
Payer: COMMERCIAL

## 2022-03-11 PROCEDURE — 97110 THERAPEUTIC EXERCISES: CPT

## 2022-03-11 PROCEDURE — G0283 ELEC STIM OTHER THAN WOUND: HCPCS

## 2022-03-11 NOTE — FLOWSHEET NOTE
[x] [facility]) [facility] CENTER &  Therapy  [address]  [phone]  F: [phone] [] [address]  [facility] [address]   [address]  P: [phone]  F: [phone] [] [place]  [address]  P: [phone]  F: [phone] [] [address]  P: [phone]  F: [phone] [] [address]  [facility]   [address]   [place]: [phone]  F: [phone]      Physical Therapy Daily Treatment Note    Date:  3/11/2022  Patient Name:  Danielle Camara    :  1970  MRN: [id number]  Physician: Dr. Jose Reza, APRN-CNP                               Insurance: [facility]- need to verify insurance on 21 when [facility] opens up  Medical Diagnosis: Bilateral leg weakness                Rehab Codes: R 26.0, R 26.81, R 26.89, M 62.81, Z 91.81, R 53.81  Onset date: 21               Next 's appt.: Not scheduled  Visit# / total visits:      Cancels/No Shows: 4/0    Subjective:  Pain:  [x] Yes  [] No Location: B legs Pain Rating: (0-10 scale) 8/10 Left, 4/10 Right  Pain altered Tx:  [] No  [x] Yes  Action: exercises/reps per tolerance  Comments: Patient arrives with increased buckling in both legs. Still working on getting rolling walker. Pain in both legs as noted.      Objective:  Modalities: IFC (opiate) to left knee to decrease pain with exercises for 25 mins; 11 mins with hot pack on left knee after treatment  Precautions:  Exercises:  Exercise Reps/ Time Weight/ Level Comments   Nustep            Standing       HR       Hip abduction       Marching      Hamstring curls      Sit to stands            Seated      LAQs   20x R, 5x L     Marching     20xR / 5x L  Pain on the left    Heel slides    20x R/ 3x L     R hamstring stretch strength improve to grossly 4/5--R LE 4+/5, L 3+/5  3. TUG score improve to 18.0 seconds or less--NOT MET --1:41 min  4. Patient to report no falls x 3 weeks or greater--MET  5. Patient to ambulate with more narrow DELBETR, no right knee buckling, equal stride lengths--NOT MET. 6. LEFS score improve to 40% functionally impaired or less---79% impaired   7. Patient to report improved ability to do functional tasks around her home without having to rely on  as much--NOT MET. Patient goals: \"To see why I'm having pain\"     Pt. Education:  [x] Yes  [] No  [] Reviewed Prior HEP/Ed  Method of Education: [] Verbal  [x] Demo  [] Written  Comprehension of Education:  [] Verbalizes understanding. [] Demonstrates understanding. [] Needs review. [x] Demonstrates/verbalizes HEP/Ed previously given.        Plan: [x] Discharge to HEP      Time In: 9:00 am            Time Out: 9:50 am    Electronically signed by:  Jon Saleem PTA

## 2022-03-11 NOTE — DISCHARGE SUMMARY
[x] [facility]) [facility] CENTER &  Therapy  [address]  [phone]  F: [phone] [] [address]  [facility]   [address]  P: [phone]  F: [phone] [] [address]  [address]  P: [phone]  F: [phone] [] [address]  P: [phone]  F: [phone] [] [address]  [facility]   [address]   [place]: [phone]  F: [phone]      Physical Therapy Discharge Note    Date: 3/11/2022      Patient: Abimbola Rodriguez  : 1970  MRN: [id number]    Physician: Dr. Karen Carvajal, APRN-CNP                               Insurance: [facility]- need to verify insurance on 21 when [facility] opens up  Medical Diagnosis: Bilateral leg weakness                Rehab Codes: R 26.0, R 26.81, R 26.89, M 62.81, Z 91.81, R 53.81  Onset date: 21               Next 's appt.: Not scheduled  Visit# / total visits:                                 Cancels/No Shows: 4/0  Date of initial visit: 21                 Date of final visit: 3/11/22      Subjective:  Pain:  [x] Yes  [] No  Location: B legs Pain Rating: (0-10 scale) 8/10 Left, 4/10 Right  Pain altered Tx:  [] No  [x] Yes  Action: exercises/reps per tolerance  Comments: Patient arrives with increased buckling in both legs. Still working on getting rolling walker. Pain in both legs as noted. Objective:  Test Measurements: Strength R LE 4+/5, L 3+/5  Function: no change in sleep, no change in ambulation, LEFS 79% impaired    Assessment:  STG: (to be met in 9 treatments) 22  1. ? Pain: B LE pain improve to 7/10 at max throughout the day--L LE 10/10, R LE 5-6/10   2. ? Strength:  B LE strength improve to grossly 4-/5--R LE 4+/5, L 3+/5  3. ? Function: Patient to report improved sleep without waking due to B LE pain--NOT MET, no change  4. Patient able to walk without right knee buckling 50% of the steps or less--buckling more in left leg than right leg  5. Patient to be independent with home exercise program as demonstrated by performance with correct form without cues. --progress  6. Demonstrate Knowledge of fall prevention--MET, given 1/28/22  LTG: (to be met in 18 treatments) 3/11/22  1. B LE pain improve to 3/10 at max after strenuous day--ranges from 8-10/10 on what she is doing  2. B LE strength improve to grossly 4/5--R LE 4+/5, L 3+/5  3. TUG score improve to 18.0 seconds or less--NOT MET --1:41 min  4. Patient to report no falls x 3 weeks or greater--MET  5. Patient to ambulate with more narrow DELBERT, no right knee buckling, equal stride lengths--NOT MET. 6. LEFS score improve to 40% functionally impaired or less---79% impaired   7. Patient to report improved ability to do functional tasks around her home without having to rely on  as much--NOT MET.                 Patient goals: \"To see why I'm having pain\"    Treatment to Date:  [x] Therapeutic Exercise    [] Modalities:  [] Therapeutic Activity    [] Ultrasound  [x] Electrical Stimulation  [] Gait Training     [] Massage       [] Lumbar/Cervical Traction  [] Neuromuscular Re-education [x] Cold/hotpack [] Iontophoresis: 4 mg/mL  [x] Instruction in Home Exercise Program                     Dexamethasone Sodium  [] Manual Therapy             Phosphate 40-80 mAmin  [] Aquatic Therapy                   [] Vasocompression/    [] Other:             Game Ready    Discharge Status:     [] Pt recovered from conditions. Treatment goals were met. [x] Pt received maximum benefit. No further therapy indicated at this time. [x] Pt to continue exercise/home instructions independently. [x] Therapy interrupted due to:  Patient wishes to be done at this time to save visits for the rest of the year.     [] Pt has 2 or more no shows/cancels, is discontinued per our policy. [] Pt has completed prescribed number of treatment sessions. [] Other:         Electronically signed by Vinson Gowers, PTA on 3/11/2022 at 10:06 AM      If you have any questions or concerns, please don't hesitate to call.   Thank you for your referral.

## 2022-04-08 ENCOUNTER — OFFICE VISIT (OUTPATIENT)
Dept: NEUROSURGERY | Age: 52
End: 2022-04-08
Payer: COMMERCIAL

## 2022-04-08 VITALS
WEIGHT: 163 LBS | HEIGHT: 59 IN | SYSTOLIC BLOOD PRESSURE: 111 MMHG | TEMPERATURE: 98.2 F | DIASTOLIC BLOOD PRESSURE: 73 MMHG | BODY MASS INDEX: 32.86 KG/M2 | OXYGEN SATURATION: 99 % | HEART RATE: 68 BPM

## 2022-04-08 DIAGNOSIS — R29.898 BILATERAL LEG WEAKNESS: Primary | ICD-10-CM

## 2022-04-08 PROCEDURE — 1036F TOBACCO NON-USER: CPT | Performed by: NURSE PRACTITIONER

## 2022-04-08 PROCEDURE — 99203 OFFICE O/P NEW LOW 30 MIN: CPT | Performed by: NURSE PRACTITIONER

## 2022-04-08 PROCEDURE — G8417 CALC BMI ABV UP PARAM F/U: HCPCS | Performed by: NURSE PRACTITIONER

## 2022-04-08 PROCEDURE — G8427 DOCREV CUR MEDS BY ELIG CLIN: HCPCS | Performed by: NURSE PRACTITIONER

## 2022-04-08 NOTE — PROGRESS NOTES
[address]  [facility] [place] [address], [address] [address]  Dept: [phone]    Patient:  Shelby Licea  YOB: 1970  Date: 4/8/22    The patient is a 54 y.o. female who presents today for consult of the following problems:     Chief Complaint   Patient presents with    New Patient     leg weakness          HPI:     Shelby Licea is a 54 y.o. female on whom neurosurgical consultation was requested by KELLEN Brand CNP for management of leg weakness. Has had ongoing issues with bilateral leg pain, weakness, and swelling to lower legs since recovering from COVID around 2 years ago. Has had to start using a walker to prevent any falls. Numbness/weakness is diffuse to both legs, left greater than right. Does have known diabetes, reports last A1c was 11.3. No saddle anesthesia, loss of bowel or bladder function. Denies any low back pain, occasional aching to lower back with menses.      History:     Past Medical History:   Diagnosis Date    Asthma     Chronic back pain     GERD (gastroesophageal reflux disease)     HLD (hyperlipidemia) 1/27/2014    Hypertension     Irregular periods/menstrual cycles 2017    Neuropathy     Obesity     Type II or unspecified type diabetes mellitus without mention of complication, not stated as uncontrolled 2007    On Insulin, Lantus nightly & Novolog 3 times with meals    Wears glasses      Past Surgical History:   Procedure Laterality Date    ENDOMETRIAL ABLATION  02/27/2018    SD HYSTEROSCOPY,W/ENDOMETRIAL ABLATION N/A 2/27/2018    ENDOMETRIAL ABLATION WITH SHAHEED performed by Kristopher Neri MD at [address] [place] [address]  2008     Family History   Problem Relation Age of Onset    Diabetes Mother     High Cholesterol Mother     High Blood Pressure Mother     Asthma Mother     No Known Problems Father     Diabetes Maternal Aunt     Diabetes Maternal Uncle     Diabetes Maternal Grandmother     Asthma Sister     No Known Problems Paternal Grandmother     No Known Problems Paternal Grandfather     Asthma Son      Current Outpatient Medications on File Prior to Visit   Medication Sig Dispense Refill    losartan-hydroCHLOROthiazide (HYZAAR) 50-12.5 MG per tablet Take 1 tablet by mouth daily 90 tablet 1    aspirin EC 81 MG EC tablet Take 1 tablet by mouth daily 30 tablet 11    tiZANidine (ZANAFLEX) 4 MG tablet Take 1 tablet by mouth nightly as needed (back/leg pain) 40 tablet 1    Semaglutide (RYBELSUS) 3 MG TABS Take 3 mg by mouth daily 90 tablet 1    simvastatin (ZOCOR) 80 MG tablet Take 1 tablet by mouth nightly 90 tablet 1    beclomethasone (QVAR) 80 MCG/ACT inhaler Inhale 1 puff into the lungs 2 times daily 3 each 1    empagliflozin (JARDIANCE) 10 MG tablet Take 1 tablet by mouth daily 90 tablet 1    famotidine (PEPCID) 20 MG tablet Take 1 tablet by mouth daily 90 tablet 1    metFORMIN (GLUCOPHAGE-XR) 500 MG extended release tablet Take 2 tablets by mouth 2 times daily (with meals) 360 tablet 1    insulin glargine (BASAGLAR KWIKPEN) 100 UNIT/ML injection pen Inject 55 Units into the skin nightly 5 pen 2    glucose monitoring (FREESTYLE FREEDOM) kit 1 kit by Does not apply route daily 1 kit 0    Continuous Blood Gluc Sensor (FREESTYLE DAVIE 2 SENSOR) MISC 1 Dose by Does not apply route every 14 days 6 each 2    Handicap Placard MISC by Does not apply route 1 each 0    albuterol sulfate  (90 Base) MCG/ACT inhaler Inhale 2 puffs into the lungs every 6 hours as needed for Wheezing or Shortness of Breath 1 each 5    Elastic Bandages & Supports (WRIST SPLINT) MISC Apply 1 each topically continuous Cock up wrist splint 2 each 0    Insulin Pen Needle ([employer] PEN NEEDLES) 31G X 6 MM MISC 1 each by Does not apply route daily 100 each 3    albuterol (PROVENTIL) (5 MG/ML) 0.5% nebulizer solution Take 0.5 mLs by nebulization every 6 hours as needed for Wheezing 120 Bottle 11    potassium chloride (KLOR-CON M) 20 MEQ extended release tablet Take 20 mEq by mouth daily      Alcohol Swabs (ALCOHOL PREP) 70 % PADS Use__3___times per day Diagnosis:  Diabetes ___Insulin-Dependent_ 100 each 11    blood glucose test strips (TRUE METRIX BLOOD GLUCOSE TEST) strip 1 each by In Vitro route daily As needed. 100 each 3    Insulin Syringe-Needle U-100 30G X 1/2\" 1 ML MISC 1 each by Does not apply route daily 100 each 3    Blood Glucose Monitoring Suppl (TRUE METRIX AIR GLUCOSE METER) ANA Use daily 1 Device 0    gabapentin (NEURONTIN) 400 MG capsule Take 1 capsule by mouth 3 times daily for 30 days. 90 capsule 2     No current facility-administered medications on file prior to visit. Social History     Tobacco Use    Smoking status: Never Smoker    Smokeless tobacco: Never Used    Tobacco comment: pt is around smokers a lot   Vaping Use    Vaping Use: Never used   Substance Use Topics    Alcohol use: Not Currently     Comment: occasionally    Drug use: No       Allergies   Allergen Reactions    Morphine Swelling     When given IV morphine pt had swelling in her arm       Review of Systems  Constitutional: Negative for activity change and appetite change. HENT: Negative for ear pain and facial swelling. Eyes: Negative for discharge and itching. Respiratory: Negative for choking and chest tightness. Cardiovascular: Negative for chest pain and leg swelling. Gastrointestinal: Negative for nausea and abdominal pain. Endocrine: Negative for cold intolerance and heat intolerance. Genitourinary: Negative for frequency and flank pain. Musculoskeletal: Negative for myalgias and joint swelling. Skin: Negative for rash and wound. Allergic/Immunologic: Negative for environmental allergies and food allergies. Hematological: Negative for adenopathy. Does not bruise/bleed easily.    Psychiatric/Behavioral: Negative for self-injury. The patient is not nervous/anxious. Physical Exam:      /73   Pulse 68   Temp 98.2 °F (36.8 °C)   Ht 4' 11\" (1.499 m)   Wt 163 lb (73.9 kg)   SpO2 99%   BMI 32.92 kg/m²   Estimated body mass index is 32.92 kg/m² as calculated from the following:    Height as of this encounter: 4' 11\" (1.499 m). Weight as of this encounter: 163 lb (73.9 kg). General:  Jd Chacko is a 54y.o. year old female who appears her stated age. HEENT: Normocephalic atraumatic. Neck supple. Chest: regular rate; pulses equal  Abdomen: Soft nontender nondistended.   Ext: DP and PT pulses 2+, good cap refill  Neuro    Mentation  Appropriate affect  Registration intact  Orientation intact  Judgement intact to situation    Cranial Nerves:   Pupils equal and reactive to light  Extraocular motion intact  Face and shrug symmetric  Tongue midline  No dysarthria  v1-3 sensation symmetric, masseter tone symmetric  Hearing symmetric    Sensation: diffusely decreased    Motor  L deltoid 5/5; R deltoid 5/5  L biceps 5/5; R biceps 5/5  L triceps 5/5; R triceps 5/5  L wrist extension 5/5; R wrist extension 5/5  L intrinsics 5/5; R intrinsics 5/5     Right lower extremity 4/5 diffusely  Left lower extremity 3/5 diffusely    Reflexes  L Brachioradialis 2+/4; R brachioradialis 2+/4  L Biceps 2+/4; R Biceps 2+/4  L Triceps 2+/4; R Triceps 2+/4  L Patellar 2+/4: R Patellar 2+/4  L Achilles 2+/4; R Achilles 2+/4    hoffmans L: neg  hoffmans R: neg  Clonus L: neg  Clonus R: neg  Babinski L: neg  Babinski R: neg    Studies Review:     MRI lumbar spine 7/17/2020:FINDINGS: Vertebral body heights and alignment are well-maintained.  Disc spaces are well-maintained.  T1-weighted sagittal images show no neural foraminal narrowing.  Central canal is widely patent.  Conus medullaris shows normal location and is not enlarged.  Mild to moderate degenerative facet   changes present greatest at L4-5.  Paraspinous musculature is symmetric.  Uterus is present incompletely included. IMPRESSION:   1.  No significant central canal or neural foraminal narrowing. 2.  Mild to moderate facet degenerative change greatest at L4-5.  No anterolisthesis seen. Physical therapy notes reviewed    Assessment and Plan:      1. Bilateral leg weakness          Plan: Patient with diffuse, nondermatomal numbness, tingling and weakness to bilateral lower extremities that has been present for the last 2 years following Covid infection. Denies any associated lower back pain, saddle anesthesia, changes to bowels or bladder. Patient is an known diabetic with uncontrolled blood glucose levels. Would recommend obtaining EMG at this point for further evaluation. May ultimately need repeat MRI if EMG suggestive of lumbar etiology. Followup: Return in about 4 weeks (around 5/6/2022), or if symptoms worsen or fail to improve. Prescriptions Ordered:  No orders of the defined types were placed in this encounter. Orders Placed:  Orders Placed This Encounter   Procedures    EMG     Standing Status:   Future     Standing Expiration Date:   4/8/2023     Order Specific Question:   Which body part? Answer:   bilateral lower extremities        Electronically signed by Luz Toro [facility] KELLEN Li CNP on 4/8/2022 at 9:39 AM    Please note that this chart was generated using voice recognition Dragon dictation software. Although every effort was made to ensure the accuracy of this automated transcription, some errors in transcription may have occurred.

## 2022-05-02 DIAGNOSIS — E11.9 DIABETES MELLITUS TYPE 2, INSULIN DEPENDENT (HCC): ICD-10-CM

## 2022-05-02 DIAGNOSIS — Z79.4 DIABETES MELLITUS TYPE 2, INSULIN DEPENDENT (HCC): ICD-10-CM

## 2022-05-02 RX ORDER — INSULIN GLARGINE 100 [IU]/ML
55 INJECTION, SOLUTION SUBCUTANEOUS NIGHTLY
Qty: 5 PEN | Refills: 2 | Status: SHIPPED | OUTPATIENT
Start: 2022-05-02 | End: 2022-08-16 | Stop reason: SDUPTHER

## 2022-05-02 NOTE — TELEPHONE ENCOUNTER
Health Maintenance   Topic Date Due    Hepatitis B vaccine (1 of 3 - Risk 3-dose series) Never done    Diabetic retinal exam  05/17/2015    Pneumococcal 0-64 years Vaccine (2 - PCV) 12/18/2016    Colorectal Cancer Screen  Never done    COVID-19 Vaccine (2 - Moderna 3-dose series) 06/15/2021    Diabetic microalbuminuria test  04/02/2022    Lipids  04/02/2022    Depression Screen  04/02/2022    Potassium  04/02/2022    Creatinine  04/02/2022    A1C test (Diabetic or Prediabetic)  05/25/2022    Flu vaccine (Season Ended) 09/01/2022    Diabetic foot exam  11/12/2022    Cervical cancer screen  03/29/2026    DTaP/Tdap/Td vaccine (3 - Td or Tdap) 01/11/2028    Hepatitis C screen  Completed    HIV screen  Completed    Hepatitis A vaccine  Aged Out    Hib vaccine  Aged Out    Meningococcal (ACWY) vaccine  Aged Out             (applicable per patient's age: Cancer Screenings, Depression Screening, Fall Risk Screening, Immunizations)    Hemoglobin A1C (%)   Date Value   02/25/2022 11.3   11/12/2021 14.0   07/30/2021 13.6     Microalb/Crt.  Ratio (mcg/mg creat)   Date Value   04/02/2021 140 (H)     LDL Cholesterol (mg/dL)   Date Value   04/02/2021 169 (H)     AST (U/L)   Date Value   04/02/2021 13     ALT (U/L)   Date Value   04/02/2021 17     BUN (mg/dL)   Date Value   04/02/2021 9      (goal A1C is < 7)   (goal LDL is <100) need 30-50% reduction from baseline     BP Readings from Last 3 Encounters:   04/08/22 111/73   02/25/22 (!) 147/102   11/12/21 131/89    (goal /80)      All Future Testing planned in [facility]:  Lab Frequency Next Occurrence   EMG Once 04/22/2022       Next Visit Date:  Future Appointments   Date Time Provider Rohan Juarez   6/1/2022  8:15 AM KELLEN Park CNP [facility]   6/3/2022  8:30 AM KELLEN Todd CNP [facility] [address] [facility]            Patient Active Problem List:     Obesity     GERD (gastroesophageal reflux disease)     HTN (hypertension), benign     RAD (reactive airway disease)     Diabetes mellitus type 2, insulin dependent (HCC)     Bilateral knee pain     Pain in joint of left shoulder     Patellofemoral syndrome     Peripheral neuropathy     Hypercholesteremia     Abnormal uterine bleeding (AUB)     Abnormal glandular Papanicolaou smear of cervix - NOS     Immunization due     S/p Endometrial Ablation 2/27/18

## 2022-05-25 DIAGNOSIS — M54.50 LUMBAR PAIN: ICD-10-CM

## 2022-05-25 DIAGNOSIS — M51.36 DEGENERATION OF L4-L5 INTERVERTEBRAL DISC: ICD-10-CM

## 2022-05-25 RX ORDER — TIZANIDINE 4 MG/1
TABLET ORAL
Qty: 40 TABLET | Refills: 1 | Status: SHIPPED | OUTPATIENT
Start: 2022-05-25 | End: 2022-06-01 | Stop reason: SDUPTHER

## 2022-05-25 NOTE — TELEPHONE ENCOUNTER
Health Maintenance   Topic Date Due    Hepatitis B vaccine (1 of 3 - Risk 3-dose series) Never done    Diabetic retinal exam  05/17/2015    Pneumococcal 0-64 years Vaccine (2 - PCV) 12/18/2016    Colorectal Cancer Screen  Never done    COVID-19 Vaccine (2 - Moderna 3-dose series) 06/15/2021    Diabetic microalbuminuria test  04/02/2022    Lipids  04/02/2022    Depression Screen  04/02/2022    A1C test (Diabetic or Prediabetic)  05/25/2022    Flu vaccine (Season Ended) 09/01/2022    Diabetic foot exam  11/12/2022    Cervical cancer screen  03/29/2026    DTaP/Tdap/Td vaccine (3 - Td or Tdap) 01/11/2028    Hepatitis C screen  Completed    HIV screen  Completed    Hepatitis A vaccine  Aged Out    Hib vaccine  Aged Out    Meningococcal (ACWY) vaccine  Aged Out             (applicable per patient's age: Cancer Screenings, Depression Screening, Fall Risk Screening, Immunizations)    Hemoglobin A1C (%)   Date Value   02/25/2022 11.3   11/12/2021 14.0   07/30/2021 13.6     Microalb/Crt.  Ratio (mcg/mg creat)   Date Value   04/02/2021 140 (H)     LDL Cholesterol (mg/dL)   Date Value   04/02/2021 169 (H)     AST (U/L)   Date Value   04/02/2021 13     ALT (U/L)   Date Value   04/02/2021 17     BUN (mg/dL)   Date Value   04/02/2021 9      (goal A1C is < 7)   (goal LDL is <100) need 30-50% reduction from baseline     BP Readings from Last 3 Encounters:   04/08/22 111/73   02/25/22 (!) 147/102   11/12/21 131/89    (goal /80)      All Future Testing planned in [facility]:  Lab Frequency Next Occurrence   EMG Once 04/22/2022       Next Visit Date:  Future Appointments   Date Time Provider Rohan Juarez   6/1/2022  8:15 AM KELLEN Robbins CNP [facility]   7/1/2022 10:00 AM [address], APRN - CNP [facility] [address] [facility]            Patient Active Problem List:     Obesity     GERD (gastroesophageal reflux disease)     HTN (hypertension), benign     RAD (reactive airway disease)     Diabetes mellitus type 2, insulin dependent (HCC)     Bilateral knee pain     Pain in joint of left shoulder     Patellofemoral syndrome     Peripheral neuropathy     Hypercholesteremia     Abnormal uterine bleeding (AUB)     Abnormal glandular Papanicolaou smear of cervix - NOS     Immunization due     S/p Endometrial Ablation 2/27/18

## 2022-06-01 ENCOUNTER — OFFICE VISIT (OUTPATIENT)
Dept: PRIMARY CARE CLINIC | Age: 52
End: 2022-06-01
Payer: COMMERCIAL

## 2022-06-01 VITALS
SYSTOLIC BLOOD PRESSURE: 137 MMHG | BODY MASS INDEX: 33.73 KG/M2 | TEMPERATURE: 97.9 F | OXYGEN SATURATION: 100 % | WEIGHT: 167 LBS | HEART RATE: 89 BPM | DIASTOLIC BLOOD PRESSURE: 94 MMHG

## 2022-06-01 DIAGNOSIS — Z79.4 DIABETES MELLITUS TYPE 2, INSULIN DEPENDENT (HCC): Primary | ICD-10-CM

## 2022-06-01 DIAGNOSIS — M51.36 DEGENERATION OF L4-L5 INTERVERTEBRAL DISC: ICD-10-CM

## 2022-06-01 DIAGNOSIS — Z12.11 SCREENING FOR MALIGNANT NEOPLASM OF COLON: ICD-10-CM

## 2022-06-01 DIAGNOSIS — E11.9 DIABETES MELLITUS TYPE 2, INSULIN DEPENDENT (HCC): Primary | ICD-10-CM

## 2022-06-01 DIAGNOSIS — M54.50 LUMBAR PAIN: ICD-10-CM

## 2022-06-01 LAB — HBA1C MFR BLD: 14 %

## 2022-06-01 PROCEDURE — 2022F DILAT RTA XM EVC RTNOPTHY: CPT | Performed by: NURSE PRACTITIONER

## 2022-06-01 PROCEDURE — 99214 OFFICE O/P EST MOD 30 MIN: CPT | Performed by: NURSE PRACTITIONER

## 2022-06-01 PROCEDURE — 1036F TOBACCO NON-USER: CPT | Performed by: NURSE PRACTITIONER

## 2022-06-01 PROCEDURE — 83036 HEMOGLOBIN GLYCOSYLATED A1C: CPT | Performed by: NURSE PRACTITIONER

## 2022-06-01 PROCEDURE — 3046F HEMOGLOBIN A1C LEVEL >9.0%: CPT | Performed by: NURSE PRACTITIONER

## 2022-06-01 PROCEDURE — G8427 DOCREV CUR MEDS BY ELIG CLIN: HCPCS | Performed by: NURSE PRACTITIONER

## 2022-06-01 PROCEDURE — G8417 CALC BMI ABV UP PARAM F/U: HCPCS | Performed by: NURSE PRACTITIONER

## 2022-06-01 RX ORDER — TIZANIDINE 4 MG/1
TABLET ORAL
Qty: 40 TABLET | Refills: 1 | Status: SHIPPED | OUTPATIENT
Start: 2022-06-01 | End: 2022-09-15 | Stop reason: SDUPTHER

## 2022-06-01 SDOH — ECONOMIC STABILITY: FOOD INSECURITY: WITHIN THE PAST 12 MONTHS, THE FOOD YOU BOUGHT JUST DIDN'T LAST AND YOU DIDN'T HAVE MONEY TO GET MORE.: NEVER TRUE

## 2022-06-01 SDOH — ECONOMIC STABILITY: FOOD INSECURITY: WITHIN THE PAST 12 MONTHS, YOU WORRIED THAT YOUR FOOD WOULD RUN OUT BEFORE YOU GOT MONEY TO BUY MORE.: NEVER TRUE

## 2022-06-01 ASSESSMENT — PATIENT HEALTH QUESTIONNAIRE - PHQ9
SUM OF ALL RESPONSES TO PHQ QUESTIONS 1-9: 0
SUM OF ALL RESPONSES TO PHQ QUESTIONS 1-9: 0
SUM OF ALL RESPONSES TO PHQ9 QUESTIONS 1 & 2: 0
2. FEELING DOWN, DEPRESSED OR HOPELESS: 0
SUM OF ALL RESPONSES TO PHQ QUESTIONS 1-9: 0
1. LITTLE INTEREST OR PLEASURE IN DOING THINGS: 0
SUM OF ALL RESPONSES TO PHQ QUESTIONS 1-9: 0

## 2022-06-01 ASSESSMENT — ENCOUNTER SYMPTOMS
COUGH: 0
SHORTNESS OF BREATH: 0

## 2022-06-01 ASSESSMENT — SOCIAL DETERMINANTS OF HEALTH (SDOH): HOW HARD IS IT FOR YOU TO PAY FOR THE VERY BASICS LIKE FOOD, HOUSING, MEDICAL CARE, AND HEATING?: NOT HARD AT ALL

## 2022-06-01 NOTE — PROGRESS NOTES
Alida Sanchez (:  1970) is a 54 y.o. female,Established patient, here for evaluation of the following chief complaint(s):  Diabetes and Leg Swelling (pt stated that she has been getting leg swelling for a couple of months )         ASSESSMENT/PLAN:  1. Diabetes mellitus type 2, insulin dependent (HCC)  -     POCT glycosylated hemoglobin (Hb A1C)  -     Microalbumin, Ur; Future  -     Lipid Panel; Future  -     [facility]) Medication Mgmt (Diabetes - Clinical Pharmacy) - [facility]  -     empagliflozin (JARDIANCE) 25 MG tablet; Take 1 tablet by mouth daily, Disp-30 tablet, R-5Normal  -     CBC; Future  -     Comprehensive Metabolic Panel; Future  -     TSH; Future  2. Lumbar pain  -     tiZANidine (ZANAFLEX) 4 MG tablet; take 1 tablet by mouth at bedtime, Disp-40 tablet, R-1Normal  3. Degeneration of L4-L5 intervertebral disc  -     tiZANidine (ZANAFLEX) 4 MG tablet; take 1 tablet by mouth at bedtime, Disp-40 tablet, R-1Normal  4. Screening for malignant neoplasm of colon  -     Fecal DNA Colorectal cancer screening ([place])  increase jardiance  Inquire about rybelsus at pharmacy   Referral to medication mgmt program at [facility]. Discussed acute and chronic complications of uncontrolled dm. No follow-ups on file. Subjective   SUBJECTIVE/OBJECTIVE:  HPI   Dm- we discussed stopping the metformin at last visit because it was making her stomach upset, she did not stop, not sure why. Never picked up new med from pharmacy (rybelsus). Not checking sugars regularly. Diet is not great. Needs to make apt for emg regarding legs- has f/u with [facility] on . Review of Systems   Constitutional: Negative for chills and fever. Respiratory: Negative for cough and shortness of breath. Cardiovascular: Negative for chest pain, palpitations and leg swelling.           Objective      Vitals:    22 0823   BP: (!) 137/94   Pulse: 89   Temp:    SpO2:      Wt Readings from Last 3 Encounters: 06/01/22 167 lb (75.8 kg)   04/08/22 163 lb (73.9 kg)   02/25/22 163 lb (73.9 kg)       Physical Exam  Constitutional:       Appearance: She is well-developed. HENT:      Right Ear: External ear normal.      Left Ear: External ear normal.      Nose: Nose normal.   Cardiovascular:      Rate and Rhythm: Normal rate and regular rhythm. Heart sounds: Normal heart sounds, S1 normal and S2 normal.   Pulmonary:      Effort: Pulmonary effort is normal. No respiratory distress. Breath sounds: Normal breath sounds. Musculoskeletal:         General: No deformity. Normal range of motion. Cervical back: Full passive range of motion without pain and normal range of motion. Skin:     General: Skin is warm and dry. Neurological:      Mental Status: She is alert and oriented to person, place, and time. An electronic signature was used to authenticate this note.     --KELLEN Zavala - CNP

## 2022-06-01 NOTE — PATIENT INSTRUCTIONS
- ask pharmacy about rybelsus- if they have it, start taking it    -call neurosurgery office and ask when EMG is    - increase jardiance to 25 mg    - a pharmacist will be calling you from [address] [place] [address] to help with your diabetes.

## 2022-06-06 ENCOUNTER — HOSPITAL ENCOUNTER (OUTPATIENT)
Age: 52
Discharge: HOME | End: 2022-06-06
Payer: COMMERCIAL

## 2022-06-06 DIAGNOSIS — Z79.4 DIABETES MELLITUS TYPE 2, INSULIN DEPENDENT (HCC): ICD-10-CM

## 2022-06-06 DIAGNOSIS — D72.829 LEUKOCYTOSIS, UNSPECIFIED TYPE: ICD-10-CM

## 2022-06-06 DIAGNOSIS — E11.9 DIABETES MELLITUS TYPE 2, INSULIN DEPENDENT (HCC): ICD-10-CM

## 2022-06-06 DIAGNOSIS — R79.89 ELEVATED SERUM CREATININE: Primary | ICD-10-CM

## 2022-06-06 LAB
ALBUMIN SERPL-MCNC: 4.1 G/DL (ref 3.5–5.2)
ALBUMIN/GLOBULIN RATIO: 1.2 (ref 1–2.5)
ALP BLD-CCNC: 119 U/L (ref 35–104)
ALT SERPL-CCNC: 11 U/L (ref 5–33)
ANION GAP SERPL CALCULATED.3IONS-SCNC: 14 MMOL/L (ref 9–17)
AST SERPL-CCNC: 12 U/L
BILIRUB SERPL-MCNC: 0.36 MG/DL (ref 0.3–1.2)
BUN BLDV-MCNC: 17 MG/DL (ref 6–20)
CALCIUM SERPL-MCNC: 9.4 MG/DL (ref 8.6–10.4)
CHLORIDE BLD-SCNC: 102 MMOL/L (ref 98–107)
CHOLESTEROL/HDL RATIO: 5.9
CHOLESTEROL: 267 MG/DL
CO2: 24 MMOL/L (ref 20–31)
CREAT SERPL-MCNC: 1.06 MG/DL (ref 0.5–0.9)
CREATININE URINE: 128.2 MG/DL (ref 28–217)
GFR AFRICAN AMERICAN: >60 ML/MIN
GFR NON-AFRICAN AMERICAN: 55 ML/MIN
GFR SERPL CREATININE-BSD FRML MDRD: ABNORMAL ML/MIN/{1.73_M2}
GLUCOSE BLD-MCNC: 102 MG/DL (ref 70–99)
HCT VFR BLD CALC: 38.7 % (ref 36.3–47.1)
HDLC SERPL-MCNC: 45 MG/DL
HEMOGLOBIN: 12.5 G/DL (ref 11.9–15.1)
LDL CHOLESTEROL: 191 MG/DL (ref 0–130)
MCH RBC QN AUTO: 26.5 PG (ref 25.2–33.5)
MCHC RBC AUTO-ENTMCNC: 32.3 G/DL (ref 28.4–34.8)
MCV RBC AUTO: 82.2 FL (ref 82.6–102.9)
MICROALBUMIN/CREAT 24H UR: 25 MG/L
MICROALBUMIN/CREAT UR-RTO: 20 MCG/MG CREAT
NRBC AUTOMATED: 0 PER 100 WBC
PDW BLD-RTO: 15.8 % (ref 11.8–14.4)
PLATELET # BLD: 418 K/UL (ref 138–453)
PMV BLD AUTO: 10.2 FL (ref 8.1–13.5)
POTASSIUM SERPL-SCNC: 3.5 MMOL/L (ref 3.7–5.3)
RBC # BLD: 4.71 M/UL (ref 3.95–5.11)
SODIUM BLD-SCNC: 140 MMOL/L (ref 135–144)
TOTAL PROTEIN: 7.5 G/DL (ref 6.4–8.3)
TRIGL SERPL-MCNC: 156 MG/DL
TSH SERPL DL<=0.05 MIU/L-ACNC: 2.38 UIU/ML (ref 0.3–5)
WBC # BLD: 11.9 K/UL (ref 3.5–11.3)

## 2022-06-06 PROCEDURE — 82043 UR ALBUMIN QUANTITATIVE: CPT

## 2022-06-06 PROCEDURE — 80061 LIPID PANEL: CPT

## 2022-06-06 PROCEDURE — 82570 ASSAY OF URINE CREATININE: CPT

## 2022-06-06 PROCEDURE — 84443 ASSAY THYROID STIM HORMONE: CPT

## 2022-06-06 PROCEDURE — 36415 COLL VENOUS BLD VENIPUNCTURE: CPT

## 2022-06-06 PROCEDURE — 85027 COMPLETE CBC AUTOMATED: CPT

## 2022-06-06 PROCEDURE — 80053 COMPREHEN METABOLIC PANEL: CPT

## 2022-06-09 NOTE — PROGRESS NOTES
Medication Management Service  [place] Frias is a 54 y.o. female that presents for an initial diabetes mellitus office visit with Medication Management Service per referral from KELLEN Thomas CNP for Diabetes Management under Collaborative Practice Agreement with A1c goal < 7 %. Subjective/Objective     New Problems/Changes: none to report today, new to our service. DIET  Breakfast:  Usually has cereal, or oatmeal, or English muffin with cream cheese, fruit smoothie, varies daily. Today had oatmeal. Reports an occasional small glass of juice, but mostly drinks water with breakfast.     Lunch: sometimes sandwich, other times baked chicken, left overs, water or juice, or tea  Dinner: 530 - 6pm; similar to lunch items; tries to include vegetables, beverages as above. Last night had noodles only plus water. Snacks: does not usually eat snacks throughout the day, occasionally cheese and crackers  Sweets: rare but every once and a while,  possibly a cookie or cake   Beverages: water, tea, and every once in a while; pop. Reports that she really likes Vernors. EXERCISE  Patient reports not much, does try to get some extra walking in some days, but has nerve damage in legs from COVID, had been Dxd with peripheral neuropathy prior to [facility].     WEIGHT  Weight: 166.7#  BMI: 33.67     BLOOD PRESSURE  130/86    DIABETES MANAGEMENT    Diabetes Goals: Using ADA Standards of Care     Goal A1c:  Less than 7 %   Fasting Blood Sugars:  80-130mg/dL  Postprandial glucose:  Less than 180mg/dL     Lab Results   Component Value Date    LABA1C 14.0 06/01/2022    LABA1C 11.3 02/25/2022    LABA1C 14.0 11/12/2021       Current DM Medications   Jardiance 25mg orally once daily - last fill 4/26/2022   Lantus Solostar (0.1 - 0.2 units / kg initial dosing)  55 units nightly (0.725 units / kg) (5/2/2022)   Patient reports that she takes Rybelsus per directive from PCP at most recent visit. No active order. Home Glucose Readings:  Has not been checking blood glucose at home, reports issues with manual dexterity due to arthritis. Hypoglycemia:   Every once and a while, somewhat rare - Sx; light headed, weak. Drinks a glass of juice to resolve. Hyperglycemia:  Occasionally, Sx include headache. Renal monitoring:  Lab Results   Component Value Date    MICROALBUR 25 06/06/2022    LABCREA 128.2 06/06/2022    LABMICR 20 06/06/2022     Estimated Creatinine Clearance: 49 mL/min (A) (based on SCr of 1.06 mg/dL (H)).     BLOOD PRESSURE MANAGEMENT  BP Readings from Last 3 Encounters:   06/01/22 (!) 137/94   04/08/22 111/73   02/25/22 (!) 147/102       Home BP Reading:    Does not check at home    Caffeine Intake:    no    CHOLESTEROL MANAGEMENT    Lab Results   Component Value Date    CHOL 267 (H) 06/06/2022    TRIG 156 (H) 06/06/2022    HDL 45 06/06/2022    LDLCHOLESTEROL 191 (H) 06/06/2022     ALT   Date Value Ref Range Status   06/06/2022 11 5 - 33 U/L Final     AST   Date Value Ref Range Status   06/06/2022 12 <32 U/L Final     The 10-year ASCVD risk score (Amber Beck, et al., 2013) is: 16.8%    Values used to calculate the score:      Age: 54 years      Sex: Female      Is Non- : Yes      Diabetic: Yes      Tobacco smoker: No      Systolic Blood Pressure: 027 mmHg      Is BP treated: Yes      HDL Cholesterol: 45 mg/dL      Total Cholesterol: 267 mg/dL     MEDICATIONS    New/Discontinued medications since last encounter:    Rybelsus to be started    Previous Diabetes Medications:    Metformin - discontinued due to GI adverse effects    Current Outpatient Medications   Medication Sig Dispense Refill    tiZANidine (ZANAFLEX) 4 MG tablet take 1 tablet by mouth at bedtime 40 tablet 1    empagliflozin (JARDIANCE) 25 MG tablet Take 1 tablet by mouth daily 30 tablet 5    insulin glargine (BASAGLAR KWIKPEN) 100 UNIT/ML injection pen Inject 55 Units into the skin nightly 5 pen 2    losartan-hydroCHLOROthiazide (HYZAAR) 50-12.5 MG per tablet Take 1 tablet by mouth daily 90 tablet 1    aspirin EC 81 MG EC tablet Take 1 tablet by mouth daily 30 tablet 11    gabapentin (NEURONTIN) 400 MG capsule Take 1 capsule by mouth 3 times daily for 30 days. 90 capsule 2    simvastatin (ZOCOR) 80 MG tablet Take 1 tablet by mouth nightly 90 tablet 1    beclomethasone (QVAR) 80 MCG/ACT inhaler Inhale 1 puff into the lungs 2 times daily 3 each 1    famotidine (PEPCID) 20 MG tablet Take 1 tablet by mouth daily 90 tablet 1    albuterol sulfate  (90 Base) MCG/ACT inhaler Inhale 2 puffs into the lungs every 6 hours as needed for Wheezing or Shortness of Breath 1 each 5    albuterol (PROVENTIL) (5 MG/ML) 0.5% nebulizer solution Take 0.5 mLs by nebulization every 6 hours as needed for Wheezing 120 Bottle 11    potassium chloride (KLOR-CON M) 20 MEQ extended release tablet Take 20 mEq by mouth daily       No current facility-administered medications for this visit. On Statin: Yes    On ACE-I or ARB for HTN or Microalbuminuria: Yes    Medication Adherence/Cost/Adverse Events:    Barrier to checking blood glucose is manual dexterity and arthritis pain. Assessment/Plan     Diabetes Management:    Diet  o Focus on using plate method particularly for dinner   Physical Activity  o Increase walking very short distances to 5 x/day   Glucose Testing  o Check blood glucose in before meals and at bedtime. Call clinic to report hyperglycemic events.    o Record blood glucose and bring log to next visit   Pharmacologic Therapy  o Continue Jardiance 25mg by mouth once daily in the morning  o Lantus;  reduced to 50 units each night  o Start Rybelsus 3mg orally once daily 30 minutes before food or beverage    Medications Ordered Prescriptions for the following were sent to [facility] on [address]:  True Metrix test strips  Rybelsus 30mg orally once daily 30 minutes prior to first food or drink x 30 days. The following education was provided during today's visit:     [x] Medication adherence   [] Insulin technique and storage   [x] Healthy lifestyle including diet and exercise changes   [x] Hypoglycemia symptoms and management   [x] Blood glucose goals    [x] Review of long term effects of uncontrolled hyperglycemia   [] Interpreting Ambulatory Glucose Profile (AGP) Report with focus on page 1, average number of scans and glucose levels per day, and snapshot     The following brochure(s)/handout(s) discussed with patient during visit:       [] What is diabetes   [] Checking your blood sugar   [] Know your numbers   [x] Hypoglycemia symptoms and management/Hyperglycemia symptoms   [] Blood glucose log sheet(s)   [] Planning Healthy Meals   [x] My Plate   [] DM snacks   [] One Week-DM meal plan   [x] Building a balanced meal   [] FreeStyle Reji 2 - Get Started    [] Today's Ambulatory Glucose Profile (AGP) Report    Blood Pressure Management:   Hyzaar 50/12.5    Lipid Management:   atorvastatin    Next Follow-Up: ~ 2 weeks, on 2022. Patient verbalized understanding of care plan. Patient advised to call Medication Management with any questions, concerns, or changes prior to next appointment. Progress note sent to referring provider; Collette Gordon, APRN - CNP     Patient acknowledges working in a consult agreement with the pharmacist as referred by his/her physician. Nataly Lara [facility], CACP  Clinical Pharmacist Medication Management  6/15/2022  2:27 PM      ===========================================================    For Pharmacy Admin Tracking Only     CPA in place:   Yes   Recommendation Provided To: Patient - 3   Intervention Detail: Adherence Monitorin, Dose Adjustment: 1, reason: Therapy De-escalation and New Rx: 2, reason: Needs Additional Therapy   Intervention Accepted By: Patient/Caregiver: 3   Time Spent (min): 75    Conferred with OMER Lucero Sorensen CNP (referring PCP) related to reduction of Lantus and starting Rybelsus.

## 2022-06-15 ENCOUNTER — HOSPITAL ENCOUNTER (OUTPATIENT)
Dept: PHARMACY | Age: 52
Setting detail: THERAPIES SERIES
Discharge: HOME | End: 2022-06-15
Payer: COMMERCIAL

## 2022-06-15 VITALS — WEIGHT: 166.7 LBS | DIASTOLIC BLOOD PRESSURE: 86 MMHG | SYSTOLIC BLOOD PRESSURE: 130 MMHG | BODY MASS INDEX: 33.67 KG/M2

## 2022-06-15 PROCEDURE — 99213 OFFICE O/P EST LOW 20 MIN: CPT

## 2022-06-15 RX ORDER — CALCIUM CITRATE/VITAMIN D3 200MG-6.25
TABLET ORAL
Qty: 100 EACH | Refills: 3 | Status: SHIPPED | OUTPATIENT
Start: 2022-06-15 | End: 2022-07-18 | Stop reason: ALTCHOICE

## 2022-06-15 RX ORDER — ORAL SEMAGLUTIDE 3 MG/1
3 TABLET ORAL
Qty: 30 TABLET | Refills: 0 | Status: SHIPPED | OUTPATIENT
Start: 2022-06-15 | End: 2022-07-15

## 2022-06-16 ENCOUNTER — TELEPHONE (OUTPATIENT)
Dept: PRIMARY CARE CLINIC | Age: 52
End: 2022-06-16

## 2022-06-20 ENCOUNTER — TELEPHONE (OUTPATIENT)
Dept: PHARMACY | Age: 52
End: 2022-06-20

## 2022-06-20 NOTE — TELEPHONE ENCOUNTER
I called Florian Valles today to discuss blood glucose checks and let her know that use of Rybelsus requires a PA, which has been submitted. No answer, left vm message.

## 2022-06-22 ENCOUNTER — TELEPHONE (OUTPATIENT)
Dept: PRIMARY CARE CLINIC | Age: 52
End: 2022-06-22

## 2022-06-23 NOTE — TELEPHONE ENCOUNTER
Kwaku Zamorano today to inform that PA approved, but her phone is not accepting calls. I called the pharmacy - PA has been processed and medication is available, they will fill and text patient.       Helen Payne, [facility]  Clinical Pharmacist Medication Management  6/23/2022  4:02 PM

## 2022-06-27 ENCOUNTER — TELEPHONE (OUTPATIENT)
Dept: PHARMACY | Age: 52
End: 2022-06-27

## 2022-06-27 NOTE — TELEPHONE ENCOUNTER
Vicky Denney returned our call today, and needed to reschedule her appt for today. I informed her that the Rybelsus is ready for  and she should start taking tomorrow due to food / beverage restrictions. Explained this a starting dose and next dose will be a therapeutic lott that can help lower her blood glucose. She reports that she has not been check BG secondary to getting an error message on her glucometer which she reports is at least two years old. I talked with [facility] and she will not be eligible for another glucometer until July 30, every 576 days (1.5 years). She has another newer meter but no test strips but a script was just filled for strips for the older meter. I encouraged her to call the 800 line ([phone]) for assistance. Rescheduled appt to July 11, 2022. I requested that she call me with an update on the glucometer to report if it is functioning or not, she agreed. Venkatesh Reich, [facility], CACP  Clinical Pharmacist Medication Management  6/27/2022  12:33 PM      OF IMPORTANT NOTE;    My previous note from her first clinic visit indicated dose of Reybelsus as 30mg, however that was a typo and correct dose is 3mg. I was no able to addend that note.

## 2022-07-11 ENCOUNTER — HOSPITAL ENCOUNTER (OUTPATIENT)
Dept: PHARMACY | Age: 52
Setting detail: THERAPIES SERIES
Discharge: HOME | End: 2022-07-11
Payer: COMMERCIAL

## 2022-07-11 VITALS — WEIGHT: 161.4 LBS | BODY MASS INDEX: 32.6 KG/M2

## 2022-07-11 PROCEDURE — 99213 OFFICE O/P EST LOW 20 MIN: CPT

## 2022-07-11 RX ORDER — ORAL SEMAGLUTIDE 7 MG/1
7 TABLET ORAL
Qty: 30 TABLET | Refills: 2 | Status: SHIPPED | OUTPATIENT
Start: 2022-07-11

## 2022-07-11 NOTE — PROGRESS NOTES
units / kg)    Rybelsus 3mg orally once daily. Home Glucose Readings:    Has started checking blood glucose at home but did not bring meter or log today and unfortunately was not able to recall any specific numbers except for:     7/10/22 AM FBS = 160  7/10/22 HS BG = 210    Hypoglycemia:   None reported. Hyperglycemia:  None reported. Renal monitoring:  Lab Results   Component Value Date/Time    MICROALBUR 25 06/06/2022 08:43 AM    LABCREA 128.2 06/06/2022 08:43 AM    LABMICR 20 06/06/2022 08:43 AM     Estimated Creatinine Clearance: 59 mL/min (A) (based on SCr of 1.06 mg/dL (H)). BLOOD PRESSURE MANAGEMENT  BP Readings from Last 3 Encounters:   06/15/22 130/86   06/01/22 (!) 137/94   04/08/22 111/73       Home BP Reading:    Does not check at home    Caffeine Intake:    no    CHOLESTEROL MANAGEMENT    Lab Results   Component Value Date    CHOL 267 (H) 06/06/2022    TRIG 156 (H) 06/06/2022    HDL 45 06/06/2022    LDLCHOLESTEROL 191 (H) 06/06/2022     ALT   Date Value Ref Range Status   06/06/2022 11 5 - 33 U/L Final     AST   Date Value Ref Range Status   06/06/2022 12 <32 U/L Final     The 10-year ASCVD risk score ([address] et al., 2013) is: 13.7%    Values used to calculate the score:      Age: 54 years      Sex: Female      Is Non- : Yes      Diabetic: Yes      Tobacco smoker: No      Systolic Blood Pressure: 394 mmHg      Is BP treated: Yes      HDL Cholesterol: 45 mg/dL      Total Cholesterol: 267 mg/dL     MEDICATIONS    New/Discontinued medications since last encounter:    Rybelsus will be increased to 7mg orally once daily after 30 days of 3mg daily dose, approx; 7/25/2022. Sending in new script today. Current outpatient Medications:     Current Outpatient Medications   Medication Sig Dispense Refill    Blood Glucose Monitoring Suppl (TRUE METRIX METER) w/Device KIT Use as directed to check blood glucose.  1 kit 0    Semaglutide (RYBELSUS) 3 MG TABS Take 3 mg by mouth every morning (before breakfast) Take 3mg (1 tablet) by mouth once daily, every day 30 minutes before first food or drink. 30 tablet 0    blood glucose test strips (TRUE METRIX BLOOD GLUCOSE TEST) strip Use to check blood glucose 4 times daily; Before meals and bedtime, and as needed. 100 each 3    tiZANidine (ZANAFLEX) 4 MG tablet take 1 tablet by mouth at bedtime 40 tablet 1    empagliflozin (JARDIANCE) 25 MG tablet Take 1 tablet by mouth daily 30 tablet 5    insulin glargine (BASAGLAR KWIKPEN) 100 UNIT/ML injection pen Inject 55 Units into the skin nightly 5 pen 2    losartan-hydroCHLOROthiazide (HYZAAR) 50-12.5 MG per tablet Take 1 tablet by mouth daily 90 tablet 1    aspirin EC 81 MG EC tablet Take 1 tablet by mouth daily 30 tablet 11    gabapentin (NEURONTIN) 400 MG capsule Take 1 capsule by mouth 3 times daily for 30 days. 90 capsule 2    simvastatin (ZOCOR) 80 MG tablet Take 1 tablet by mouth nightly 90 tablet 1    beclomethasone (QVAR) 80 MCG/ACT inhaler Inhale 1 puff into the lungs 2 times daily 3 each 1    famotidine (PEPCID) 20 MG tablet Take 1 tablet by mouth daily 90 tablet 1    albuterol sulfate  (90 Base) MCG/ACT inhaler Inhale 2 puffs into the lungs every 6 hours as needed for Wheezing or Shortness of Breath 1 each 5    albuterol (PROVENTIL) (5 MG/ML) 0.5% nebulizer solution Take 0.5 mLs by nebulization every 6 hours as needed for Wheezing 120 Bottle 11    potassium chloride (KLOR-CON M) 20 MEQ extended release tablet Take 20 mEq by mouth daily       No current facility-administered medications for this encounter.        On Statin: Yes    On ACE-I or ARB for HTN or Microalbuminuria: Yes       Assessment/Plan     Diabetes Management:    Diet  o Will work to reduce carbohydrate intake, particularly with brekfast as she reports eating two whole English muffins, will reduce to one and add other healthy option as part of breakfast.  Continue to use plate method particularly for dinner   Physical Activity  o Increase walking very short distances to 5 x/day   Glucose Testing  o Check blood glucose in before meals and at bedtime. Call clinic to report hyperglycemic events. o Record blood glucose and bring log to next visit   Pharmacologic Therapy  o Continue Jardiance 25mg by mouth once daily in the morning  o Continue Lantus 50 units each night  o Complete 30 days of Rybelsus 3mg orally once daily 30 minutes before food or beverage, then increase to 7mg orally once daily. Medications Ordered Prescriptions for the following were sent to [facility] on [address]:  Rybelsus 7mg orally once daily 30 minutes prior to first food or drink. The following education was provided during today's visit:     [x] Medication adherence   [x] Carbohydrate intake   [x] Healthy lifestyle including diet and exercise changes   [] Hypoglycemia symptoms and management   [x] Blood glucose goals    [x] Review of long term effects of uncontrolled hyperglycemia   [] Interpreting Ambulatory Glucose Profile (AGP) Report with focus on page 1, average number of scans and glucose levels per day, and snapshot     The following brochure(s)/handout(s) discussed with patient during visit:       [] What is diabetes   [] Checking your blood sugar   [] Know your numbers   [] Hypoglycemia symptoms and management/Hyperglycemia symptoms   [x] Reading a food labe   [x] Planning Healthy Meals   [] My Plate   [x] DM snacks   [x] One Week-DM meal plan   [] Building a balanced meal   [] FreeStyle Reji 2 - Get Started    [] Today's Ambulatory Glucose Profile (AGP) Report    Blood Pressure Management:   Hyzaar 50/12.5    Lipid Management:   atorvastatin    Next Follow-Up: ~ 4 weeks:  August 8, 2022. Patient verbalized understanding of care plan. Patient advised to call Medication Management with any questions, concerns, or changes prior to next appointment.     Progress note sent to referring provider; Hugo Graves, APRN - CNP     Patient acknowledges working in a consult agreement with the pharmacist as referred by his/her physician.    Bob Landeros, [facility], CACP  Clinical Pharmacist Medication Management  2022  9:33 AM      ===========================================================    For Pharmacy Admin Tracking Only     Intervention Detail: Adherence Monitorin and New Rx: 1, reason: Needs Additional Therapy   Total # of Interventions Recommended: 2   Total # of Interventions Accepted: 2   Time Spent (min): 45

## 2022-07-12 DIAGNOSIS — J45.21 RAD (REACTIVE AIRWAY DISEASE), MILD INTERMITTENT, WITH ACUTE EXACERBATION: ICD-10-CM

## 2022-07-12 RX ORDER — ALBUTEROL SULFATE 90 UG/1
2 AEROSOL, METERED RESPIRATORY (INHALATION) EVERY 6 HOURS PRN
Qty: 1 EACH | Refills: 5 | Status: SHIPPED | OUTPATIENT
Start: 2022-07-12 | End: 2022-09-07 | Stop reason: SDUPTHER

## 2022-07-12 NOTE — TELEPHONE ENCOUNTER
Patient would like refills of her Albuterol inhaler and Albuterol nebulizer solution sent to [facility].

## 2022-07-18 ENCOUNTER — TELEPHONE (OUTPATIENT)
Dept: PHARMACY | Age: 52
End: 2022-07-18

## 2022-07-18 NOTE — PROGRESS NOTES
I called [facility] Kate today to confirm that the Rybelsus 7mg tablet has been approved by insurance and she will need to start once the 3mg tablets are depleted. She already has 7mg tabs on hand, only a few days remaining for 3mg tablets. She reports that she continues to have issues with BG meter, doubts accuracy of results, stating BG can be in the 400's when she gets up before eating but then when she checks it again the result is in the 200's. She is not eligible for a new meter until , but has a new unused meter from 2021. I am sending in a script for test strips for the One Touch Ultra 2 meter, to be determined if these will be covered. I called patient back to let her know script was sent in and encouraged her to take her meter with her when picking up strips and open bag while there to determine and make sure the strips will work with the meter she has. We do not want to \"waste\" a fill for incorrect strips.         Pricila Hope, [facility]  Clinical Pharmacist Medication Management  2022  12:27 PM      For Pharmacy Admin Tracking Only    Intervention Detail: Adherence Monitorin and New Rx: 1, reason: Improve Adherence  Total # of Interventions Recommended: 2  Total # of Interventions Accepted: 2  Time Spent (min):  25

## 2022-07-20 DIAGNOSIS — Z79.4 DIABETES MELLITUS TYPE 2, INSULIN DEPENDENT (HCC): ICD-10-CM

## 2022-07-20 DIAGNOSIS — E11.9 DIABETES MELLITUS TYPE 2, INSULIN DEPENDENT (HCC): ICD-10-CM

## 2022-07-21 RX ORDER — EMPAGLIFLOZIN 10 MG/1
TABLET, FILM COATED ORAL DAILY
Qty: 90 TABLET | Refills: 1 | OUTPATIENT
Start: 2022-07-21

## 2022-08-08 ENCOUNTER — TELEPHONE (OUTPATIENT)
Dept: PHARMACY | Age: 52
End: 2022-08-08

## 2022-08-15 DIAGNOSIS — Z79.4 DIABETES MELLITUS TYPE 2, INSULIN DEPENDENT (HCC): ICD-10-CM

## 2022-08-15 DIAGNOSIS — E11.9 DIABETES MELLITUS TYPE 2, INSULIN DEPENDENT (HCC): ICD-10-CM

## 2022-08-15 NOTE — TELEPHONE ENCOUNTER
Health Maintenance   Topic Date Due    Diabetic retinal exam  05/17/2015    Colorectal Cancer Screen  Never done    A1C test (Diabetic or Prediabetic)  09/01/2022    COVID-19 Vaccine (2 - Moderna series) 09/01/2022 (Originally 6/15/2021)    Hepatitis B vaccine (1 of 3 - Risk 3-dose series) 06/01/2023 (Originally 12/20/1991)    Pneumococcal 0-64 years Vaccine (2 - PCV) 06/01/2023 (Originally 12/18/2016)    Flu vaccine (1) 09/01/2022    Diabetic foot exam  11/12/2022    Depression Screen  06/01/2023    Diabetic microalbuminuria test  06/06/2023    Lipids  06/06/2023    Cervical cancer screen  03/29/2026    DTaP/Tdap/Td vaccine (3 - Td or Tdap) 01/11/2028    Hepatitis C screen  Completed    HIV screen  Completed    Hepatitis A vaccine  Aged Out    Hib vaccine  Aged Out    Meningococcal (ACWY) vaccine  Aged Out             (applicable per patient's age: Cancer Screenings, Depression Screening, Fall Risk Screening, Immunizations)    Hemoglobin A1C (%)   Date Value   06/01/2022 14.0   02/25/2022 11.3   11/12/2021 14.0     Microalb/Crt.  Ratio (mcg/mg creat)   Date Value   06/06/2022 20     LDL Cholesterol (mg/dL)   Date Value   06/06/2022 191 (H)     AST (U/L)   Date Value   06/06/2022 12     ALT (U/L)   Date Value   06/06/2022 11     BUN (mg/dL)   Date Value   06/06/2022 17      (goal A1C is < 7)   (goal LDL is <100) need 30-50% reduction from baseline     BP Readings from Last 3 Encounters:   06/15/22 130/86   06/01/22 (!) 137/94   04/08/22 111/73    (goal /80)      All Future Testing planned in [facility]:  Lab Frequency Next Occurrence   EMG Once 13/72/6951   Basic Metabolic Panel Once 52/16/1255   CBC Once 06/06/2022       Next Visit Date:  Future Appointments   Date Time Provider Rohan Juarez   9/6/2022  9:30 AM KELLEN Longo - CNP [facility]   9/8/2022 10:00 AM [facility] EMG  [facility]   9/8/2022 10:00 AM Gwendolyn Sharma MD [address]            Patient Active Problem List:     Obesity     GERD (gastroesophageal reflux disease)     HTN (hypertension), benign     RAD (reactive airway disease)     Diabetes mellitus type 2, insulin dependent (HCC)     Bilateral knee pain     Pain in joint of left shoulder     Patellofemoral syndrome     Peripheral neuropathy     Hypercholesteremia     Abnormal uterine bleeding (AUB)     Abnormal glandular Papanicolaou smear of cervix - NOS     Immunization due     S/p Endometrial Ablation 2/27/18

## 2022-08-16 RX ORDER — INSULIN GLARGINE 100 [IU]/ML
55 INJECTION, SOLUTION SUBCUTANEOUS NIGHTLY
Qty: 5 PEN | Refills: 2 | Status: SHIPPED | OUTPATIENT
Start: 2022-08-16

## 2022-08-16 NOTE — TELEPHONE ENCOUNTER
Called to schedule appointment for Diabetes management f/u.       Joanie Mccoy RPh, MAYLINP  Clinical Pharmacist Medication Management  8/16/2022  12:10 PM

## 2022-08-31 ENCOUNTER — TELEPHONE (OUTPATIENT)
Dept: PRIMARY CARE CLINIC | Age: 52
End: 2022-08-31

## 2022-08-31 DIAGNOSIS — E11.9 DIABETES MELLITUS TYPE 2, INSULIN DEPENDENT (HCC): ICD-10-CM

## 2022-08-31 DIAGNOSIS — Z79.4 DIABETES MELLITUS TYPE 2, INSULIN DEPENDENT (HCC): ICD-10-CM

## 2022-08-31 RX ORDER — LOSARTAN POTASSIUM AND HYDROCHLOROTHIAZIDE 12.5; 5 MG/1; MG/1
1 TABLET ORAL DAILY
Qty: 90 TABLET | Refills: 1 | Status: CANCELLED | OUTPATIENT
Start: 2022-08-31

## 2022-08-31 RX ORDER — GABAPENTIN 400 MG/1
400 CAPSULE ORAL 3 TIMES DAILY
Qty: 90 CAPSULE | Refills: 2 | Status: CANCELLED | OUTPATIENT
Start: 2022-08-31 | End: 2022-09-30

## 2022-08-31 NOTE — TELEPHONE ENCOUNTER
Patient is reqesting a med refill on losartan hctz 50/12.5 mg, BD Pen Needles and gabapentin 400 mg, patient was also advised of orders placed in chart to have labs completed. Health Maintenance   Topic Date Due    Diabetic retinal exam  05/17/2015    Colorectal Cancer Screen  Never done    A1C test (Diabetic or Prediabetic)  09/01/2022    COVID-19 Vaccine (2 - Moderna series) 09/01/2022 (Originally 6/15/2021)    Hepatitis B vaccine (1 of 3 - Risk 3-dose series) 06/01/2023 (Originally 12/20/1991)    Pneumococcal 0-64 years Vaccine (2 - PCV) 06/01/2023 (Originally 12/18/2016)    Flu vaccine (1) 09/01/2022    Diabetic foot exam  11/12/2022    Depression Screen  06/01/2023    Diabetic microalbuminuria test  06/06/2023    Lipids  06/06/2023    Cervical cancer screen  03/29/2026    DTaP/Tdap/Td vaccine (3 - Td or Tdap) 01/11/2028    Hepatitis C screen  Completed    HIV screen  Completed    Hepatitis A vaccine  Aged Out    Hib vaccine  Aged Out    Meningococcal (ACWY) vaccine  Aged Out             (applicable per patient's age: Cancer Screenings, Depression Screening, Fall Risk Screening, Immunizations)    Hemoglobin A1C (%)   Date Value   06/01/2022 14.0   02/25/2022 11.3   11/12/2021 14.0     Microalb/Crt.  Ratio (mcg/mg creat)   Date Value   06/06/2022 20     LDL Cholesterol (mg/dL)   Date Value   06/06/2022 191 (H)     AST (U/L)   Date Value   06/06/2022 12     ALT (U/L)   Date Value   06/06/2022 11     BUN (mg/dL)   Date Value   06/06/2022 17      (goal A1C is < 7)   (goal LDL is <100) need 30-50% reduction from baseline     BP Readings from Last 3 Encounters:   06/15/22 130/86   06/01/22 (!) 137/94   04/08/22 111/73    (goal /80)      All Future Testing planned in [facility]:  Lab Frequency Next Occurrence   EMG Once 79/07/6286   Basic Metabolic Panel Once 38/37/9908   CBC Once 06/06/2022       Next Visit Date:  Future Appointments   Date Time Provider [place]   9/8/2022 10:00 AM [facility] EMG  [facility] [place]   9/8/2022 10:00 AM Irina Jefferson MD [facility]   9/29/2022 10:00 AM KELLEN Hawkins - CNP [facility]            Patient Active Problem List:     Obesity     GERD (gastroesophageal reflux disease)     HTN (hypertension), benign     RAD (reactive airway disease)     Diabetes mellitus type 2, insulin dependent (HCC)     Bilateral knee pain     Pain in joint of left shoulder     Patellofemoral syndrome     Peripheral neuropathy     Hypercholesteremia     Abnormal uterine bleeding (AUB)     Abnormal glandular Papanicolaou smear of cervix - NOS     Immunization due     S/p Endometrial Ablation 2/27/18

## 2022-09-07 ENCOUNTER — OFFICE VISIT (OUTPATIENT)
Dept: PRIMARY CARE CLINIC | Age: 52
End: 2022-09-07
Payer: COMMERCIAL

## 2022-09-07 VITALS
DIASTOLIC BLOOD PRESSURE: 107 MMHG | OXYGEN SATURATION: 98 % | SYSTOLIC BLOOD PRESSURE: 161 MMHG | WEIGHT: 161 LBS | BODY MASS INDEX: 32.52 KG/M2 | TEMPERATURE: 98.7 F

## 2022-09-07 DIAGNOSIS — Z79.4 DIABETES MELLITUS TYPE 2, INSULIN DEPENDENT (HCC): ICD-10-CM

## 2022-09-07 DIAGNOSIS — I10 HTN (HYPERTENSION), BENIGN: Primary | ICD-10-CM

## 2022-09-07 DIAGNOSIS — J45.21 RAD (REACTIVE AIRWAY DISEASE), MILD INTERMITTENT, WITH ACUTE EXACERBATION: ICD-10-CM

## 2022-09-07 DIAGNOSIS — E11.9 DIABETES MELLITUS TYPE 2, INSULIN DEPENDENT (HCC): ICD-10-CM

## 2022-09-07 LAB — HBA1C MFR BLD: 14 %

## 2022-09-07 PROCEDURE — 99214 OFFICE O/P EST MOD 30 MIN: CPT | Performed by: NURSE PRACTITIONER

## 2022-09-07 PROCEDURE — 83036 HEMOGLOBIN GLYCOSYLATED A1C: CPT | Performed by: NURSE PRACTITIONER

## 2022-09-07 PROCEDURE — 2022F DILAT RTA XM EVC RTNOPTHY: CPT | Performed by: NURSE PRACTITIONER

## 2022-09-07 PROCEDURE — G8417 CALC BMI ABV UP PARAM F/U: HCPCS | Performed by: NURSE PRACTITIONER

## 2022-09-07 PROCEDURE — 1036F TOBACCO NON-USER: CPT | Performed by: NURSE PRACTITIONER

## 2022-09-07 PROCEDURE — 3046F HEMOGLOBIN A1C LEVEL >9.0%: CPT | Performed by: NURSE PRACTITIONER

## 2022-09-07 PROCEDURE — G8427 DOCREV CUR MEDS BY ELIG CLIN: HCPCS | Performed by: NURSE PRACTITIONER

## 2022-09-07 RX ORDER — LOSARTAN POTASSIUM AND HYDROCHLOROTHIAZIDE 12.5; 5 MG/1; MG/1
1 TABLET ORAL DAILY
Qty: 90 TABLET | Refills: 1 | Status: SHIPPED | OUTPATIENT
Start: 2022-09-07

## 2022-09-07 RX ORDER — GABAPENTIN 400 MG/1
400 CAPSULE ORAL 3 TIMES DAILY
Qty: 90 CAPSULE | Refills: 2 | Status: SHIPPED | OUTPATIENT
Start: 2022-09-07 | End: 2022-10-03 | Stop reason: ALTCHOICE

## 2022-09-07 RX ORDER — PEN NEEDLE, DIABETIC 31 GX5/16"
1 NEEDLE, DISPOSABLE MISCELLANEOUS DAILY
Qty: 100 EACH | Refills: 11 | Status: SHIPPED | OUTPATIENT
Start: 2022-09-07 | End: 2022-09-15 | Stop reason: SDUPTHER

## 2022-09-07 RX ORDER — ALBUTEROL SULFATE 90 UG/1
2 AEROSOL, METERED RESPIRATORY (INHALATION) EVERY 6 HOURS PRN
Qty: 1 EACH | Refills: 5 | Status: SHIPPED | OUTPATIENT
Start: 2022-09-07

## 2022-09-07 ASSESSMENT — ENCOUNTER SYMPTOMS
COUGH: 0
SHORTNESS OF BREATH: 0

## 2022-09-07 NOTE — PROGRESS NOTES
Luis Antonio Samano (:  1970) is a 54 y.o. female,Established patient, here for evaluation of the following chief complaint(s):  Medication Refill      ASSESSMENT/PLAN:  1. HTN (hypertension), benign  -     losartan-hydroCHLOROthiazide (HYZAAR) 50-12.5 MG per tablet; Take 1 tablet by mouth daily, Disp-90 tablet, R-1Normal  2. RAD (reactive airway disease), mild intermittent, with acute exacerbation  -     albuterol sulfate HFA (PROVENTIL;VENTOLIN;PROAIR) 108 (90 Base) MCG/ACT inhaler; Inhale 2 puffs into the lungs every 6 hours as needed for Wheezing or Shortness of Breath, Disp-1 each, R-5Normal  -     albuterol (PROVENTIL) (5 MG/ML) 0.5% nebulizer solution; Take 0.5 mLs by nebulization every 6 hours as needed for Wheezing, Disp-120 each, R-5Normal  3. Diabetes mellitus type 2, insulin dependent (HCC)  -     gabapentin (NEURONTIN) 400 MG capsule; Take 1 capsule by mouth 3 times daily for 30 days. , Disp-90 capsule, R-2Normal  -     empagliflozin (JARDIANCE) 25 MG tablet; Take 1 tablet by mouth daily, Disp-30 tablet, R-5Normal  -     POCT glycosylated hemoglobin (Hb A1C)  -     Insulin Pen Needle (B-D ULTRAFINE III SHORT PEN) 31G X 8 MM MISC; DAILY Starting Wed 2022, Disp-100 each, R-11, NormalMay substitute with any brand  Risks of uncontrolled dm/htn discussed   Reminded to get refills from pharmacy when she is out of meds. Needs to be taking all three dm meds- pt vocalizes correct dosages. Sergio Howard was referred to diabetes-pharmacy program, she did complete a few visits and then no showed- no future apt made. No follow-ups on file. Subjective   SUBJECTIVE/OBJECTIVE:  HPI    Htn- out of bp med. Dm- Colleen Ramirez thinks her a1c will be bad today she has been out of pen needles for she states about a week. The box of needles she has with her was dispensed may 2021. Review of Systems   Constitutional:  Negative for chills and fever.    Respiratory:  Negative for cough and shortness of breath. Cardiovascular:  Negative for chest pain, palpitations and leg swelling. Objective   Vitals:    09/07/22 1105   BP: (!) 161/107   Temp:    SpO2:      Wt Readings from Last 3 Encounters:   09/07/22 161 lb (73 kg)   07/11/22 161 lb 6.4 oz (73.2 kg)   06/15/22 166 lb 11.2 oz (75.6 kg)       Physical Exam  Constitutional:       Appearance: She is well-developed. HENT:      Right Ear: External ear normal.      Left Ear: External ear normal.      Nose: Nose normal.   Cardiovascular:      Rate and Rhythm: Normal rate and regular rhythm. Heart sounds: Normal heart sounds, S1 normal and S2 normal.   Pulmonary:      Effort: Pulmonary effort is normal. No respiratory distress. Breath sounds: Normal breath sounds. Musculoskeletal:         General: No deformity. Normal range of motion. Cervical back: Full passive range of motion without pain and normal range of motion. Skin:     General: Skin is warm and dry. Neurological:      Mental Status: She is alert and oriented to person, place, and time. An electronic signature was used to authenticate this note.     --Nitza Glez, APRNISHANT - CNP

## 2022-09-07 NOTE — PROGRESS NOTES
Visit Information    Have you changed or started any medications since your last visit including any over-the-counter medicines, vitamins, or herbal medicines? no   Are you having any side effects from any of your medications? -  no  Have you stopped taking any of your medications? Is so, why? -  no    Have you seen any other physician or provider since your last visit? No  Have you had any other diagnostic tests since your last visit? No  Have you been seen in the emergency room and/or had an admission to a hospital since we last saw you? No  Have you had your routine dental cleaning in the past 6 months? no    Have you activated your [employer] account? If not, what are your barriers?  Yes     Patient Care Team:  KELLEN Davis CNP as PCP - General (Family Nurse Practitioner)  KELLEN Davis CNP as PCP - [facility]  Adán Collins (Optometry)  Willie Curtis MD as Consulting Physician (Endocrinology)  Lynne Hernandez DO as Resident (Obstetrics & Gynecology)  KELLEN Loving CNP (Family Medicine)    Medical History Review  Past Medical, Family, and Social History reviewed and does not contribute to the patient presenting condition    Health Maintenance   Topic Date Due    Diabetic retinal exam  05/17/2015    Colorectal Cancer Screen  Never done    COVID-19 Vaccine (2 - Moderna series) 06/15/2021    Flu vaccine (1) 09/01/2022    A1C test (Diabetic or Prediabetic)  09/01/2022    Hepatitis B vaccine (1 of 3 - Risk 3-dose series) 06/01/2023 (Originally 12/20/1991)    Pneumococcal 0-64 years Vaccine (2 - PCV) 06/01/2023 (Originally 12/18/2016)    Diabetic foot exam  11/12/2022    Depression Screen  06/01/2023    Diabetic microalbuminuria test  06/06/2023    Lipids  06/06/2023    Cervical cancer screen  03/29/2026    DTaP/Tdap/Td vaccine (3 - Td or Tdap) 01/11/2028    Hepatitis C screen  Completed    HIV screen  Completed    Hepatitis A vaccine  Aged Out    Hib vaccine Aged Out    Meningococcal (ACWY) vaccine  Aged Out

## 2022-09-08 ENCOUNTER — HOSPITAL ENCOUNTER (OUTPATIENT)
Dept: NEUROLOGY | Age: 52
Discharge: HOME | End: 2022-09-08
Payer: COMMERCIAL

## 2022-09-08 DIAGNOSIS — R29.898 BILATERAL LEG WEAKNESS: ICD-10-CM

## 2022-09-08 PROCEDURE — 95885 MUSC TST DONE W/NERV TST LIM: CPT | Performed by: PHYSICAL MEDICINE & REHABILITATION

## 2022-09-08 PROCEDURE — 95909 NRV CNDJ TST 5-6 STUDIES: CPT | Performed by: PHYSICAL MEDICINE & REHABILITATION

## 2022-09-15 DIAGNOSIS — E11.9 DIABETES MELLITUS TYPE 2, INSULIN DEPENDENT (HCC): ICD-10-CM

## 2022-09-15 DIAGNOSIS — Z79.4 DIABETES MELLITUS TYPE 2, INSULIN DEPENDENT (HCC): ICD-10-CM

## 2022-09-15 DIAGNOSIS — M54.50 LUMBAR PAIN: ICD-10-CM

## 2022-09-15 DIAGNOSIS — M51.36 DEGENERATION OF L4-L5 INTERVERTEBRAL DISC: ICD-10-CM

## 2022-09-15 RX ORDER — PEN NEEDLE, DIABETIC 31 GX5/16"
1 NEEDLE, DISPOSABLE MISCELLANEOUS DAILY
Qty: 100 EACH | Refills: 11 | Status: SHIPPED | OUTPATIENT
Start: 2022-09-15

## 2022-09-15 RX ORDER — TIZANIDINE 4 MG/1
TABLET ORAL
Qty: 40 TABLET | Refills: 1 | Status: SHIPPED | OUTPATIENT
Start: 2022-09-15

## 2022-09-15 NOTE — TELEPHONE ENCOUNTER
Health Maintenance   Topic Date Due    Diabetic retinal exam  05/17/2015    Colorectal Cancer Screen  Never done    COVID-19 Vaccine (2 - Moderna series) 06/15/2021    Flu vaccine (1) 09/01/2022    Hepatitis B vaccine (1 of 3 - Risk 3-dose series) 06/01/2023 (Originally 12/20/1991)    Pneumococcal 0-64 years Vaccine (2 - PCV) 06/01/2023 (Originally 12/18/2016)    Diabetic foot exam  11/12/2022    A1C test (Diabetic or Prediabetic)  12/07/2022    Depression Screen  06/01/2023    Diabetic microalbuminuria test  06/06/2023    Lipids  06/06/2023    Cervical cancer screen  03/29/2026    DTaP/Tdap/Td vaccine (3 - Td or Tdap) 01/11/2028    Hepatitis C screen  Completed    HIV screen  Completed    Hepatitis A vaccine  Aged Out    Hib vaccine  Aged Out    Meningococcal (ACWY) vaccine  Aged Out             (applicable per patient's age: Cancer Screenings, Depression Screening, Fall Risk Screening, Immunizations)    Hemoglobin A1C (%)   Date Value   09/07/2022 14.0   06/01/2022 14.0   02/25/2022 11.3     Microalb/Crt.  Ratio (mcg/mg creat)   Date Value   06/06/2022 20     LDL Cholesterol (mg/dL)   Date Value   06/06/2022 191 (H)     AST (U/L)   Date Value   06/06/2022 12     ALT (U/L)   Date Value   06/06/2022 11     BUN (mg/dL)   Date Value   06/06/2022 17      (goal A1C is < 7)   (goal LDL is <100) need 30-50% reduction from baseline     BP Readings from Last 3 Encounters:   09/07/22 (!) 161/107   06/15/22 130/86   06/01/22 (!) 137/94    (goal /80)      All Future Testing planned in [facility]:  Lab Frequency Next Occurrence   Basic Metabolic Panel Once 98/65/7209   CBC Once 06/06/2022       Next Visit Date:  Future Appointments   Date Time Provider Rohan Juarez   9/29/2022 10:00 AM KELLEN Lacy CNP [facility]   12/7/2022 11:15 AM KELLEN Carney CNP [facility]            Patient Active Problem List:     Obesity     GERD (gastroesophageal reflux disease)     HTN (hypertension), benign     RAD (reactive airway disease)     Diabetes mellitus type 2, insulin dependent (HCC)     Bilateral knee pain     Pain in joint of left shoulder     Patellofemoral syndrome     Peripheral neuropathy     Hypercholesteremia     Abnormal uterine bleeding (AUB)     Abnormal glandular Papanicolaou smear of cervix - NOS     Immunization due     S/p Endometrial Ablation 2/27/18

## 2022-09-29 ENCOUNTER — OFFICE VISIT (OUTPATIENT)
Dept: NEUROSURGERY | Age: 52
End: 2022-09-29
Payer: COMMERCIAL

## 2022-09-29 VITALS
HEART RATE: 102 BPM | HEIGHT: 59 IN | TEMPERATURE: 97.1 F | BODY MASS INDEX: 32.05 KG/M2 | WEIGHT: 159 LBS | OXYGEN SATURATION: 96 % | SYSTOLIC BLOOD PRESSURE: 128 MMHG | DIASTOLIC BLOOD PRESSURE: 88 MMHG

## 2022-09-29 DIAGNOSIS — G62.9 NEUROPATHY: ICD-10-CM

## 2022-09-29 DIAGNOSIS — E11.65 UNCONTROLLED TYPE 2 DIABETES MELLITUS WITH HYPERGLYCEMIA (HCC): ICD-10-CM

## 2022-09-29 DIAGNOSIS — R29.898 BILATERAL LEG WEAKNESS: Primary | ICD-10-CM

## 2022-09-29 PROCEDURE — 1036F TOBACCO NON-USER: CPT | Performed by: NURSE PRACTITIONER

## 2022-09-29 PROCEDURE — 99214 OFFICE O/P EST MOD 30 MIN: CPT | Performed by: NURSE PRACTITIONER

## 2022-09-29 PROCEDURE — G8427 DOCREV CUR MEDS BY ELIG CLIN: HCPCS | Performed by: NURSE PRACTITIONER

## 2022-09-29 PROCEDURE — 2022F DILAT RTA XM EVC RTNOPTHY: CPT | Performed by: NURSE PRACTITIONER

## 2022-09-29 PROCEDURE — G8417 CALC BMI ABV UP PARAM F/U: HCPCS | Performed by: NURSE PRACTITIONER

## 2022-09-29 PROCEDURE — 3046F HEMOGLOBIN A1C LEVEL >9.0%: CPT | Performed by: NURSE PRACTITIONER

## 2022-09-29 NOTE — PROGRESS NOTES
[address]  [facility] [address], [address] [address]  Dept: [phone]    Patient:  Sandro Subramanian  YOB: 1970  Date: 4/8/22    The patient is a 54 y.o. female who presents today for consult of the following problems:     Chief Complaint   Patient presents with    Leg Pain         HPI:     Sandro Subramanian is a 54 y.o. female on whom neurosurgical consultation was requested by KELLEN Byrd CNP for management of leg weakness. Has had ongoing issues with bilateral leg pain, weakness, and swelling to lower legs since recovering from COVID around 2 years ago. Has had to start using a walker to prevent any falls. Numbness/weakness is diffuse to both legs, left greater than right. Does have known diabetes, reports last A1c was 11.3. No saddle anesthesia, loss of bowel or bladder function. Denies any low back pain, occasional aching to lower back with menses. 9/29/2022:  Presents for follow-up of painful neuropathy to lower extremities, left-sided more so than the right. Typically occurs from knees and below, however will extend higher in her left leg. Does have associated weakness. Makes walking difficult. Was able to complete EMG, this was suggestive of peripheral polyneuropathy, however was only able to partially complete test.  She continues to deny any significant back pain. Diabetes remains uncontrolled. Symptoms significantly worsened following COVID infection approximately 2 years ago. Times are present both during the daytime as well as at night. Currently taking gabapentin as well as tizanidine to attempt to manage symptoms.     History:     Past Medical History:   Diagnosis Date    Asthma     Chronic back pain     GERD (gastroesophageal reflux disease)     HLD (hyperlipidemia) 1/27/2014    Hypertension     Irregular periods/menstrual cycles 2017 Neuropathy     Obesity     Type II or unspecified type diabetes mellitus without mention of complication, not stated as uncontrolled 2007    On Insulin, Lantus nightly & Novolog 3 times with meals    Wears glasses      Past Surgical History:   Procedure Laterality Date    ENDOMETRIAL ABLATION  02/27/2018    [place] HYSTEROSCOPY,W/ENDOMETRIAL ABLATION N/A 2/27/2018    ENDOMETRIAL ABLATION WITH SHAHEED performed by Suzanne Waggoner MD at [address]  2008     Family History   Problem Relation Age of Onset    Diabetes Mother     High Cholesterol Mother     High Blood Pressure Mother     Asthma Mother     No Known Problems Father     Diabetes Maternal Aunt     Diabetes Maternal Uncle     Diabetes Maternal Grandmother     Asthma Sister     No Known Problems Paternal Grandmother     No Known Problems Paternal Grandfather     Asthma Son      Current Outpatient Medications on File Prior to Visit   Medication Sig Dispense Refill    tiZANidine (ZANAFLEX) 4 MG tablet take 1 tablet by mouth at bedtime 40 tablet 1    Insulin Pen Needle (B-D ULTRAFINE III SHORT PEN) 31G X 8 MM MISC Inject 1 each into the skin daily May substitute with any brand 100 each 11    albuterol sulfate HFA (PROVENTIL;VENTOLIN;PROAIR) 108 (90 Base) MCG/ACT inhaler Inhale 2 puffs into the lungs every 6 hours as needed for Wheezing or Shortness of Breath 1 each 5    albuterol (PROVENTIL) (5 MG/ML) 0.5% nebulizer solution Take 0.5 mLs by nebulization every 6 hours as needed for Wheezing 120 each 5    gabapentin (NEURONTIN) 400 MG capsule Take 1 capsule by mouth 3 times daily for 30 days.  90 capsule 2    losartan-hydroCHLOROthiazide (HYZAAR) 50-12.5 MG per tablet Take 1 tablet by mouth daily 90 tablet 1    empagliflozin (JARDIANCE) 25 MG tablet Take 1 tablet by mouth daily 30 tablet 5    insulin glargine (BASAGLAR KWIKPEN) 100 UNIT/ML injection pen Inject 55 Units into the skin nightly 5 pen 2    blood glucose test strips ([address] ULTRA TEST VI) strip One Touch Ultra 2 test strip to check blood glucose four times daily. 100 each 3    Semaglutide (RYBELSUS) 7 MG TABS Take 7 mg by mouth every morning (before breakfast) or first food or drink of the day. Start after 30 days of the 3mg daily dose. 30 tablet 2    Blood Glucose Monitoring Suppl (TRUE METRIX METER) w/Device KIT Use as directed to check blood glucose. 1 kit 0    aspirin EC 81 MG EC tablet Take 1 tablet by mouth daily 30 tablet 11    simvastatin (ZOCOR) 80 MG tablet Take 1 tablet by mouth nightly 90 tablet 1    beclomethasone (QVAR) 80 MCG/ACT inhaler Inhale 1 puff into the lungs 2 times daily 3 each 1    famotidine (PEPCID) 20 MG tablet Take 1 tablet by mouth daily 90 tablet 1    potassium chloride (KLOR-CON M) 20 MEQ extended release tablet Take 20 mEq by mouth daily       No current facility-administered medications on file prior to visit. Social History     Tobacco Use    Smoking status: Never    Smokeless tobacco: Never    Tobacco comments:     pt is around smokers a lot   Vaping Use    Vaping Use: Never used   Substance Use Topics    Alcohol use: Not Currently     Comment: occasionally    Drug use: No       Allergies   Allergen Reactions    Morphine Swelling     When given IV morphine pt had swelling in her arm       Review of Systems  Constitutional: Negative for activity change and appetite change. HENT: Negative for ear pain and facial swelling. Eyes: Negative for discharge and itching. Respiratory: Negative for choking and chest tightness. Cardiovascular: Negative for chest pain and leg swelling. Gastrointestinal: Negative for nausea and abdominal pain. Endocrine: Negative for cold intolerance and heat intolerance. Genitourinary: Negative for frequency and flank pain. Musculoskeletal: Negative for myalgias and joint swelling. Skin: Negative for rash and wound. Allergic/Immunologic: Negative for environmental allergies and food allergies. Hematological: Negative for adenopathy. Does not bruise/bleed easily. Psychiatric/Behavioral: Negative for self-injury. The patient is not nervous/anxious. Physical Exam:      /88 (Site: Right Upper Arm, Position: Sitting, Cuff Size: Medium Adult)   Pulse (!) 102   Temp 97.1 °F (36.2 °C) (Temporal)   Ht 4' 11\" (1.499 m)   Wt 159 lb (72.1 kg)   SpO2 96%   BMI 32.11 kg/m²   Estimated body mass index is 32.11 kg/m² as calculated from the following:    Height as of this encounter: 4' 11\" (1.499 m). Weight as of this encounter: 159 lb (72.1 kg). General:  Lanre Alvarez is a 54y.o. year old female who appears her stated age. HEENT: Normocephalic atraumatic. Neck supple. Chest: regular rate; pulses equal  Abdomen: Soft nontender nondistended. Ext: DP and PT pulses 2+, good cap refill  Neuro    Mentation  Appropriate affect  Registration intact  Orientation intact  Judgement intact to situation    Cranial Nerves:   Pupils equal and reactive to light  Extraocular motion intact  Face and shrug symmetric  Tongue midline  No dysarthria  v1-3 sensation symmetric, masseter tone symmetric  Hearing symmetric    Sensation: diffusely decreased    Motor  L deltoid 5/5; R deltoid 5/5  L biceps 5/5; R biceps 5/5  L triceps 5/5; R triceps 5/5  L wrist extension 5/5; R wrist extension 5/5  L intrinsics 5/5; R intrinsics 5/5     Right lower extremity 4+/5 diffusely  Left lower extremity 3+/5 diffusely    Reflexes  L Brachioradialis 2+/4; R brachioradialis 2+/4  L Biceps 2+/4; R Biceps 2+/4  L Triceps 2+/4; R Triceps 2+/4  L Patellar 2+/4: R Patellar 2+/4  L Achilles 2+/4; R Achilles 2+/4    hoffmans L: neg  hoffmans R: neg  Clonus L: neg  Clonus R: neg  Babinski L: neg  Babinski R: neg    Studies Review:     EMG 9/8/2022:      Hemoglobin A1c 6/1/2022: 14.0    Physical therapy notes reviewed    Assessment and Plan:      1. Bilateral leg weakness    2. Neuropathy    3.  Uncontrolled type 2 diabetes mellitus with hyperglycemia ([facility] [address]          Plan: EMG findings reviewed with patient, suggestive of peripheral polyneuropathy, may be secondary to uncontrolled diabetes and potentially exacerbated by post COVID symptoms. Given significant weakness to bilateral lower extremities, recommend proceeding with lumbar MRI. We will also provide referral for neurology evaluation. We will see the patient back in 4 to 6 weeks for reevaluation. Followup: Return in about 4 weeks (around 10/27/2022), or if symptoms worsen or fail to improve. Prescriptions Ordered:  No orders of the defined types were placed in this encounter. Orders Placed:  Orders Placed This Encounter   Procedures    MRI LUMBAR SPINE WO CONTRAST     Standing Status:   Future     Standing Expiration Date:   9/29/2023     Order Specific Question:   Reason for exam:     Answer:   leg weakness; numbness/tingling     Order Specific Question:   What is the sedation requirement? Answer:   None    [place], Neurology, [facility]     Referral Priority:   Routine     Referral Type:   Eval and Treat     Referral Reason:   Specialty Services Required     Requested Specialty:   Neurology     Number of Visits Requested:   1        Electronically signed by Claudell Gent, APRN - CNP on 9/29/2022 at 10:42 AM    Please note that this chart was generated using voice recognition Dragon dictation software. Although every effort was made to ensure the accuracy of this automated transcription, some errors in transcription may have occurred.

## 2022-10-03 ENCOUNTER — OFFICE VISIT (OUTPATIENT)
Dept: NEUROLOGY | Age: 52
End: 2022-10-03
Payer: COMMERCIAL

## 2022-10-03 VITALS
HEIGHT: 59 IN | WEIGHT: 165 LBS | OXYGEN SATURATION: 98 % | TEMPERATURE: 97 F | SYSTOLIC BLOOD PRESSURE: 129 MMHG | BODY MASS INDEX: 33.26 KG/M2 | DIASTOLIC BLOOD PRESSURE: 84 MMHG | HEART RATE: 107 BPM

## 2022-10-03 DIAGNOSIS — E11.42 DIABETIC PERIPHERAL NEUROPATHY (HCC): Primary | ICD-10-CM

## 2022-10-03 DIAGNOSIS — R26.9 ABNORMAL GAIT: ICD-10-CM

## 2022-10-03 DIAGNOSIS — E11.9 DIABETES MELLITUS TYPE 2, INSULIN DEPENDENT (HCC): ICD-10-CM

## 2022-10-03 DIAGNOSIS — Z79.4 DIABETES MELLITUS TYPE 2, INSULIN DEPENDENT (HCC): ICD-10-CM

## 2022-10-03 PROCEDURE — G8484 FLU IMMUNIZE NO ADMIN: HCPCS

## 2022-10-03 PROCEDURE — 2022F DILAT RTA XM EVC RTNOPTHY: CPT

## 2022-10-03 PROCEDURE — 3046F HEMOGLOBIN A1C LEVEL >9.0%: CPT

## 2022-10-03 PROCEDURE — G8417 CALC BMI ABV UP PARAM F/U: HCPCS

## 2022-10-03 PROCEDURE — 1036F TOBACCO NON-USER: CPT

## 2022-10-03 PROCEDURE — 99205 OFFICE O/P NEW HI 60 MIN: CPT

## 2022-10-03 PROCEDURE — G8427 DOCREV CUR MEDS BY ELIG CLIN: HCPCS

## 2022-10-03 RX ORDER — GABAPENTIN 300 MG/1
300 CAPSULE ORAL 3 TIMES DAILY
Qty: 90 CAPSULE | Refills: 1 | Status: SHIPPED | OUTPATIENT
Start: 2022-10-03 | End: 2022-11-02

## 2022-10-03 ASSESSMENT — ENCOUNTER SYMPTOMS
NAUSEA: 0
EYE PAIN: 0
VOMITING: 0
EYE REDNESS: 0

## 2022-10-03 NOTE — PROGRESS NOTES
[address] [place] [address] # [address] [id number]  Dept: [phone]  Dept Fax: [phone]    NEUROLOGY NEW PATIENT NOTE       PATIENT NAME: Joana Franklin  PATIENT MRN: [id number]  PRIMARY CARE PHYSICIAN: KELLEN Robert - [place]      HPI:      Joana Franklin is a 54 y.o. female who presents today with a 3-year history of progressively worsening peripheral neuropathy. Patient states that her symptoms began 3 years ago but began getting worse in an accelerated rate following her having COVID in 2020. Past medical history significant for uncontrolled insulin-dependent type 2 diabetes, hypertension, hypercholesterolemia, GERD and asthma. Patient has an asymmetric stocking distribution to her symptoms. Symptoms have progressed to about the level of the knee on the lower limbs. Her symptoms initially began 3 years ago with a burning pain but developed to a pins and needle sensation in her progressing to numbness. She is currently on gabapentin 400 mg 3 times daily and tizanidine. Most recent HbA1c is 14.0. Patient had a recent EMG. The nerve conduction portion revealed findings consistent with a demyelinating process but she was unable to complete the EMG aspect of the exam.      Patient has no personal history of autoimmune or inflammatory disorders and has no other peripheral nerve findings. She does have worsening GERD and abdominal pain. On physical exam patient is 4+/5 throughout except 4/5 in the left lower extremity with a weaker dorsiflexion. Her gait is consistent with sensory ataxia. Reflexes 1+ throughout.       PREVIOUS WORKUP:     Lab Results   Component Value Date    WBC 11.9 (H) 06/06/2022    HGB 12.5 06/06/2022    HCT 38.7 06/06/2022    MCV 82.2 (L) 06/06/2022     06/06/2022       Past Medical History:   Diagnosis Date    Asthma     Chronic back pain     GERD (gastroesophageal reflux disease)     HLD (hyperlipidemia) 1/27/2014    Hypertension     Irregular periods/menstrual cycles 2017    Neuropathy     Obesity     Type II or unspecified type diabetes mellitus without mention of complication, not stated as uncontrolled 2007    On Insulin, Lantus nightly & Novolog 3 times with meals    Wears glasses         Past Surgical History:   Procedure Laterality Date    ENDOMETRIAL ABLATION  02/27/2018    CO HYSTEROSCOPY,W/ENDOMETRIAL ABLATION N/A 2/27/2018    ENDOMETRIAL ABLATION WITH SHAHEED performed by Valentino Selby MD at [address]  2008        Social History     Socioeconomic History    Marital status:      Spouse name: Not on file    Number of children: 5    Years of education: Not on file    Highest education level: Not on file   Occupational History    Occupation: Day Care     Comment: DAY CARE - [employer]   Tobacco Use    Smoking status: Never    Smokeless tobacco: Never    Tobacco comments:     pt is around smokers a lot   Vaping Use    Vaping Use: Never used   Substance and Sexual Activity    Alcohol use: Not Currently     Comment: occasionally    Drug use: No    Sexual activity: Yes     Partners: Male   Other Topics Concern    Not on file   Social History Narrative    Not on file     Social Determinants of Health     Financial Resource Strain: Low Risk     Difficulty of Paying Living Expenses: Not hard at all   Food Insecurity: No Food Insecurity    Worried About Running Out of Food in the Last Year: Never true    Ran Out of Food in the Last Year: Never true   Transportation Needs: Not on file   Physical Activity: Not on file   Stress: Not on file   Social Connections: Not on file   Intimate Partner Violence: Not on file   Housing Stability: Not on file        Current Outpatient Medications   Medication Sig Dispense Refill    tiZANidine (ZANAFLEX) 4 MG tablet take 1 tablet by mouth at bedtime 40 tablet 1    Insulin Pen Needle (B-D ULTRAFINE III SHORT PEN) 31G X 8 MM MISC Inject 1 each into the skin daily May substitute with any brand 100 each 11    albuterol sulfate HFA (PROVENTIL;VENTOLIN;PROAIR) 108 (90 Base) MCG/ACT inhaler Inhale 2 puffs into the lungs every 6 hours as needed for Wheezing or Shortness of Breath 1 each 5    albuterol (PROVENTIL) (5 MG/ML) 0.5% nebulizer solution Take 0.5 mLs by nebulization every 6 hours as needed for Wheezing 120 each 5    gabapentin (NEURONTIN) 400 MG capsule Take 1 capsule by mouth 3 times daily for 30 days. 90 capsule 2    losartan-hydroCHLOROthiazide (HYZAAR) 50-12.5 MG per tablet Take 1 tablet by mouth daily 90 tablet 1    empagliflozin (JARDIANCE) 25 MG tablet Take 1 tablet by mouth daily 30 tablet 5    insulin glargine (BASAGLAR KWIKPEN) 100 UNIT/ML injection pen Inject 55 Units into the skin nightly 5 pen 2    blood glucose test strips (ASCENSIA AUTODISC VI;ONE TOUCH ULTRA TEST VI) strip One Touch Ultra 2 test strip to check blood glucose four times daily. 100 each 3    Semaglutide (RYBELSUS) 7 MG TABS Take 7 mg by mouth every morning (before breakfast) or first food or drink of the day. Start after 30 days of the 3mg daily dose. 30 tablet 2    Blood Glucose Monitoring Suppl (TRUE METRIX METER) w/Device KIT Use as directed to check blood glucose. 1 kit 0    aspirin EC 81 MG EC tablet Take 1 tablet by mouth daily 30 tablet 11    simvastatin (ZOCOR) 80 MG tablet Take 1 tablet by mouth nightly 90 tablet 1    beclomethasone (QVAR) 80 MCG/ACT inhaler Inhale 1 puff into the lungs 2 times daily 3 each 1    famotidine (PEPCID) 20 MG tablet Take 1 tablet by mouth daily 90 tablet 1    potassium chloride (KLOR-CON M) 20 MEQ extended release tablet Take 20 mEq by mouth daily       No current facility-administered medications for this visit. Allergies   Allergen Reactions    Morphine Swelling     When given IV morphine pt had swelling in her arm        REVIEW OF SYSTEMS:     Review of Systems   Constitutional:  Negative for chills, fatigue and fever. HENT:  Negative for hearing loss. Eyes:  Negative for pain, redness and visual disturbance. Gastrointestinal:  Negative for nausea and vomiting. Musculoskeletal:  Negative for neck pain and neck stiffness. Neurological:  Positive for weakness and numbness. Negative for dizziness, tremors, seizures, syncope, facial asymmetry, speech difficulty, light-headedness and headaches. Psychiatric/Behavioral:  Negative for confusion. VITALS  /84 (Site: Right Upper Arm, Position: Sitting, Cuff Size: Large Adult)   Pulse (!) 107   Temp 97 °F (36.1 °C) (Temporal)   Ht 4' 11\" (1.499 m)   Wt 165 lb (74.8 kg)   SpO2 98%   BMI 33.33 kg/m²      PHYSICAL EXAMINATION:     Physical Exam   General appearance: cooperative  Skin: no rash or skin lesions. HEENT: normocephalic  Optic Fundi: deferred  Neck: supple, no cervcical adenopathy or carotid bruit  Lungs: clear to auscultation  Heart: Regular rate and rhythm, normal S1, S2. No murmurs, clicks or gallops.   Peripheral pulses: radial pulses palpable  Abdominal: BS present, soft, NT, ND  Extremities: no edema    NEUROLOGICAL EXAMINATION:     GENERAL  Appears comfortable and in no distress   HEENT  NC/ AT   HEART  S1 and S2 heard; palpation of pulses: radial pulse    NECK  Supple and no bruits heard   MENTAL STATUS:  Alert, oriented, intact memory, no confusion, normal speech, normal language, no hallucination or delusion   CRANIAL NERVES: II     -      Visual fields intact to confrontation  III,IV,VI -  PERR, EOMs full, no ptosis  V     -     Normal facial sensation   VII    -     Normal facial symmetry  VIII   -     Intact hearing   IX,X -     Symmetrical palate  XI    -     Symmetrical shoulder shrug  XII   -     Midline tongue, no atrophy    MOTOR FUNCTION: RUE: Significant for good strength of grade 4/5 in proximal and distal muscle groups   LUE: Significant for good strength of grade 4/5 in proximal and distal muscle groups   RLE: Significant for good strength of grade 4/5 in proximal and distal muscle groups   LLE: Significant for good strength of grade 4/5 in proximal and distal muscle groups with relative worse weakness in the left lower extremity to dorsiflexion     Normal bulk, normal tone and no involuntary movements, no tremor   SENSORY FUNCTION: Decrease sensation to the level of the need to light touch and pinprick, proprioception   CEREBELLAR FUNCTION:  Intact fine motor control over upper limbs and lower limbs   REFLEX FUNCTION: 1+ throughout   STATION and GAIT Presence of sensory ataxia     IMAGING:     No relevant neurological imaging    ASSESSMENT:      Case of a 77-year-old female with metabolic syndrome with poorly controlled insulin-dependent type 2 diabetes mellitus presents as a referral due to progressively worsening asymmetric peripheral neuropathy. Previous EMG consistent with demyelinating disorder. Symptoms appear worse on the left compared to the right. She has significant painful polyneuropathy in a stocking distribution. Currently on gabapentin 400mg 3 times daily. Options were discussed with patient.    //Painful diabetic polyneuropathy    PLAN:     Discussed possible options with patient in terms of management of symptoms. Patient agreeable to increasing gabapentin to 600 mg 3 times daily. She denies any side effects current dose. If patient experiences side effects of drowsiness drowsiness or difficulty concentrating we can reduce the dose back to 400mg 3 times daily and add on duloxetine 20mg daily  Recommend balance training evaluation for sensory ataxia    Ms. Mireles received counseling on the following healthy behaviors: medical compliance, smoking cessation, blood pressure control, regular follow up with primary doctor.         Electronically signed by Richard Griffin MD on 10/3/2022 at 2:55 PM

## 2022-10-25 ENCOUNTER — HOSPITAL ENCOUNTER (OUTPATIENT)
Dept: MRI IMAGING | Age: 52
Discharge: HOME | End: 2022-10-27
Payer: COMMERCIAL

## 2022-10-25 ENCOUNTER — TELEPHONE (OUTPATIENT)
Dept: PHARMACY | Age: 52
End: 2022-10-25

## 2022-10-25 DIAGNOSIS — G62.9 NEUROPATHY: ICD-10-CM

## 2022-10-25 DIAGNOSIS — R29.898 BILATERAL LEG WEAKNESS: ICD-10-CM

## 2022-10-25 PROCEDURE — 72148 MRI LUMBAR SPINE W/O DYE: CPT

## 2022-10-25 NOTE — TELEPHONE ENCOUNTER
Patient overdue for f/u related to Diabetes Management, called to schedule, left voicemail.       Taylor Gaspar RPh, CACP  Clinical Pharmacist Medication Management  10/25/2022  9:02 AM

## 2022-11-07 ENCOUNTER — TELEPHONE (OUTPATIENT)
Dept: PHARMACY | Age: 52
End: 2022-11-07

## 2022-11-07 NOTE — TELEPHONE ENCOUNTER
Patient was a No Call No Show for DM appointment today. Called to reschedule, left voicemail.     Ollie Maldonado RP, CACP  Clinical Pharmacist Medication Management  11/7/2022  12:23 PM

## 2022-11-17 NOTE — TELEPHONE ENCOUNTER
Dtr calling requesting to speak with LIZ re: pt's surgery tomorrow and his prework testing.  Can reach her at #[phone]/cammy   Patient is rescheduled for December.

## 2022-12-07 ENCOUNTER — OFFICE VISIT (OUTPATIENT)
Dept: PRIMARY CARE CLINIC | Age: 52
End: 2022-12-07
Payer: COMMERCIAL

## 2022-12-07 VITALS
HEART RATE: 96 BPM | DIASTOLIC BLOOD PRESSURE: 92 MMHG | OXYGEN SATURATION: 97 % | SYSTOLIC BLOOD PRESSURE: 130 MMHG | WEIGHT: 175.8 LBS | BODY MASS INDEX: 35.51 KG/M2

## 2022-12-07 DIAGNOSIS — Z23 NEED FOR VACCINATION: ICD-10-CM

## 2022-12-07 DIAGNOSIS — E11.9 DIABETES MELLITUS TYPE 2, INSULIN DEPENDENT (HCC): Primary | ICD-10-CM

## 2022-12-07 DIAGNOSIS — Z79.4 DIABETES MELLITUS TYPE 2, INSULIN DEPENDENT (HCC): Primary | ICD-10-CM

## 2022-12-07 LAB — HBA1C MFR BLD: 13.9 %

## 2022-12-07 PROCEDURE — 83036 HEMOGLOBIN GLYCOSYLATED A1C: CPT | Performed by: NURSE PRACTITIONER

## 2022-12-07 PROCEDURE — 3074F SYST BP LT 130 MM HG: CPT | Performed by: NURSE PRACTITIONER

## 2022-12-07 PROCEDURE — G8482 FLU IMMUNIZE ORDER/ADMIN: HCPCS | Performed by: NURSE PRACTITIONER

## 2022-12-07 PROCEDURE — G8427 DOCREV CUR MEDS BY ELIG CLIN: HCPCS | Performed by: NURSE PRACTITIONER

## 2022-12-07 PROCEDURE — 99214 OFFICE O/P EST MOD 30 MIN: CPT | Performed by: NURSE PRACTITIONER

## 2022-12-07 PROCEDURE — 90674 CCIIV4 VAC NO PRSV 0.5 ML IM: CPT | Performed by: NURSE PRACTITIONER

## 2022-12-07 PROCEDURE — 2022F DILAT RTA XM EVC RTNOPTHY: CPT | Performed by: NURSE PRACTITIONER

## 2022-12-07 PROCEDURE — G8417 CALC BMI ABV UP PARAM F/U: HCPCS | Performed by: NURSE PRACTITIONER

## 2022-12-07 PROCEDURE — 3046F HEMOGLOBIN A1C LEVEL >9.0%: CPT | Performed by: NURSE PRACTITIONER

## 2022-12-07 PROCEDURE — 3078F DIAST BP <80 MM HG: CPT | Performed by: NURSE PRACTITIONER

## 2022-12-07 PROCEDURE — 1036F TOBACCO NON-USER: CPT | Performed by: NURSE PRACTITIONER

## 2022-12-07 RX ORDER — GLIPIZIDE 5 MG/1
5 TABLET ORAL
Qty: 180 TABLET | Refills: 1 | Status: SHIPPED | OUTPATIENT
Start: 2022-12-07

## 2022-12-07 RX ORDER — ORAL SEMAGLUTIDE 7 MG/1
7 TABLET ORAL
Qty: 90 TABLET | Refills: 1 | Status: SHIPPED | OUTPATIENT
Start: 2022-12-07

## 2022-12-07 RX ORDER — SIMVASTATIN 80 MG
80 TABLET ORAL NIGHTLY
Qty: 90 TABLET | Refills: 1 | Status: SHIPPED | OUTPATIENT
Start: 2022-12-07

## 2022-12-07 RX ORDER — INSULIN GLARGINE 100 [IU]/ML
55 INJECTION, SOLUTION SUBCUTANEOUS NIGHTLY
Qty: 5 ADJUSTABLE DOSE PRE-FILLED PEN SYRINGE | Refills: 5 | Status: SHIPPED | OUTPATIENT
Start: 2022-12-07

## 2022-12-07 ASSESSMENT — PATIENT HEALTH QUESTIONNAIRE - PHQ9
2. FEELING DOWN, DEPRESSED OR HOPELESS: 0
SUM OF ALL RESPONSES TO PHQ QUESTIONS 1-9: 0
SUM OF ALL RESPONSES TO PHQ QUESTIONS 1-9: 0
1. LITTLE INTEREST OR PLEASURE IN DOING THINGS: 0
SUM OF ALL RESPONSES TO PHQ9 QUESTIONS 1 & 2: 0
SUM OF ALL RESPONSES TO PHQ QUESTIONS 1-9: 0
SUM OF ALL RESPONSES TO PHQ QUESTIONS 1-9: 0

## 2022-12-07 ASSESSMENT — ENCOUNTER SYMPTOMS
SHORTNESS OF BREATH: 0
COUGH: 0

## 2022-12-07 NOTE — PROGRESS NOTES
Jl Storm (:  1970) is a 54 y.o. female,Established patient, here for evaluation of the following chief complaint(s):  Diabetes (3 month follow up- Pt checks her sugars at home- states they are usually under 200 in the morning. )         ASSESSMENT/PLAN:  1. Diabetes mellitus type 2, insulin dependent (HCC)  -     Semaglutide (RYBELSUS) 7 MG TABS; Take 7 mg by mouth every morning (before breakfast) or first food or drink of the day. Start after 30 days of the 3mg daily dose., Disp-90 tablet, R-1Normal  -     insulin glargine (BASAGLAR KWIKPEN) 100 UNIT/ML injection pen; Inject 55 Units into the skin nightly, Disp-5 Adjustable Dose Pre-filled Pen Syringe, R-5Normal  -     glipiZIDE (GLUCOTROL) 5 MG tablet; Take 1 tablet by mouth 2 times daily (before meals), Disp-180 tablet, R-1Normal  -     simvastatin (ZOCOR) 80 MG tablet; Take 1 tablet by mouth nightly, Disp-90 tablet, R-1Normal  2. Need for vaccination  -     Influenza, FLUCELVAX, (age 10 mo+), IM, Preservative Free, 0.5 mL      No follow-ups on file. Subjective   SUBJECTIVE/OBJECTIVE:  HPI  Dm- states checking sugars states they are in the 100's and sometimes 200's. States compliance with meds. Has missed quite a few appointments with dm pharmacist, encouraged to call and make apt. Denies any hypo episodes. Discussed use and side effects of new med. Dietary changes encouraged. Suggested endo, she would prefer to take another med at this time. States drinks a lot of water. Review of Systems   Constitutional:  Negative for chills and fever. Respiratory:  Negative for cough and shortness of breath. Cardiovascular:  Negative for chest pain, palpitations and leg swelling.         Objective   Vitals:    22 1107   BP: (!) 130/92   Pulse: 96   SpO2: 97%     Wt Readings from Last 3 Encounters:   22 175 lb 12.8 oz (79.7 kg)   10/03/22 165 lb (74.8 kg)   10/25/22 160 lb (72.6 kg)       Physical Exam  Constitutional: Appearance: She is well-developed. HENT:      Right Ear: External ear normal.      Left Ear: External ear normal.      Nose: Nose normal.   Cardiovascular:      Rate and Rhythm: Normal rate and regular rhythm. Heart sounds: Normal heart sounds, S1 normal and S2 normal.   Pulmonary:      Effort: Pulmonary effort is normal. No respiratory distress. Breath sounds: Normal breath sounds. Musculoskeletal:         General: No deformity. Normal range of motion. Cervical back: Full passive range of motion without pain and normal range of motion. Skin:     General: Skin is warm and dry. Neurological:      Mental Status: She is alert and oriented to person, place, and time. An electronic signature was used to authenticate this note.     --KELLEN Kovacs - CNP

## 2023-03-22 DIAGNOSIS — I10 HTN (HYPERTENSION), BENIGN: ICD-10-CM

## 2023-03-23 RX ORDER — ASPIRIN 81 MG/1
TABLET, COATED ORAL
Qty: 30 TABLET | Refills: 11 | Status: SHIPPED | OUTPATIENT
Start: 2023-03-23

## 2023-03-29 ENCOUNTER — OFFICE VISIT (OUTPATIENT)
Dept: PRIMARY CARE CLINIC | Age: 53
End: 2023-03-29
Payer: COMMERCIAL

## 2023-03-29 VITALS
WEIGHT: 170 LBS | OXYGEN SATURATION: 100 % | BODY MASS INDEX: 34.34 KG/M2 | SYSTOLIC BLOOD PRESSURE: 133 MMHG | HEART RATE: 93 BPM | DIASTOLIC BLOOD PRESSURE: 95 MMHG

## 2023-03-29 DIAGNOSIS — Z79.4 DIABETES MELLITUS TYPE 2, INSULIN DEPENDENT (HCC): ICD-10-CM

## 2023-03-29 DIAGNOSIS — Z12.31 ENCOUNTER FOR SCREENING MAMMOGRAM FOR BREAST CANCER: Primary | ICD-10-CM

## 2023-03-29 DIAGNOSIS — J45.21 RAD (REACTIVE AIRWAY DISEASE), MILD INTERMITTENT, WITH ACUTE EXACERBATION: ICD-10-CM

## 2023-03-29 DIAGNOSIS — M54.50 LUMBAR PAIN: ICD-10-CM

## 2023-03-29 DIAGNOSIS — M51.36 DEGENERATION OF L4-L5 INTERVERTEBRAL DISC: ICD-10-CM

## 2023-03-29 DIAGNOSIS — E11.9 DIABETES MELLITUS TYPE 2, INSULIN DEPENDENT (HCC): ICD-10-CM

## 2023-03-29 PROCEDURE — G8417 CALC BMI ABV UP PARAM F/U: HCPCS | Performed by: NURSE PRACTITIONER

## 2023-03-29 PROCEDURE — 3017F COLORECTAL CA SCREEN DOC REV: CPT | Performed by: NURSE PRACTITIONER

## 2023-03-29 PROCEDURE — 3075F SYST BP GE 130 - 139MM HG: CPT | Performed by: NURSE PRACTITIONER

## 2023-03-29 PROCEDURE — 1036F TOBACCO NON-USER: CPT | Performed by: NURSE PRACTITIONER

## 2023-03-29 PROCEDURE — 3046F HEMOGLOBIN A1C LEVEL >9.0%: CPT | Performed by: NURSE PRACTITIONER

## 2023-03-29 PROCEDURE — 3080F DIAST BP >= 90 MM HG: CPT | Performed by: NURSE PRACTITIONER

## 2023-03-29 PROCEDURE — 99214 OFFICE O/P EST MOD 30 MIN: CPT | Performed by: NURSE PRACTITIONER

## 2023-03-29 PROCEDURE — 2022F DILAT RTA XM EVC RTNOPTHY: CPT | Performed by: NURSE PRACTITIONER

## 2023-03-29 PROCEDURE — G8482 FLU IMMUNIZE ORDER/ADMIN: HCPCS | Performed by: NURSE PRACTITIONER

## 2023-03-29 PROCEDURE — G8427 DOCREV CUR MEDS BY ELIG CLIN: HCPCS | Performed by: NURSE PRACTITIONER

## 2023-03-29 RX ORDER — GABAPENTIN 300 MG/1
300 CAPSULE ORAL 3 TIMES DAILY
Qty: 90 CAPSULE | Refills: 1 | Status: SHIPPED | OUTPATIENT
Start: 2023-03-29 | End: 2023-04-28

## 2023-03-29 RX ORDER — TIZANIDINE 4 MG/1
TABLET ORAL
Qty: 40 TABLET | Refills: 1 | Status: CANCELLED | OUTPATIENT
Start: 2023-03-29

## 2023-03-29 RX ORDER — TIZANIDINE 4 MG/1
TABLET ORAL
Qty: 40 TABLET | Refills: 1 | Status: SHIPPED | OUTPATIENT
Start: 2023-03-29

## 2023-03-29 RX ORDER — GABAPENTIN 300 MG/1
300 CAPSULE ORAL 3 TIMES DAILY
Qty: 90 CAPSULE | Refills: 1 | Status: CANCELLED | OUTPATIENT
Start: 2023-03-29 | End: 2023-04-28

## 2023-03-29 RX ORDER — PEN NEEDLE, DIABETIC 31 GX5/16"
1 NEEDLE, DISPOSABLE MISCELLANEOUS DAILY
Qty: 100 EACH | Refills: 11 | OUTPATIENT
Start: 2023-03-29

## 2023-03-29 RX ORDER — ALBUTEROL SULFATE 90 UG/1
2 AEROSOL, METERED RESPIRATORY (INHALATION) EVERY 6 HOURS PRN
Qty: 1 EACH | Refills: 5 | Status: CANCELLED | OUTPATIENT
Start: 2023-03-29

## 2023-03-29 RX ORDER — PEN NEEDLE, DIABETIC 31 GX5/16"
1 NEEDLE, DISPOSABLE MISCELLANEOUS DAILY
Qty: 100 EACH | Refills: 3 | Status: SHIPPED | OUTPATIENT
Start: 2023-03-29

## 2023-03-29 RX ORDER — ALBUTEROL SULFATE 90 UG/1
2 AEROSOL, METERED RESPIRATORY (INHALATION) EVERY 6 HOURS PRN
Qty: 1 EACH | Refills: 5 | Status: SHIPPED | OUTPATIENT
Start: 2023-03-29

## 2023-03-29 RX ORDER — LOSARTAN POTASSIUM 100 MG/1
100 TABLET ORAL EVERY MORNING
COMMUNITY
Start: 2023-03-22

## 2023-03-29 SDOH — ECONOMIC STABILITY: INCOME INSECURITY: HOW HARD IS IT FOR YOU TO PAY FOR THE VERY BASICS LIKE FOOD, HOUSING, MEDICAL CARE, AND HEATING?: NOT HARD AT ALL

## 2023-03-29 SDOH — ECONOMIC STABILITY: FOOD INSECURITY: WITHIN THE PAST 12 MONTHS, THE FOOD YOU BOUGHT JUST DIDN'T LAST AND YOU DIDN'T HAVE MONEY TO GET MORE.: NEVER TRUE

## 2023-03-29 SDOH — ECONOMIC STABILITY: HOUSING INSECURITY
IN THE LAST 12 MONTHS, WAS THERE A TIME WHEN YOU DID NOT HAVE A STEADY PLACE TO SLEEP OR SLEPT IN A SHELTER (INCLUDING NOW)?: NO

## 2023-03-29 SDOH — ECONOMIC STABILITY: FOOD INSECURITY: WITHIN THE PAST 12 MONTHS, YOU WORRIED THAT YOUR FOOD WOULD RUN OUT BEFORE YOU GOT MONEY TO BUY MORE.: NEVER TRUE

## 2023-03-29 ASSESSMENT — PATIENT HEALTH QUESTIONNAIRE - PHQ9
SUM OF ALL RESPONSES TO PHQ QUESTIONS 1-9: 0
SUM OF ALL RESPONSES TO PHQ QUESTIONS 1-9: 0
2. FEELING DOWN, DEPRESSED OR HOPELESS: 0
SUM OF ALL RESPONSES TO PHQ QUESTIONS 1-9: 0
SUM OF ALL RESPONSES TO PHQ QUESTIONS 1-9: 0
SUM OF ALL RESPONSES TO PHQ9 QUESTIONS 1 & 2: 0
1. LITTLE INTEREST OR PLEASURE IN DOING THINGS: 0

## 2023-03-29 ASSESSMENT — ENCOUNTER SYMPTOMS
COUGH: 0
SHORTNESS OF BREATH: 0

## 2023-03-29 NOTE — PROGRESS NOTES
Cardiovascular:  Negative for chest pain, palpitations and leg swelling. Objective   Vitals:    03/29/23 0900   BP: (!) 133/95   Pulse:    SpO2:      Wt Readings from Last 3 Encounters:   03/29/23 170 lb (77.1 kg)   12/07/22 175 lb 12.8 oz (79.7 kg)   10/03/22 165 lb (74.8 kg)       Physical Exam  Constitutional:       Appearance: She is well-developed. HENT:      Right Ear: External ear normal.      Left Ear: External ear normal.      Nose: Nose normal.   Cardiovascular:      Rate and Rhythm: Normal rate and regular rhythm. Heart sounds: Normal heart sounds, S1 normal and S2 normal.   Pulmonary:      Effort: Pulmonary effort is normal. No respiratory distress. Breath sounds: Normal breath sounds. Musculoskeletal:         General: No deformity. Normal range of motion. Cervical back: Full passive range of motion without pain and normal range of motion. Skin:     General: Skin is warm and dry. Neurological:      Mental Status: She is alert and oriented to person, place, and time. An electronic signature was used to authenticate this note.     --Kathleen Vasquez, APRN - CNP

## 2023-03-30 ENCOUNTER — HOSPITAL ENCOUNTER (OUTPATIENT)
Age: 53
Discharge: HOME | End: 2023-03-30
Payer: COMMERCIAL

## 2023-03-30 DIAGNOSIS — E11.9 DIABETES MELLITUS TYPE 2, INSULIN DEPENDENT (HCC): ICD-10-CM

## 2023-03-30 DIAGNOSIS — Z79.4 DIABETES MELLITUS TYPE 2, INSULIN DEPENDENT (HCC): ICD-10-CM

## 2023-03-30 LAB
ANION GAP SERPL CALCULATED.3IONS-SCNC: 13 MMOL/L (ref 9–17)
BUN SERPL-MCNC: 16 MG/DL (ref 6–20)
CALCIUM SERPL-MCNC: 9.6 MG/DL (ref 8.6–10.4)
CHLORIDE SERPL-SCNC: 101 MMOL/L (ref 98–107)
CO2 SERPL-SCNC: 26 MMOL/L (ref 20–31)
CREAT SERPL-MCNC: 0.95 MG/DL (ref 0.5–0.9)
EST. AVERAGE GLUCOSE BLD GHB EST-MCNC: 404 MG/DL
GFR SERPL CREATININE-BSD FRML MDRD: >60 ML/MIN/1.73M2
GLUCOSE SERPL-MCNC: 81 MG/DL (ref 70–99)
HBA1C MFR BLD: 15.7 % (ref 4–6)
POTASSIUM SERPL-SCNC: 3.9 MMOL/L (ref 3.7–5.3)
SODIUM SERPL-SCNC: 140 MMOL/L (ref 135–144)

## 2023-03-30 PROCEDURE — 83036 HEMOGLOBIN GLYCOSYLATED A1C: CPT

## 2023-03-30 PROCEDURE — 80048 BASIC METABOLIC PNL TOTAL CA: CPT

## 2023-03-30 PROCEDURE — 36415 COLL VENOUS BLD VENIPUNCTURE: CPT

## 2023-04-04 DIAGNOSIS — E11.65 UNCONTROLLED TYPE 2 DIABETES MELLITUS WITH HYPERGLYCEMIA (HCC): Primary | ICD-10-CM

## 2023-04-21 ENCOUNTER — TELEPHONE (OUTPATIENT)
Dept: ORTHOPEDIC SURGERY | Age: 53
End: 2023-04-21

## 2023-04-26 ENCOUNTER — OFFICE VISIT (OUTPATIENT)
Dept: ORTHOPEDIC SURGERY | Age: 53
End: 2023-04-26

## 2023-04-26 VITALS — BODY MASS INDEX: 32.09 KG/M2 | WEIGHT: 159.2 LBS | HEIGHT: 59 IN

## 2023-04-26 DIAGNOSIS — R52 PAIN: Primary | ICD-10-CM

## 2023-04-26 DIAGNOSIS — M75.101 TEAR OF RIGHT ROTATOR CUFF, UNSPECIFIED TEAR EXTENT, UNSPECIFIED WHETHER TRAUMATIC: ICD-10-CM

## 2023-04-26 NOTE — PROGRESS NOTES
[facility]  [address] [place] [id number]  Dept: [phone]    Orthopaedic New Patient    CHIEF COMPLAINT:    Chief Complaint   Patient presents with    New Patient     Right shoulder pain, pt denies previous surg or injury       HISTORY OF PRESENT ILLNESS:    The patient is a 50 y.o. female who is being seen as a new patient for right shoulder pain. Pain began approximately 3 weeks ago without trauma. Patient performs a lot of overhead activities and cleans the house regularly. Pain is located at the right shoulder diffusely and is aching nature worsened with overhead activities and reaching behind the back. Denies any pain like this before. She reports the pain is significant and is affecting her quality of life severely. Denies numbness/tingling. Denies prior trauma to right shoulder. Otherwise, patient has not orthopedic complaints at this time.       Past Medical History:    Past Medical History:   Diagnosis Date    Asthma     Chronic back pain     GERD (gastroesophageal reflux disease)     HLD (hyperlipidemia) 1/27/2014    Hypertension     Irregular periods/menstrual cycles 2017    Neuropathy     Obesity     Type II or unspecified type diabetes mellitus without mention of complication, not stated as uncontrolled 2007    On Insulin, Lantus nightly & Novolog 3 times with meals    Wears glasses        Past Surgical History:    Past Surgical History:   Procedure Laterality Date    ENDOMETRIAL ABLATION  02/27/2018    [place] HYSTEROSCOPY ENDOMETRIAL ABLATION N/A 2/27/2018    ENDOMETRIAL ABLATION WITH SHAHEED performed by Kavita Hoskins MD at [address] [place] [address]  2008       Current Medications:   Current Outpatient Medications   Medication Sig Dispense Refill    albuterol (PROVENTIL) (5 MG/ML) 0.5% nebulizer solution Take 0.5 mLs by nebulization every 6 hours as needed for Wheezing 120 each 5    losartan

## 2023-05-03 ENCOUNTER — OFFICE VISIT (OUTPATIENT)
Dept: PRIMARY CARE CLINIC | Age: 53
End: 2023-05-03
Payer: COMMERCIAL

## 2023-05-03 VITALS
SYSTOLIC BLOOD PRESSURE: 145 MMHG | HEART RATE: 92 BPM | OXYGEN SATURATION: 97 % | WEIGHT: 174.6 LBS | DIASTOLIC BLOOD PRESSURE: 97 MMHG | BODY MASS INDEX: 35.26 KG/M2

## 2023-05-03 DIAGNOSIS — Z79.4 DIABETES MELLITUS TYPE 2, INSULIN DEPENDENT (HCC): ICD-10-CM

## 2023-05-03 DIAGNOSIS — E11.9 DIABETES MELLITUS TYPE 2, INSULIN DEPENDENT (HCC): ICD-10-CM

## 2023-05-03 DIAGNOSIS — J45.30 MILD PERSISTENT REACTIVE AIRWAY DISEASE WITHOUT COMPLICATION: Primary | ICD-10-CM

## 2023-05-03 DIAGNOSIS — I10 HTN (HYPERTENSION), BENIGN: ICD-10-CM

## 2023-05-03 PROCEDURE — 3017F COLORECTAL CA SCREEN DOC REV: CPT | Performed by: NURSE PRACTITIONER

## 2023-05-03 PROCEDURE — 99214 OFFICE O/P EST MOD 30 MIN: CPT | Performed by: NURSE PRACTITIONER

## 2023-05-03 PROCEDURE — 3077F SYST BP >= 140 MM HG: CPT | Performed by: NURSE PRACTITIONER

## 2023-05-03 PROCEDURE — 2022F DILAT RTA XM EVC RTNOPTHY: CPT | Performed by: NURSE PRACTITIONER

## 2023-05-03 PROCEDURE — 3080F DIAST BP >= 90 MM HG: CPT | Performed by: NURSE PRACTITIONER

## 2023-05-03 PROCEDURE — G8417 CALC BMI ABV UP PARAM F/U: HCPCS | Performed by: NURSE PRACTITIONER

## 2023-05-03 PROCEDURE — 1036F TOBACCO NON-USER: CPT | Performed by: NURSE PRACTITIONER

## 2023-05-03 PROCEDURE — G8427 DOCREV CUR MEDS BY ELIG CLIN: HCPCS | Performed by: NURSE PRACTITIONER

## 2023-05-03 PROCEDURE — 3046F HEMOGLOBIN A1C LEVEL >9.0%: CPT | Performed by: NURSE PRACTITIONER

## 2023-05-03 RX ORDER — ORAL SEMAGLUTIDE 7 MG/1
7 TABLET ORAL
Qty: 90 TABLET | Refills: 1 | Status: SHIPPED | OUTPATIENT
Start: 2023-06-02

## 2023-05-03 RX ORDER — ORAL SEMAGLUTIDE 3 MG/1
3 TABLET ORAL DAILY
Qty: 30 TABLET | Refills: 0 | Status: SHIPPED | OUTPATIENT
Start: 2023-05-03

## 2023-05-03 RX ORDER — LOSARTAN POTASSIUM AND HYDROCHLOROTHIAZIDE 12.5; 1 MG/1; MG/1
1 TABLET ORAL DAILY
Qty: 90 TABLET | Refills: 1 | Status: SHIPPED | OUTPATIENT
Start: 2023-05-03

## 2023-05-03 ASSESSMENT — ENCOUNTER SYMPTOMS
COUGH: 0
SHORTNESS OF BREATH: 0

## 2023-05-03 NOTE — PROGRESS NOTES
Florian Martin (:  1970) is a 50 y.o. female,Established patient, here for evaluation of the following chief complaint(s):  Diabetes (Patient is here for 1 month follow up.) and Hypertension (Patient is here for 1 month follow up.)         ASSESSMENT/PLAN:  1. Mild persistent reactive airway disease without complication  -     beclomethasone (QVAR REDIHALER) 80 MCG/ACT AERB inhaler; Inhale 1 puff into the lungs in the morning and 1 puff in the evening., Disp-1 each, R-5Normal  2. Diabetes mellitus type 2, insulin dependent (HCC)  -     Semaglutide (RYBELSUS) 7 MG TABS; Take 7 mg by mouth every morning (before breakfast) or first food or drink of the day. Start after 30 days of the 3mg daily dose., Disp-90 tablet, R-1Normal  -     dapagliflozin (FARXIGA) 10 MG tablet; Take 1 tablet by mouth every morning, Disp-90 tablet, R-1Normal  3. HTN (hypertension), benign  -     losartan-hydroCHLOROthiazide (HYZAAR) 100-12.5 MG per tablet; Take 1 tablet by mouth daily, Disp-90 tablet, R-1Normal      No follow-ups on file. Subjective   SUBJECTIVE/OBJECTIVE:  HPI  Dax Kang was asked to bring all her meds with her today, she forgot. States sugars are better. Pharmacy called for dispense list.  When we discussed her dispense list irving states the only diabetes meds she is taking is her insulin and glipizide, states her rybelsus and jardiance were stopped during her hospitalization in January. I do not show any record of this, I do show that her hctz was placed on hold for a minor elevation in her creatinine which was improved on her draw on . Dax Kang states she has an appointment with someone (diabetes ed or diabetes pharm??) on , I do not show record of this. She has again been asked to bring all of her medications to her next visit in 1 month. A1c not due until . We have discussed the complications of uncontrolled dm. Htn- restart hctz. Recheck labs.      Review of Systems

## 2023-05-18 ENCOUNTER — HOSPITAL ENCOUNTER (OUTPATIENT)
Dept: MRI IMAGING | Age: 53
Discharge: HOME | End: 2023-05-20
Payer: COMMERCIAL

## 2023-05-18 DIAGNOSIS — M75.101 TEAR OF RIGHT ROTATOR CUFF, UNSPECIFIED TEAR EXTENT, UNSPECIFIED WHETHER TRAUMATIC: ICD-10-CM

## 2023-05-18 PROCEDURE — 73221 MRI JOINT UPR EXTREM W/O DYE: CPT

## 2023-05-26 ENCOUNTER — TELEPHONE (OUTPATIENT)
Dept: NEUROLOGY | Age: 53
End: 2023-05-26

## 2023-06-07 ENCOUNTER — OFFICE VISIT (OUTPATIENT)
Dept: PRIMARY CARE CLINIC | Age: 53
End: 2023-06-07
Payer: COMMERCIAL

## 2023-06-07 VITALS
BODY MASS INDEX: 34.46 KG/M2 | DIASTOLIC BLOOD PRESSURE: 90 MMHG | SYSTOLIC BLOOD PRESSURE: 138 MMHG | OXYGEN SATURATION: 98 % | HEART RATE: 92 BPM | WEIGHT: 170.6 LBS

## 2023-06-07 DIAGNOSIS — E11.9 DIABETES MELLITUS TYPE 2, INSULIN DEPENDENT (HCC): Primary | ICD-10-CM

## 2023-06-07 DIAGNOSIS — J45.30 MILD PERSISTENT REACTIVE AIRWAY DISEASE WITHOUT COMPLICATION: ICD-10-CM

## 2023-06-07 DIAGNOSIS — Z79.4 DIABETES MELLITUS TYPE 2, INSULIN DEPENDENT (HCC): Primary | ICD-10-CM

## 2023-06-07 PROCEDURE — 1036F TOBACCO NON-USER: CPT | Performed by: NURSE PRACTITIONER

## 2023-06-07 PROCEDURE — 3017F COLORECTAL CA SCREEN DOC REV: CPT | Performed by: NURSE PRACTITIONER

## 2023-06-07 PROCEDURE — G8417 CALC BMI ABV UP PARAM F/U: HCPCS | Performed by: NURSE PRACTITIONER

## 2023-06-07 PROCEDURE — 3075F SYST BP GE 130 - 139MM HG: CPT | Performed by: NURSE PRACTITIONER

## 2023-06-07 PROCEDURE — G8427 DOCREV CUR MEDS BY ELIG CLIN: HCPCS | Performed by: NURSE PRACTITIONER

## 2023-06-07 PROCEDURE — 3046F HEMOGLOBIN A1C LEVEL >9.0%: CPT | Performed by: NURSE PRACTITIONER

## 2023-06-07 PROCEDURE — 2022F DILAT RTA XM EVC RTNOPTHY: CPT | Performed by: NURSE PRACTITIONER

## 2023-06-07 PROCEDURE — 3080F DIAST BP >= 90 MM HG: CPT | Performed by: NURSE PRACTITIONER

## 2023-06-07 PROCEDURE — 99213 OFFICE O/P EST LOW 20 MIN: CPT | Performed by: NURSE PRACTITIONER

## 2023-06-07 ASSESSMENT — ENCOUNTER SYMPTOMS
COUGH: 0
SHORTNESS OF BREATH: 0

## 2023-06-07 NOTE — PROGRESS NOTES
Yenifer Vasques (:  1970) is a 50 y.o. female,Established patient, here for evaluation of the following chief complaint(s):  Follow-up (Patient is here for 1 month follow up -- medication check.)         ASSESSMENT/PLAN:  1. Diabetes mellitus type 2, insulin dependent ([facility] [address]  2. Mild persistent reactive airway disease without complication      No follow-ups on file. Subjective   SUBJECTIVE/OBJECTIVE:  LADONNA Owen forgot to bring her meds with her again. She states she did not  any new meds from the pharmacy but then states she picked up the rybelsus and the [address] due for a1c until the . States she had an apt with endo and she missed it. Confirmed with pharmacy that pt did  rybelsus and farxiga. No showed a neuro apt- states she was out of town. Review of Systems   Constitutional:  Negative for chills and fever. Respiratory:  Negative for cough and shortness of breath. Cardiovascular:  Negative for chest pain, palpitations and leg swelling. Objective   Vitals:    23 0944   BP: (!) 138/90   Pulse:    SpO2:      Wt Readings from Last 3 Encounters:   23 170 lb 9.6 oz (77.4 kg)   23 174 lb 9.6 oz (79.2 kg)   23 159 lb 3.2 oz (72.2 kg)       Physical Exam  Constitutional:       Appearance: She is well-developed. HENT:      Right Ear: External ear normal.      Left Ear: External ear normal.      Nose: Nose normal.   Cardiovascular:      Rate and Rhythm: Normal rate and regular rhythm. Heart sounds: Normal heart sounds, S1 normal and S2 normal.   Pulmonary:      Effort: Pulmonary effort is normal. No respiratory distress. Breath sounds: Normal breath sounds. Musculoskeletal:         General: No deformity. Normal range of motion. Cervical back: Full passive range of motion without pain and normal range of motion. Skin:     General: Skin is warm and dry.    Neurological:      Mental Status: She is alert and oriented to

## 2023-06-28 ENCOUNTER — OFFICE VISIT (OUTPATIENT)
Dept: ORTHOPEDIC SURGERY | Age: 53
End: 2023-06-28

## 2023-06-28 VITALS — WEIGHT: 168.2 LBS | HEIGHT: 59 IN | BODY MASS INDEX: 33.91 KG/M2

## 2023-06-28 DIAGNOSIS — M67.921 TENDINOPATHY OF RIGHT BICEPS TENDON: ICD-10-CM

## 2023-06-28 DIAGNOSIS — M25.512 LEFT SHOULDER PAIN, UNSPECIFIED CHRONICITY: Primary | ICD-10-CM

## 2023-06-28 DIAGNOSIS — M75.81 RIGHT ROTATOR CUFF TENDONITIS: ICD-10-CM

## 2023-06-28 DIAGNOSIS — M75.32 CALCIFIC TENDONITIS OF LEFT SHOULDER: ICD-10-CM

## 2023-06-28 RX ORDER — CELECOXIB 100 MG/1
100 CAPSULE ORAL 2 TIMES DAILY
Qty: 60 CAPSULE | Refills: 3 | Status: SHIPPED | OUTPATIENT
Start: 2023-06-28

## 2023-09-05 PROBLEM — R55 SYNCOPE: Status: ACTIVE | Noted: 2023-01-05

## 2023-09-05 PROBLEM — R73.9 HYPERGLYCEMIA, UNSPECIFIED: Status: ACTIVE | Noted: 2023-01-05

## 2023-09-06 ENCOUNTER — OFFICE VISIT (OUTPATIENT)
Dept: PRIMARY CARE CLINIC | Age: 53
End: 2023-09-06
Payer: COMMERCIAL

## 2023-09-06 VITALS
WEIGHT: 166.8 LBS | HEART RATE: 86 BPM | DIASTOLIC BLOOD PRESSURE: 98 MMHG | SYSTOLIC BLOOD PRESSURE: 149 MMHG | OXYGEN SATURATION: 98 % | BODY MASS INDEX: 33.69 KG/M2

## 2023-09-06 DIAGNOSIS — E11.9 DIABETES MELLITUS TYPE 2, INSULIN DEPENDENT (HCC): Primary | ICD-10-CM

## 2023-09-06 DIAGNOSIS — Z91.199 NON-COMPLIANCE: ICD-10-CM

## 2023-09-06 DIAGNOSIS — E11.42 DIABETIC PERIPHERAL NEUROPATHY (HCC): ICD-10-CM

## 2023-09-06 DIAGNOSIS — Z79.4 DIABETES MELLITUS TYPE 2, INSULIN DEPENDENT (HCC): Primary | ICD-10-CM

## 2023-09-06 LAB — HBA1C MFR BLD: 13 %

## 2023-09-06 PROCEDURE — 99214 OFFICE O/P EST MOD 30 MIN: CPT | Performed by: NURSE PRACTITIONER

## 2023-09-06 PROCEDURE — 83036 HEMOGLOBIN GLYCOSYLATED A1C: CPT | Performed by: NURSE PRACTITIONER

## 2023-09-06 PROCEDURE — 3074F SYST BP LT 130 MM HG: CPT | Performed by: NURSE PRACTITIONER

## 2023-09-06 PROCEDURE — 1036F TOBACCO NON-USER: CPT | Performed by: NURSE PRACTITIONER

## 2023-09-06 PROCEDURE — 3046F HEMOGLOBIN A1C LEVEL >9.0%: CPT | Performed by: NURSE PRACTITIONER

## 2023-09-06 PROCEDURE — 3078F DIAST BP <80 MM HG: CPT | Performed by: NURSE PRACTITIONER

## 2023-09-06 PROCEDURE — 2022F DILAT RTA XM EVC RTNOPTHY: CPT | Performed by: NURSE PRACTITIONER

## 2023-09-06 PROCEDURE — 3017F COLORECTAL CA SCREEN DOC REV: CPT | Performed by: NURSE PRACTITIONER

## 2023-09-06 PROCEDURE — G8417 CALC BMI ABV UP PARAM F/U: HCPCS | Performed by: NURSE PRACTITIONER

## 2023-09-06 PROCEDURE — G8427 DOCREV CUR MEDS BY ELIG CLIN: HCPCS | Performed by: NURSE PRACTITIONER

## 2023-09-06 RX ORDER — GLIPIZIDE 5 MG/1
5 TABLET ORAL
Qty: 180 TABLET | Refills: 1 | Status: SHIPPED | OUTPATIENT
Start: 2023-09-06

## 2023-09-06 RX ORDER — SIMVASTATIN 80 MG
80 TABLET ORAL NIGHTLY
Qty: 90 TABLET | Refills: 1 | Status: SHIPPED | OUTPATIENT
Start: 2023-09-06

## 2023-09-06 ASSESSMENT — ENCOUNTER SYMPTOMS
SHORTNESS OF BREATH: 0
COUGH: 0

## 2023-09-06 NOTE — PROGRESS NOTES
Makayla Escobedo (:  1970) is a 50 y.o. female,Established patient, here for evaluation of the following chief complaint(s):  Diabetes and Follow-up (Patient is here today for a follow up on diabetes )         ASSESSMENT/PLAN:  1. Diabetes mellitus type 2, insulin dependent (HCC)  -     POCT glycosylated hemoglobin (Hb A1C)  -     glipiZIDE (GLUCOTROL) 5 MG tablet; Take 1 tablet by mouth 2 times daily (before meals), Disp-180 tablet, R-1Normal  -     simvastatin (ZOCOR) 80 MG tablet; Take 1 tablet by mouth nightly, Disp-90 tablet, R-1Normal  2. Non-compliance  3. Diabetic peripheral neuropathy ([address])      Return in about 6 weeks (around 10/18/2023) for Diabetes. Subjective   SUBJECTIVE/OBJECTIVE:  LADONNA  Lexie brought meds with her. She brought glipizide, gabapentin, losartan/hctz, asa, simvastatin, and an empty bottle of tizanidine with her along with a lantus pen. She states she is taking her rybelsus and farxiga but \"didn't grab them\". Her a1c is 13 down from 15.1 3 months ago. Glipizide, simvastatin, dispense date is December. States she is using her insulin every night. Hasn't followed up with endo. Stopped following up with dm pharmacist.  Confirm with pharmacy, Cyndi Ortiz picked up 23 for 90 day supply. Rybelsus 7 mg not picked up since 2022. Will try to get nurse coordinator involved. Htn- forgot to take bp med today     A pill box has been encouraged. Review of Systems   Constitutional:  Negative for chills and fever. Respiratory:  Negative for cough and shortness of breath. Cardiovascular:  Negative for chest pain, palpitations and leg swelling. Objective   Vitals:    23 0901   BP: (!) 149/98   Pulse:    SpO2:      Physical Exam  Constitutional:       Appearance: She is well-developed.    HENT:      Right Ear: External ear normal.      Left Ear: External ear normal.      Nose: Nose normal.   Cardiovascular:      Rate and Rhythm: Normal rate and regular

## 2023-09-07 ENCOUNTER — CARE COORDINATION (OUTPATIENT)
Dept: CARE COORDINATION | Age: 53
End: 2023-09-07

## 2023-09-07 NOTE — CARE COORDINATION
Referred by PCP for help with diabetes management. Left VM message asking patient to return call to assess/enrolled in care management. Will call again in a week.     Future Appointments   Date Time Provider [address] [place] [address]   10/18/2023  8:45 AM KELLEN Callejas - CNP [facility]

## 2023-09-18 ENCOUNTER — CARE COORDINATION (OUTPATIENT)
Dept: CARE COORDINATION | Age: 53
End: 2023-09-18

## 2023-09-18 NOTE — CARE COORDINATION
Left VM message asking patient to return call for care management enrollment. Will call again next week.     Future Appointments   Date Time Provider [address]   9/26/2023  9:30 AM MD BLANCHE Rose   10/18/2023  8:45 AM KELLEN Rihcard - CNP [facility]

## 2023-09-25 ENCOUNTER — CARE COORDINATION (OUTPATIENT)
Dept: CARE COORDINATION | Age: 53
End: 2023-09-25

## 2023-09-25 SDOH — HEALTH STABILITY: PHYSICAL HEALTH: ON AVERAGE, HOW MANY MINUTES DO YOU ENGAGE IN EXERCISE AT THIS LEVEL?: 20 MIN

## 2023-09-25 SDOH — ECONOMIC STABILITY: INCOME INSECURITY: IN THE LAST 12 MONTHS, WAS THERE A TIME WHEN YOU WERE NOT ABLE TO PAY THE MORTGAGE OR RENT ON TIME?: NO

## 2023-09-25 SDOH — ECONOMIC STABILITY: HOUSING INSECURITY: IN THE LAST 12 MONTHS, HOW MANY PLACES HAVE YOU LIVED?: 1

## 2023-09-25 SDOH — HEALTH STABILITY: PHYSICAL HEALTH: ON AVERAGE, HOW MANY DAYS PER WEEK DO YOU ENGAGE IN MODERATE TO STRENUOUS EXERCISE (LIKE A BRISK WALK)?: 3 DAYS

## 2023-09-25 SDOH — ECONOMIC STABILITY: TRANSPORTATION INSECURITY
IN THE PAST 12 MONTHS, HAS LACK OF TRANSPORTATION KEPT YOU FROM MEETINGS, WORK, OR FROM GETTING THINGS NEEDED FOR DAILY LIVING?: NO

## 2023-09-25 ASSESSMENT — SOCIAL DETERMINANTS OF HEALTH (SDOH)
HOW HARD IS IT FOR YOU TO PAY FOR THE VERY BASICS LIKE FOOD, HOUSING, MEDICAL CARE, AND HEATING?: SOMEWHAT HARD
HOW OFTEN DO YOU GET TOGETHER WITH FRIENDS OR RELATIVES?: ONCE A WEEK
HOW OFTEN DO YOU ATTEND CHURCH OR RELIGIOUS SERVICES?: NEVER
IN A TYPICAL WEEK, HOW MANY TIMES DO YOU TALK ON THE PHONE WITH FAMILY, FRIENDS, OR NEIGHBORS?: THREE TIMES A WEEK
HOW OFTEN DO YOU ATTENT MEETINGS OF THE CLUB OR ORGANIZATION YOU BELONG TO?: NEVER
DO YOU BELONG TO ANY CLUBS OR ORGANIZATIONS SUCH AS CHURCH GROUPS UNIONS, FRATERNAL OR ATHLETIC GROUPS, OR SCHOOL GROUPS?: NO

## 2023-09-25 ASSESSMENT — LIFESTYLE VARIABLES
HOW OFTEN DO YOU HAVE A DRINK CONTAINING ALCOHOL: MONTHLY OR LESS
HOW MANY STANDARD DRINKS CONTAINING ALCOHOL DO YOU HAVE ON A TYPICAL DAY: 1 OR 2

## 2023-09-25 NOTE — CARE COORDINATION
ability to afford all required medical care)?: Financially secure, some resource challenges   How wells does the patient now understand their health and well-being (symptoms, signs or risk factors) and what they need to do to manage their health?: Little understanding which impacts on their ability to undertake better management   How well do you think your patient can engage in healthcare discussions? (Barriers include language, deafness, aphasia, alcohol or drug problems, learning difficulties, concentration):  Adequate communication, with or without minor barriers   Do other services need to be involved to help this patient?: Other care/services not in place and required   Suggested Interventions and Community Resources   Pharmacist: In Process   Social Work: In Process                  Future Appointments   Date Time Provider [address]   9/26/2023  9:30 AM MD BLANCHE Hsu   10/18/2023  8:45 AM KELLEN Gil - [facility] RAMIREZ  TYLER Campo

## 2023-09-26 ENCOUNTER — TELEPHONE (OUTPATIENT)
Dept: PHARMACY | Facility: CLINIC | Age: 53
End: 2023-09-26

## 2023-09-26 ENCOUNTER — HOSPITAL ENCOUNTER (OUTPATIENT)
Age: 53
Setting detail: SPECIMEN
Discharge: HOME | End: 2023-09-26

## 2023-09-26 ENCOUNTER — CARE COORDINATION (OUTPATIENT)
Dept: CARE COORDINATION | Age: 53
End: 2023-09-26

## 2023-09-26 ENCOUNTER — OFFICE VISIT (OUTPATIENT)
Dept: OBGYN CLINIC | Age: 53
End: 2023-09-26
Payer: COMMERCIAL

## 2023-09-26 VITALS
HEART RATE: 95 BPM | SYSTOLIC BLOOD PRESSURE: 126 MMHG | DIASTOLIC BLOOD PRESSURE: 82 MMHG | WEIGHT: 164 LBS | BODY MASS INDEX: 33.06 KG/M2 | HEIGHT: 59 IN

## 2023-09-26 DIAGNOSIS — Z98.890 STATUS POST ENDOMETRIAL ABLATION: ICD-10-CM

## 2023-09-26 DIAGNOSIS — N95.0 POST-MENOPAUSAL BLEEDING: Primary | ICD-10-CM

## 2023-09-26 DIAGNOSIS — N76.0 VULVOVAGINITIS: ICD-10-CM

## 2023-09-26 PROCEDURE — 3078F DIAST BP <80 MM HG: CPT | Performed by: SPECIALIST

## 2023-09-26 PROCEDURE — 3074F SYST BP LT 130 MM HG: CPT | Performed by: SPECIALIST

## 2023-09-26 PROCEDURE — 99203 OFFICE O/P NEW LOW 30 MIN: CPT | Performed by: SPECIALIST

## 2023-09-26 RX ORDER — FLUCONAZOLE 100 MG/1
100 TABLET ORAL DAILY
Qty: 7 TABLET | Refills: 0 | Status: SHIPPED | OUTPATIENT
Start: 2023-09-26 | End: 2023-10-03

## 2023-09-26 RX ORDER — METRONIDAZOLE 500 MG/1
500 TABLET ORAL 2 TIMES DAILY
Qty: 14 TABLET | Refills: 0 | Status: SHIPPED | OUTPATIENT
Start: 2023-09-26 | End: 2023-10-03

## 2023-09-26 RX ORDER — CLOTRIMAZOLE AND BETAMETHASONE DIPROPIONATE 10; .64 MG/G; MG/G
CREAM TOPICAL
Qty: 45 G | Refills: 1 | Status: SHIPPED | OUTPATIENT
Start: 2023-09-26

## 2023-09-26 ASSESSMENT — ENCOUNTER SYMPTOMS
ABDOMINAL DISTENTION: 0
CONSTIPATION: 0
DIARRHEA: 0
NAUSEA: 0
APNEA: 0
EYE PAIN: 0
ABDOMINAL PAIN: 0
COUGH: 0
VOMITING: 0

## 2023-09-26 NOTE — TELEPHONE ENCOUNTER
----- Message from Daniela Juarez RN sent at 9/25/2023  6:21 PM EDT -----  Regarding: clinical pharmacy  Patient stated lost her insurance, will be out of most of her medications within a week. Has DM- uncontrolled, HTN, asthma. Can you review medications with patient, evaluate when she is due for her medications and her fill history to check for compliance?  referral made to maybe help get a one month emergency supply of medications. Thank you.

## 2023-09-27 ENCOUNTER — TELEPHONE (OUTPATIENT)
Dept: PHARMACY | Facility: CLINIC | Age: 53
End: 2023-09-27

## 2023-09-27 NOTE — CARE COORDINATION
called and spoke to Silvia. Silvia reports that she had Anais Murcia and her coverage stopped recently.  inquired if Silvia received a increase in SS? Silvia declined.  and Lexie contact [address] [place] [address] and spoke with a representative. Representative was able to complete renewal over the phone. Silvia was informed they need a letter from her  who is currently not working stating he has no income at this time. Silvia was agreeable.  and Silvia were transferred to [facility] to have Silvia completed SNAP application over the phone. Plan:   Silvia will send in documentation that her  was let go from his job. Lexie's  will send in a letter stating that he has no income and is currently utilizing [employer] for bills.

## 2023-09-27 NOTE — TELEPHONE ENCOUNTER
Lakia Kidney, APRN - CNP, medication review completed with patient:  Albuterol solution, glipizide, losartan/hctz, simvastatin are all cost effective without insurance. Educated patient to shop for [employer]  [employer] does not have a patient assistance program. Max Stafford has a pap program. If patient qualifies will need flovent script sent to PAP program  Hayley Moorecaio, and rybelsus all have pap program. All PAP program information was provided to patient for her to apply too and then prescriptions will need sent directly to drug companies if she qualifies. Educated patient relion classic glucometer and supplies is the cheapest and can be bought over the counter  Patient is having yeast infection currently which can be caused/worsened by Johanna Limber especially with how high patient's A1c is. Will have to monitor for further issues with infections. See note below for complete details. Thank you,  Vannessa Brown, PharmD, [address] Pharmacist  Department: [phone]  =======================================================    CLINICAL PHARMACY NOTE - Medication Review  Patient outreach to review medications - Spoke with patient. SUBJECTIVE/OBJECTIVE:   Dank Martin is a 50 y.o. female referred to a clinical pharmacy specialist by care coordination. Medications:  Current Outpatient Medications   Medication Instructions    albuterol (PROVENTIL) 2.5 mg, Nebulization, EVERY 6 HOURS PRN    albuterol sulfate HFA (PROVENTIL;VENTOLIN;PROAIR) 108 (90 Base) MCG/ACT inhaler 2 puffs, Inhalation, EVERY 6 HOURS PRN    ASPIRIN LOW DOSE 81 MG EC tablet take 1 tablet by mouth once daily    Basaglar KwikPen 55 Units, SubCUTAneous, NIGHTLY    beclomethasone (QVAR REDIHALER) 80 MCG/ACT AERB inhaler 1 puff, Inhalation, 2 TIMES DAILY RESP    Blood Glucose Monitoring Suppl (TRUE METRIX METER) w/Device KIT Use as directed to check blood glucose.     blood

## 2023-09-28 LAB
HPV I/H RISK 4 DNA CVX QL NAA+PROBE: NOT DETECTED
HPV SAMPLE: NORMAL
HPV, INTERPRETATION: NORMAL
HPV16 DNA CVX QL NAA+PROBE: NOT DETECTED
HPV18 DNA CVX QL NAA+PROBE: NOT DETECTED
SPECIMEN DESCRIPTION: NORMAL

## 2023-10-02 ENCOUNTER — CARE COORDINATION (OUTPATIENT)
Dept: CARE COORDINATION | Age: 53
End: 2023-10-02

## 2023-10-02 LAB — CYTOLOGY REPORT: NORMAL

## 2023-10-03 ENCOUNTER — CARE COORDINATION (OUTPATIENT)
Dept: CARE COORDINATION | Age: 53
End: 2023-10-03

## 2023-10-03 DIAGNOSIS — E11.9 DIABETES MELLITUS TYPE 2, INSULIN DEPENDENT (HCC): ICD-10-CM

## 2023-10-03 DIAGNOSIS — Z79.4 DIABETES MELLITUS TYPE 2, INSULIN DEPENDENT (HCC): ICD-10-CM

## 2023-10-03 NOTE — CARE COORDINATION
Ambulatory Care Coordination Note  10/3/2023    Patient Current Location:  Home: [address] [place] [address]      She needs to submit some more information to [address] [place] [address] by 10/6 for Medicaid reinstatement application then will have to wait for answer if will get it or not. Encouraged to take care of this matter as quickly as possible so can get her medications filled. Stated she has all her oral medications \"for a while yet\" but is running low. Stated has very little insulin left and needs that. Was given information by pharmacist on how to apply for patient assistance program but she hasn't done that. Writer called and encouraged she at least ask PCP office if any samples are available. Refill request sent to PCP so can at least check price at [facility], was getting at [facility] before. Also ran out of glucometer strips so unable to check her blood sugars, she hasn't called pharmacy to check on self pay cost. Pharmacist gave her information about Relion meter and test strips at [facility] that are very inexpensive out of pocket. Currently has True Metrix meter. Sent refill request to [facility] [place]. Recently had to switch to [facility] from [facility] and not all medications are switched over yet. CC Plan:   -Follow up next week to see if she heard back from [address] [place] [address] about her Medicaid application, if she got insulin or test strips or what. Sent referral to pharmacy  to help with  patient assistance application for [place]. Offered patient enrollment in the Remote Patient Monitoring (RPM) program for in-home monitoring: Patient is not eligible for RPM program.    Lab Results       None                 Goals Addressed                   This Visit's Progress     Medication Management   No change     I will take my medication as directed. I will notify my provider of any problems with medications, like adverse effects or side effects.   I will notify my provider/Care Coordinator

## 2023-10-04 ENCOUNTER — OFFICE VISIT (OUTPATIENT)
Dept: OBGYN CLINIC | Age: 53
End: 2023-10-04
Payer: COMMERCIAL

## 2023-10-04 VITALS
DIASTOLIC BLOOD PRESSURE: 70 MMHG | HEART RATE: 109 BPM | SYSTOLIC BLOOD PRESSURE: 104 MMHG | BODY MASS INDEX: 31.45 KG/M2 | WEIGHT: 156 LBS | HEIGHT: 59 IN

## 2023-10-04 DIAGNOSIS — N95.0 POST-MENOPAUSAL BLEEDING: Primary | ICD-10-CM

## 2023-10-04 DIAGNOSIS — Z98.890 STATUS POST ENDOMETRIAL ABLATION: ICD-10-CM

## 2023-10-04 PROCEDURE — 3074F SYST BP LT 130 MM HG: CPT | Performed by: SPECIALIST

## 2023-10-04 PROCEDURE — 99213 OFFICE O/P EST LOW 20 MIN: CPT | Performed by: SPECIALIST

## 2023-10-04 PROCEDURE — 3078F DIAST BP <80 MM HG: CPT | Performed by: SPECIALIST

## 2023-10-04 RX ORDER — CLINDAMYCIN HYDROCHLORIDE 300 MG/1
300 CAPSULE ORAL 2 TIMES DAILY
Qty: 14 CAPSULE | Refills: 0 | Status: SHIPPED | OUTPATIENT
Start: 2023-10-04 | End: 2023-10-11

## 2023-10-04 ASSESSMENT — ENCOUNTER SYMPTOMS
CONSTIPATION: 0
VOMITING: 0
NAUSEA: 0
DIARRHEA: 0
COUGH: 0
ABDOMINAL PAIN: 0
APNEA: 0
EYE PAIN: 0
ABDOMINAL DISTENTION: 0

## 2023-10-04 NOTE — PROGRESS NOTES
([employer] PEN NEEDLES 31G) 31G X 8 MM MISC 1 each by Does not apply route daily 100 each 3    beclomethasone (QVAR REDIHALER) 80 MCG/ACT AERB inhaler Inhale 1 puff into the lungs in the morning and 1 puff in the evening. 1 each 5    tiZANidine (ZANAFLEX) 4 MG tablet take 1 tablet by mouth at bedtime 40 tablet 1    Handicap Placard MISC by Does not apply route Exp March 2028 1 each 0    ASPIRIN LOW DOSE 81 MG EC tablet take 1 tablet by mouth once daily 30 tablet 11    insulin glargine (BASAGLAR KWIKPEN) 100 UNIT/ML injection pen Inject 55 Units into the skin nightly 5 Adjustable Dose Pre-filled Pen Syringe 5    Insulin Pen Needle (B-D ULTRAFINE III SHORT PEN) 31G X 8 MM MISC Inject 1 each into the skin daily May substitute with any brand 100 each 11    Blood Glucose Monitoring Suppl (TRUE METRIX METER) w/Device KIT Use as directed to check blood glucose. 1 kit 0    losartan-hydroCHLOROthiazide (HYZAAR) 100-12.5 MG per tablet Take 1 tablet by mouth daily (Patient not taking: Reported on 9/27/2023) 90 tablet 1    gabapentin (NEURONTIN) 300 MG capsule Take 1 capsule by mouth 3 times daily for 30 days. Intended supply: 30 days 90 capsule 1     No current Epic-ordered facility-administered medications on file. Problem List Items Addressed This Visit       Status post endometrial ablation    Post-menopausal bleeding - Primary     Allergies   Allergen Reactions    Morphine Swelling     When given IV morphine pt had swelling in her arm     No orders of the defined types were placed in this encounter. HPI  Patient is here today to discuss the result of her ultrasound that was done for postmenopausal bleeding. She had an endometrial ablation in 2018. She did not have a period for 2 years and then she had a period that lasted 5 days. Patient states that several years ago she was told she needed a hysterectomy but when she stopped bleeding, she did not get the surgery.   She states that she could not the pill

## 2023-10-05 RX ORDER — INSULIN GLARGINE 100 [IU]/ML
55 INJECTION, SOLUTION SUBCUTANEOUS NIGHTLY
Qty: 5 ADJUSTABLE DOSE PRE-FILLED PEN SYRINGE | Refills: 5 | Status: SHIPPED | OUTPATIENT
Start: 2023-10-05

## 2023-10-05 RX ORDER — CALCIUM CITRATE/VITAMIN D3 200MG-6.25
TABLET ORAL
Qty: 100 EACH | Refills: 3 | Status: SHIPPED | OUTPATIENT
Start: 2023-10-05

## 2023-10-10 ENCOUNTER — CARE COORDINATION (OUTPATIENT)
Dept: CARE COORDINATION | Age: 53
End: 2023-10-10

## 2023-10-11 ENCOUNTER — CARE COORDINATION (OUTPATIENT)
Dept: CARE COORDINATION | Age: 53
End: 2023-10-11

## 2023-10-11 NOTE — CARE COORDINATION
attempted to contact Ms. Mireles.  left message with name and number.  inquired about Renewal of Kalyn and Berny Pierre.  requested a call back to [phone]. Plan:    will attempt to follow up with Ms. Yolanda Doshi.

## 2023-10-13 ENCOUNTER — CARE COORDINATION (OUTPATIENT)
Dept: CARE COORDINATION | Age: 53
End: 2023-10-13

## 2023-10-13 NOTE — CARE COORDINATION
Attempted to call patient to check if got [employer] reinstated and if able to get her medications from the pharmacy. Left VM message asking she return care manager's call. Will call again in a week.     Future Appointments   Date Time Provider [address] [place] [address]   10/18/2023  8:45 AM KELLEN Camarillo - CNP [facility]   1/5/2024  9:45 AM MD BLANCHE Nick

## 2023-10-18 ENCOUNTER — HOSPITAL ENCOUNTER (OUTPATIENT)
Age: 53
Setting detail: SPECIMEN
Discharge: HOME | End: 2023-10-18

## 2023-10-18 ENCOUNTER — OFFICE VISIT (OUTPATIENT)
Dept: PRIMARY CARE CLINIC | Age: 53
End: 2023-10-18

## 2023-10-18 VITALS
WEIGHT: 162.4 LBS | HEART RATE: 93 BPM | DIASTOLIC BLOOD PRESSURE: 88 MMHG | OXYGEN SATURATION: 97 % | SYSTOLIC BLOOD PRESSURE: 138 MMHG | BODY MASS INDEX: 32.74 KG/M2 | HEIGHT: 59 IN

## 2023-10-18 DIAGNOSIS — Z23 NEED FOR IMMUNIZATION AGAINST INFLUENZA: ICD-10-CM

## 2023-10-18 DIAGNOSIS — E11.9 DIABETES MELLITUS TYPE 2, INSULIN DEPENDENT (HCC): Primary | ICD-10-CM

## 2023-10-18 DIAGNOSIS — Z23 NEED FOR VACCINATION: ICD-10-CM

## 2023-10-18 DIAGNOSIS — Z79.4 DIABETES MELLITUS TYPE 2, INSULIN DEPENDENT (HCC): Primary | ICD-10-CM

## 2023-10-18 ASSESSMENT — ENCOUNTER SYMPTOMS
COUGH: 0
SHORTNESS OF BREATH: 0

## 2023-10-18 NOTE — PROGRESS NOTES
Pt in office for dm f/u. Pt stated she was feeling ill for the last 2 weeks and had not been able to keep anything down and was also vomiting up her medications. Pt was not keeping up on BS readings while ill. Visit Information    Have you changed or started any medications since your last visit including any over-the-counter medicines, vitamins, or herbal medicines? yes - Lotrisone cream   Have you stopped taking any of your medications? Is so, why? -  no  Are you having any side effects from any of your medications? - no    Have you seen any other physician or provider since your last visit? yes - Dr. Alisha Orozco   Have you had any other diagnostic tests since your last visit?  no   Have you been seen in the emergency room and/or had an admission in a hospital since we last saw you?  no   Have you had your routine dental cleaning in the past 6 months?  no     Do you have an active MyChart account? If no, what is the barrier?   Yes    Patient Care Team:  KELLEN Grimaldo CNP as PCP - General (Family Nurse Practitioner)  KELELN Grimaldo CNP as PCP - Empaneled Provider  Micah Fisher (Optometry)  Page Carter MD as Consulting Physician (Endocrinology)  Megan Barnes DO as Resident (Obstetrics & Gynecology)  KELLEN Chambers CNP (Family Medicine)  Marcia Aceves RN as Ambulatory Care Manager  Mckay Lazcano, [facility] Vivi Viera as   CHAMP Brown as     Medical History Review  Past Medical, Family, and Social History reviewed and does contribute to the patient presenting condition    Health Maintenance   Topic Date Due    Hepatitis B vaccine (1 of 3 - 3-dose series) Never done    Pneumococcal 0-64 years Vaccine (2 - PCV) 12/18/2016    Colorectal Cancer Screen  Never done    COVID-19 Vaccine (3 - Mixed Product series) 07/13/2021    Diabetic foot exam  11/12/2022    Shingles vaccine (1 of 2) Never done    Diabetic Alb to Cr ratio (uACR) test

## 2023-10-19 ENCOUNTER — CARE COORDINATION (OUTPATIENT)
Dept: CARE COORDINATION | Age: 53
End: 2023-10-19

## 2023-10-19 DIAGNOSIS — E11.9 DIABETES MELLITUS TYPE 2, INSULIN DEPENDENT (HCC): ICD-10-CM

## 2023-10-19 DIAGNOSIS — Z79.4 DIABETES MELLITUS TYPE 2, INSULIN DEPENDENT (HCC): ICD-10-CM

## 2023-10-19 LAB
CREAT UR-MCNC: 23.5 MG/DL (ref 28–217)
MICROALBUMIN UR-MCNC: 95 MG/L
MICROALBUMIN/CREAT UR-RTO: 404 MCG/MG CREAT

## 2023-10-20 ENCOUNTER — CARE COORDINATION (OUTPATIENT)
Dept: CARE COORDINATION | Age: 53
End: 2023-10-20

## 2023-10-20 NOTE — CARE COORDINATION
Left VM message asking patient to return call. She saw PCP this week, did not indicate that she got her insurance back. Will attempt to contact again next week.     Future Appointments   Date Time Provider [address]   11/29/2023  8:30 AM [facility] Baker [facility]   1/5/2024  9:45 AM Lilly Kinney MD OB Cloteal Bias

## 2023-10-23 ENCOUNTER — CARE COORDINATION (OUTPATIENT)
Dept: CARE COORDINATION | Age: 53
End: 2023-10-23

## 2023-10-23 NOTE — CARE COORDINATION
Ambulatory Care Coordination Note  10/24/2023    Patient Current Location:  Home: [address] [place], [place] [address]      Patient called Karthikeyan Silva. She still doesn't have insurance yet but got a text and an email last week stated they are reviewing it. Writer encouraged her to call or email them every day to check on decision. She ran out of insulin, she checked with pharmacy and is $352 out of pocket which she can't afford. Per [facility] pharmacy , the turn around time for applying for patient assistance is about 4 weeks so not really an option because if she got the insulin then got the insurance she would have to pay the cost of the insulin back to  and wouldn't be able to afford it then either. She didn't get glucometer strips from pharmacy even though was told about the cheaper Relion glucometer and strips option at [facility] [place] [facility]. She was instructed by pharmacist 9/27 to check with pharmacy about getting glipizide, losartan/hctz and simvastatin since self pay price would still be very inexpensive but she didn't call or get them. Writer called [facility], Good Rx price for Ayleen Stallion is still too expensive at $248. She didn't get the glipizide or simvastatin 9/6. Writer conferred with  to find options to help patient get her medication, nothing found yet. CC Plan:   -Follow up with patient next week to check if she got reinstated with insurance. Offered patient enrollment in the Remote Patient Monitoring (RPM) program for in-home monitoring: Patient declined. Lab Results       None            Care Coordination Interventions    Referral from Primary Care Provider: Yes  Suggested Interventions and Community Resources  Pharmacist: In Process  Social Work: In Process          Goals Addressed                      This Visit's Progress      Medication Management   No change      I will take my medication as directed.   I will notify my provider of any problems with

## 2023-10-24 NOTE — CARE COORDINATION
attempted to contact Shadi.  left message with name and number.  requested a call back to follow up on Medicaid application.  spoke with Alex Cortes who reports that Shadi is out of insulin and has still not been approved for Medicaid.  encouraged Tania Zapata if she speaks with Shadi to encourage her to call every other day to follow up on application.  reached out to [facility] regarding patient assistance with medications. Plan:    will speak with Ms. Wilson regarding helping with cost of Insulin.

## 2023-10-30 ENCOUNTER — CARE COORDINATION (OUTPATIENT)
Dept: CARE COORDINATION | Age: 53
End: 2023-10-30

## 2023-10-31 ENCOUNTER — CARE COORDINATION (OUTPATIENT)
Dept: CARE COORDINATION | Age: 53
End: 2023-10-31

## 2023-10-31 NOTE — CARE COORDINATION
Attempted to call for care management follow up, unable to leave message, phone not accepting calls. Will call again in a few days.     Future Appointments   Date Time Provider [address]   11/29/2023  8:30 AM [facility]   1/5/2024  9:45 AM Sara Calderón MD OB Eric Benoit

## 2023-10-31 NOTE — CARE COORDINATION
attempted to contact Shadi. Lexie's phone is not accepting calls at this time.  will attempt to contact Shadi in 2-4 days.

## 2023-11-02 ENCOUNTER — CARE COORDINATION (OUTPATIENT)
Dept: CARE COORDINATION | Age: 53
End: 2023-11-02

## 2023-11-02 NOTE — CARE COORDINATION
attempted to contact Shadi. Lexie's phone reports its still not accepting calls at this time. Plan:    will attempt to follow up with Shadi regarding Medicaid/SNAP.

## 2023-11-03 ENCOUNTER — CARE COORDINATION (OUTPATIENT)
Dept: CARE COORDINATION | Age: 53
End: 2023-11-03

## 2023-11-03 NOTE — CARE COORDINATION
Attempted to call for care management, left VM message asking for return call to discuss if has insurance yet and if able to get her medications. Will attempt to call again next week.     Future Appointments   Date Time Provider [address]   11/29/2023  8:30 AM [facility]   1/5/2024  9:45 AM Sara Calderón MD OB Eric Benoit

## 2023-11-09 ENCOUNTER — CARE COORDINATION (OUTPATIENT)
Dept: CARE COORDINATION | Age: 53
End: 2023-11-09

## 2023-11-09 NOTE — CARE COORDINATION
attempted to contact Shadi.  left message with name and number.  inquired if [employer] was re-activated.  inquired if Shadi was able to obtain SNAP benefits. Plan:    will attempt to contact Shadi one more time. If no answer,  will sign off.

## 2023-11-10 ENCOUNTER — CARE COORDINATION (OUTPATIENT)
Dept: CARE COORDINATION | Age: 53
End: 2023-11-10

## 2023-11-10 NOTE — CARE COORDINATION
Left VM message asking patient to return call for care management follow up. Informed her  was unable to find any resources for help getting her medications. She has not returned writer's nor  calls to let us know if she got her insurance back or not so can get her insulin and other medications. Will attempt to call again next week.     Future Appointments   Date Time Provider [address]   11/29/2023  8:30 AM KELLEN Santamaria - CNP [facility]   1/5/2024  9:45 AM Shay Glaser MD OB Marva Loza

## 2023-11-17 ENCOUNTER — CARE COORDINATION (OUTPATIENT)
Dept: CARE COORDINATION | Age: 53
End: 2023-11-17

## 2023-11-17 NOTE — CARE COORDINATION
Left VM message on patient's phone asking for return call to discuss if got insurance so can get her medications. Reminded her of upcoming PCP appt. [address], she has not picked up any insulin or other medications in the last month. [facility], last medication she got filled there was Lantus in July. Will call again after PCP appt.     Future Appointments   Date Time Provider [address]  [facility]   11/29/2023  8:30 AM [facility]ruitt - [facility]   1/5/2024  9:45 AM Aimee Morgan MD OB Michelle Castaneda

## 2023-11-27 ENCOUNTER — CARE COORDINATION (OUTPATIENT)
Dept: CARE COORDINATION | Age: 53
End: 2023-11-27

## 2023-11-28 NOTE — CARE COORDINATION
attempted to contact Shadi.  left .  has been unsuccessful making contact with Shadi.  will make one more attempt to contact before signing off.

## 2023-11-30 ENCOUNTER — CARE COORDINATION (OUTPATIENT)
Dept: CARE COORDINATION | Age: 53
End: 2023-11-30

## 2023-11-30 DIAGNOSIS — E11.9 DIABETES MELLITUS TYPE 2, INSULIN DEPENDENT (HCC): ICD-10-CM

## 2023-11-30 DIAGNOSIS — I10 HTN (HYPERTENSION), BENIGN: ICD-10-CM

## 2023-11-30 DIAGNOSIS — Z79.4 DIABETES MELLITUS TYPE 2, INSULIN DEPENDENT (HCC): ICD-10-CM

## 2023-11-30 DIAGNOSIS — J45.21 RAD (REACTIVE AIRWAY DISEASE), MILD INTERMITTENT, WITH ACUTE EXACERBATION: ICD-10-CM

## 2023-11-30 NOTE — CARE COORDINATION
Ambulatory Care Coordination Note  2023    Patient Current Location:  Home: [address] [place] [address]     ACM contacted the patient by telephone. Verified name and  with patient as identifiers. Spoke with patient. She got new insurance, has cards but won't be in effect until tomorrow. She plans on getting all her medications and glucometer strips and will start checking blood sugars again. She has been trying to watch her diet more, drinking more water since didn't have an insulin or other medications. Denied any hyper or hypoglycemia symptoms. Did not have any glucometer strips to be able to check blood sugars. She will schedule eye and GYN appts that she hasn't been able to schedule since didn't have insurance. She had to cancel/reschedule PCP appt due to no insurance and car wasn't running yesterday. Writer sent refill requests for all medications to PCP to go to [facility] since [facility]. Whole household has been sick. She has been using albuterol inhaler but ran out of nebulizer solution. Has a stuffy nose and cough. CC Plan:   -Follow up in a week to see if got all medications, is checking blood sugars, review ACP. Diabetes Assessment    Medic Alert ID: No  How often do you test your blood sugar?:  (Comment: currently no glucometer strips)   Do you have barriers with adherence to non-pharmacologic self-management interventions?  (Nutrition/Exercise/Self-Monitoring): No   Have you ever had to go to the ED for symptoms of low blood sugar?: No       Do you have hyperglycemia symptoms?: No   Do you have hypoglycemia symptoms?: No   Blood Sugar Monitoring Regimen: Not Testing        ,   Asthma - Encounter Level       Has woken due to asthma: No  Exercised differently due to asthma: No  Needed quick-acting relief medication more than usual: Yes  Took asthma control medication consistently: No  Needed unscheduled care for asthma, including calling in, an office visit, or an emergency

## 2023-12-05 RX ORDER — ASPIRIN 81 MG/1
81 TABLET ORAL DAILY
Qty: 30 TABLET | Refills: 11 | Status: SHIPPED | OUTPATIENT
Start: 2023-12-05

## 2023-12-05 RX ORDER — ALBUTEROL SULFATE 90 UG/1
2 AEROSOL, METERED RESPIRATORY (INHALATION) EVERY 6 HOURS PRN
Qty: 1 EACH | Refills: 5 | Status: SHIPPED | OUTPATIENT
Start: 2023-12-05

## 2023-12-05 RX ORDER — GABAPENTIN 300 MG/1
300 CAPSULE ORAL 3 TIMES DAILY
Qty: 90 CAPSULE | Refills: 1 | Status: SHIPPED | OUTPATIENT
Start: 2023-12-05 | End: 2024-02-03

## 2023-12-05 RX ORDER — LOSARTAN POTASSIUM AND HYDROCHLOROTHIAZIDE 12.5; 1 MG/1; MG/1
1 TABLET ORAL DAILY
Qty: 90 TABLET | Refills: 1 | Status: SHIPPED | OUTPATIENT
Start: 2023-12-05

## 2023-12-05 RX ORDER — PEN NEEDLE, DIABETIC 31 GX5/16"
1 NEEDLE, DISPOSABLE MISCELLANEOUS DAILY
Qty: 100 EACH | Refills: 11 | Status: SHIPPED | OUTPATIENT
Start: 2023-12-05

## 2023-12-05 RX ORDER — ORAL SEMAGLUTIDE 7 MG/1
7 TABLET ORAL
Qty: 90 TABLET | Refills: 1 | Status: SHIPPED | OUTPATIENT
Start: 2023-12-05

## 2023-12-05 RX ORDER — CELECOXIB 100 MG/1
100 CAPSULE ORAL 2 TIMES DAILY
Qty: 60 CAPSULE | Refills: 3 | Status: SHIPPED | OUTPATIENT
Start: 2023-12-05

## 2023-12-06 ENCOUNTER — CARE COORDINATION (OUTPATIENT)
Dept: CARE COORDINATION | Age: 53
End: 2023-12-06

## 2023-12-07 NOTE — CARE COORDINATION
called and spoke with [place]. Silvia reports that she did hear from [address] [place] [address]. Silvia reports that she was approved for Medicaid and had PCP send her prescriptions in. Vasquez Lezama reports that she was denied SNAP and was told she needed to turn in a form from her husbands previous employer.  inquired if Silvia was going to try and send form in to hopefully receive snap benefits.  encouraged Lexie to contact S and complete snap application over (since been over the 30 days) and turn in form needed. Silvia was agreeable. Plan:   Silvia will  prescriptions. Silvia will re-apply for SNAP.

## 2023-12-11 ENCOUNTER — CARE COORDINATION (OUTPATIENT)
Dept: CARE COORDINATION | Age: 53
End: 2023-12-11

## 2023-12-11 NOTE — CARE COORDINATION
Left VM message asking patient to return call for care management follow up, see if got all medications from pharmacy and if checking blood sugars. Will call again next week.       Future Appointments   Date Time Provider [address] [place] [address]   1/3/2024  8:00 AM KELLEN Dyer CNP

## 2023-12-27 ENCOUNTER — CARE COORDINATION (OUTPATIENT)
Dept: CARE COORDINATION | Age: 53
End: 2023-12-27

## 2023-12-27 NOTE — CARE COORDINATION
Left  message asking patient to return call to discuss if checking blood sugars, if got her medications. Spoke with pharmacist at [facility], the only medications patient picked up were losartan/HCTZ, Celebrex, pen needles and albuterol inhaler, has not picked up insulin since was prescribed 2 months ago. Have made several attempts to contact patient, she does not return calls, did not respond to [employer] message sent to her. She told writer on 11/30 she got new insurance cards and would be able to get all her medications. Discharged from care management as she is not engaged, unable to contact her.     Future Appointments   Date Time Provider [address]   1/3/2024  8:00 AM KELLEN Abarca - CNP [facility] Lonnie Filbert

## 2023-12-29 ENCOUNTER — CARE COORDINATION (OUTPATIENT)
Dept: CARE COORDINATION | Age: 53
End: 2023-12-29

## 2023-12-29 NOTE — CARE COORDINATION
attempted to contact Shadi.  noted that Heriberto Carvajal signed off due to lack of engagement. Dinesh Palmer noted that Shadi has insurance however has not picked up medications.  attempted to [employer] with food stamps after assisting with Medicaid application. Shadi noted that she was denied SNAP because she needed additional information regarding her  and not working.  requested a call back if Shadi was still interested in services.  will sign off.

## 2024-07-23 ASSESSMENT — PATIENT HEALTH QUESTIONNAIRE - PHQ9
2. FEELING DOWN, DEPRESSED OR HOPELESS: NOT AT ALL
1. LITTLE INTEREST OR PLEASURE IN DOING THINGS: SEVERAL DAYS
SUM OF ALL RESPONSES TO PHQ9 QUESTIONS 1 & 2: 1

## 2024-07-24 ENCOUNTER — OFFICE VISIT (OUTPATIENT)
Dept: PRIMARY CARE CLINIC | Age: 54
End: 2024-07-24

## 2024-07-24 VITALS
HEART RATE: 99 BPM | WEIGHT: 142 LBS | SYSTOLIC BLOOD PRESSURE: 132 MMHG | BODY MASS INDEX: 28.68 KG/M2 | DIASTOLIC BLOOD PRESSURE: 90 MMHG | OXYGEN SATURATION: 97 %

## 2024-07-24 DIAGNOSIS — I10 HTN (HYPERTENSION), BENIGN: ICD-10-CM

## 2024-07-24 DIAGNOSIS — Z79.4 DIABETES MELLITUS TYPE 2, INSULIN DEPENDENT (HCC): Primary | ICD-10-CM

## 2024-07-24 DIAGNOSIS — E11.42 DIABETIC PERIPHERAL NEUROPATHY (HCC): ICD-10-CM

## 2024-07-24 DIAGNOSIS — J45.21 RAD (REACTIVE AIRWAY DISEASE), MILD INTERMITTENT, WITH ACUTE EXACERBATION: ICD-10-CM

## 2024-07-24 DIAGNOSIS — M51.36 DEGENERATION OF L4-L5 INTERVERTEBRAL DISC: ICD-10-CM

## 2024-07-24 DIAGNOSIS — M54.50 LUMBAR PAIN: ICD-10-CM

## 2024-07-24 DIAGNOSIS — Z91.199 NON-COMPLIANCE: ICD-10-CM

## 2024-07-24 DIAGNOSIS — E11.9 DIABETES MELLITUS TYPE 2, INSULIN DEPENDENT (HCC): Primary | ICD-10-CM

## 2024-07-24 LAB — HBA1C MFR BLD: 14 %

## 2024-07-24 PROCEDURE — 3075F SYST BP GE 130 - 139MM HG: CPT | Performed by: NURSE PRACTITIONER

## 2024-07-24 PROCEDURE — 83036 HEMOGLOBIN GLYCOSYLATED A1C: CPT | Performed by: NURSE PRACTITIONER

## 2024-07-24 PROCEDURE — 3046F HEMOGLOBIN A1C LEVEL >9.0%: CPT | Performed by: NURSE PRACTITIONER

## 2024-07-24 PROCEDURE — 3080F DIAST BP >= 90 MM HG: CPT | Performed by: NURSE PRACTITIONER

## 2024-07-24 PROCEDURE — 99214 OFFICE O/P EST MOD 30 MIN: CPT | Performed by: NURSE PRACTITIONER

## 2024-07-24 RX ORDER — GLIPIZIDE 5 MG/1
5 TABLET ORAL
Qty: 180 TABLET | Refills: 1 | Status: SHIPPED | OUTPATIENT
Start: 2024-07-24

## 2024-07-24 RX ORDER — ORAL SEMAGLUTIDE 3 MG/1
3 TABLET ORAL DAILY
Qty: 30 TABLET | Refills: 0 | Status: SHIPPED | OUTPATIENT
Start: 2024-07-24

## 2024-07-24 RX ORDER — TIZANIDINE 4 MG/1
TABLET ORAL
Qty: 40 TABLET | Refills: 1 | Status: SHIPPED | OUTPATIENT
Start: 2024-07-24

## 2024-07-24 RX ORDER — INSULIN GLARGINE 100 [IU]/ML
20 INJECTION, SOLUTION SUBCUTANEOUS NIGHTLY
Qty: 5 ADJUSTABLE DOSE PRE-FILLED PEN SYRINGE | Refills: 5 | Status: SHIPPED | OUTPATIENT
Start: 2024-07-24

## 2024-07-24 RX ORDER — GLUCOSAMINE HCL/CHONDROITIN SU 500-400 MG
CAPSULE ORAL
Qty: 100 STRIP | Refills: 2 | Status: SHIPPED | OUTPATIENT
Start: 2024-07-24

## 2024-07-24 RX ORDER — CELECOXIB 100 MG/1
100 CAPSULE ORAL 2 TIMES DAILY
Qty: 60 CAPSULE | Refills: 3 | Status: SHIPPED | OUTPATIENT
Start: 2024-07-24

## 2024-07-24 RX ORDER — GABAPENTIN 300 MG/1
300 CAPSULE ORAL 3 TIMES DAILY
Qty: 90 CAPSULE | Refills: 1 | Status: SHIPPED | OUTPATIENT
Start: 2024-07-24 | End: 2024-09-22

## 2024-07-24 RX ORDER — CALCIUM CITRATE/VITAMIN D3 200MG-6.25
TABLET ORAL
Qty: 100 EACH | Refills: 3 | Status: SHIPPED | OUTPATIENT
Start: 2024-07-24

## 2024-07-24 RX ORDER — ORAL SEMAGLUTIDE 7 MG/1
7 TABLET ORAL
Qty: 90 TABLET | Refills: 1 | Status: SHIPPED | OUTPATIENT
Start: 2024-08-23

## 2024-07-24 RX ORDER — DAPAGLIFLOZIN 10 MG/1
10 TABLET, FILM COATED ORAL EVERY MORNING
Qty: 90 TABLET | Refills: 1 | Status: SHIPPED | OUTPATIENT
Start: 2024-07-24

## 2024-07-24 RX ORDER — PEN NEEDLE, DIABETIC 31 GX5/16"
1 NEEDLE, DISPOSABLE MISCELLANEOUS DAILY
Qty: 100 EACH | Refills: 11 | Status: SHIPPED | OUTPATIENT
Start: 2024-07-24

## 2024-07-24 RX ORDER — LANCETS 30 GAUGE
1 EACH MISCELLANEOUS 4 TIMES DAILY
Qty: 100 EACH | Refills: 5 | Status: SHIPPED | OUTPATIENT
Start: 2024-07-24

## 2024-07-24 RX ORDER — LOSARTAN POTASSIUM AND HYDROCHLOROTHIAZIDE 12.5; 1 MG/1; MG/1
1 TABLET ORAL DAILY
Qty: 90 TABLET | Refills: 1 | Status: SHIPPED | OUTPATIENT
Start: 2024-07-24

## 2024-07-24 RX ORDER — ALBUTEROL SULFATE 90 UG/1
2 AEROSOL, METERED RESPIRATORY (INHALATION) EVERY 6 HOURS PRN
Qty: 1 EACH | Refills: 5 | Status: SHIPPED | OUTPATIENT
Start: 2024-07-24

## 2024-07-24 RX ORDER — SIMVASTATIN 80 MG
80 TABLET ORAL NIGHTLY
Qty: 90 TABLET | Refills: 1 | Status: SHIPPED | OUTPATIENT
Start: 2024-07-24

## 2024-07-24 ASSESSMENT — ENCOUNTER SYMPTOMS
COUGH: 0
SHORTNESS OF BREATH: 0

## 2024-07-24 NOTE — PROGRESS NOTES
Lexie Mireles (:  1970) is a 53 y.o. female,Established patient, here for evaluation of the following chief complaint(s):  Diabetes      Assessment & Plan   ASSESSMENT/PLAN:  1. Diabetes mellitus type 2, insulin dependent (HCC)  -     POCT glycosylated hemoglobin (Hb A1C)  -     blood glucose test strips (TRUE METRIX BLOOD GLUCOSE TEST) strip; Use to check blood glucose 4 times daily;  Before meals and bedtime, and as needed., Disp-100 each, R-3Normal  -     dapagliflozin (FARXIGA) 10 MG tablet; Take 1 tablet by mouth every morning, Disp-90 tablet, R-1Normal  -     glipiZIDE (GLUCOTROL) 5 MG tablet; Take 1 tablet by mouth 2 times daily (before meals), Disp-180 tablet, R-1Normal  -     insulin glargine (BASAGLAR KWIKPEN) 100 UNIT/ML injection pen; Inject 20 Units into the skin nightly, Disp-5 Adjustable Dose Pre-filled Pen Syringe, R-5Normal  -     Insulin Pen Needle (B-D ULTRAFINE III SHORT PEN) 31G X 8 MM MISC; DAILY Starting 2024, Disp-100 each, R-11, NormalMay substitute with any brand  -     Semaglutide (RYBELSUS) 7 MG TABS; Take 7 mg by mouth every morning (before breakfast) or first food or drink of the day. Start after 30 days of the 3mg daily dose., Disp-90 tablet, R-1Normal  -     simvastatin (ZOCOR) 80 MG tablet; Take 1 tablet by mouth nightly, Disp-90 tablet, R-1Normal  2. RAD (reactive airway disease), mild intermittent, with acute exacerbation  -     albuterol (PROVENTIL) (5 MG/ML) 0.5% nebulizer solution; Take 0.5 mLs by nebulization every 6 hours as needed for Wheezing, Disp-120 each, R-5Normal  -     albuterol sulfate HFA (PROVENTIL;VENTOLIN;PROAIR) 108 (90 Base) MCG/ACT inhaler; Inhale 2 puffs into the lungs every 6 hours as needed for Wheezing or Shortness of Breath, Disp-1 each, R-5Normal  -     beclomethasone (QVAR REDIHALER) 80 MCG/ACT AERB inhaler; Inhale 1 puff into the lungs in the morning and 1 puff in the evening., Disp-1 each, R-5Normal  3. HTN (hypertension), benign  -  Health Maintenance Summary     Topic Due On Due Status Completed On Postpone Until Reason    MAMMOGRAM - BREAST CANCER SCREENING Mar 4, 2018 Due On       Colorectal Cancer Screening - Colonoscopy Mar 4, 2013 Overdue       Pap Smear - Cervical Cancer Screening  Jul 8, 2020 Not Due Jul 8, 2015      Immunization - TDAP Pregnancy  Hidden       IMMUNIZATION - DTaP/Tdap/Td Jan 8, 2011 Overdue Jan 7, 2011      Immunization-Influenza Sep 1, 2017 Postponed Oct 29, 2015 Apr 1, 2018 Patient Refused    Depression Screening Mar 22, 2019 Not Due Mar 22, 2018      Hepatitis C Screening  Completed Jul 8, 2015            Patient is due for topics as listed above, she wishes to discuss with provider .

## 2024-07-29 DIAGNOSIS — E11.9 DIABETES MELLITUS TYPE 2, INSULIN DEPENDENT (HCC): ICD-10-CM

## 2024-07-29 DIAGNOSIS — J45.21 RAD (REACTIVE AIRWAY DISEASE), MILD INTERMITTENT, WITH ACUTE EXACERBATION: ICD-10-CM

## 2024-07-29 DIAGNOSIS — J45.30 MILD PERSISTENT REACTIVE AIRWAY DISEASE WITHOUT COMPLICATION: Primary | ICD-10-CM

## 2024-07-29 DIAGNOSIS — Z79.4 DIABETES MELLITUS TYPE 2, INSULIN DEPENDENT (HCC): ICD-10-CM

## 2024-07-29 RX ORDER — ALBUTEROL SULFATE 2.5 MG/3ML
2.5 SOLUTION RESPIRATORY (INHALATION) 4 TIMES DAILY PRN
Qty: 120 EACH | Refills: 3 | Status: SHIPPED | OUTPATIENT
Start: 2024-07-29

## 2024-07-29 RX ORDER — INSULIN GLARGINE 100 [IU]/ML
20 INJECTION, SOLUTION SUBCUTANEOUS NIGHTLY
Qty: 5 ADJUSTABLE DOSE PRE-FILLED PEN SYRINGE | Refills: 2 | Status: SHIPPED | OUTPATIENT
Start: 2024-07-29

## 2024-07-29 RX ORDER — INSULIN GLARGINE 100 [IU]/ML
20 INJECTION, SOLUTION SUBCUTANEOUS NIGHTLY
Qty: 15 ML | Refills: 5 | OUTPATIENT
Start: 2024-07-29

## 2024-07-29 NOTE — TELEPHONE ENCOUNTER
PA completed on [email or web address] for the rybelsus medication. Turnaround time states 5-7 days. Awaiting determination.      PA APPROVED for Rybelsus

## 2024-07-29 NOTE — TELEPHONE ENCOUNTER
Pharmacy [facility] called into office stating that the nebulizer solution needs to be the 0.083% albuterol for the neb machine. Pharmacy stated they would need a new order sent over. Pended.

## 2024-11-06 ENCOUNTER — TELEPHONE (OUTPATIENT)
Dept: PRIMARY CARE CLINIC | Age: 54
End: 2024-11-06

## 2024-11-06 ENCOUNTER — OFFICE VISIT (OUTPATIENT)
Dept: PRIMARY CARE CLINIC | Age: 54
End: 2024-11-06

## 2024-11-06 ENCOUNTER — HOSPITAL ENCOUNTER (OUTPATIENT)
Age: 54
Discharge: HOME | End: 2024-11-06
Payer: MEDICARE

## 2024-11-06 VITALS
TEMPERATURE: 97.2 F | OXYGEN SATURATION: 98 % | BODY MASS INDEX: 27.39 KG/M2 | DIASTOLIC BLOOD PRESSURE: 115 MMHG | WEIGHT: 135.6 LBS | HEART RATE: 86 BPM | SYSTOLIC BLOOD PRESSURE: 174 MMHG

## 2024-11-06 DIAGNOSIS — Z12.11 SCREENING FOR MALIGNANT NEOPLASM OF COLON: ICD-10-CM

## 2024-11-06 DIAGNOSIS — R79.89 ELEVATED SERUM CREATININE: Primary | ICD-10-CM

## 2024-11-06 DIAGNOSIS — I10 HTN (HYPERTENSION), BENIGN: ICD-10-CM

## 2024-11-06 DIAGNOSIS — Z79.4 DIABETES MELLITUS TYPE 2, INSULIN DEPENDENT (HCC): ICD-10-CM

## 2024-11-06 DIAGNOSIS — Z12.31 BREAST CANCER SCREENING BY MAMMOGRAM: ICD-10-CM

## 2024-11-06 DIAGNOSIS — Z23 NEED FOR INFLUENZA VACCINATION: Primary | ICD-10-CM

## 2024-11-06 DIAGNOSIS — E11.9 DIABETES MELLITUS TYPE 2, INSULIN DEPENDENT (HCC): ICD-10-CM

## 2024-11-06 LAB
ALBUMIN SERPL-MCNC: 4.4 G/DL (ref 3.5–5.2)
ALBUMIN/GLOB SERPL: 1.3 {RATIO} (ref 1–2.5)
ALP SERPL-CCNC: 146 U/L (ref 35–104)
ALT SERPL-CCNC: 14 U/L (ref 10–35)
ANION GAP SERPL CALCULATED.3IONS-SCNC: 16 MMOL/L (ref 9–16)
AST SERPL-CCNC: 17 U/L (ref 10–35)
BILIRUB SERPL-MCNC: 0.4 MG/DL (ref 0–1.2)
BUN SERPL-MCNC: 20 MG/DL (ref 6–20)
CALCIUM SERPL-MCNC: 9.8 MG/DL (ref 8.6–10.4)
CHLORIDE SERPL-SCNC: 94 MMOL/L (ref 98–107)
CHOLEST SERPL-MCNC: 348 MG/DL (ref 0–199)
CHOLESTEROL/HDL RATIO: 7
CO2 SERPL-SCNC: 24 MMOL/L (ref 20–31)
CREAT SERPL-MCNC: 1.3 MG/DL (ref 0.6–0.9)
CREAT UR-MCNC: 62.9 MG/DL (ref 28–217)
ERYTHROCYTE [DISTWIDTH] IN BLOOD BY AUTOMATED COUNT: 13.5 % (ref 11.8–14.4)
EST. AVERAGE GLUCOSE BLD GHB EST-MCNC: 456 MG/DL
GFR, ESTIMATED: 50 ML/MIN/1.73M2
GLUCOSE SERPL-MCNC: 463 MG/DL (ref 74–99)
HBA1C MFR BLD: 17.5 % (ref 4–6)
HCT VFR BLD AUTO: 39.1 % (ref 36.3–47.1)
HDLC SERPL-MCNC: 50 MG/DL
HGB BLD-MCNC: 12.7 G/DL (ref 11.9–15.1)
LDLC SERPL CALC-MCNC: 233 MG/DL (ref 0–100)
MCH RBC QN AUTO: 26.5 PG (ref 25.2–33.5)
MCHC RBC AUTO-ENTMCNC: 32.5 G/DL (ref 28.4–34.8)
MCV RBC AUTO: 81.6 FL (ref 82.6–102.9)
MICROALBUMIN UR-MCNC: 622 MG/L (ref 0–20)
MICROALBUMIN/CREAT UR-RTO: 989 MCG/MG CREAT (ref 0–25)
NRBC BLD-RTO: 0 PER 100 WBC
PLATELET # BLD AUTO: 358 K/UL (ref 138–453)
PMV BLD AUTO: 10.5 FL (ref 8.1–13.5)
POTASSIUM SERPL-SCNC: 4.5 MMOL/L (ref 3.7–5.3)
PROT SERPL-MCNC: 7.8 G/DL (ref 6.6–8.7)
RBC # BLD AUTO: 4.79 M/UL (ref 3.95–5.11)
SODIUM SERPL-SCNC: 134 MMOL/L (ref 136–145)
TRIGL SERPL-MCNC: 326 MG/DL
WBC OTHER # BLD: 9.8 K/UL (ref 3.5–11.3)

## 2024-11-06 PROCEDURE — 36415 COLL VENOUS BLD VENIPUNCTURE: CPT

## 2024-11-06 PROCEDURE — 80061 LIPID PANEL: CPT

## 2024-11-06 PROCEDURE — 85027 COMPLETE CBC AUTOMATED: CPT

## 2024-11-06 PROCEDURE — 82043 UR ALBUMIN QUANTITATIVE: CPT

## 2024-11-06 PROCEDURE — 82570 ASSAY OF URINE CREATININE: CPT

## 2024-11-06 PROCEDURE — 83036 HEMOGLOBIN GLYCOSYLATED A1C: CPT

## 2024-11-06 PROCEDURE — 80053 COMPREHEN METABOLIC PANEL: CPT

## 2024-11-06 RX ORDER — INSULIN GLARGINE 100 [IU]/ML
15 INJECTION, SOLUTION SUBCUTANEOUS 2 TIMES DAILY
Qty: 5 ADJUSTABLE DOSE PRE-FILLED PEN SYRINGE | Refills: 2 | Status: SHIPPED | OUTPATIENT
Start: 2024-11-06

## 2024-11-06 RX ORDER — AMLODIPINE BESYLATE 5 MG/1
5 TABLET ORAL DAILY
Qty: 90 TABLET | Refills: 0 | Status: SHIPPED | OUTPATIENT
Start: 2024-11-06

## 2024-11-06 NOTE — RESULT ENCOUNTER NOTE
Lexie's sugars have worsened, increase lantus to 15 units bid- she needs to be checking sugars regularly.  I will also place a referral for endocrinology and nephrology- her kidneys are showing more damage from her uncontrolled dm.

## 2024-11-06 NOTE — PROGRESS NOTES
Lexie Mireles (:  1970) is a 53 y.o. female,Established patient, here for evaluation of the following chief complaint(s):  Diabetes         Assessment & Plan  Need for influenza vaccination       Orders:    Influenza, FLUCELVAX Trivalent, (age 6 mo+) IM, Preservative Free, 0.5mL    Breast cancer screening by mammogram       Orders:    TERELL CARMENCITA DIGITAL SCREEN BILATERAL; Future    HTN (hypertension), benign   Add amlodipine.     Orders:    amLODIPine (NORVASC) 5 MG tablet; Take 1 tablet by mouth daily    Diabetes mellitus type 2, insulin dependent (HCC)   Chronic uncertain prognosis, pending work up below.     Orders:    Lipid Panel; Future    Comprehensive Metabolic Panel; Future    Hemoglobin A1C; Future    Microalbumin, Ur; Future    CBC; Future    Screening for malignant neoplasm of colon       Orders:    Fecal DNA Colorectal cancer screening (Cologuard)      No follow-ups on file.       Subjective   Diabetes  Pertinent negatives for diabetes include no chest pain.     Unsure what meds she is taking.   Reminded about eye exam.   No problems with feet.   Bp is elevated today, no cardio sxs.  She states she just took her med.       Review of Systems   Constitutional:  Negative for chills and fever.   Respiratory:  Negative for cough and shortness of breath.    Cardiovascular:  Negative for chest pain, palpitations and leg swelling.          Objective   Vitals:    24 0824   BP: (!) 174/115   Pulse: 86   Temp:    SpO2:      Wt Readings from Last 3 Encounters:   24 61.5 kg (135 lb 9.6 oz)   24 64.4 kg (142 lb)   10/18/23 73.7 kg (162 lb 6.4 oz)         Physical Exam  Constitutional:       Appearance: She is well-developed.   HENT:      Right Ear: External ear normal.      Left Ear: External ear normal.      Nose: Nose normal.   Cardiovascular:      Rate and Rhythm: Normal rate and regular rhythm.      Heart sounds: Normal heart sounds, S1 normal and S2 normal.   Pulmonary:      Effort:

## 2024-11-06 NOTE — ASSESSMENT & PLAN NOTE
Chronic uncertain prognosis, pending work up below.     Orders:    Lipid Panel; Future    Comprehensive Metabolic Panel; Future    Hemoglobin A1C; Future    Microalbumin, Ur; Future    CBC; Future

## 2024-11-22 ENCOUNTER — OFFICE VISIT (OUTPATIENT)
Dept: ORTHOPEDIC SURGERY | Age: 54
End: 2024-11-22
Payer: COMMERCIAL

## 2024-11-22 VITALS — WEIGHT: 135 LBS | BODY MASS INDEX: 27.21 KG/M2 | HEIGHT: 59 IN

## 2024-11-22 DIAGNOSIS — G89.29 CHRONIC RIGHT SHOULDER PAIN: ICD-10-CM

## 2024-11-22 DIAGNOSIS — M75.81 RIGHT ROTATOR CUFF TENDONITIS: Primary | ICD-10-CM

## 2024-11-22 DIAGNOSIS — M25.511 CHRONIC RIGHT SHOULDER PAIN: ICD-10-CM

## 2024-11-22 DIAGNOSIS — M25.511 RIGHT SHOULDER PAIN, UNSPECIFIED CHRONICITY: ICD-10-CM

## 2024-11-22 PROCEDURE — 99213 OFFICE O/P EST LOW 20 MIN: CPT

## 2024-11-22 RX ORDER — LIDOCAINE 50 MG/G
1 PATCH TOPICAL DAILY
Qty: 20 PATCH | Refills: 1 | Status: SHIPPED | OUTPATIENT
Start: 2024-11-22 | End: 2025-01-01

## 2024-11-22 ASSESSMENT — ENCOUNTER SYMPTOMS: COLOR CHANGE: 0

## 2024-11-22 NOTE — PROGRESS NOTES
[facility]  [facility] [address]  [place] [address]  Dept: [phone]  Dept Fax: [phone]        Ambulatory Follow Up      Subjective:   Lexie Mireles is a 53 y.o. year old female who presents to our office today for routine followup regarding her   1. Right rotator cuff tendonitis    2. Right shoulder pain, unspecified chronicity    3. Chronic right shoulder pain    .    Chief Complaint   Patient presents with    Joint Pain     Rt shoulder pain        History of Present Illness  The patient is a right hand dominant 53-year-old female who presents for evaluation of right shoulder pain.    She has been experiencing significant discomfort in her right shoulder, which occasionally disrupts her sleep. The pain is severe enough to hinder her ability to lift objects and perform daily activities such as showering. She also reports a sensation of swelling in the shoulder. Despite trying various remedies including Tylenol, massage, ice, and heat, she has found no relief. She has been dealing with right shoulder pain for the past two years at least. She was last seen in 2023 for the shoulder in residency clinic and at that time she was prescribed PT and Celebrex. She never did PT. She was prescribed Celebrex but discontinued it due to concerns about potential kidney damage. She reports no recent falls or injuries to the shoulder. Additionally, she reports tingling in her right hand but does not believe it originates from her neck.    She has been making efforts to lower her A1c levels. Her blood sugar was 165 this morning, which decreased to 108 after medication. She is a type 2 diabetic and is currently on Farxiga, glipizide, and insulin. She had been taking Rybelsus but stopped due to rapid weight loss. She has an upcoming appointment with her endocrinologist. Her last HgA1c was 17.5%. She has not consulted with a dietitian or

## 2024-11-26 ENCOUNTER — HOSPITAL ENCOUNTER (OUTPATIENT)
Dept: MAMMOGRAPHY | Age: 54
Discharge: HOME | End: 2024-11-28
Payer: MEDICARE

## 2024-11-26 DIAGNOSIS — Z12.31 BREAST CANCER SCREENING BY MAMMOGRAM: ICD-10-CM

## 2024-11-26 PROCEDURE — 77063 BREAST TOMOSYNTHESIS BI: CPT

## 2024-12-02 ENCOUNTER — HOSPITAL ENCOUNTER (OUTPATIENT)
Dept: PHYSICAL THERAPY | Age: 54
Setting detail: THERAPIES SERIES
Discharge: HOME | End: 2024-12-02
Payer: MEDICARE

## 2024-12-02 PROCEDURE — 97161 PT EVAL LOW COMPLEX 20 MIN: CPT

## 2024-12-02 PROCEDURE — 97110 THERAPEUTIC EXERCISES: CPT

## 2024-12-02 NOTE — CONSULTS
difficulty   ? Strength: Increase right shoulder MMT to 3+/5 for flex and abd, 4/5 for ER, 4+/5 for elbow flexion  ? Function: Improve UEFI to 55% functional impairment  Patient to be independent with home exercise program as demonstrated by performance with correct form without cues.  LTG: (to be met in 12 treatments)  Decrease right shoulder pain to 6/10  Increase right shoulder AROM flex to 110°, abd to 90°, IR to lower thoracic spine in order to further improve the pt's ability to perform all ADL's with little difficulty   Pt will be able to wash her hair with moderate difficulty  Pt will be able to sleep with little difficulty  Improve UEFI to 45% functional impairment                   Patient goals: \"To get my shoulder back together.\"    Rehab Potential:  [] Good  [x] Fair  [] Poor   Suggested Professional Referral:  [x] No  [] Yes:  Barriers to Goal Achievement:  [] No  [x] Yes: poor tolerance to exercises due to high pain level  Domestic Concerns:  [x] No  [] Yes:    Pt. Education:  [x] Plans/Goals, Risks/Benefits discussed  [x] Home exercise program-- Exercises listed below given to the pt on a handout for HEP  Method of Education: [x] Verbal  [x] Demo  [x] Written  Comprehension of Education:  [x] Verbalizes understanding.  [x] Demonstrates understanding.  [] Needs Review.  [] Demonstrates/verbalizes understanding of HEP/Ed previously given.    Treatment Plan:  [x] Therapeutic Exercise   73030  [x] Iontophoresis: 4 mg/mL Dexamethasone Sodium Phosphate  mAmin  12292   [] Therapeutic Activity  14167 [] Vasopneumatic cold with compression  21913    [] Gait Training   46939 [] Ultrasound   23812   [] Neuromuscular Re-education  77415 [x] Electrical Stimulation Unattended  16348   [x] Manual Therapy  84744 [] Electrical Stimulation Attended  87654   [x] Instruction in HEP  [] Lumbar/Cervical Traction  99829   [] Aquatic Therapy   44791 [x] Cold/hotpack    [] Massage   44942      [] Dry Needling, 1 or 2

## 2024-12-09 ENCOUNTER — HOSPITAL ENCOUNTER (OUTPATIENT)
Dept: PHYSICAL THERAPY | Age: 54
Setting detail: THERAPIES SERIES
Discharge: HOME | End: 2024-12-09
Payer: MEDICARE

## 2024-12-09 PROCEDURE — 97016 VASOPNEUMATIC DEVICE THERAPY: CPT

## 2024-12-09 PROCEDURE — 97110 THERAPEUTIC EXERCISES: CPT

## 2024-12-09 NOTE — PRE-CERTIFICATION NOTE
[x] [facility]  Outpatient Rehabilitation &  Therapy  [address]  [phone]  [phone] [] [facility]  Outpatient Rehabilitation &  Therapy  [address]  P: [phone]  F: [phone] [] [facility]  Outpatient Rehabilitation &  Therapy  [address]  P: [phone]  F: [phone] [] [facility]  Outpatient Rehabilitation &  Therapy  [address]  [phone]  [phone] [] [facility]  Outpatient Rehabilitation &  Therapy  [address]   P: [phone]  F: [phone]  [] [facility]  Outpatient Rehabilitation &  Therapy  [address]  P: [phone]  F: [phone] [] [facility]  Outpatient Rehabilitation &  Therapy  [address]  P: [phone]  F: [phone] [] [facility]  Outpatient Rehabilitation &  Therapy  [address]  P: [phone]  F: [phone] [] [facility]  Outpatient Rehabilitation &  Therapy  [address]  P: [phone]  F: [phone]  [] [facility] Outpatient Rehabilitation &  Therapy  [address]  P: [phone]  F: [phone]          Therapy Pre-certification Note      12/9/2024    Lexie Mireles  1970   [id number]      Insurance approval was received for Physical Therapy from [facility] on 12/9/2024.  Approval was received for 10 visits, from 12/2/2024 to 3/1/2025.  Tracking Number:  Tracking Number: [id number].  Order :  [id number]    [id number]  [id number]  [id number]    Primary therapist notified as patient is scheduled.      Electronically signed by Dominga Moore PT on 12/9/2024 at 8:26 AM

## 2024-12-09 NOTE — FLOWSHEET NOTE
Rating: (0-10 scale) 6-7/10  Pain altered Tx:  [x] No  [] Yes  Action:  Comments: States that she fell on Thursday or Friday from a standing height and landed directly on her right shoulder.  States that the pain feels the same, no new symptoms, and that they are exacerbated.  Rates pain 6-7/10 this morning which is less than at the initial evaluation.      Objective:  Modalities: GameReady x 10 minutes, low pressure, 38°; ADD ionto next session  Precautions [x] No  [] Yes:   Exercises:  Exercise Reps/ Time Weight/ Level Comments   UBE ADD  Both in use 12/9         Pulleys 2min  Flexion/Scaption only         Sitting at mat on stool:      Flexion slides 10x  Holds towel roll   Wax on/off 10xea     PROM into flex 10x  Slides back into flex, hand stays on mat         Seated therabar:      Elbow flex/ext 2x10     Shd IR/ER 10x           Seated:      Retro shoulder rolls 10x                                   Other:         Treatment Charges: Mins Units   []  Modalities       [x]  Ther Exercise 30 2   []  Neuromuscular Re-ed     []  Gait Training     []  Manual Therapy     []  Ther Activities     []  Aquatics     [x]  Vasocompression 10 1   []  Cervical Traction     []  Other     Total Billable time 40 min           Assessment: [x] Progressing toward goals. Pt with visible signs of pain during all exercises, facial grimacing and moaning.  Instructed to stay within a tolerable range. Pt able to complete all exercises within a very limited ROM.  Ended with GameReady to right shoulder.  After taken off of GameReady, therapist observed pt actively abducting the right shoulder to just below shoulder height without difficulty in order to grab her purse from the mat.       [] No change.     [] Other:  [x] Patient would continue to benefit from skilled physical therapy services in order to: address right shoulder pain, limited ROM of the right shoulder both passively and actively, weakness and of the right shoulder, and

## 2024-12-13 ENCOUNTER — HOSPITAL ENCOUNTER (OUTPATIENT)
Dept: PHYSICAL THERAPY | Age: 54
Setting detail: THERAPIES SERIES
Discharge: HOME | End: 2024-12-13
Payer: MEDICARE

## 2024-12-13 PROCEDURE — 97033 APP MDLTY 1+IONTPHRSIS EA 15: CPT

## 2024-12-13 PROCEDURE — 97110 THERAPEUTIC EXERCISES: CPT

## 2024-12-13 PROCEDURE — 97016 VASOPNEUMATIC DEVICE THERAPY: CPT

## 2024-12-13 NOTE — FLOWSHEET NOTE
[x] [facility]  Outpatient Rehabilitation &  Therapy  [address]  [phone]  [phone] [] [facility]  Outpatient Rehabilitation &  Therapy  [address]  P: [phone]  F: [phone] [] [facility]  Outpatient Rehabilitation &  Therapy  [address]  P: [phone]  F: [phone] [] [facility]  Outpatient Rehabilitation &  Therapy  [address]  [phone]  [phone] [] [facility]  Outpatient Rehabilitation &  Therapy  [address]   P: [phone]  F: [phone]  [] [facility]  Outpatient Rehabilitation &  Therapy  [address]  P: [phone]  F: [phone] [] [facility]  Outpatient Rehabilitation &  Therapy  [address]  P: [phone]  F: [phone] [] [facility]  Outpatient Rehabilitation &  Therapy  [address]  P: [phone]  F: [phone] [] [facility]  Outpatient Rehabilitation &  Therapy  [address]  P: [phone]  F: [phone]  [] [facility] Outpatient Rehabilitation &  Therapy  [address]  P: [phone]  F: [phone]     Physical Therapy Daily Treatment Note    Date:  2024  Patient Name:  Lexie Mireles    :  1970  MRN: [id number]  Physician: Brynn Babcock PA-C                             Insurance: BCBS MEDICARE ANTHEM ADVANTAGE HMO (Auth after eval)  Medical Diagnosis: Chronic right shoulder pain (M25.511,G89.29); Right rotator cuff tendonitis (M75.81)                  Rehab Codes: M25.511, M25.611, M62.81  Onset Date: 23                 Next 's appt: 1/10/25    Visit# / total visits: 3/12; Progress note for Medicare patient due at visit 10       Cancels/No Shows: 0/1    Subjective:    Pain:  [x] Yes  [] No Location: R shoulder  Pain

## 2024-12-15 ENCOUNTER — APPOINTMENT (OUTPATIENT)
Dept: CT IMAGING | Age: 54
End: 2024-12-15
Payer: MEDICARE

## 2024-12-15 ENCOUNTER — HOSPITAL ENCOUNTER (OUTPATIENT)
Age: 54
Setting detail: OBSERVATION
Discharge: HOME | End: 2024-12-16
Attending: EMERGENCY MEDICINE | Admitting: EMERGENCY MEDICINE
Payer: MEDICARE

## 2024-12-15 ENCOUNTER — APPOINTMENT (OUTPATIENT)
Dept: GENERAL RADIOLOGY | Age: 54
End: 2024-12-15
Payer: MEDICARE

## 2024-12-15 DIAGNOSIS — R10.9 LEFT FLANK PAIN: Primary | ICD-10-CM

## 2024-12-15 LAB
ALBUMIN SERPL-MCNC: 3.6 G/DL (ref 3.5–5.2)
ALBUMIN/GLOB SERPL: 1.1 {RATIO} (ref 1–2.5)
ALP SERPL-CCNC: 161 U/L (ref 35–104)
ALT SERPL-CCNC: 30 U/L (ref 10–35)
ANION GAP SERPL CALCULATED.3IONS-SCNC: 13 MMOL/L (ref 9–16)
AST SERPL-CCNC: 29 U/L (ref 10–35)
BACTERIA URNS QL MICRO: NORMAL
BASOPHILS # BLD: 0.05 K/UL (ref 0–0.2)
BASOPHILS NFR BLD: 0 % (ref 0–2)
BILIRUB SERPL-MCNC: 0.4 MG/DL (ref 0–1.2)
BILIRUB UR QL STRIP: NEGATIVE
BUN SERPL-MCNC: 25 MG/DL (ref 6–20)
CALCIUM SERPL-MCNC: 8.7 MG/DL (ref 8.6–10.4)
CASTS #/AREA URNS LPF: NORMAL /LPF (ref 0–8)
CHLORIDE SERPL-SCNC: 100 MMOL/L (ref 98–107)
CLARITY UR: CLEAR
CO2 SERPL-SCNC: 22 MMOL/L (ref 20–31)
COLOR UR: YELLOW
CREAT SERPL-MCNC: 1.3 MG/DL (ref 0.6–0.9)
D DIMER PPP FEU-MCNC: 0.38 UG/ML FEU (ref 0–0.57)
EOSINOPHIL # BLD: 0.13 K/UL (ref 0–0.44)
EOSINOPHILS RELATIVE PERCENT: 1 % (ref 1–4)
EPI CELLS #/AREA URNS HPF: NORMAL /HPF (ref 0–5)
ERYTHROCYTE [DISTWIDTH] IN BLOOD BY AUTOMATED COUNT: 14.6 % (ref 11.8–14.4)
GFR, ESTIMATED: 50 ML/MIN/1.73M2
GLUCOSE BLD-MCNC: 147 MG/DL (ref 65–105)
GLUCOSE BLD-MCNC: 85 MG/DL (ref 65–105)
GLUCOSE SERPL-MCNC: 153 MG/DL (ref 74–99)
GLUCOSE UR STRIP-MCNC: NEGATIVE MG/DL
HCG SERPL QL: NEGATIVE
HCT VFR BLD AUTO: 33.3 % (ref 36.3–47.1)
HGB BLD-MCNC: 11 G/DL (ref 11.9–15.1)
HGB UR QL STRIP.AUTO: NEGATIVE
IMM GRANULOCYTES # BLD AUTO: 0.06 K/UL (ref 0–0.3)
IMM GRANULOCYTES NFR BLD: 1 %
INR PPP: 0.9
KETONES UR STRIP-MCNC: NEGATIVE MG/DL
LACTIC ACID, WHOLE BLOOD: 1.3 MMOL/L (ref 0.7–2.1)
LEUKOCYTE ESTERASE UR QL STRIP: NEGATIVE
LIPASE SERPL-CCNC: 17 U/L (ref 13–60)
LYMPHOCYTES NFR BLD: 3.03 K/UL (ref 1.1–3.7)
LYMPHOCYTES RELATIVE PERCENT: 24 % (ref 24–43)
MCH RBC QN AUTO: 26.6 PG (ref 25.2–33.5)
MCHC RBC AUTO-ENTMCNC: 33 G/DL (ref 28.4–34.8)
MCV RBC AUTO: 80.4 FL (ref 82.6–102.9)
MONOCYTES NFR BLD: 0.62 K/UL (ref 0.1–1.2)
MONOCYTES NFR BLD: 5 % (ref 3–12)
NEUTROPHILS NFR BLD: 69 % (ref 36–65)
NEUTS SEG NFR BLD: 8.53 K/UL (ref 1.5–8.1)
NITRITE UR QL STRIP: NEGATIVE
NRBC BLD-RTO: 0 PER 100 WBC
PARTIAL THROMBOPLASTIN TIME: 30.3 SEC (ref 23–36.5)
PH UR STRIP: 7 [PH] (ref 5–8)
PLATELET # BLD AUTO: 498 K/UL (ref 138–453)
PMV BLD AUTO: 9.9 FL (ref 8.1–13.5)
POTASSIUM SERPL-SCNC: 4.5 MMOL/L (ref 3.7–5.3)
PROT SERPL-MCNC: 6.9 G/DL (ref 6.6–8.7)
PROT UR STRIP-MCNC: ABNORMAL MG/DL
PROTHROMBIN TIME: 11.8 SEC (ref 11.7–14.9)
RBC # BLD AUTO: 4.14 M/UL (ref 3.95–5.11)
RBC # BLD: ABNORMAL 10*6/UL
RBC #/AREA URNS HPF: NORMAL /HPF (ref 0–4)
SODIUM SERPL-SCNC: 135 MMOL/L (ref 136–145)
SP GR UR STRIP: 1.01 (ref 1–1.03)
UROBILINOGEN UR STRIP-ACNC: NORMAL EU/DL (ref 0–1)
WBC #/AREA URNS HPF: NORMAL /HPF (ref 0–5)
WBC OTHER # BLD: 12.4 K/UL (ref 3.5–11.3)

## 2024-12-15 PROCEDURE — 96375 TX/PRO/DX INJ NEW DRUG ADDON: CPT

## 2024-12-15 PROCEDURE — G0378 HOSPITAL OBSERVATION PER HR: HCPCS

## 2024-12-15 PROCEDURE — 6360000004 HC RX CONTRAST MEDICATION

## 2024-12-15 PROCEDURE — 85025 COMPLETE CBC W/AUTO DIFF WBC: CPT

## 2024-12-15 PROCEDURE — 87040 BLOOD CULTURE FOR BACTERIA: CPT

## 2024-12-15 PROCEDURE — 94761 N-INVAS EAR/PLS OXIMETRY MLT: CPT

## 2024-12-15 PROCEDURE — 71045 X-RAY EXAM CHEST 1 VIEW: CPT

## 2024-12-15 PROCEDURE — 96365 THER/PROPH/DIAG IV INF INIT: CPT

## 2024-12-15 PROCEDURE — 2580000003 HC RX 258

## 2024-12-15 PROCEDURE — 83690 ASSAY OF LIPASE: CPT

## 2024-12-15 PROCEDURE — 99285 EMERGENCY DEPT VISIT HI MDM: CPT

## 2024-12-15 PROCEDURE — 85610 PROTHROMBIN TIME: CPT

## 2024-12-15 PROCEDURE — 74177 CT ABD & PELVIS W/CONTRAST: CPT

## 2024-12-15 PROCEDURE — 94664 DEMO&/EVAL PT USE INHALER: CPT

## 2024-12-15 PROCEDURE — 6370000000 HC RX 637 (ALT 250 FOR IP)

## 2024-12-15 PROCEDURE — 2700000000 HC OXYGEN THERAPY PER DAY

## 2024-12-15 PROCEDURE — 96366 THER/PROPH/DIAG IV INF ADDON: CPT

## 2024-12-15 PROCEDURE — 81001 URINALYSIS AUTO W/SCOPE: CPT

## 2024-12-15 PROCEDURE — 85379 FIBRIN DEGRADATION QUANT: CPT

## 2024-12-15 PROCEDURE — 84703 CHORIONIC GONADOTROPIN ASSAY: CPT

## 2024-12-15 PROCEDURE — 82947 ASSAY GLUCOSE BLOOD QUANT: CPT

## 2024-12-15 PROCEDURE — 6360000002 HC RX W HCPCS

## 2024-12-15 PROCEDURE — 94640 AIRWAY INHALATION TREATMENT: CPT

## 2024-12-15 PROCEDURE — 83605 ASSAY OF LACTIC ACID: CPT

## 2024-12-15 PROCEDURE — 96372 THER/PROPH/DIAG INJ SC/IM: CPT

## 2024-12-15 PROCEDURE — 80053 COMPREHEN METABOLIC PANEL: CPT

## 2024-12-15 PROCEDURE — 85730 THROMBOPLASTIN TIME PARTIAL: CPT

## 2024-12-15 PROCEDURE — 36415 COLL VENOUS BLD VENIPUNCTURE: CPT

## 2024-12-15 PROCEDURE — 96376 TX/PRO/DX INJ SAME DRUG ADON: CPT

## 2024-12-15 PROCEDURE — 96367 TX/PROPH/DG ADDL SEQ IV INF: CPT

## 2024-12-15 RX ORDER — ALBUTEROL SULFATE 5 MG/ML
2.5 SOLUTION RESPIRATORY (INHALATION) EVERY 6 HOURS PRN
Status: DISCONTINUED | OUTPATIENT
Start: 2024-12-15 | End: 2024-12-15 | Stop reason: SDUPTHER

## 2024-12-15 RX ORDER — ACETAMINOPHEN 650 MG/1
650 SUPPOSITORY RECTAL EVERY 6 HOURS PRN
Status: DISCONTINUED | OUTPATIENT
Start: 2024-12-15 | End: 2024-12-16 | Stop reason: HOSPADM

## 2024-12-15 RX ORDER — SODIUM CHLORIDE 9 MG/ML
INJECTION, SOLUTION INTRAVENOUS PRN
Status: DISCONTINUED | OUTPATIENT
Start: 2024-12-15 | End: 2024-12-16 | Stop reason: HOSPADM

## 2024-12-15 RX ORDER — ACETAMINOPHEN 325 MG/1
650 TABLET ORAL EVERY 6 HOURS PRN
Status: DISCONTINUED | OUTPATIENT
Start: 2024-12-15 | End: 2024-12-16 | Stop reason: HOSPADM

## 2024-12-15 RX ORDER — ALBUTEROL SULFATE 0.83 MG/ML
2.5 SOLUTION RESPIRATORY (INHALATION) 4 TIMES DAILY PRN
Status: DISCONTINUED | OUTPATIENT
Start: 2024-12-15 | End: 2024-12-16 | Stop reason: HOSPADM

## 2024-12-15 RX ORDER — MAGNESIUM SULFATE IN WATER 40 MG/ML
2000 INJECTION, SOLUTION INTRAVENOUS PRN
Status: DISCONTINUED | OUTPATIENT
Start: 2024-12-15 | End: 2024-12-15

## 2024-12-15 RX ORDER — KETOROLAC TROMETHAMINE 15 MG/ML
15 INJECTION, SOLUTION INTRAMUSCULAR; INTRAVENOUS ONCE
Status: COMPLETED | OUTPATIENT
Start: 2024-12-15 | End: 2024-12-15

## 2024-12-15 RX ORDER — GABAPENTIN 300 MG/1
300 CAPSULE ORAL 3 TIMES DAILY
Status: DISCONTINUED | OUTPATIENT
Start: 2024-12-15 | End: 2024-12-16 | Stop reason: HOSPADM

## 2024-12-15 RX ORDER — GLIPIZIDE 5 MG/1
5 TABLET ORAL
Status: DISCONTINUED | OUTPATIENT
Start: 2024-12-16 | End: 2024-12-16 | Stop reason: HOSPADM

## 2024-12-15 RX ORDER — POTASSIUM CHLORIDE 1500 MG/1
40 TABLET, EXTENDED RELEASE ORAL PRN
Status: DISCONTINUED | OUTPATIENT
Start: 2024-12-15 | End: 2024-12-15

## 2024-12-15 RX ORDER — HALOPERIDOL 5 MG/ML
2 INJECTION INTRAMUSCULAR ONCE
Status: COMPLETED | OUTPATIENT
Start: 2024-12-15 | End: 2024-12-15

## 2024-12-15 RX ORDER — OXYCODONE HYDROCHLORIDE 5 MG/1
5 TABLET ORAL ONCE
Status: COMPLETED | OUTPATIENT
Start: 2024-12-15 | End: 2024-12-15

## 2024-12-15 RX ORDER — ONDANSETRON 2 MG/ML
4 INJECTION INTRAMUSCULAR; INTRAVENOUS EVERY 6 HOURS PRN
Status: DISCONTINUED | OUTPATIENT
Start: 2024-12-15 | End: 2024-12-16 | Stop reason: HOSPADM

## 2024-12-15 RX ORDER — SODIUM CHLORIDE 0.9 % (FLUSH) 0.9 %
5-40 SYRINGE (ML) INJECTION EVERY 12 HOURS SCHEDULED
Status: DISCONTINUED | OUTPATIENT
Start: 2024-12-15 | End: 2024-12-16 | Stop reason: HOSPADM

## 2024-12-15 RX ORDER — LOSARTAN POTASSIUM 50 MG/1
100 TABLET ORAL DAILY
Status: DISCONTINUED | OUTPATIENT
Start: 2024-12-16 | End: 2024-12-16 | Stop reason: HOSPADM

## 2024-12-15 RX ORDER — FENTANYL CITRATE 50 UG/ML
50 INJECTION, SOLUTION INTRAMUSCULAR; INTRAVENOUS ONCE
Status: COMPLETED | OUTPATIENT
Start: 2024-12-15 | End: 2024-12-15

## 2024-12-15 RX ORDER — LOSARTAN POTASSIUM AND HYDROCHLOROTHIAZIDE 12.5; 1 MG/1; MG/1
1 TABLET ORAL DAILY
Status: DISCONTINUED | OUTPATIENT
Start: 2024-12-16 | End: 2024-12-15 | Stop reason: CLARIF

## 2024-12-15 RX ORDER — ASPIRIN 81 MG/1
81 TABLET ORAL DAILY
Status: DISCONTINUED | OUTPATIENT
Start: 2024-12-16 | End: 2024-12-16 | Stop reason: HOSPADM

## 2024-12-15 RX ORDER — AMLODIPINE BESYLATE 10 MG/1
5 TABLET ORAL DAILY
Status: DISCONTINUED | OUTPATIENT
Start: 2024-12-16 | End: 2024-12-16 | Stop reason: HOSPADM

## 2024-12-15 RX ORDER — IOPAMIDOL 755 MG/ML
75 INJECTION, SOLUTION INTRAVASCULAR
Status: COMPLETED | OUTPATIENT
Start: 2024-12-15 | End: 2024-12-15

## 2024-12-15 RX ORDER — SODIUM CHLORIDE 0.9 % (FLUSH) 0.9 %
5-40 SYRINGE (ML) INJECTION PRN
Status: DISCONTINUED | OUTPATIENT
Start: 2024-12-15 | End: 2024-12-16 | Stop reason: HOSPADM

## 2024-12-15 RX ORDER — ALBUTEROL SULFATE 90 UG/1
2 INHALANT RESPIRATORY (INHALATION) EVERY 6 HOURS PRN
Status: DISCONTINUED | OUTPATIENT
Start: 2024-12-15 | End: 2024-12-16 | Stop reason: HOSPADM

## 2024-12-15 RX ORDER — DROPERIDOL 2.5 MG/ML
0.62 INJECTION, SOLUTION INTRAMUSCULAR; INTRAVENOUS ONCE
Status: DISCONTINUED | OUTPATIENT
Start: 2024-12-15 | End: 2024-12-15

## 2024-12-15 RX ORDER — HALOPERIDOL 5 MG/ML
2 INJECTION INTRAMUSCULAR EVERY 6 HOURS PRN
Status: DISCONTINUED | OUTPATIENT
Start: 2024-12-15 | End: 2024-12-16 | Stop reason: HOSPADM

## 2024-12-15 RX ORDER — ENOXAPARIN SODIUM 100 MG/ML
40 INJECTION SUBCUTANEOUS DAILY
Status: DISCONTINUED | OUTPATIENT
Start: 2024-12-16 | End: 2024-12-16 | Stop reason: HOSPADM

## 2024-12-15 RX ORDER — INSULIN GLARGINE 100 [IU]/ML
15 INJECTION, SOLUTION SUBCUTANEOUS 2 TIMES DAILY
Status: DISCONTINUED | OUTPATIENT
Start: 2024-12-15 | End: 2024-12-16 | Stop reason: HOSPADM

## 2024-12-15 RX ORDER — ONDANSETRON 4 MG/1
4 TABLET, ORALLY DISINTEGRATING ORAL EVERY 8 HOURS PRN
Status: DISCONTINUED | OUTPATIENT
Start: 2024-12-15 | End: 2024-12-16 | Stop reason: HOSPADM

## 2024-12-15 RX ORDER — POLYETHYLENE GLYCOL 3350 17 G/17G
17 POWDER, FOR SOLUTION ORAL DAILY PRN
Status: DISCONTINUED | OUTPATIENT
Start: 2024-12-15 | End: 2024-12-16 | Stop reason: HOSPADM

## 2024-12-15 RX ORDER — ATORVASTATIN CALCIUM 40 MG/1
40 TABLET, FILM COATED ORAL DAILY
Status: DISCONTINUED | OUTPATIENT
Start: 2024-12-16 | End: 2024-12-16 | Stop reason: HOSPADM

## 2024-12-15 RX ORDER — POTASSIUM CHLORIDE 7.45 MG/ML
10 INJECTION INTRAVENOUS PRN
Status: DISCONTINUED | OUTPATIENT
Start: 2024-12-15 | End: 2024-12-15

## 2024-12-15 RX ORDER — CELECOXIB 100 MG/1
100 CAPSULE ORAL 2 TIMES DAILY
Status: DISCONTINUED | OUTPATIENT
Start: 2024-12-15 | End: 2024-12-16

## 2024-12-15 RX ORDER — FLUTICASONE PROPIONATE 110 UG/1
1 AEROSOL, METERED RESPIRATORY (INHALATION)
Status: DISCONTINUED | OUTPATIENT
Start: 2024-12-15 | End: 2024-12-16 | Stop reason: HOSPADM

## 2024-12-15 RX ORDER — HYDROCHLOROTHIAZIDE 25 MG/1
12.5 TABLET ORAL DAILY
Status: DISCONTINUED | OUTPATIENT
Start: 2024-12-16 | End: 2024-12-16 | Stop reason: HOSPADM

## 2024-12-15 RX ADMIN — FLUTICASONE PROPIONATE 1 PUFF: 110 AEROSOL, METERED RESPIRATORY (INHALATION) at 21:50

## 2024-12-15 RX ADMIN — PIPERACILLIN AND TAZOBACTAM 3375 MG: 3; .375 INJECTION, POWDER, LYOPHILIZED, FOR SOLUTION INTRAVENOUS at 17:18

## 2024-12-15 RX ADMIN — VANCOMYCIN HYDROCHLORIDE 1250 MG: 1.25 INJECTION, POWDER, LYOPHILIZED, FOR SOLUTION INTRAVENOUS at 17:53

## 2024-12-15 RX ADMIN — SODIUM CHLORIDE, PRESERVATIVE FREE 10 ML: 5 INJECTION INTRAVENOUS at 22:29

## 2024-12-15 RX ADMIN — IOPAMIDOL 75 ML: 755 INJECTION, SOLUTION INTRAVENOUS at 19:11

## 2024-12-15 RX ADMIN — SODIUM CHLORIDE, PRESERVATIVE FREE 10 ML: 5 INJECTION INTRAVENOUS at 22:28

## 2024-12-15 RX ADMIN — INSULIN GLARGINE 15 UNITS: 100 INJECTION, SOLUTION SUBCUTANEOUS at 23:30

## 2024-12-15 RX ADMIN — KETOROLAC TROMETHAMINE 15 MG: 15 INJECTION, SOLUTION INTRAMUSCULAR; INTRAVENOUS at 16:13

## 2024-12-15 RX ADMIN — DICLOFENAC SODIUM 4 G: 10 GEL TOPICAL at 22:21

## 2024-12-15 RX ADMIN — OXYCODONE 5 MG: 5 TABLET ORAL at 17:38

## 2024-12-15 RX ADMIN — TIZANIDINE 4 MG: 4 TABLET ORAL at 22:10

## 2024-12-15 RX ADMIN — KETOROLAC TROMETHAMINE 15 MG: 15 INJECTION, SOLUTION INTRAMUSCULAR; INTRAVENOUS at 17:40

## 2024-12-15 RX ADMIN — FENTANYL CITRATE 50 MCG: 50 INJECTION, SOLUTION INTRAMUSCULAR; INTRAVENOUS at 16:36

## 2024-12-15 RX ADMIN — HALOPERIDOL LACTATE 2 MG: 5 INJECTION, SOLUTION INTRAMUSCULAR at 18:37

## 2024-12-15 RX ADMIN — CELECOXIB 100 MG: 100 CAPSULE ORAL at 22:10

## 2024-12-15 RX ADMIN — ACETAMINOPHEN 650 MG: 325 TABLET ORAL at 22:09

## 2024-12-15 RX ADMIN — GABAPENTIN 300 MG: 300 CAPSULE ORAL at 22:10

## 2024-12-15 ASSESSMENT — PAIN SCALES - GENERAL
PAINLEVEL_OUTOF10: 10

## 2024-12-15 ASSESSMENT — PAIN DESCRIPTION - ORIENTATION
ORIENTATION: LEFT;POSTERIOR
ORIENTATION: LEFT;POSTERIOR
ORIENTATION: LEFT
ORIENTATION: LEFT

## 2024-12-15 ASSESSMENT — PAIN SCALES - WONG BAKER
WONGBAKER_NUMERICALRESPONSE: NO HURT
WONGBAKER_NUMERICALRESPONSE: NO HURT

## 2024-12-15 ASSESSMENT — PAIN - FUNCTIONAL ASSESSMENT
PAIN_FUNCTIONAL_ASSESSMENT: PREVENTS OR INTERFERES SOME ACTIVE ACTIVITIES AND ADLS
PAIN_FUNCTIONAL_ASSESSMENT: 0-10

## 2024-12-15 ASSESSMENT — PAIN DESCRIPTION - DESCRIPTORS
DESCRIPTORS: SHARP
DESCRIPTORS: ACHING
DESCRIPTORS: STABBING;SHARP
DESCRIPTORS: ACHING

## 2024-12-15 ASSESSMENT — PAIN DESCRIPTION - LOCATION
LOCATION: FLANK

## 2024-12-15 NOTE — ED PROVIDER NOTES
[facility]     Emergency Department     Faculty Note/ Attestation      Pt Name: Lexie Mireles                                       MRN: [id number]  Birthdate 1970  Date of evaluation: 12/15/2024  Note Started: 4:15 PM EST    Patients PCP:    Viktor Fox, APRN - CNP    Attestation  I performed a history and physical examination of the patient and discussed management with the resident. I reviewed the resident’s note and agree with the documented findings and plan of care. Any areas of disagreement are noted on the chart. I was personally present for the key portions of any procedures. I have documented in the chart those procedures where I was not present during the key portions. I have reviewed the emergency nurses triage note. I agree with the chief complaint, past medical history, past surgical history, allergies, medications, social and family history as documented unless otherwise noted below.    For Physician Assistant/ Nurse Practitioner cases/documentation I have personally evaluated this patient and have completed at least one if not all key elements of the E/M (history, physical exam, and MDM). Additional findings are as noted.    Initial Screens:        [place]ust[place] Coma Scale  Eye Opening: Spontaneous  Best Verbal Response: Oriented  Best Motor Response: Obeys commands  [place] Coma Scale Score: 15    Vitals:    Vitals:    12/15/24 1600   BP: (!) 141/91   Pulse: (!) 106   Resp: 20   Temp: 98.2 °F (36.8 °C)   TempSrc: Oral   SpO2: 100%       CHIEF COMPLAINT       Chief Complaint   Patient presents with   • Flank Pain       The pt is a 54 YO F with recent UTI the pt arrive s with L sided flank pain increasing pain and worsening CT week ago showed no stones but still had UTI and s finished the cephalexin.  Still nauseated and some vomiting.  The pt having no abdominal pain or tenderness.        EMERGENCY DEPARTMENT COURSE:     -------------------------  BP: (!) 141/91, 
106.  Respirations 20.  On exam patient does appear uncomfortable, she is tearful throughout my exam.  She has left CVA tenderness, no right CVA tenderness.  No abdominal tenderness.  Abdomen is soft, no rebound no guarding.    My differential diagnosis includes nephrolithiasis, pyelonephritis, pancreatitis, complicated UTI. Less likely pneumonia, PE, aortic aneurysm or dissection.  Patient is also meeting SIRS criteria, will order sepsis labs and start broad-spectrum antibiotics.  Plan for basic labs and urinalysis, will likely end up re-scanning the patient as well due to the level of her pain even though she has a recent negative CT scan.    Amount and/or Complexity of Data Reviewed  Labs: ordered. Decision-making details documented in ED Course.  Radiology: ordered.    Risk  OTC drugs.  Prescription drug management.  Decision regarding hospitalization.          EMERGENCY DEPARTMENT COURSE:    ED Course as of 12/15/24 2347   Sun Dec 15, 2024   1617 CT from [facility] did not show any aneurysm or stones [WK]   1630 CBC with Auto Differential(!):    WBC 12.4(!)   RBC 4.14   Hemoglobin Quant 11.0(!)   Hematocrit 33.3(!)   MCV 80.4(!)   MCH 26.6   MCHC 33.0   RDW 14.6(!)   Platelet Count 498(!)   MPV 9.9   NRBC Automated 0.0   Neutrophils % 69(!)   Lymphocyte % 24   Monocytes % 5   Eosinophils % 1   Basophils % 0   Immature Granulocytes % 1(!)   Neutrophils Absolute 8.53(!)   Lymphocytes Absolute 3.03   Monocytes Absolute 0.62   Eosinophils Absolute 0.13   Basophils Absolute 0.05   Immature Granulocytes Absolute 0.06   RBC Morphology ANISOCYTOSIS PRESENT MICROCYTOSIS PRESENT [MB]   1630 Patient still in significant pain following Toradol administration.  Will give 50 of fentanyl. [MB]   1648 Lactic Acid, Whole Blood: 1.3 [MB]   1720 Urinalysis with Reflex to Culture(!):    Color, UA Yellow   Turbidity UA Clear   Glucose, Ur NEGATIVE   Bilirubin, Urine NEGATIVE   Ketones, Urine NEGATIVE   Specific [place], UA 1.010   Urine

## 2024-12-16 ENCOUNTER — APPOINTMENT (OUTPATIENT)
Dept: ULTRASOUND IMAGING | Age: 54
End: 2024-12-16
Payer: MEDICARE

## 2024-12-16 ENCOUNTER — HOSPITAL ENCOUNTER (OUTPATIENT)
Dept: PHYSICAL THERAPY | Age: 54
Setting detail: THERAPIES SERIES
Discharge: HOME | End: 2024-12-16
Payer: MEDICARE

## 2024-12-16 ENCOUNTER — APPOINTMENT (OUTPATIENT)
Dept: MRI IMAGING | Age: 54
End: 2024-12-16
Payer: MEDICARE

## 2024-12-16 VITALS
SYSTOLIC BLOOD PRESSURE: 142 MMHG | BODY MASS INDEX: 28.49 KG/M2 | HEIGHT: 59 IN | RESPIRATION RATE: 18 BRPM | TEMPERATURE: 98.1 F | OXYGEN SATURATION: 99 % | HEART RATE: 95 BPM | DIASTOLIC BLOOD PRESSURE: 86 MMHG | WEIGHT: 141.31 LBS

## 2024-12-16 PROBLEM — N18.31 STAGE 3A CHRONIC KIDNEY DISEASE (HCC): Status: ACTIVE | Noted: 2024-12-16

## 2024-12-16 LAB
ANA SER QL IA: NEGATIVE
ANION GAP SERPL CALCULATED.3IONS-SCNC: 14 MMOL/L (ref 9–16)
BUN SERPL-MCNC: 22 MG/DL (ref 6–20)
C3 SERPL-MCNC: 187 MG/DL (ref 90–180)
C4 SERPL-MCNC: 37 MG/DL (ref 10–40)
CALCIUM SERPL-MCNC: 9 MG/DL (ref 8.6–10.4)
CHLORIDE SERPL-SCNC: 98 MMOL/L (ref 98–107)
CHLORIDE UR-SCNC: 56 MMOL/L
CO2 SERPL-SCNC: 21 MMOL/L (ref 20–31)
CREAT SERPL-MCNC: 1.2 MG/DL (ref 0.6–0.9)
CREAT UR-MCNC: 129 MG/DL (ref 28–217)
DSDNA IGG SER QL IA: 7.5 IU/ML
FREE KAPPA/LAMBDA RATIO: 1.28 (ref 0.22–1.74)
GFR, ESTIMATED: 55 ML/MIN/1.73M2
GLUCOSE SERPL-MCNC: 337 MG/DL (ref 74–99)
HAV IGM SERPL QL IA: NONREACTIVE
HBV CORE IGM SERPL QL IA: NONREACTIVE
HBV SURFACE AG SERPL QL IA: NONREACTIVE
HCV AB SERPL QL IA: NONREACTIVE
KAPPA LC FREE SER-MCNC: 63.7 MG/L
LAMBDA LC FREE SERPL-MCNC: 49.9 MG/L (ref 4.2–27.7)
NUCLEAR IGG SER IA-RTO: 0.2 U/ML
POTASSIUM SERPL-SCNC: 4.3 MMOL/L (ref 3.7–5.3)
SODIUM SERPL-SCNC: 133 MMOL/L (ref 136–145)
SODIUM UR-SCNC: 89 MMOL/L
TOTAL PROTEIN, URINE: 243 MG/DL
URINE TOTAL PROTEIN CREATININE RATIO: 1.88 (ref 0–0.2)

## 2024-12-16 PROCEDURE — 96372 THER/PROPH/DIAG INJ SC/IM: CPT

## 2024-12-16 PROCEDURE — 96376 TX/PRO/DX INJ SAME DRUG ADON: CPT

## 2024-12-16 PROCEDURE — 82436 ASSAY OF URINE CHLORIDE: CPT

## 2024-12-16 PROCEDURE — G0378 HOSPITAL OBSERVATION PER HR: HCPCS

## 2024-12-16 PROCEDURE — 86160 COMPLEMENT ANTIGEN: CPT

## 2024-12-16 PROCEDURE — 94640 AIRWAY INHALATION TREATMENT: CPT

## 2024-12-16 PROCEDURE — 80048 BASIC METABOLIC PNL TOTAL CA: CPT

## 2024-12-16 PROCEDURE — 84165 PROTEIN E-PHORESIS SERUM: CPT

## 2024-12-16 PROCEDURE — 96361 HYDRATE IV INFUSION ADD-ON: CPT

## 2024-12-16 PROCEDURE — 84300 ASSAY OF URINE SODIUM: CPT

## 2024-12-16 PROCEDURE — 6360000002 HC RX W HCPCS

## 2024-12-16 PROCEDURE — 99222 1ST HOSP IP/OBS MODERATE 55: CPT | Performed by: INTERNAL MEDICINE

## 2024-12-16 PROCEDURE — 84166 PROTEIN E-PHORESIS/URINE/CSF: CPT

## 2024-12-16 PROCEDURE — 2580000003 HC RX 258

## 2024-12-16 PROCEDURE — 82570 ASSAY OF URINE CREATININE: CPT

## 2024-12-16 PROCEDURE — 80074 ACUTE HEPATITIS PANEL: CPT

## 2024-12-16 PROCEDURE — 84155 ASSAY OF PROTEIN SERUM: CPT

## 2024-12-16 PROCEDURE — 36415 COLL VENOUS BLD VENIPUNCTURE: CPT

## 2024-12-16 PROCEDURE — 6370000000 HC RX 637 (ALT 250 FOR IP)

## 2024-12-16 PROCEDURE — 83521 IG LIGHT CHAINS FREE EACH: CPT

## 2024-12-16 PROCEDURE — 84156 ASSAY OF PROTEIN URINE: CPT

## 2024-12-16 PROCEDURE — 2580000003 HC RX 258: Performed by: INTERNAL MEDICINE

## 2024-12-16 PROCEDURE — 72148 MRI LUMBAR SPINE W/O DYE: CPT

## 2024-12-16 PROCEDURE — 86225 DNA ANTIBODY NATIVE: CPT

## 2024-12-16 PROCEDURE — 76775 US EXAM ABDO BACK WALL LIM: CPT

## 2024-12-16 PROCEDURE — 86038 ANTINUCLEAR ANTIBODIES: CPT

## 2024-12-16 PROCEDURE — 94761 N-INVAS EAR/PLS OXIMETRY MLT: CPT

## 2024-12-16 RX ORDER — FENTANYL CITRATE 50 UG/ML
50 INJECTION, SOLUTION INTRAMUSCULAR; INTRAVENOUS ONCE
Status: COMPLETED | OUTPATIENT
Start: 2024-12-16 | End: 2024-12-16

## 2024-12-16 RX ORDER — SODIUM CHLORIDE 9 MG/ML
INJECTION, SOLUTION INTRAVENOUS CONTINUOUS
Status: DISCONTINUED | OUTPATIENT
Start: 2024-12-16 | End: 2024-12-16 | Stop reason: HOSPADM

## 2024-12-16 RX ADMIN — SODIUM CHLORIDE, PRESERVATIVE FREE 10 ML: 5 INJECTION INTRAVENOUS at 08:50

## 2024-12-16 RX ADMIN — SODIUM CHLORIDE: 9 INJECTION, SOLUTION INTRAVENOUS at 09:16

## 2024-12-16 RX ADMIN — ATORVASTATIN CALCIUM 40 MG: 40 TABLET, FILM COATED ORAL at 08:48

## 2024-12-16 RX ADMIN — FLUTICASONE PROPIONATE 1 PUFF: 110 AEROSOL, METERED RESPIRATORY (INHALATION) at 08:07

## 2024-12-16 RX ADMIN — SODIUM CHLORIDE, PRESERVATIVE FREE 10 ML: 5 INJECTION INTRAVENOUS at 05:12

## 2024-12-16 RX ADMIN — CELECOXIB 100 MG: 100 CAPSULE ORAL at 08:48

## 2024-12-16 RX ADMIN — FENTANYL CITRATE 50 MCG: 50 INJECTION, SOLUTION INTRAMUSCULAR; INTRAVENOUS at 05:13

## 2024-12-16 RX ADMIN — GLIPIZIDE 5 MG: 5 TABLET ORAL at 08:48

## 2024-12-16 RX ADMIN — TIZANIDINE 4 MG: 4 TABLET ORAL at 09:11

## 2024-12-16 RX ADMIN — GABAPENTIN 300 MG: 300 CAPSULE ORAL at 08:47

## 2024-12-16 RX ADMIN — GABAPENTIN 300 MG: 300 CAPSULE ORAL at 13:00

## 2024-12-16 RX ADMIN — SODIUM CHLORIDE, PRESERVATIVE FREE 10 ML: 5 INJECTION INTRAVENOUS at 05:22

## 2024-12-16 RX ADMIN — INSULIN GLARGINE 15 UNITS: 100 INJECTION, SOLUTION SUBCUTANEOUS at 08:49

## 2024-12-16 RX ADMIN — DICLOFENAC SODIUM 4 G: 10 GEL TOPICAL at 13:00

## 2024-12-16 RX ADMIN — DICLOFENAC SODIUM 4 G: 10 GEL TOPICAL at 05:04

## 2024-12-16 RX ADMIN — ASPIRIN 81 MG: 81 TABLET, COATED ORAL at 08:48

## 2024-12-16 RX ADMIN — ACETAMINOPHEN 650 MG: 325 TABLET ORAL at 04:53

## 2024-12-16 RX ADMIN — AMLODIPINE BESYLATE 5 MG: 10 TABLET ORAL at 08:47

## 2024-12-16 RX ADMIN — HALOPERIDOL LACTATE 2 MG: 5 INJECTION, SOLUTION INTRAMUSCULAR at 02:34

## 2024-12-16 ASSESSMENT — PAIN SCALES - WONG BAKER
WONGBAKER_NUMERICALRESPONSE: NO HURT
WONGBAKER_NUMERICALRESPONSE: HURTS WORST
WONGBAKER_NUMERICALRESPONSE: HURTS WORST
WONGBAKER_NUMERICALRESPONSE: HURTS A LITTLE BIT

## 2024-12-16 ASSESSMENT — PAIN DESCRIPTION - LOCATION
LOCATION: FLANK
LOCATION: BACK
LOCATION: FLANK
LOCATION: FLANK

## 2024-12-16 ASSESSMENT — PAIN DESCRIPTION - ORIENTATION
ORIENTATION: LEFT

## 2024-12-16 ASSESSMENT — PAIN SCALES - GENERAL
PAINLEVEL_OUTOF10: 0
PAINLEVEL_OUTOF10: 9
PAINLEVEL_OUTOF10: 4
PAINLEVEL_OUTOF10: 0
PAINLEVEL_OUTOF10: 10
PAINLEVEL_OUTOF10: 10
PAINLEVEL_OUTOF10: 7
PAINLEVEL_OUTOF10: 10

## 2024-12-16 ASSESSMENT — PAIN DESCRIPTION - DIRECTION: RADIATING_TOWARDS: DENIES

## 2024-12-16 ASSESSMENT — PAIN DESCRIPTION - ONSET: ONSET: AWAKENED FROM SLEEP

## 2024-12-16 ASSESSMENT — PAIN DESCRIPTION - DESCRIPTORS
DESCRIPTORS: ACHING
DESCRIPTORS: SHARP
DESCRIPTORS: ACHING
DESCRIPTORS: SHARP;STABBING

## 2024-12-16 ASSESSMENT — PAIN DESCRIPTION - PAIN TYPE: TYPE: ACUTE PAIN

## 2024-12-16 ASSESSMENT — PAIN - FUNCTIONAL ASSESSMENT
PAIN_FUNCTIONAL_ASSESSMENT: PREVENTS OR INTERFERES SOME ACTIVE ACTIVITIES AND ADLS
PAIN_FUNCTIONAL_ASSESSMENT: ACTIVITIES ARE NOT PREVENTED

## 2024-12-16 ASSESSMENT — PAIN DESCRIPTION - FREQUENCY: FREQUENCY: CONTINUOUS

## 2024-12-16 NOTE — H&P
of Asthma, Chronic back pain, GERD (gastroesophageal reflux disease), HLD (hyperlipidemia), Hypertension, Irregular periods/menstrual cycles, Neuropathy, Obesity, Type II or unspecified type diabetes mellitus without mention of complication, not stated as uncontrolled, and Wears glasses.    I have reviewed the past medical history with the patient and it is pertinent to this complaint.      SURGICAL HISTORY      has a past surgical history that includes Tubal ligation (2008); Endometrial ablation (02/27/2018); and pr hysteroscopy endometrial ablation (N/A, 2/27/2018).  I have reviewed and agree with Surgical History entered and it is pertinent to this complaint.     CURRENT MEDICATIONS     0.9 % sodium chloride infusion, Continuous  sodium chloride flush 0.9 % injection 5-40 mL, 2 times per day  sodium chloride flush 0.9 % injection 5-40 mL, PRN  0.9 % sodium chloride infusion, PRN  enoxaparin (LOVENOX) injection 40 mg, Daily  ondansetron (ZOFRAN-ODT) disintegrating tablet 4 mg, Q8H PRN   Or  ondansetron (ZOFRAN) injection 4 mg, Q6H PRN  polyethylene glycol (GLYCOLAX) packet 17 g, Daily PRN  acetaminophen (TYLENOL) tablet 650 mg, Q6H PRN   Or  acetaminophen (TYLENOL) suppository 650 mg, Q6H PRN  haloperidol lactate (HALDOL) injection 2 mg, Q6H PRN  albuterol (PROVENTIL) (2.5 MG/3ML) 0.083% nebulizer solution 2.5 mg, 4x Daily PRN  albuterol sulfate HFA (PROVENTIL;VENTOLIN;PROAIR) 108 (90 Base) MCG/ACT inhaler 2 puff, Q6H PRN  amLODIPine (NORVASC) tablet 5 mg, Daily  aspirin EC tablet 81 mg, Daily  fluticasone (FLOVENT HFA) 110 MCG/ACT inhaler 1 puff, BID RT  celecoxib (CELEBREX) capsule 100 mg, BID  diclofenac sodium (VOLTAREN) 1 % gel 4 g, 4x Daily  gabapentin (NEURONTIN) capsule 300 mg, TID  glipiZIDE (GLUCOTROL) tablet 5 mg, BID AC  insulin glargine (LANTUS) injection vial 15 Units, BID  Semaglutide TABS 7 mg (Patient Supplied), QAM AC  atorvastatin (LIPITOR) tablet 40 mg, Daily  tiZANidine (ZANAFLEX) tablet 4 mg,

## 2024-12-16 NOTE — FLOWSHEET NOTE
[x] [facility]  Outpatient Rehabilitation &  Therapy  [address]  [phone]  [phone] [] [facility]  Outpatient Rehabilitation &  Therapy  [address]  P: [phone]  F: [phone] [] [facility]  Outpatient Rehabilitation &  Therapy  [address]  P: [phone]  F: [phone] [] [facility]  Outpatient Rehabilitation &  Therapy  [address]  [phone]  [phone] [] [facility]  Outpatient Rehabilitation &  Therapy  [address]   P: [phone]  F: [phone]  [] [facility]  Outpatient Rehabilitation &  Therapy  [address]  P: [phone]  F: [phone] [] [facility]  Outpatient Rehabilitation &  Therapy  [address]  P: [phone]  F: [phone] [] [facility]  Outpatient Rehabilitation &  Therapy  [address]  P: [phone]  F: [phone] [] [facility]  Outpatient Rehabilitation &  Therapy  [address]  P: [phone]  F: [phone]  [] [facility] Outpatient Rehabilitation &  Therapy  [address]  P: [phone]  F: [phone]     Therapy Cancel/No Show note    Date: 2024  Patient: Lexie PULIDO Chi  : 1970  MRN: [id number]    Cancels/No Shows to date: 10    For today's appointment patient:    [x]  Cancelled    [] Rescheduled appointment    [] No-show     Reason given by patient:    []  Patient ill    []  Conflicting appointment    [] No transportation      [] Conflict with work    [] No reason given    [] Weather related    [] COVID-19    [x] Other:      Comments: Pt currently in the hospital for left flank pain.       [] Next appointment was confirmed    Electronically signed by: Scarlet Sotomayor PT

## 2024-12-16 NOTE — CONSULTS
contrast dye and was on diuretics along with ARB and NSAIDs at home.  Further renal function currently seems to be at baseline.  Patient's back pain does not look to be flank pain seems to be more like musculoskeletal back pain to be managed per primary.  Diabetes.  Hypertension.  History of recent UTI.  Plan: Will keep gentle hydration with normal saline 75 cc an hour for now.  Monitor strict I's and O's and renal function.  Would recommend stopping NSAID usage including Celebrex.  Can restart hydrochlorothiazide and losartan on discharge as renal function continues to be stable.  Back pain management as per primary.  Renal serology ordered.  Patient should be okay to discharge from nephrology standpoint.  Patient can follow-up with me in 1 to 2 months on discharge and should get BMP in 1 week and results faxed at [phone].       Esa Fuller MD   [facility]    This note is created with the assistance of a speech-recognition program. While intending to generate a document that actually reflects the content of the visit, no guarantees can be provided that every mistake has been identified and corrected by editing.

## 2024-12-16 NOTE — PLAN OF CARE
Problem: Discharge Planning  Goal: Discharge to home or other facility with appropriate resources  12/16/2024 1048 by Herlinda Richard RN  Outcome: Progressing  12/15/2024 2304 by Eliza Martin RN  Outcome: Progressing     Problem: Pain  Goal: Verbalizes/displays adequate comfort level or baseline comfort level  12/16/2024 1048 by Herlinda Richard RN  Outcome: Progressing  12/15/2024 2304 by Eliza Martin RN  Outcome: Progressing     Problem: Skin/Tissue Integrity  Goal: Absence of new skin breakdown  Description: 1.  Monitor for areas of redness and/or skin breakdown  2.  Assess vascular access sites hourly  3.  Every 4-6 hours minimum:  Change oxygen saturation probe site  4.  Every 4-6 hours:  If on nasal continuous positive airway pressure, respiratory therapy assess nares and determine need for appliance change or resting period.  12/16/2024 1048 by Herlinda Richard RN  Outcome: Progressing  12/15/2024 2304 by Eliza Martin RN  Outcome: Progressing     Problem: Safety - Adult  Goal: Free from fall injury  12/16/2024 1048 by Herlinda Richard RN  Outcome: Progressing  12/15/2024 2304 by Eliza Martin RN  Outcome: Progressing

## 2024-12-16 NOTE — PROGRESS NOTES
Per [facility] approved formulary policy.     SGLT2's are only formulary with the indication of CKD or CHF therefore:    Please note that the  Empagliflozin (Jardiance) is non-formulary with indications of type 2 diabetes and has been discontinued while inpatient. If you feel the patient needs to continue their home therapy during the inpatient stay, the patient may bring their medication bottle for verification and administration pursuant to our home medication use policy.      Please contact the pharmacy with any questions or concerns.  Thank you.  Evert Wilkerson RPH, [place]/PharmD 12/15/2024 9:39 PM    
   [facility]  CDU / OBSERVATION ENCOUNTER  ATTENDING NOTE         I performed a history and physical examination of the patient and discussed management with the resident or midlevel provider. I reviewed the resident or midlevel provider's note and agree with the documented findings and plan of care. Any areas of disagreement are noted on the chart. I was personally present for the key portions of any procedures. I have documented in the chart those procedures where I was not present during the key portions. I have reviewed the nurses notes. I agree with the chief complaint, past medical history, past surgical history, allergies, medications, social and family history as documented unless otherwise noted below.    The Family history, social history, and ROS are effectively unchanged since admission unless noted elsewhere in the chart.     This patient was placed in the observation unit for reevaluation for possible admission to the hospital     Patient admitted with hypertension and diabetes.  Patient with flank pain.  Patient describes the pain as sharp and intermittent and states it was wrapping around to the left groin.  Patient denies right flank pain or groin pain.  Patient without vomiting but some nausea.  Patient denies diarrhea.  Patient was worked up in the emergency department for renal lithiasis and was negative.    Patient has been recently treated for urinary tract infection.  Patient was just finishing up her course of antibiotics.  Patient was also recently seen at [facility] on the sixth with similar symptoms.  CT scan at that time was unremarkable for kidney stone or Sukh.  Patient has having ongoing pain and therefore presents for further workup.    ED workup effectively negative.    From ED Medical Decision Making note:  My differential diagnosis includes nephrolithiasis, pyelonephritis, pancreatitis, complicated UTI. Less likely pneumonia, PE, aortic aneurysm or dissection.  
Observation resident notified via perfect serve that vitals retaken and bp 190/116, pulse 125, rr 18, spo2 100% on room air in bed, eyes closed, no complaints at this time. Patient response easily and falls back to sleep and denies any chest pain, shortness of breath or difficulty breathing at this time. No new orders given at this time.  
PHARMACY NOTE:    The electrolyte replacement protocol for potassium/magnesium has been discontinued per P&T guidelines because the patient has reduced renal function (CrCl < 30 mL/min).      The patient's most recent potassium & magnesium levels are:  Recent Labs     12/15/24  1617   K 4.5     Estimated Creatinine Clearance: 39 mL/min (A) (based on SCr of 1.3 mg/dL (H)).    For patients with decreased renal function (below 30ml/min) needing potassium/magnesium supplementation, please order individual bolus doses with appropriate monitoring.      Please contact the inpatient pharmacy with any concerns.  Thank you.    
Current  Energy (kcal/day): 4028-1163 kcals/day  Weight Used for Protein Requirements: Current  Protein (g/day): 64-74 g/day  Method Used for Fluid Requirements: 1 ml/kcal  Fluid (ml/day): 9467-9068 ml/day    Nutrition Diagnosis:   Inadequate oral intake related to decreased appetite as evidenced by weight loss    Nutrition Interventions:   Food and/or Nutrient Delivery: Continue Current Diet, Start Oral Nutrition Supplement  Nutrition Education/Counseling: Survival skills/brief education completed  Coordination of Nutrition Care: Continue to monitor while inpatient  Plan of Care discussed with: Pt    Goals:  Goals: Meet at least 75% of estimated needs, prior to discharge  Type of Goal: New goal     Nutrition Monitoring and Evaluation:   Behavioral-Environmental Outcomes: None Identified  Food/Nutrient Intake Outcomes: Food and Nutrient Intake, Supplement Intake  Physical Signs/Symptoms Outcomes: Biochemical Data, Meal Time Behavior, Weight    Discharge Planning:    Continue Oral Nutrition Supplement (as needed)     Daria Espinoza RDN, LD, MS   Contact: [phone]

## 2024-12-16 NOTE — ED NOTES
Called lab will add on ordered labs.   
Can hear patient crying at the nurses station. Resident notified and at bedside to reassess patient.   
Dr ross at bedside to update pt on results   
Out of stock on [facility] hospital wide. Resident notified.   
PT back form CT   
Pt off o2 upon return to room from bathroom. Pt again desat to 84% on room air. Pt placed on 2L/NC again with sat improvement to 98%. Will continue with plan of care.   
Pt placed on 2L/NC for spo2 dropping to 68% on room air post fentanyl administration.   
Pt presents to ED room 6 with c/o left flank pain. Pt states she was at a hospital last week and worked up for similar pain with no real answer. Pt denies hx of kidney stones or urinary symptoms. Pt states 10/10 pain on the left flank. Pt is tachycardic when attached to monitor. IV established. Will continue with plan of care.   
Pt taken to the bathroom via wheelchair. Pt now returned to bed and attached back to monitor. No acute distress noted. Will continue with plan of care.   
Report given to Daniela OLIVAS. All questions answered and updated on plan.   
The following labs were labeled with appropriate pt sticker and tubed to lab:     [] Blue     [] Lavender   [] on ice  [] Green/yellow  [] Green/black [] on ice  [] Grey  [] on ice  [] Yellow  [] Red  [] Pink  [] Type/ Screen  [] ABG  [] VBG    [] COVID-19 swab    [] Rapid  [] PCR  [] Flu swab  [] Peds Viral Panel     [] Urine Sample  [] Fecal Sample  [] Pelvic Cultures  [x] Blood Cultures  [x] X 2  [] STREP Cultures  [] Wound Cultures   
The following labs were labeled with appropriate pt sticker and tubed to lab:     [] Blue     [] Lavender   [] on ice  [] Green/yellow  [] Green/black [] on ice  [] Grey  [] on ice  [] Yellow  [] Red  [] Pink  [] Type/ Screen  [] ABG  [] VBG    [] COVID-19 swab    [] Rapid  [] PCR  [] Flu swab  [] Peds Viral Panel     [x] Urine Sample  [] Fecal Sample  [] Pelvic Cultures  [] Blood Cultures  [] X 2  [] STREP Cultures  [] Wound Cultures   
  CBC WITH AUTO DIFFERENTIAL - Abnormal; Notable for the following components:       Result Value    WBC 12.4 (*)     Hemoglobin 11.0 (*)     Hematocrit 33.3 (*)     MCV 80.4 (*)     RDW 14.6 (*)     Platelets 498 (*)     Neutrophils % 69 (*)     Immature Granulocytes % 1 (*)     Neutrophils Absolute 8.53 (*)     All other components within normal limits   COMPREHENSIVE METABOLIC PANEL - Abnormal; Notable for the following components:    Sodium 135 (*)     Glucose 153 (*)     BUN 25 (*)     Creatinine 1.3 (*)     Est, Glom Filt Rate 50 (*)     Alkaline Phosphatase 161 (*)     All other components within normal limits   URINALYSIS WITH REFLEX TO CULTURE - Abnormal; Notable for the following components:    Protein, UA 2+ (*)     All other components within normal limits   CULTURE, BLOOD 1   CULTURE, BLOOD 2   LACTIC ACID   PROTIME-INR   APTT   D-DIMER, QUANTITATIVE   LIPASE   MICROSCOPIC URINALYSIS   HCG, SERUM, QUALITATIVE       Electronically signed by Yanni Bertrand RN on 12/15/2024 at 8:48 PM

## 2024-12-16 NOTE — DISCHARGE SUMMARY
1.20 k/uL    Eosinophils Absolute 0.13 0.00 - 0.44 k/uL    Basophils Absolute 0.05 0.00 - 0.20 k/uL    Immature Granulocytes Absolute 0.06 0.00 - 0.30 k/uL    RBC Morphology ANISOCYTOSIS PRESENT MICROCYTOSIS PRESENT    Comprehensive Metabolic Panel   Result Value Ref Range    Sodium 135 (L) 136 - 145 mmol/L    Potassium 4.5 3.7 - 5.3 mmol/L    Chloride 100 98 - 107 mmol/L    CO2 22 20 - 31 mmol/L    Anion Gap 13 9 - 16 mmol/L    Glucose 153 (H) 74 - 99 mg/dL    BUN 25 (H) 6 - 20 mg/dL    Creatinine 1.3 (H) 0.6 - 0.9 mg/dL    Est, Glom Filt Rate 50 (L) >60 mL/min/1.73m2    Calcium 8.7 8.6 - 10.4 mg/dL    Total Protein 6.9 6.6 - 8.7 g/dL    Albumin 3.6 3.5 - 5.2 g/dL    Albumin/Globulin Ratio 1.1 1.0 - 2.5    Total Bilirubin 0.4 0.0 - 1.2 mg/dL    Alkaline Phosphatase 161 (H) 35 - 104 U/L    ALT 30 10 - 35 U/L    AST 29 10 - 35 U/L   Urinalysis with Reflex to Culture    Specimen: Urine   Result Value Ref Range    Color, UA Yellow Yellow    Turbidity UA Clear Clear    Glucose, Ur NEGATIVE NEGATIVE mg/dL    Bilirubin, Urine NEGATIVE NEGATIVE    Ketones, Urine NEGATIVE NEGATIVE mg/dL    Specific Gravity, UA 1.010 1.005 - 1.030    Urine Hgb NEGATIVE NEGATIVE    pH, Urine 7.0 5.0 - 8.0    Protein, UA 2+ (A) NEGATIVE mg/dL    Urobilinogen, Urine Normal 0.0 - 1.0 EU/dL    Nitrite, Urine NEGATIVE NEGATIVE    Leukocyte Esterase, Urine NEGATIVE NEGATIVE   Lactic Acid   Result Value Ref Range    Lactic Acid, Whole Blood 1.3 0.7 - 2.1 mmol/L   Protime-INR   Result Value Ref Range    Protime 11.8 11.7 - 14.9 sec    INR 0.9    APTT   Result Value Ref Range    APTT 30.3 23.0 - 36.5 sec   D-Dimer, Quantitative   Result Value Ref Range    D-Dimer, Quant 0.38 0.00 - 0.57 ug/mL FEU   Lipase   Result Value Ref Range    Lipase 17 13 - 60 U/L   Microscopic Urinalysis   Result Value Ref Range    WBC, UA 2 TO 5 0 - 5 /HPF    RBC, UA 0 TO 2 0 - 4 /HPF    Casts UA  0 - 8 /LPF     None Reference range defined for non-centrifuged specimen.    
bowel.Terminal ileum and appendix are normal.  There are few diverticula in the ascending colon without rebecca diverticular fat infiltration. *Genitourinary: The uterus and ovaries are normal. *Urinary Bladder: Normal Vessels: Normal Peritoneum: Normal Retroperitoneum: Normal Lymph nodes: Normal Abdominal wall: Normal Bones: Mild degenerative changes in the lower thoracic and lumbar spine.     No acute abnormality identified in the abdomen or pelvis.     XR CHEST PORTABLE    Result Date: 12/15/2024  EXAMINATION: ONE XRAY VIEW OF THE CHEST 12/15/2024 4:17 pm COMPARISON: 03/30/2011. HISTORY: ORDERING SYSTEM PROVIDED HISTORY: Pain in Flank TECHNOLOGIST PROVIDED HISTORY: Pain in Flank FINDINGS: The lungs and costophrenic angles are clear. The cardiomediastinal silhouette, pulmonary vessels and interstitium appear normal.     No radiographic evidence of an acute cardiopulmonary process.           Physical Exam:    General appearance - NAD, AOx 3  Lungs -CTAB, no R/R/R  Heart - RRR, no M/R/G  Abdomen - Soft, NT/ND  Neurological -  MAEx4, No focal motor deficit, sensory loss  Extremities - Cap refil <2 sec in all ext., no edema  Skin -warm, dry      Hospital Course:  Clinical course has improved, labs and imaging reviewed.     Lexie Mireles originally presented to the hospital on 12/15/2024  3:54 PM with groin and flank pain .  At that time it was determined that She required further observation and nephrology consultation. Labs and imaging were followed daily.  Imaging results as above.  She is medically stable to be discharged.       Disposition: Home    Patient stated that they will not drive themselves home from the hospital if they have gotten pain killers/ narcotics earlier that day and that they will arrange for transportation on their own or work with the  for a ride. Patient counseled NOT to drive while under the influence of narcotics/ pain killers.     Condition: Good    Patient stable and ready for

## 2024-12-17 ENCOUNTER — CARE COORDINATION (OUTPATIENT)
Dept: CARE COORDINATION | Age: 54
End: 2024-12-17

## 2024-12-17 DIAGNOSIS — I10 HYPERTENSION, ESSENTIAL: Primary | ICD-10-CM

## 2024-12-17 DIAGNOSIS — R10.9 LEFT FLANK PAIN: Primary | ICD-10-CM

## 2024-12-17 LAB
ALBUMIN PERCENT: 51 % (ref 45–65)
ALBUMIN SERPL-MCNC: 3.8 G/DL (ref 3.2–5.2)
ALPHA 2 PERCENT: 16 % (ref 6–13)
ALPHA1 GLOB SERPL ELPH-MCNC: 0.4 G/DL (ref 0.1–0.4)
ALPHA1 GLOB SERPL ELPH-MCNC: 6 % (ref 3–6)
ALPHA2 GLOB SERPL ELPH-MCNC: 1.2 G/DL (ref 0.5–0.9)
B-GLOBULIN SERPL ELPH-MCNC: 1 G/DL (ref 0.5–1.1)
B-GLOBULIN SERPL ELPH-MCNC: 13 % (ref 11–19)
GAMMA GLOB SERPL ELPH-MCNC: 1 G/DL (ref 0.5–1.5)
GAMMA GLOBULIN %: 14 % (ref 9–20)
P E INTERPRETATION, U: NORMAL
PATHOLOGIST: ABNORMAL
PATHOLOGIST: NORMAL
PROT PATTERN SERPL ELPH-IMP: ABNORMAL
PROT SERPL-MCNC: 7.4 G/DL (ref 6.6–8.7)
SPECIMEN TYPE: NORMAL
TOTAL PROT. SUM,%: 100 % (ref 98–102)
TOTAL PROT. SUM: 7.4 G/DL (ref 6.3–8.2)
URINE TOTAL PROTEIN: 243 MG/DL

## 2024-12-17 PROCEDURE — 1111F DSCHRG MED/CURRENT MED MERGE: CPT | Performed by: NURSE PRACTITIONER

## 2024-12-17 NOTE — CARE COORDINATION
Case Management Assessment  Initial Observation Evaluation    Date/Time of Evaluation: 12/16/24 1140   Assessment Completed by: Henrietta Saldivar    If patient is discharged prior to next notation, then this note serves as note for discharge by case management.    Patient Name: Lexie Mireles                   YOB: 1970  Diagnosis: Left flank pain [R10.9]  Flank pain [R10.9]                   Date / Time: 12/15/2024  3:54 PM      CM Services requested for transitional needs.     Patient Admission Status: Observation   Readmission Risk (Low < 19, Mod (19-27), High > 27): No data recorded  Current PCP: Viktor Fox APRN - CNP  PCP verified by CM? Yes (KELLEN Harrington)       History Provided by: Patient  Patient Orientation: Alert and Oriented, Person, Place, Situation, Self    Patient Cognition: Alert      ADLS  Prior functional level: Independent in ADLs/IADLs  Current functional level: Assistance with the following:, Bathing, Dressing, Toileting, Cooking, Housework, Shopping, Mobility, Other (see comment) (needs assist d/t pain)  Family can provide assistance at DC: Yes  Would you like Case Management to discuss the discharge plan with any other family members/significant others, and if so, who? No    Financial    [facility] / Plan: [facility] [place] / Product Type: *No Product type* /       Transportation/Food Security/Housing Addressed:  No issues identified.     Equipment needs: none; has walker at home    Case Management Services Information Letter Provided [x]    Transition plan: plan is to return home independent w/ family and  will transport

## 2024-12-17 NOTE — PROGRESS NOTES
Remote Patient Monitoring Treatment Plan    Received request from ACM/Genesis Amador RN   to order remote patient monitoring for in home monitoring of HTN; Condition managed by PCP.  and order completed.     Patient will be monitoring blood pressure   pulse ox .      Patient will engage in Remote Patient Monitoring each day to develop the skills necessary for self management.       RPM Care Team Responsibilities:   Alerts will be reviewed daily and addressed within 2-4 hours during operational hours (Monday -Friday 9 am-4 pm)  Alert response and intervention documented in patient medical record  Alert response escalated to PCP per protocol and documented in patient medical record  Patient monitored over approximately  days  Discharge from program based on self-management readiness    See care coordination encounters for additional details.

## 2024-12-17 NOTE — CARE COORDINATION
Phone    12/20/2024 9:00 AM Scarlet Sotomayor, [facility] Physical Therapy [phone]    1/8/2025 7:45 AM Viktor Fox, APRN - [facility] Primary Care [phone]    1/10/2025 9:00 AM Flo Alva PA Orthopedic Surgery [phone]    4/7/2025 2:10 PM Ruy Carroll MD Nephrology [phone]            Care Transition Nurse provided contact information.  Plan for follow-up call in 6-10 days based on severity of symptoms and risk factors.  Plan for next call: symptom management-how is pain? Did she resume PT, PCP appt moved sooner? How are BP readings?      Yumi Kam RN        Remote Patient Monitoring Enrollment Note    Date/Time:  12/17/2024 9:33 AM    Offered patient enrollment in the [facility] Remote Patient Monitoring (RPM) program for in home monitoring for HTN.  Patient accepted RPM services.    Patient will be monitoring the following daily:  blood pressure reading and pulse ox reading    CTN reviewed the information below with patient:    Emergency Contact (name and contact number): Pramod Mireles [phone]    [x] A member from the care coordination team will reach out to notify the patient once the RPM kit is ordered.   [x] Once the kit is delivered, the HRS team will contact the patient after UPS deliver to assist with set up.            [x] Determined BP cuff size: regular (9.05\"-15.74\")      [] Determined weight scale: regular (<330lbs)                                                [x] Hours of ACM monitoring - Monday-Friday [phone]                     All questions about RPM program answered at this time.

## 2024-12-18 ENCOUNTER — APPOINTMENT (OUTPATIENT)
Dept: CT IMAGING | Age: 54
End: 2024-12-18
Payer: MEDICARE

## 2024-12-18 ENCOUNTER — HOSPITAL ENCOUNTER (EMERGENCY)
Age: 54
Discharge: HOME | End: 2024-12-18
Attending: EMERGENCY MEDICINE
Payer: MEDICARE

## 2024-12-18 VITALS
RESPIRATION RATE: 18 BRPM | SYSTOLIC BLOOD PRESSURE: 148 MMHG | OXYGEN SATURATION: 100 % | HEART RATE: 112 BPM | TEMPERATURE: 97.7 F | DIASTOLIC BLOOD PRESSURE: 104 MMHG
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
## 2024-12-18 LAB
ANION GAP SERPL CALCULATED.3IONS-SCNC: 12 MMOL/L (ref 9–16)
BASOPHILS # BLD: 0.05 K/UL (ref 0–0.2)
BASOPHILS NFR BLD: 1 % (ref 0–2)
BUN SERPL-MCNC: 24 MG/DL (ref 6–20)
CALCIUM SERPL-MCNC: 9.1 MG/DL (ref 8.6–10.4)
CHLORIDE SERPL-SCNC: 100 MMOL/L (ref 98–107)
CO2 SERPL-SCNC: 22 MMOL/L (ref 20–31)
CREAT SERPL-MCNC: 1.3 MG/DL (ref 0.6–0.9)
EOSINOPHIL # BLD: 0.15 K/UL (ref 0–0.44)
EOSINOPHILS RELATIVE PERCENT: 2 % (ref 1–4)
ERYTHROCYTE [DISTWIDTH] IN BLOOD BY AUTOMATED COUNT: 14.9 % (ref 11.8–14.4)
GFR, ESTIMATED: 50 ML/MIN/1.73M2
GLUCOSE SERPL-MCNC: 276 MG/DL (ref 74–99)
HCT VFR BLD AUTO: 35.5 % (ref 36.3–47.1)
HGB BLD-MCNC: 11.5 G/DL (ref 11.9–15.1)
IMM GRANULOCYTES # BLD AUTO: 0.03 K/UL (ref 0–0.3)
IMM GRANULOCYTES NFR BLD: 0 %
LYMPHOCYTES NFR BLD: 1.83 K/UL (ref 1.1–3.7)
LYMPHOCYTES RELATIVE PERCENT: 20 % (ref 24–43)
MCH RBC QN AUTO: 25.9 PG (ref 25.2–33.5)
MCHC RBC AUTO-ENTMCNC: 32.4 G/DL (ref 28.4–34.8)
MCV RBC AUTO: 80 FL (ref 82.6–102.9)
MONOCYTES NFR BLD: 0.38 K/UL (ref 0.1–1.2)
MONOCYTES NFR BLD: 4 % (ref 3–12)
NEUTROPHILS NFR BLD: 73 % (ref 36–65)
NEUTS SEG NFR BLD: 6.87 K/UL (ref 1.5–8.1)
NRBC BLD-RTO: 0 PER 100 WBC
PLATELET # BLD AUTO: 497 K/UL (ref 138–453)
PMV BLD AUTO: 9.6 FL (ref 8.1–13.5)
POTASSIUM SERPL-SCNC: 4.5 MMOL/L (ref 3.7–5.3)
RBC # BLD AUTO: 4.44 M/UL (ref 3.95–5.11)
RBC # BLD: ABNORMAL 10*6/UL
SODIUM SERPL-SCNC: 134 MMOL/L (ref 136–145)
WBC OTHER # BLD: 9.3 K/UL (ref 3.5–11.3)

## 2024-12-18 PROCEDURE — 80048 BASIC METABOLIC PNL TOTAL CA: CPT

## 2024-12-18 PROCEDURE — 99285 EMERGENCY DEPT VISIT HI MDM: CPT

## 2024-12-18 PROCEDURE — 96376 TX/PRO/DX INJ SAME DRUG ADON: CPT

## 2024-12-18 PROCEDURE — 6360000004 HC RX CONTRAST MEDICATION: Performed by: EMERGENCY MEDICINE

## 2024-12-18 PROCEDURE — 6360000002 HC RX W HCPCS: Performed by: EMERGENCY MEDICINE

## 2024-12-18 PROCEDURE — 96374 THER/PROPH/DIAG INJ IV PUSH: CPT

## 2024-12-18 PROCEDURE — 85025 COMPLETE CBC W/AUTO DIFF WBC: CPT

## 2024-12-18 PROCEDURE — 2580000003 HC RX 258: Performed by: EMERGENCY MEDICINE

## 2024-12-18 PROCEDURE — 74177 CT ABD & PELVIS W/CONTRAST: CPT

## 2024-12-18 RX ORDER — IOPAMIDOL 755 MG/ML
75 INJECTION, SOLUTION INTRAVASCULAR
Status: COMPLETED | OUTPATIENT
Start: 2024-12-18 | End: 2024-12-18

## 2024-12-18 RX ORDER — FENTANYL CITRATE 50 UG/ML
50 INJECTION, SOLUTION INTRAMUSCULAR; INTRAVENOUS ONCE
Status: COMPLETED | OUTPATIENT
Start: 2024-12-18 | End: 2024-12-18

## 2024-12-18 RX ORDER — 0.9 % SODIUM CHLORIDE 0.9 %
500 INTRAVENOUS SOLUTION INTRAVENOUS ONCE
Status: COMPLETED | OUTPATIENT
Start: 2024-12-18 | End: 2024-12-18

## 2024-12-18 RX ORDER — HYDROCODONE BITARTRATE AND ACETAMINOPHEN 5; 325 MG/1; MG/1
1 TABLET ORAL EVERY 6 HOURS PRN
Qty: 10 TABLET | Refills: 0 | Status: SHIPPED | OUTPATIENT
Start: 2024-12-18 | End: 2024-12-21

## 2024-12-18 RX ADMIN — IOPAMIDOL 75 ML: 755 INJECTION, SOLUTION INTRAVENOUS at 09:44

## 2024-12-18 RX ADMIN — FENTANYL CITRATE 50 MCG: 50 INJECTION, SOLUTION INTRAMUSCULAR; INTRAVENOUS at 10:25

## 2024-12-18 RX ADMIN — FENTANYL CITRATE 50 MCG: 50 INJECTION, SOLUTION INTRAMUSCULAR; INTRAVENOUS at 08:34

## 2024-12-18 RX ADMIN — SODIUM CHLORIDE 500 ML: 9 INJECTION, SOLUTION INTRAVENOUS at 08:34

## 2024-12-18 ASSESSMENT — ENCOUNTER SYMPTOMS
RHINORRHEA: 0
SHORTNESS OF BREATH: 0
SORE THROAT: 0
ABDOMINAL PAIN: 1
VOMITING: 0
NAUSEA: 0
BACK PAIN: 1
COLOR CHANGE: 0
COUGH: 0
DIARRHEA: 0

## 2024-12-18 ASSESSMENT — LIFESTYLE VARIABLES
HOW OFTEN DO YOU HAVE A DRINK CONTAINING ALCOHOL: NEVER
HOW MANY STANDARD DRINKS CONTAINING ALCOHOL DO YOU HAVE ON A TYPICAL DAY: PATIENT DOES NOT DRINK

## 2024-12-18 ASSESSMENT — PAIN SCALES - GENERAL
PAINLEVEL_OUTOF10: 10
PAINLEVEL_OUTOF10: 10

## 2024-12-18 NOTE — ED PROVIDER NOTES
[facility] EMERGENCY DEPARTMENT  eMERGENCY dEPARTMENT eNCOUnter      Pt Name: Lexie Mireles  MRN: [id number]  Birthdate 1970  Date of evaluation: 12/18/24      CHIEF COMPLAINT       Chief Complaint   Patient presents with    Fall         HISTORY OF PRESENT ILLNESS    Lexie Mireles is a 53 y.o. female who presents after she had a fall yesterday evening and is now having pain to her left lower abdomen, left flank, and left hip.  Patient was just discharged from the ETU after having left flank pain but says that this pain is different since her fall.  Patient says she fell because she slipped when she was getting out of the shower.  She denies hitting her head or having any loss of consciousness.  The etiology of patient's flank pain was unknown after evaluation with CT scan of the abdomen and lumbar MRI.  Patient states that her family member was able to help her get up off the ground after her fall yesterday and she has been able to walk with her walker as usual since the fall but is having pain with it.  She denies any new weakness or numbness to the legs.  She denies any changes in bowel or bladder habits.  She denies any chest pain or shortness of breath.  Patient does have a history of CKD.    Location/Symptom: L flank, abd, and hip pain  Timing/Onset: yesterday evening  Context/Setting: fell after slipping getting out of shower  Quality: achy  Duration: several hours  Modifying Factors: worse with movement  Severity: moderate      REVIEW OF SYSTEMS       Review of Systems   Constitutional:  Negative for chills and fever.   HENT:  Negative for rhinorrhea and sore throat.    Respiratory:  Negative for cough and shortness of breath.    Cardiovascular:  Negative for chest pain and leg swelling.   Gastrointestinal:  Positive for abdominal pain. Negative for diarrhea, nausea and vomiting.   Genitourinary:  Positive for flank pain. Negative for difficulty urinating, dysuria, frequency and hematuria.

## 2024-12-18 NOTE — ED NOTES
Labeled blood specimens sent to lab via tube system.    [x] Lavender   [] on ice   [] Blue   [x] Green/yellow  [] Green/black [] on ice  [] Pink  [] Red  [] Yellow  [] on ice  [] Blood Cultures

## 2024-12-18 NOTE — ED NOTES
Pt to room 21 with c/o left flank pain. Pt reports that she slipped when she was getting out of the shower yesterday and landed on her left back. Pt reports that she has been having pain in the area since. Pt has bruising noted to area. Pt alert and oriented x4, talking in complete sentences, respirations even and unlabored. Pt acting age appropriate. Will continue to plan of care.  Pt placed on continuous pulse ox and bp.

## 2024-12-18 NOTE — PROGRESS NOTES
SPIRITUAL HEALTH   [facility]  PROGRESS NOTE    Shift date: 12.18.2024  Shift day: Wednesday   Shift # 1    Room # 21/21        Name: Lexie Mireles MRN: [id number]    Age: 53 y.o.     Sex: female   Language: English   Orthodoxy: None   <principal problem not specified>     Referral: Routine Visit    Date: 12/18/2024            Total Time Calculated: (P) 10 min              Spiritual Assessment began in [facility] EMERGENCY DEPARTMENT        Referral/Consult From: (P) Rounding   Encounter Overview/Reason: (P) Initial Encounter  Service Provided For: (P) Patient    Admit Date & Time: 12/18/2024  8:04 AM    Emergency Contacts Listed:  Extended Emergency Contact Information  Primary Emergency Contact: Pramod Mireles  Home Phone: [phone]  Mobile Phone: [phone]  Relation: Spouse  Secondary Emergency [place]: [place] [address]  Home Phone: [phone]  Relation: Parent      Assessment:  Lexie Mireles is a 53 y.o. female in the hospital because <principal problem not specified> and preparing for discharge.     Upon entering the room writer observes Patient appearing Calm and Coping.States that she is about to be discharged and reaching out to her  to pick her up from the hospital.      Perceived Need:  Receive care and concern and Recieve hospitality    Intervention:  Writer introduced self and title as  and provided Actively listened, Explored relational needs and resources, and Provided space for feelings, thoughts, and concerns.    Outcome:  Demonstrated caring concern     Plan:  Chaplains will follow up as needed       Electronically signed by EMILY Faulkner on 12/18/2024 at 11:02 AM  Spiritual Health  [facility]  [phone]     12/18/24 1101   Encounter Summary   Encounter Overview/Reason Initial Encounter   Service Provided For Patient   Referral/Consult From [facility]   Support System Spouse   Last Encounter  12/18/24

## 2024-12-18 NOTE — DISCHARGE INSTRUCTIONS
Read and follow all instructions.    Take medication as prescribed as needed for pain.    Return to the ER if symptoms worsen or if you become concerned for any reason.

## 2024-12-19 ENCOUNTER — CARE COORDINATION (OUTPATIENT)
Dept: CARE COORDINATION | Age: 54
End: 2024-12-19

## 2024-12-19 NOTE — CARE COORDINATION
Care Transitions Note    Follow Up Call     Attempted to reach patient for transitions of care follow up.  Unable to reach patient.      Outreach Attempts:   HIPAA compliant voicemail left for patient.     Care Summary Note: ED follow up attempt #1, left VM.     Follow Up Appointment:   Future Appointments         Provider Specialty Dept Phone    12/20/2024 9:00 AM Scarlet Sotomayor PT Physical Therapy [phone]    1/8/2025 7:45 AM Viktor Fox, APRN - CNP Primary Care [phone]    1/10/2025 9:00 AM Flo Alva PA Orthopedic Surgery [phone]    4/7/2025 2:10 PM Ruy Carroll MD Nephrology [phone]            Plan for follow-up on next business day.  based on severity of symptoms and risk factors. Plan for next call: symptom management-how is pain? Did she resume PT, PCP appt moved sooner? How are BP readings? ED follow up    Yumi Kam RN

## 2024-12-20 ENCOUNTER — CARE COORDINATION (OUTPATIENT)
Dept: CARE COORDINATION | Age: 54
End: 2024-12-20

## 2024-12-20 LAB
MICROORGANISM SPEC CULT: NORMAL
MICROORGANISM SPEC CULT: NORMAL
SERVICE CMNT-IMP: NORMAL
SERVICE CMNT-IMP: NORMAL
SPECIMEN DESCRIPTION: NORMAL
SPECIMEN DESCRIPTION: NORMAL

## 2024-12-20 NOTE — CARE COORDINATION
Care Transitions Note    Follow Up Call     Attempted to reach patient for transitions of care follow up.  Unable to reach patient.      Outreach Attempts:   Multiple attempts to contact patient at phone numbers on file.   HIPAA compliant voicemail left for patient.     Care Summary Note: Second attempt to reach for follow up/ ED follow up. Patient is RPM, will continue to follow under HR.     Follow Up Appointment:   Future Appointments         Provider Specialty Dept Phone    12/20/2024 9:00 AM Scarlet Sotomayor PT Physical Therapy [phone]    1/8/2025 7:45 AM Viktor Fox, APRN - CNP Primary Care [phone]    1/10/2025 9:00 AM Flo Alva PA Orthopedic Surgery [phone]    4/7/2025 2:10 PM Ruy Carroll MD Nephrology [phone]            Plan for follow-up call in 6-10 days based on severity of symptoms and risk factors. Plan for next call: referral to ambulatory care manager-Yumi Kam RN

## 2024-12-23 ENCOUNTER — CARE COORDINATION (OUTPATIENT)
Dept: OTHER | Facility: CLINIC | Age: 54
End: 2024-12-23

## 2024-12-23 NOTE — CARE COORDINATION
12/23/2024 2:31 PM  *  Unable to Reach Date/Time:  12/23/2024 2:32 PM  ACM attempted to reach patient by telephone regarding  welcome call and red alert  in remote patient monitoring program. Left HIPAA compliant message requesting a return call. Will attempt to reach patient again.       DONIS Walters, RN  Associate Care Manager   Cell: [phone]  [email or web address]

## 2024-12-23 NOTE — CARE COORDINATION
12/23/2024 3:18 PM  *  Unable to Reach Date/Time:  12/23/2024 3:18 PM  ACM attempted to reach patient by telephone regarding  welcome call  in remote patient monitoring program. Left HIPAA compliant message requesting a return call. Will attempt to reach patient again.       DONIS Walters, RN  Associate Care Manager   Cell: [phone]  [email or web address]

## 2024-12-26 ENCOUNTER — CARE COORDINATION (OUTPATIENT)
Dept: CASE MANAGEMENT | Age: 54
End: 2024-12-26

## 2024-12-26 NOTE — CARE COORDINATION
Date/Time:  12/26/2024 9:22 AM  LPN attempted to reach patient by telephone regarding red alert /109 in remote patient monitoring program. Left HIPAA compliant message requesting a return call. Will attempt to reach patient again.

## 2024-12-26 NOTE — CARE COORDINATION
Date/Time:  12/26/2024 2:31 PM  LPN attempted to reach patient by telephone regarding red alert /109 in remote patient monitoring program. Left HIPAA compliant message requesting a return call. Will attempt to reach patient again.  Second attempt at welcome call

## 2024-12-27 ENCOUNTER — HOSPITAL ENCOUNTER (EMERGENCY)
Age: 54
Discharge: HOME | End: 2024-12-27
Attending: EMERGENCY MEDICINE
Payer: MEDICARE

## 2024-12-27 ENCOUNTER — CARE COORDINATION (OUTPATIENT)
Dept: CARE COORDINATION | Age: 54
End: 2024-12-27

## 2024-12-27 ENCOUNTER — CARE COORDINATION (OUTPATIENT)
Dept: CASE MANAGEMENT | Age: 54
End: 2024-12-27

## 2024-12-27 VITALS
SYSTOLIC BLOOD PRESSURE: 149 MMHG | HEART RATE: 102 BPM | TEMPERATURE: 98.2 F | RESPIRATION RATE: 18 BRPM | DIASTOLIC BLOOD PRESSURE: 100 MMHG | OXYGEN SATURATION: 100 %

## 2024-12-27 DIAGNOSIS — M54.50 CHRONIC LEFT-SIDED LOW BACK PAIN WITHOUT SCIATICA: ICD-10-CM

## 2024-12-27 DIAGNOSIS — G89.29 CHRONIC LEFT-SIDED LOW BACK PAIN WITHOUT SCIATICA: ICD-10-CM

## 2024-12-27 DIAGNOSIS — S39.012A STRAIN OF LUMBAR REGION, INITIAL ENCOUNTER: Primary | ICD-10-CM

## 2024-12-27 LAB
ALBUMIN SERPL-MCNC: 4.2 G/DL (ref 3.5–5.2)
ALBUMIN/GLOB SERPL: 1.2 {RATIO} (ref 1–2.5)
ALP SERPL-CCNC: 149 U/L (ref 35–104)
ALT SERPL-CCNC: 20 U/L (ref 10–35)
ANION GAP SERPL CALCULATED.3IONS-SCNC: 14 MMOL/L (ref 9–16)
AST SERPL-CCNC: 20 U/L (ref 10–35)
BACTERIA URNS QL MICRO: ABNORMAL
BASOPHILS # BLD: 0.07 K/UL (ref 0–0.2)
BASOPHILS NFR BLD: 1 % (ref 0–2)
BILIRUB SERPL-MCNC: 0.3 MG/DL (ref 0–1.2)
BILIRUB UR QL STRIP: NEGATIVE
BUN SERPL-MCNC: 20 MG/DL (ref 6–20)
CALCIUM SERPL-MCNC: 9.7 MG/DL (ref 8.6–10.4)
CASTS #/AREA URNS LPF: ABNORMAL /LPF (ref 0–2)
CASTS #/AREA URNS LPF: ABNORMAL /LPF (ref 0–2)
CHLORIDE SERPL-SCNC: 102 MMOL/L (ref 98–107)
CLARITY UR: ABNORMAL
CO2 SERPL-SCNC: 22 MMOL/L (ref 20–31)
COLOR UR: YELLOW
CREAT SERPL-MCNC: 1.2 MG/DL (ref 0.6–0.9)
EOSINOPHIL # BLD: 0.17 K/UL (ref 0–0.44)
EOSINOPHILS RELATIVE PERCENT: 2 % (ref 1–4)
EPI CELLS #/AREA URNS HPF: ABNORMAL /HPF (ref 0–5)
ERYTHROCYTE [DISTWIDTH] IN BLOOD BY AUTOMATED COUNT: 15 % (ref 11.8–14.4)
GFR, ESTIMATED: 54 ML/MIN/1.73M2
GLUCOSE SERPL-MCNC: 234 MG/DL (ref 74–99)
GLUCOSE UR STRIP-MCNC: ABNORMAL MG/DL
HCT VFR BLD AUTO: 36.1 % (ref 36.3–47.1)
HGB BLD-MCNC: 11.7 G/DL (ref 11.9–15.1)
HGB UR QL STRIP.AUTO: NEGATIVE
IMM GRANULOCYTES # BLD AUTO: 0.05 K/UL (ref 0–0.3)
IMM GRANULOCYTES NFR BLD: 1 %
KETONES UR STRIP-MCNC: ABNORMAL MG/DL
LACTIC ACID, WHOLE BLOOD: 2.1 MMOL/L (ref 0.7–2.1)
LEUKOCYTE ESTERASE UR QL STRIP: NEGATIVE
LIPASE SERPL-CCNC: 24 U/L (ref 13–60)
LYMPHOCYTES NFR BLD: 3.21 K/UL (ref 1.1–3.7)
LYMPHOCYTES RELATIVE PERCENT: 30 % (ref 24–43)
MCH RBC QN AUTO: 26.1 PG (ref 25.2–33.5)
MCHC RBC AUTO-ENTMCNC: 32.4 G/DL (ref 28.4–34.8)
MCV RBC AUTO: 80.4 FL (ref 82.6–102.9)
MONOCYTES NFR BLD: 0.52 K/UL (ref 0.1–1.2)
MONOCYTES NFR BLD: 5 % (ref 3–12)
NEUTROPHILS NFR BLD: 61 % (ref 36–65)
NEUTS SEG NFR BLD: 6.75 K/UL (ref 1.5–8.1)
NITRITE UR QL STRIP: NEGATIVE
NRBC BLD-RTO: 0 PER 100 WBC
PH UR STRIP: 6.5 [PH] (ref 5–8)
PLATELET # BLD AUTO: 412 K/UL (ref 138–453)
PMV BLD AUTO: 10 FL (ref 8.1–13.5)
POTASSIUM SERPL-SCNC: 4.4 MMOL/L (ref 3.7–5.3)
PROT SERPL-MCNC: 7.7 G/DL (ref 6.6–8.7)
PROT UR STRIP-MCNC: ABNORMAL MG/DL
RBC # BLD AUTO: 4.49 M/UL (ref 3.95–5.11)
RBC # BLD: ABNORMAL 10*6/UL
RBC #/AREA URNS HPF: ABNORMAL /HPF (ref 0–2)
SODIUM SERPL-SCNC: 138 MMOL/L (ref 136–145)
SP GR UR STRIP: 1.03 (ref 1–1.03)
UROBILINOGEN UR STRIP-ACNC: NORMAL EU/DL (ref 0–1)
WBC #/AREA URNS HPF: ABNORMAL /HPF (ref 0–5)
WBC OTHER # BLD: 10.8 K/UL (ref 3.5–11.3)

## 2024-12-27 PROCEDURE — 80053 COMPREHEN METABOLIC PANEL: CPT

## 2024-12-27 PROCEDURE — 96374 THER/PROPH/DIAG INJ IV PUSH: CPT

## 2024-12-27 PROCEDURE — 6370000000 HC RX 637 (ALT 250 FOR IP)

## 2024-12-27 PROCEDURE — 83690 ASSAY OF LIPASE: CPT

## 2024-12-27 PROCEDURE — 81001 URINALYSIS AUTO W/SCOPE: CPT

## 2024-12-27 PROCEDURE — 83605 ASSAY OF LACTIC ACID: CPT

## 2024-12-27 PROCEDURE — 96375 TX/PRO/DX INJ NEW DRUG ADDON: CPT

## 2024-12-27 PROCEDURE — 6360000002 HC RX W HCPCS

## 2024-12-27 PROCEDURE — 99284 EMERGENCY DEPT VISIT MOD MDM: CPT

## 2024-12-27 PROCEDURE — 85025 COMPLETE CBC W/AUTO DIFF WBC: CPT

## 2024-12-27 RX ORDER — LIDOCAINE 4 G/G
1 PATCH TOPICAL ONCE
Status: DISCONTINUED | OUTPATIENT
Start: 2024-12-27 | End: 2024-12-28 | Stop reason: HOSPADM

## 2024-12-27 RX ORDER — CYCLOBENZAPRINE HCL 5 MG
5 TABLET ORAL 3 TIMES DAILY PRN
Qty: 30 TABLET | Refills: 0 | Status: SHIPPED | OUTPATIENT
Start: 2024-12-27 | End: 2025-01-06

## 2024-12-27 RX ORDER — DEXAMETHASONE SODIUM PHOSPHATE 10 MG/ML
10 INJECTION, SOLUTION INTRAMUSCULAR; INTRAVENOUS ONCE
Status: COMPLETED | OUTPATIENT
Start: 2024-12-27 | End: 2024-12-27

## 2024-12-27 RX ORDER — FENTANYL CITRATE 50 UG/ML
50 INJECTION, SOLUTION INTRAMUSCULAR; INTRAVENOUS ONCE
Status: COMPLETED | OUTPATIENT
Start: 2024-12-27 | End: 2024-12-27

## 2024-12-27 RX ORDER — CYCLOBENZAPRINE HCL 10 MG
10 TABLET ORAL ONCE
Status: COMPLETED | OUTPATIENT
Start: 2024-12-27 | End: 2024-12-27

## 2024-12-27 RX ORDER — LIDOCAINE HYDROCHLORIDE 40 MG/ML
SOLUTION TOPICAL
Qty: 10 EACH | Refills: 1 | Status: SHIPPED | OUTPATIENT
Start: 2024-12-27 | End: 2025-01-03

## 2024-12-27 RX ORDER — KETOROLAC TROMETHAMINE 30 MG/ML
30 INJECTION, SOLUTION INTRAMUSCULAR; INTRAVENOUS ONCE
Status: COMPLETED | OUTPATIENT
Start: 2024-12-27 | End: 2024-12-27

## 2024-12-27 RX ADMIN — FENTANYL CITRATE 50 MCG: 50 INJECTION, SOLUTION INTRAMUSCULAR; INTRAVENOUS at 22:05

## 2024-12-27 RX ADMIN — CYCLOBENZAPRINE 10 MG: 10 TABLET, FILM COATED ORAL at 22:26

## 2024-12-27 RX ADMIN — DEXAMETHASONE SODIUM PHOSPHATE 10 MG: 10 INJECTION INTRAMUSCULAR; INTRAVENOUS at 22:26

## 2024-12-27 RX ADMIN — KETOROLAC TROMETHAMINE 30 MG: 30 INJECTION, SOLUTION INTRAMUSCULAR; INTRAVENOUS at 22:26

## 2024-12-27 ASSESSMENT — PAIN SCALES - GENERAL: PAINLEVEL_OUTOF10: 10

## 2024-12-27 ASSESSMENT — PAIN DESCRIPTION - LOCATION: LOCATION: BACK

## 2024-12-27 NOTE — CARE COORDINATION
Date/Time:  12/27/2024 9:58 AM  LPN attempted to reach patient by telephone regarding red alert in remote patient monitoring program. Left HIPAA compliant message requesting a return call. Will attempt to reach patient again.    RPM red alert for /116

## 2024-12-27 NOTE — CARE COORDINATION
Date/Time:  12/27/2024 2:37 PM  LPN attempted to reach patient by telephone regarding red alert in remote patient monitoring program. Left HIPAA compliant message requesting a return call. Will attempt to reach patient again.    RPM red alert for /116 and 212/127     Attempt x 2. No answer. No return call    Update to [facility]

## 2024-12-27 NOTE — CARE COORDINATION
Ambulatory Care Coordination Note     12/27/2024 11:10 AM     Patient and Spouse/Partner outreach attempt by this ACM today to offer care management services. ACM was unable to reach the patient, spouse/partner  by telephone today;   left voice message requesting a return phone call to this ACM.     ACM: Yumi Kam RN     Care Summary Note: Left VM for both patient and spouse, CTN contact number provided    PCP/Specialist follow up:   Future Appointments         Provider Specialty Dept Phone    1/8/2025 7:45 AM Viktor Fox, APRN - [facility] Primary Care [phone]    1/10/2025 9:00 AM Flo Alva PA Orthopedic Surgery [phone]    4/7/2025 2:10 PM Ruy Carroll MD Nephrology [phone]            Follow Up:   Plan for next AC outreach in approximately 1 week to complete:  - outreach attempt to offer care management services  - RPM.

## 2024-12-28 NOTE — ED PROVIDER NOTES
[facility] EMERGENCY DEPARTMENT  Emergency Department Encounter  Emergency Medicine Resident     Pt Name:Lexie Mireles  MRN: [id number]  Birthdate 1970  Date of evaluation: 12/27/24  PCP:  Viktor Fox APRN - CNP  Note Started: 9:49 PM EST      CHIEF COMPLAINT       Chief Complaint   Patient presents with    Back Pain       HISTORY OF PRESENT ILLNESS  (Location/Symptom, Timing/Onset, Context/Setting, Quality, Duration, Modifying Factors, Severity.)      Lexie Mireles is a  54-year-old female that presents to the ED with complaints of acute on chronic left lower back pain.  Patient states that this pain has been continuing since she was last evaluated.  She denies any traumatic fall or injury that worsen the symptoms.  Denies any history of kidney stones.  Denies any radiating pain into her groin, dysuria, hematuria or increased frequency.  She denies any numbness or weakening in her bilateral lower extremities.  No reported vaginal discharge or vaginal bleeding.  Patient is been taking prescribed gabapentin and Tylenol/ibuprofen for pain control with some mild improvement.    Per chart review, patient has had extensive workup including a negative CT abdomen pelvis IV contrast as well as an MRI of the lumbar spine that showed no stenosis or degenerative changes or acute pathology.    Patient has no history of IV drug use.  Does not use recreational drugs at this time.  Patient only drinks socially.  No tobacco use reported.  No constipation or diarrhea    PAST MEDICAL / SURGICAL / SOCIAL / FAMILY HISTORY      has a past medical history of Asthma, Chronic back pain, GERD (gastroesophageal reflux disease), HLD (hyperlipidemia), Hypertension, Irregular periods/menstrual cycles, Neuropathy, Obesity, Type II or unspecified type diabetes mellitus without mention of complication, not stated as uncontrolled, and Wears glasses.       has a past surgical history that includes Tubal ligation (2008);  [AS]   2255 Epithelial Cells, UA: 10 TO 20  Urinalysis only shows a few bacteria.  There appears to be skin contamination.  No leukocyte esterase or nitrites.  Red blood cell count is within reference range.  No indication of nephrolithiasis tumbling down the ureter [AS]   2259 This patient.  Patient resting more comfortably in bed.  Patient states she has had improvement in her back pain but still has some discomfort on the flank and side.    Will apply a topical lidocaine patch.  Discussed with patient that her workup is negative for any acute abnormality and complete resolution of her pain may not be possible due to the hyperstimulation her nerves have been experiencing over the last few weeks [AS]   2318 Pulse(!): 102  Improvement  [AS]   2321 Will discharge patient with return precautions and multi-modality pain medication to help alleviate her pain / make it more manageable.     Will provide PCP referral if patient does not have one. [AS]   2329 Patient has an appointment with her PCP on 1/6/2025 [AS]      ED Course User Index  [AS] Wilton Velazco, DO       PROCEDURES:    CONSULTS:  None    CRITICAL CARE:  There was significant risk of life threatening deterioration of patient's condition requiring my direct management. Critical care time  minutes, excluding any documented procedures.    FINAL IMPRESSION      1. Strain of lumbar region, initial encounter    2. Chronic left-sided low back pain without sciatica          DISPOSITION / PLAN     DISPOSITION Decision To Discharge 12/27/2024 11:30:21 PM   DISPOSITION CONDITION Stable           PATIENT REFERRED TO:  Viktor Fox, APRN - CNP  [address] [address]  [phone]    Schedule an appointment as soon as possible for a visit in 3 days  For re-evaluation      DISCHARGE MEDICATIONS:  Discharge Medication List as of 12/27/2024 11:33 PM        START taking these medications    Details   cyclobenzaprine (FLEXERIL) 5 MG tablet Take 1

## 2024-12-28 NOTE — DISCHARGE INSTRUCTIONS
-You were seen and evaluated by emergency medicine physicians at [facility].    -Please follow-up with your primary care physician and/or with the referrals to specialist.    -You were diagnosed with: Strain of lumbar region, chronic left-sided lower back pain    -Lab work was negative for any acute abnormality.  You have had extensive imaging of your lumbar spine as well as your abdomen pelvis with no acute findings.  You had moderate improvement in your pain.  Please call your PCP to schedule an earlier appointment.  Multimodal pain therapy has been sent to your pharmacy.  Please take with your other prescribed medications.  Do not operate a motor vehicle or heavy machinery after taking the muscle relaxant.    -Please return to the Emergency Department if you are experiencing the following symptoms acutely: Flank pain and back pain, headache, fever, chills, nausea, vomiting, chest pain, shortness of breath, abdominal pain, change with urination, change with bowel movements, change in your skin/hair/nail, weakness, fatigue, altered mental status and/or any change from baseline health.    -Thank you for coming to [facility].

## 2024-12-28 NOTE — ED NOTES
Pt presents to the ER via triage in wheelchair. Pt c/o lower middle back pain radiating to her L. Side and down her L. Hip. Pt states no new injury and has been going on for a couple of weeks now. Pt states the pain is getting worse. Pt A&O x4, in NAD. IV established, labs drawn.

## 2024-12-28 NOTE — ED NOTES
The following labs were labeled with appropriate pt sticker and tubed to lab:     [] Blue     [x] Lavender   [] on ice  [x] Green/yellow  [x] Green/black [x] on ice  [] Grey  [] on ice  [] Yellow  [] Red  [] Pink  [] Type/ Screen  [] ABG  [] VBG    [] COVID-19 swab    [] Rapid  [] PCR  [] Flu swab  [] Peds Viral Panel     [] Urine Sample  [] Fecal Sample  [] Pelvic Cultures  [] Blood Cultures  [] X 2  [] STREP Cultures  [] Wound Cultures

## 2024-12-30 ENCOUNTER — CARE COORDINATION (OUTPATIENT)
Dept: CARE COORDINATION | Age: 54
End: 2024-12-30

## 2024-12-30 VITALS — SYSTOLIC BLOOD PRESSURE: 171 MMHG | DIASTOLIC BLOOD PRESSURE: 124 MMHG | HEART RATE: 119 BPM | OXYGEN SATURATION: 98 %

## 2024-12-30 NOTE — CARE COORDINATION
Ambulatory Care Coordination Note     12/30/2024 9:20 AM     Patient outreach attempt by this ACM today to offer care management services and perform hospital follow up. ACM was unable to reach the patient by telephone today;   left voice message requesting a return phone call to this ACM.     ACM: Yumi Kam RN     Care Summary Note: First attempt ED follow up, left VM    PCP/Specialist follow up:   Future Appointments         Provider Specialty Dept Phone    1/8/2025 7:45 AM Viktor Fox, APRN - [facility] Primary Care [phone]    1/10/2025 9:00 AM Flo Alva PA Orthopedic Surgery [phone]    4/7/2025 2:10 PM Ruy Carroll MD Nephrology [phone]            Follow Up:   Plan for next AC outreach in approximately  Next business day  to complete:  - outreach attempt to offer care management services  ED follow up  RPM.

## 2024-12-31 ENCOUNTER — CARE COORDINATION (OUTPATIENT)
Dept: CARE COORDINATION | Age: 54
End: 2024-12-31

## 2024-12-31 NOTE — CARE COORDINATION
Ambulatory Care Coordination Note     12/31/2024 2:25 PM     Patient outreach attempt by this ACM today to offer care management services and perform hospital follow up. ACM was unable to reach the patient by telephone today;   left voice message requesting a return phone call to this ACM.     ACM: Yumi Kam RN     Care Summary Note: 2nd attempt ED follow up, left VM. [facility]    PCP/Specialist follow up:   Future Appointments         Provider Specialty Dept Phone    1/8/2025 7:45 AM Viktor Fox, APRN - [facility] Primary Care [phone]    1/10/2025 9:00 AM Flo Alva PA Orthopedic Surgery [phone]    4/7/2025 2:10 PM Ruy Carroll MD Nephrology [phone]            Follow Up:   Plan for next AC outreach in approximately 2 weeks to complete:  - outreach attempt to offer care management services  - RPM.

## 2025-01-02 ENCOUNTER — CARE COORDINATION (OUTPATIENT)
Dept: CASE MANAGEMENT | Age: 55
End: 2025-01-02

## 2025-01-02 NOTE — CARE COORDINATION
Date/Time:  1/2/2025 2:49 PM  LPN attempted to reach patient by telephone regarding yellow alert no metrics in 5 days  in remote patient monitoring program. Left HIPAA compliant message requesting a return call. Will attempt to reach patient again.  Notified ACM

## 2025-01-06 LAB — NONINV COLON CA DNA+OCC BLD SCRN STL QL: NORMAL

## 2025-01-08 ENCOUNTER — OFFICE VISIT (OUTPATIENT)
Dept: PRIMARY CARE CLINIC | Age: 55
End: 2025-01-08
Payer: MEDICARE

## 2025-01-08 VITALS
HEIGHT: 59 IN | WEIGHT: 133 LBS | OXYGEN SATURATION: 98 % | TEMPERATURE: 97.6 F | HEART RATE: 107 BPM | BODY MASS INDEX: 26.81 KG/M2 | SYSTOLIC BLOOD PRESSURE: 169 MMHG | DIASTOLIC BLOOD PRESSURE: 105 MMHG

## 2025-01-08 DIAGNOSIS — Z09 HOSPITAL DISCHARGE FOLLOW-UP: ICD-10-CM

## 2025-01-08 DIAGNOSIS — M54.50 LUMBAR PAIN: Primary | ICD-10-CM

## 2025-01-08 DIAGNOSIS — R10.9 LEFT FLANK PAIN: ICD-10-CM

## 2025-01-08 DIAGNOSIS — I10 HTN (HYPERTENSION), BENIGN: ICD-10-CM

## 2025-01-08 PROCEDURE — 1111F DSCHRG MED/CURRENT MED MERGE: CPT | Performed by: NURSE PRACTITIONER

## 2025-01-08 PROCEDURE — 3077F SYST BP >= 140 MM HG: CPT | Performed by: NURSE PRACTITIONER

## 2025-01-08 PROCEDURE — 99215 OFFICE O/P EST HI 40 MIN: CPT | Performed by: NURSE PRACTITIONER

## 2025-01-08 PROCEDURE — 3080F DIAST BP >= 90 MM HG: CPT | Performed by: NURSE PRACTITIONER

## 2025-01-08 RX ORDER — AMLODIPINE BESYLATE 10 MG/1
10 TABLET ORAL DAILY
Qty: 90 TABLET | Refills: 1 | Status: SHIPPED | OUTPATIENT
Start: 2025-01-08

## 2025-01-08 SDOH — ECONOMIC STABILITY: FOOD INSECURITY: WITHIN THE PAST 12 MONTHS, THE FOOD YOU BOUGHT JUST DIDN'T LAST AND YOU DIDN'T HAVE MONEY TO GET MORE.: NEVER TRUE

## 2025-01-08 SDOH — ECONOMIC STABILITY: FOOD INSECURITY: WITHIN THE PAST 12 MONTHS, YOU WORRIED THAT YOUR FOOD WOULD RUN OUT BEFORE YOU GOT MONEY TO BUY MORE.: NEVER TRUE

## 2025-01-08 ASSESSMENT — PATIENT HEALTH QUESTIONNAIRE - PHQ9
1. LITTLE INTEREST OR PLEASURE IN DOING THINGS: NOT AT ALL
SUM OF ALL RESPONSES TO PHQ QUESTIONS 1-9: 0
SUM OF ALL RESPONSES TO PHQ QUESTIONS 1-9: 0
2. FEELING DOWN, DEPRESSED OR HOPELESS: NOT AT ALL
SUM OF ALL RESPONSES TO PHQ9 QUESTIONS 1 & 2: 0
SUM OF ALL RESPONSES TO PHQ QUESTIONS 1-9: 0
SUM OF ALL RESPONSES TO PHQ QUESTIONS 1-9: 0

## 2025-01-08 NOTE — PROGRESS NOTES
Post-Discharge Transitional Care Management Progress Note      Lexie Mireles   YOB: 1970    Date of Office Visit:  1/8/2025  Date of Hospital Admission: 12/30  Date of Hospital Discharge: 12/31    Care management risk score Rising risk (score 2-5) and Complex Care (Scores >=6): No Risk Score On File     Non face to face  following discharge, date last encounter closed (first attempt may have been earlier): *No documented post hospital discharge outreach found in the last 14 days *No documented post hospital discharge outreach found in the last 14 days    Call initiated 2 business days of discharge: *No response recorded in the last 14 days    ASSESSMENT/PLAN:   Lumbar pain  -     [facility] Physical Therapy - [facility]  Left flank pain  -     [facility] Physical [facility] discharge follow-up  -     [place] DISCHARGE MEDS RECONCILED W/ CURRENT OUTPATIENT MED LIST  HTN (hypertension), benign  -     amLODIPine (NORVASC) 10 MG tablet; Take 1 tablet by mouth daily, Disp-90 tablet, R-1Normal      Medical Decision Making: moderate complexity  Return in about 6 weeks (around 2/19/2025) for Diabetes.         Subjective:   HPI:  Follow up of Hospital problems/diagnosis(es): per hospital dc summary:  54-year-old female with past medical history of hypertension, diabetes mellitus type 2, asthma, previous tubal ligation, flank pain, denies history of kidney stones, denies history of PE, DVT, MI, presents for complaint of left flank pain for several weeks. Patient reports she has been seen multiple times for the same thing. Patient reports she has had multiple imaging which does not show the cause of her flank pain. Patient reports she took Tylenol and gabapentin 4 PM for her flank pain. Patient reports she has Flexeril prescribed but has not taken any in the last few days because she has not picked up her new prescription. Patient denies fall trauma or injury no numbness tingling or weakness no loss

## 2025-01-09 ENCOUNTER — CARE COORDINATION (OUTPATIENT)
Dept: CARE COORDINATION | Age: 55
End: 2025-01-09

## 2025-01-09 ENCOUNTER — CARE COORDINATION (OUTPATIENT)
Dept: PRIMARY CARE CLINIC | Age: 55
End: 2025-01-09

## 2025-01-09 DIAGNOSIS — I10 HYPERTENSION, ESSENTIAL: Primary | ICD-10-CM

## 2025-01-09 NOTE — CARE COORDINATION
Care Transitions Note    Follow Up Call     Attempted to reach patient for transitions of care follow up.  Unable to reach patient.      Outreach Attempts:   Multiple attempts to contact patient, spouse/partner  at phone numbers on file.   HIPAA compliant voicemail left for patient.     Care Summary Note: 5th attempt outreach to patient, no response on letter sent or multiple messages for patient or spouse. Closing for [facility] and will dis-enroll from [facility]    Follow Up Appointment:   Future Appointments         Provider Specialty Dept Phone    1/10/2025 9:00 AM Flo Alva PA Orthopedic Surgery [phone]    2/19/2025 7:45 AM Viktor Fox, APRN - CNP Primary Care [phone]    4/7/2025 2:10 PM Ruy Carroll MD Nephrology [phone]            No further follow-up call indicated based on severity of symptoms and risk factors. Plan for next call:  padmini Kam RN

## 2025-01-09 NOTE — CARE COORDINATION
Patient Lexie Mireles  01/09/25     Care Coordination  placed call to patient to arrange RPM kit  through [employer]. Left HIPAA Compliant Message     provided return and how to pack equipment in original packing via the patients voicemail if available and provided call back number should patient have questions.    Patient made aware [employer] will  equipment in 2-4 days.

## 2025-01-09 NOTE — PROGRESS NOTES
Remote Patient Order Discontinued    Received request from Genesis Kam, RN   to discontinue order for remote patient monitoring of HTN and order completed.

## 2025-01-25 ENCOUNTER — HOSPITAL ENCOUNTER (INPATIENT)
Age: 55
LOS: 4 days | Discharge: HOME | DRG: 638 | End: 2025-01-29
Attending: EMERGENCY MEDICINE | Admitting: INTERNAL MEDICINE
Payer: MEDICARE

## 2025-01-25 ENCOUNTER — APPOINTMENT (OUTPATIENT)
Dept: CT IMAGING | Age: 55
DRG: 638 | End: 2025-01-25
Payer: MEDICARE

## 2025-01-25 DIAGNOSIS — E11.65 TYPE 2 DIABETES MELLITUS WITH HYPERGLYCEMIA, WITH LONG-TERM CURRENT USE OF INSULIN (HCC): ICD-10-CM

## 2025-01-25 DIAGNOSIS — R11.2 NAUSEA AND VOMITING, UNSPECIFIED VOMITING TYPE: Primary | ICD-10-CM

## 2025-01-25 DIAGNOSIS — E86.0 DEHYDRATION: ICD-10-CM

## 2025-01-25 DIAGNOSIS — Z79.4 TYPE 2 DIABETES MELLITUS WITH HYPERGLYCEMIA, WITH LONG-TERM CURRENT USE OF INSULIN (HCC): ICD-10-CM

## 2025-01-25 PROBLEM — R73.9 HYPERGLYCEMIA: Status: ACTIVE | Noted: 2025-01-25

## 2025-01-25 LAB
ALBUMIN SERPL-MCNC: 3.7 G/DL (ref 3.5–5.2)
ALBUMIN/GLOB SERPL: 1.1 {RATIO} (ref 1–2.5)
ALP SERPL-CCNC: 138 U/L (ref 35–104)
ALT SERPL-CCNC: 15 U/L (ref 10–35)
ANION GAP SERPL CALCULATED.3IONS-SCNC: 19 MMOL/L (ref 9–16)
AST SERPL-CCNC: 15 U/L (ref 10–35)
B-OH-BUTYR SERPL-MCNC: 1.94 MMOL/L (ref 0.02–0.27)
BASOPHILS # BLD: 0.05 K/UL (ref 0–0.2)
BASOPHILS NFR BLD: 0 % (ref 0–2)
BILIRUB SERPL-MCNC: 0.4 MG/DL (ref 0–1.2)
BODY TEMPERATURE: 37
BUN BLD-MCNC: 24 MG/DL (ref 8–26)
BUN SERPL-MCNC: 24 MG/DL (ref 6–20)
CA-I BLD-SCNC: 1.2 MMOL/L (ref 1.15–1.33)
CALCIUM SERPL-MCNC: 9.4 MG/DL (ref 8.6–10.4)
CHLORIDE BLD-SCNC: 91 MMOL/L (ref 98–107)
CHLORIDE SERPL-SCNC: 89 MMOL/L (ref 98–107)
CO2 BLD CALC-SCNC: 31 MMOL/L (ref 22–30)
CO2 SERPL-SCNC: 21 MMOL/L (ref 20–31)
COHGB MFR BLD: 0 % (ref 0–5)
CREAT SERPL-MCNC: 1.4 MG/DL (ref 0.6–0.9)
EGFR, POC: 49 ML/MIN/1.73M2
EOSINOPHIL # BLD: 0.09 K/UL (ref 0–0.44)
EOSINOPHILS RELATIVE PERCENT: 1 % (ref 1–4)
ERYTHROCYTE [DISTWIDTH] IN BLOOD BY AUTOMATED COUNT: 14 % (ref 11.8–14.4)
FIO2 ON VENT: NORMAL %
GFR, ESTIMATED: 45 ML/MIN/1.73M2
GLUCOSE BLD-MCNC: 276 MG/DL (ref 65–105)
GLUCOSE BLD-MCNC: 480 MG/DL (ref 65–105)
GLUCOSE BLD-MCNC: >700 MG/DL (ref 74–100)
GLUCOSE SERPL-MCNC: 597 MG/DL (ref 74–99)
HCO3 VENOUS: 26.6 MMOL/L (ref 24–30)
HCO3, MIXED: 32 MMOL/L (ref 23–29)
HCT VFR BLD AUTO: 33.9 % (ref 36.3–47.1)
HCT VFR BLD AUTO: 42 % (ref 36–46)
HGB BLD-MCNC: 11.3 G/DL (ref 11.9–15.1)
IMM GRANULOCYTES # BLD AUTO: 0.09 K/UL (ref 0–0.3)
IMM GRANULOCYTES NFR BLD: 1 %
LIPASE SERPL-CCNC: 53 U/L (ref 13–60)
LYMPHOCYTES NFR BLD: 1.96 K/UL (ref 1.1–3.7)
LYMPHOCYTES RELATIVE PERCENT: 16 % (ref 24–43)
MCH RBC QN AUTO: 26.5 PG (ref 25.2–33.5)
MCHC RBC AUTO-ENTMCNC: 33.3 G/DL (ref 28.4–34.8)
MCV RBC AUTO: 79.4 FL (ref 82.6–102.9)
MONOCYTES NFR BLD: 0.56 K/UL (ref 0.1–1.2)
MONOCYTES NFR BLD: 5 % (ref 3–12)
NEUTROPHILS NFR BLD: 77 % (ref 36–65)
NEUTS SEG NFR BLD: 9.43 K/UL (ref 1.5–8.1)
NRBC BLD-RTO: 0 PER 100 WBC
O2 SAT, MIXED: 89.1 % (ref 60–80)
O2 SAT, VEN: 82.4 % (ref 60–85)
PCO2 MIXED: 36.1 MM HG (ref 42–52)
PCO2 VENOUS: 42.2 MM HG (ref 39–55)
PH VENOUS: 7.42 (ref 7.32–7.42)
PH, MIXED: 7.56 (ref 7.31–7.41)
PLATELET # BLD AUTO: 367 K/UL (ref 138–453)
PMV BLD AUTO: 11.1 FL (ref 8.1–13.5)
PO2 MIXED: 48.8 MM HG (ref 35–45)
PO2 VENOUS: 45.7 MM HG (ref 30–50)
POC ANION GAP: 12 MMOL/L (ref 7–16)
POC CREATININE: 1.3 MG/DL (ref 0.51–1.19)
POC HEMOGLOBIN (CALC): 14.2 G/DL (ref 12–16)
POC LACTIC ACID: 1.9 MMOL/L (ref 0.56–1.39)
POSITIVE BASE EXCESS, MIXED: 9.3 MMOL/L (ref 0–3)
POSITIVE BASE EXCESS, VEN: 1.9 MMOL/L (ref 0–2)
POTASSIUM BLD-SCNC: 4.7 MMOL/L (ref 3.5–4.5)
POTASSIUM SERPL-SCNC: 4 MMOL/L (ref 3.7–5.3)
PROT SERPL-MCNC: 7.1 G/DL (ref 6.6–8.7)
RBC # BLD AUTO: 4.27 M/UL (ref 3.95–5.11)
RBC # BLD: ABNORMAL 10*6/UL
SODIUM BLD-SCNC: 133 MMOL/L (ref 138–146)
SODIUM SERPL-SCNC: 129 MMOL/L (ref 136–145)
WBC OTHER # BLD: 12.2 K/UL (ref 3.5–11.3)

## 2025-01-25 PROCEDURE — 2060000000 HC ICU INTERMEDIATE R&B

## 2025-01-25 PROCEDURE — 82565 ASSAY OF CREATININE: CPT

## 2025-01-25 PROCEDURE — 6360000002 HC RX W HCPCS: Performed by: NURSE PRACTITIONER

## 2025-01-25 PROCEDURE — 36415 COLL VENOUS BLD VENIPUNCTURE: CPT

## 2025-01-25 PROCEDURE — 82010 KETONE BODYS QUAN: CPT

## 2025-01-25 PROCEDURE — 82330 ASSAY OF CALCIUM: CPT

## 2025-01-25 PROCEDURE — 80051 ELECTROLYTE PANEL: CPT

## 2025-01-25 PROCEDURE — 93005 ELECTROCARDIOGRAM TRACING: CPT | Performed by: STUDENT IN AN ORGANIZED HEALTH CARE EDUCATION/TRAINING PROGRAM

## 2025-01-25 PROCEDURE — 96372 THER/PROPH/DIAG INJ SC/IM: CPT

## 2025-01-25 PROCEDURE — 6370000000 HC RX 637 (ALT 250 FOR IP): Performed by: STUDENT IN AN ORGANIZED HEALTH CARE EDUCATION/TRAINING PROGRAM

## 2025-01-25 PROCEDURE — 6360000004 HC RX CONTRAST MEDICATION: Performed by: STUDENT IN AN ORGANIZED HEALTH CARE EDUCATION/TRAINING PROGRAM

## 2025-01-25 PROCEDURE — 96374 THER/PROPH/DIAG INJ IV PUSH: CPT

## 2025-01-25 PROCEDURE — 83605 ASSAY OF LACTIC ACID: CPT

## 2025-01-25 PROCEDURE — 80053 COMPREHEN METABOLIC PANEL: CPT

## 2025-01-25 PROCEDURE — 83690 ASSAY OF LIPASE: CPT

## 2025-01-25 PROCEDURE — 93005 ELECTROCARDIOGRAM TRACING: CPT | Performed by: EMERGENCY MEDICINE

## 2025-01-25 PROCEDURE — 82803 BLOOD GASES ANY COMBINATION: CPT

## 2025-01-25 PROCEDURE — 96361 HYDRATE IV INFUSION ADD-ON: CPT

## 2025-01-25 PROCEDURE — 96375 TX/PRO/DX INJ NEW DRUG ADDON: CPT

## 2025-01-25 PROCEDURE — 74177 CT ABD & PELVIS W/CONTRAST: CPT

## 2025-01-25 PROCEDURE — 85025 COMPLETE CBC W/AUTO DIFF WBC: CPT

## 2025-01-25 PROCEDURE — 6360000002 HC RX W HCPCS: Performed by: STUDENT IN AN ORGANIZED HEALTH CARE EDUCATION/TRAINING PROGRAM

## 2025-01-25 PROCEDURE — 82805 BLOOD GASES W/O2 SATURATION: CPT

## 2025-01-25 PROCEDURE — 82947 ASSAY GLUCOSE BLOOD QUANT: CPT

## 2025-01-25 PROCEDURE — 99285 EMERGENCY DEPT VISIT HI MDM: CPT

## 2025-01-25 PROCEDURE — 2580000003 HC RX 258: Performed by: STUDENT IN AN ORGANIZED HEALTH CARE EDUCATION/TRAINING PROGRAM

## 2025-01-25 PROCEDURE — 85014 HEMATOCRIT: CPT

## 2025-01-25 PROCEDURE — 84520 ASSAY OF UREA NITROGEN: CPT

## 2025-01-25 RX ORDER — FENTANYL CITRATE 50 UG/ML
25 INJECTION, SOLUTION INTRAMUSCULAR; INTRAVENOUS
Status: DISCONTINUED | OUTPATIENT
Start: 2025-01-25 | End: 2025-01-26

## 2025-01-25 RX ORDER — SODIUM CHLORIDE 9 MG/ML
INJECTION, SOLUTION INTRAVENOUS CONTINUOUS
Status: DISCONTINUED | OUTPATIENT
Start: 2025-01-25 | End: 2025-01-28

## 2025-01-25 RX ORDER — ACETAMINOPHEN 650 MG/1
650 SUPPOSITORY RECTAL EVERY 6 HOURS PRN
Status: DISCONTINUED | OUTPATIENT
Start: 2025-01-25 | End: 2025-01-29 | Stop reason: HOSPADM

## 2025-01-25 RX ORDER — 0.9 % SODIUM CHLORIDE 0.9 %
1000 INTRAVENOUS SOLUTION INTRAVENOUS ONCE
Status: COMPLETED | OUTPATIENT
Start: 2025-01-25 | End: 2025-01-25

## 2025-01-25 RX ORDER — POTASSIUM CHLORIDE 1500 MG/1
40 TABLET, EXTENDED RELEASE ORAL PRN
Status: DISCONTINUED | OUTPATIENT
Start: 2025-01-25 | End: 2025-01-29 | Stop reason: HOSPADM

## 2025-01-25 RX ORDER — INSULIN LISPRO 100 [IU]/ML
0-16 INJECTION, SOLUTION INTRAVENOUS; SUBCUTANEOUS EVERY 4 HOURS
Status: DISCONTINUED | OUTPATIENT
Start: 2025-01-25 | End: 2025-01-26

## 2025-01-25 RX ORDER — SODIUM CHLORIDE 0.9 % (FLUSH) 0.9 %
5-40 SYRINGE (ML) INJECTION PRN
Status: DISCONTINUED | OUTPATIENT
Start: 2025-01-25 | End: 2025-01-29 | Stop reason: HOSPADM

## 2025-01-25 RX ORDER — FENTANYL CITRATE 50 UG/ML
50 INJECTION, SOLUTION INTRAMUSCULAR; INTRAVENOUS ONCE
Status: COMPLETED | OUTPATIENT
Start: 2025-01-25 | End: 2025-01-25

## 2025-01-25 RX ORDER — SODIUM CHLORIDE 9 MG/ML
INJECTION, SOLUTION INTRAVENOUS PRN
Status: DISCONTINUED | OUTPATIENT
Start: 2025-01-25 | End: 2025-01-29 | Stop reason: HOSPADM

## 2025-01-25 RX ORDER — ONDANSETRON 4 MG/1
4 TABLET, ORALLY DISINTEGRATING ORAL ONCE
Status: COMPLETED | OUTPATIENT
Start: 2025-01-25 | End: 2025-01-25

## 2025-01-25 RX ORDER — ENOXAPARIN SODIUM 100 MG/ML
40 INJECTION SUBCUTANEOUS DAILY
Status: DISCONTINUED | OUTPATIENT
Start: 2025-01-26 | End: 2025-01-25

## 2025-01-25 RX ORDER — GLUCAGON 1 MG/ML
1 KIT INJECTION PRN
Status: DISCONTINUED | OUTPATIENT
Start: 2025-01-25 | End: 2025-01-26 | Stop reason: SDUPTHER

## 2025-01-25 RX ORDER — INSULIN LISPRO 100 [IU]/ML
5 INJECTION, SOLUTION INTRAVENOUS; SUBCUTANEOUS ONCE
Status: COMPLETED | OUTPATIENT
Start: 2025-01-25 | End: 2025-01-25

## 2025-01-25 RX ORDER — ACETAMINOPHEN 325 MG/1
650 TABLET ORAL EVERY 6 HOURS PRN
Status: DISCONTINUED | OUTPATIENT
Start: 2025-01-25 | End: 2025-01-29 | Stop reason: HOSPADM

## 2025-01-25 RX ORDER — DEXTROSE MONOHYDRATE 100 MG/ML
INJECTION, SOLUTION INTRAVENOUS CONTINUOUS PRN
Status: DISCONTINUED | OUTPATIENT
Start: 2025-01-25 | End: 2025-01-26

## 2025-01-25 RX ORDER — IOPAMIDOL 755 MG/ML
75 INJECTION, SOLUTION INTRAVASCULAR
Status: COMPLETED | OUTPATIENT
Start: 2025-01-25 | End: 2025-01-25

## 2025-01-25 RX ORDER — HEPARIN SODIUM 5000 [USP'U]/ML
5000 INJECTION, SOLUTION INTRAVENOUS; SUBCUTANEOUS EVERY 8 HOURS SCHEDULED
Status: DISCONTINUED | OUTPATIENT
Start: 2025-01-25 | End: 2025-01-29 | Stop reason: HOSPADM

## 2025-01-25 RX ORDER — MAGNESIUM SULFATE IN WATER 40 MG/ML
2000 INJECTION, SOLUTION INTRAVENOUS PRN
Status: DISCONTINUED | OUTPATIENT
Start: 2025-01-25 | End: 2025-01-29 | Stop reason: HOSPADM

## 2025-01-25 RX ORDER — SODIUM CHLORIDE 0.9 % (FLUSH) 0.9 %
5-40 SYRINGE (ML) INJECTION EVERY 12 HOURS SCHEDULED
Status: DISCONTINUED | OUTPATIENT
Start: 2025-01-25 | End: 2025-01-29 | Stop reason: HOSPADM

## 2025-01-25 RX ORDER — POTASSIUM CHLORIDE 7.45 MG/ML
10 INJECTION INTRAVENOUS PRN
Status: DISCONTINUED | OUTPATIENT
Start: 2025-01-25 | End: 2025-01-29 | Stop reason: HOSPADM

## 2025-01-25 RX ORDER — METOCLOPRAMIDE HYDROCHLORIDE 5 MG/ML
10 INJECTION INTRAMUSCULAR; INTRAVENOUS ONCE
Status: COMPLETED | OUTPATIENT
Start: 2025-01-25 | End: 2025-01-25

## 2025-01-25 RX ORDER — ONDANSETRON 4 MG/1
4 TABLET, ORALLY DISINTEGRATING ORAL EVERY 8 HOURS PRN
Status: DISCONTINUED | OUTPATIENT
Start: 2025-01-25 | End: 2025-01-29 | Stop reason: HOSPADM

## 2025-01-25 RX ORDER — POLYETHYLENE GLYCOL 3350 17 G/17G
17 POWDER, FOR SOLUTION ORAL DAILY PRN
Status: DISCONTINUED | OUTPATIENT
Start: 2025-01-25 | End: 2025-01-29 | Stop reason: HOSPADM

## 2025-01-25 RX ORDER — ONDANSETRON 2 MG/ML
4 INJECTION INTRAMUSCULAR; INTRAVENOUS EVERY 6 HOURS PRN
Status: DISCONTINUED | OUTPATIENT
Start: 2025-01-25 | End: 2025-01-29 | Stop reason: HOSPADM

## 2025-01-25 RX ORDER — ONDANSETRON 2 MG/ML
4 INJECTION INTRAMUSCULAR; INTRAVENOUS ONCE
Status: COMPLETED | OUTPATIENT
Start: 2025-01-25 | End: 2025-01-25

## 2025-01-25 RX ORDER — ACETAMINOPHEN 500 MG
1000 TABLET ORAL
Status: ACTIVE | OUTPATIENT
Start: 2025-01-25 | End: 2025-01-26

## 2025-01-25 RX ADMIN — FENTANYL CITRATE 50 MCG: 50 INJECTION, SOLUTION INTRAMUSCULAR; INTRAVENOUS at 19:15

## 2025-01-25 RX ADMIN — ONDANSETRON 4 MG: 4 TABLET, ORALLY DISINTEGRATING ORAL at 18:25

## 2025-01-25 RX ADMIN — INSULIN LISPRO 5 UNITS: 100 INJECTION, SOLUTION INTRAVENOUS; SUBCUTANEOUS at 22:15

## 2025-01-25 RX ADMIN — SODIUM CHLORIDE 1000 ML: 9 INJECTION, SOLUTION INTRAVENOUS at 18:49

## 2025-01-25 RX ADMIN — ONDANSETRON 4 MG: 2 INJECTION INTRAMUSCULAR; INTRAVENOUS at 23:05

## 2025-01-25 RX ADMIN — METOCLOPRAMIDE 10 MG: 5 INJECTION, SOLUTION INTRAMUSCULAR; INTRAVENOUS at 19:29

## 2025-01-25 RX ADMIN — IOPAMIDOL 75 ML: 755 INJECTION, SOLUTION INTRAVENOUS at 19:34

## 2025-01-25 RX ADMIN — SODIUM CHLORIDE 1000 ML: 9 INJECTION, SOLUTION INTRAVENOUS at 21:51

## 2025-01-25 ASSESSMENT — PAIN SCALES - GENERAL
PAINLEVEL_OUTOF10: 10
PAINLEVEL_OUTOF10: 10

## 2025-01-25 ASSESSMENT — PAIN - FUNCTIONAL ASSESSMENT
PAIN_FUNCTIONAL_ASSESSMENT: ACTIVITIES ARE NOT PREVENTED
PAIN_FUNCTIONAL_ASSESSMENT: 0-10

## 2025-01-25 ASSESSMENT — PAIN DESCRIPTION - LOCATION
LOCATION: CHEST
LOCATION: ABDOMEN

## 2025-01-25 ASSESSMENT — PAIN DESCRIPTION - PAIN TYPE: TYPE: ACUTE PAIN

## 2025-01-25 ASSESSMENT — PAIN DESCRIPTION - DESCRIPTORS
DESCRIPTORS: DISCOMFORT
DESCRIPTORS: SHARP

## 2025-01-25 NOTE — ED PROVIDER NOTES
[facility] EMERGENCY DEPARTMENT     Emergency Department     Faculty Attestation    I performed a history and physical examination of the patient and discussed management with the resident. I reviewed the resident’s note and agree with the documented findings and plan of care. Any areas of disagreement are noted on the chart. I was personally present for the key portions of any procedures. I have documented in the chart those procedures where I was not present during the key portions. I have reviewed the emergency nurses triage note. I agree with the chief complaint, past medical history, past surgical history, allergies, medications, social and family history as documented unless otherwise noted below. For Physician Assistant/ Nurse Practitioner cases/documentation I have personally evaluated this patient and have completed at least one if not all key elements of the E/M (history, physical exam, and MDM). Additional findings are as noted.    6:34 PM EST    Patient presents with abdominal pain, nausea and vomiting that she has had for the past 2 weeks.  Patient does have a history of diabetes but says she has stopped taking her insulin and oral medication for because she has been vomiting so much.  She says she has not been able to check her glucose because she has not been feeling well.  She denies any changes in bowel movements.  She denies any dysuria or hematuria.  She denies any fever, cough, congestion, chest pain.  On exam, patient is lying in the bed and appears uncomfortable.  Lungs are clear to auscultation bilaterally.  Heart sounds are tachycardic but regular.  Abdomen is soft with moderate diffuse tenderness.  No rebound or guarding.  Will get CT scan of the abdomen as well as labs.  Will treat patient's pain and nausea and reassess.    EKG Interpretation    Interpreted by emergency department physician    Rhythm: sinus tachycardia  Rate: 110-120  Axis: normal  Ectopy: none  Conduction: 
cardiologist.    EMERGENCY DEPARTMENT COURSE:    ED Course as of 01/25/25 2244   Sat Jan 25, 2025   1824 Pt seen at bedside, feels unwell, actively retching during exam, will provide zofran at this time [CB]   1830 IVF ordered due to poor PO intake, tachycardia [CB]   1847 Serum glucose >700, POC pH 7.55, Bicarb 32, K 4.7 [CB]   1857 WBC(!): 12.2  Slight increase compared to baseline [CB]   1917 Pt still puking, will order reglan [CB]   1918 Beta-Hydroxybutyrate(!):    Beta-Hydroxybutyrate 1.94(!)  Reviewed [CB]   1939 CT ABDOMEN PELVIS W IV CONTRAST Additional Contrast? None  CT reviewed, awaiting upload of additional views [CB]   2005 CT ABDOMEN PELVIS W IV CONTRAST Additional Contrast? None  IMPRESSION:  No acute abnormality identified in the abdomen or pelvis. [CB]   2039 BLOOD GAS, VENOUS:    pH, Taiwo 7.418   pCO2, Taiwo 42.2   pO2, Taiwo 45.7   Bicarbonate, Venous 26.6   Positive Base Excess, Taiwo 1.9   O2 Saturation Venous 82.4   Carboxyhemoglobin 0.0   Pt Temp 37.0   FIO2 INFORMATION NOT PROVIDED  Reviewed [CB]   2202 POC Glucose Fingerstick(!!):    POC Glucose 480(!!)  Improving, will give 5 units subcutaneous insulin [CB]   2220 Pt resting comfortably in room, pt notified of plan to admit to the hospital. [CB]   2235 Spoke with [facility], they will admit the patient [CB]      ED Course User Index  [CB] Gardenia Garcia MD       PROCEDURES:  None    CONSULTS:  IP CONSULT TO HOSPITALIST    CRITICAL CARE:  There was significant risk of life threatening deterioration of patient's condition requiring my direct management. Critical care time 0 minutes, excluding any documented procedures.    FINAL IMPRESSION      1. Nausea and vomiting, unspecified vomiting type    2. Dehydration    3. Type 2 diabetes mellitus with hyperglycemia, with long-term current use of insulin ([facility])          DISPOSITION / PLAN     DISPOSITION Admitted 01/25/2025 10:35:07 PM         PATIENT REFERRED TO:  No follow-up provider

## 2025-01-26 PROBLEM — D64.9 ANEMIA: Status: ACTIVE | Noted: 2025-01-26

## 2025-01-26 PROBLEM — R10.9 ABDOMINAL PAIN: Status: ACTIVE | Noted: 2025-01-26

## 2025-01-26 PROBLEM — R11.2 NAUSEA AND VOMITING: Status: ACTIVE | Noted: 2025-01-26

## 2025-01-26 LAB
25(OH)D3 SERPL-MCNC: 9.6 NG/ML (ref 30–100)
AMPHET UR QL SCN: NEGATIVE
ANION GAP SERPL CALCULATED.3IONS-SCNC: 11 MMOL/L (ref 9–16)
ANION GAP SERPL CALCULATED.3IONS-SCNC: 13 MMOL/L (ref 9–16)
ANION GAP SERPL CALCULATED.3IONS-SCNC: 15 MMOL/L (ref 9–16)
BACTERIA URNS QL MICRO: ABNORMAL
BARBITURATES UR QL SCN: NEGATIVE
BENZODIAZ UR QL: NEGATIVE
BILIRUB UR QL STRIP: NEGATIVE
BUN SERPL-MCNC: 19 MG/DL (ref 6–20)
BUN SERPL-MCNC: 19 MG/DL (ref 6–20)
BUN SERPL-MCNC: 20 MG/DL (ref 6–20)
C PEPTIDE SERPL-MCNC: 3.5 NG/ML (ref 1.1–4.4)
CALCIUM SERPL-MCNC: 8.2 MG/DL (ref 8.6–10.4)
CALCIUM SERPL-MCNC: 8.5 MG/DL (ref 8.6–10.4)
CALCIUM SERPL-MCNC: 9 MG/DL (ref 8.6–10.4)
CANNABINOIDS UR QL SCN: POSITIVE
CASTS #/AREA URNS LPF: ABNORMAL /LPF (ref 0–2)
CASTS #/AREA URNS LPF: ABNORMAL /LPF (ref 0–2)
CHLORIDE SERPL-SCNC: 100 MMOL/L (ref 98–107)
CHLORIDE SERPL-SCNC: 102 MMOL/L (ref 98–107)
CHLORIDE SERPL-SCNC: 98 MMOL/L (ref 98–107)
CLARITY UR: CLEAR
CO2 SERPL-SCNC: 22 MMOL/L (ref 20–31)
CO2 SERPL-SCNC: 23 MMOL/L (ref 20–31)
CO2 SERPL-SCNC: 24 MMOL/L (ref 20–31)
COCAINE UR QL SCN: NEGATIVE
COLOR UR: YELLOW
CREAT SERPL-MCNC: 1.1 MG/DL (ref 0.6–0.9)
CREAT SERPL-MCNC: 1.1 MG/DL (ref 0.6–0.9)
CREAT SERPL-MCNC: 1.2 MG/DL (ref 0.6–0.9)
CRP SERPL HS-MCNC: 4.5 MG/L (ref 0–5)
EPI CELLS #/AREA URNS HPF: ABNORMAL /HPF (ref 0–5)
ERYTHROCYTE [SEDIMENTATION RATE] IN BLOOD BY PHOTOMETRIC METHOD: 108 MM/HR (ref 0–30)
EST. AVERAGE GLUCOSE BLD GHB EST-MCNC: 309 MG/DL
FENTANYL UR QL: POSITIVE
FERRITIN SERPL-MCNC: 446 NG/ML (ref 15–150)
GFR, ESTIMATED: 54 ML/MIN/1.73M2
GFR, ESTIMATED: 60 ML/MIN/1.73M2
GFR, ESTIMATED: 60 ML/MIN/1.73M2
GLUCOSE BLD-MCNC: 144 MG/DL (ref 65–105)
GLUCOSE BLD-MCNC: 158 MG/DL (ref 65–105)
GLUCOSE BLD-MCNC: 198 MG/DL (ref 65–105)
GLUCOSE BLD-MCNC: 215 MG/DL (ref 65–105)
GLUCOSE BLD-MCNC: 216 MG/DL (ref 65–105)
GLUCOSE BLD-MCNC: 243 MG/DL (ref 65–105)
GLUCOSE BLD-MCNC: 285 MG/DL (ref 65–105)
GLUCOSE BLD-MCNC: 67 MG/DL (ref 65–105)
GLUCOSE SERPL-MCNC: 168 MG/DL (ref 74–99)
GLUCOSE SERPL-MCNC: 233 MG/DL (ref 74–99)
GLUCOSE SERPL-MCNC: 295 MG/DL (ref 74–99)
GLUCOSE UR STRIP-MCNC: ABNORMAL MG/DL
HBA1C MFR BLD: 12.4 % (ref 4–6)
HGB UR QL STRIP.AUTO: ABNORMAL
IRON SATN MFR SERPL: 14 % (ref 20–55)
IRON SERPL-MCNC: 33 UG/DL (ref 37–145)
KETONES UR STRIP-MCNC: ABNORMAL MG/DL
LEUKOCYTE ESTERASE UR QL STRIP: NEGATIVE
MAGNESIUM SERPL-MCNC: 1.9 MG/DL (ref 1.6–2.6)
METHADONE UR QL: NEGATIVE
NITRITE UR QL STRIP: NEGATIVE
OPIATES UR QL SCN: NEGATIVE
OXYCODONE UR QL SCN: NEGATIVE
PCP UR QL SCN: NEGATIVE
PH UR STRIP: 5.5 [PH] (ref 5–8)
PHOSPHATE SERPL-MCNC: 2.5 MG/DL (ref 2.5–4.5)
POTASSIUM SERPL-SCNC: 3 MMOL/L (ref 3.7–5.3)
POTASSIUM SERPL-SCNC: 3.1 MMOL/L (ref 3.7–5.3)
POTASSIUM SERPL-SCNC: 3.9 MMOL/L (ref 3.7–5.3)
PROT UR STRIP-MCNC: ABNORMAL MG/DL
RBC #/AREA URNS HPF: ABNORMAL /HPF (ref 0–2)
SODIUM SERPL-SCNC: 135 MMOL/L (ref 136–145)
SODIUM SERPL-SCNC: 136 MMOL/L (ref 136–145)
SODIUM SERPL-SCNC: 137 MMOL/L (ref 136–145)
SP GR UR STRIP: 1.05 (ref 1–1.03)
TEST INFORMATION: ABNORMAL
TIBC SERPL-MCNC: 236 UG/DL (ref 250–450)
TSH SERPL DL<=0.05 MIU/L-ACNC: 1.82 UIU/ML (ref 0.27–4.2)
UNSATURATED IRON BINDING CAPACITY: 203 UG/DL (ref 112–347)
UROBILINOGEN UR STRIP-ACNC: NORMAL EU/DL (ref 0–1)
WBC #/AREA URNS HPF: ABNORMAL /HPF (ref 0–5)

## 2025-01-26 PROCEDURE — 2500000003 HC RX 250 WO HCPCS: Performed by: NURSE PRACTITIONER

## 2025-01-26 PROCEDURE — 99222 1ST HOSP IP/OBS MODERATE 55: CPT | Performed by: INTERNAL MEDICINE

## 2025-01-26 PROCEDURE — 2580000003 HC RX 258: Performed by: STUDENT IN AN ORGANIZED HEALTH CARE EDUCATION/TRAINING PROGRAM

## 2025-01-26 PROCEDURE — 6360000002 HC RX W HCPCS: Performed by: NURSE PRACTITIONER

## 2025-01-26 PROCEDURE — 94761 N-INVAS EAR/PLS OXIMETRY MLT: CPT

## 2025-01-26 PROCEDURE — 6360000002 HC RX W HCPCS: Performed by: STUDENT IN AN ORGANIZED HEALTH CARE EDUCATION/TRAINING PROGRAM

## 2025-01-26 PROCEDURE — 6370000000 HC RX 637 (ALT 250 FOR IP): Performed by: NURSE PRACTITIONER

## 2025-01-26 PROCEDURE — 2580000003 HC RX 258: Performed by: NURSE PRACTITIONER

## 2025-01-26 PROCEDURE — 6370000000 HC RX 637 (ALT 250 FOR IP): Performed by: INTERNAL MEDICINE

## 2025-01-26 PROCEDURE — 6370000000 HC RX 637 (ALT 250 FOR IP): Performed by: STUDENT IN AN ORGANIZED HEALTH CARE EDUCATION/TRAINING PROGRAM

## 2025-01-26 PROCEDURE — 2500000003 HC RX 250 WO HCPCS: Performed by: STUDENT IN AN ORGANIZED HEALTH CARE EDUCATION/TRAINING PROGRAM

## 2025-01-26 PROCEDURE — 94640 AIRWAY INHALATION TREATMENT: CPT

## 2025-01-26 PROCEDURE — 99223 1ST HOSP IP/OBS HIGH 75: CPT | Performed by: INTERNAL MEDICINE

## 2025-01-26 PROCEDURE — 2060000000 HC ICU INTERMEDIATE R&B

## 2025-01-26 RX ORDER — GABAPENTIN 300 MG/1
300 CAPSULE ORAL 3 TIMES DAILY
Status: DISCONTINUED | OUTPATIENT
Start: 2025-01-26 | End: 2025-01-29 | Stop reason: HOSPADM

## 2025-01-26 RX ORDER — HYDRALAZINE HYDROCHLORIDE 20 MG/ML
10 INJECTION INTRAMUSCULAR; INTRAVENOUS EVERY 6 HOURS PRN
Status: DISCONTINUED | OUTPATIENT
Start: 2025-01-26 | End: 2025-01-29 | Stop reason: HOSPADM

## 2025-01-26 RX ORDER — INSULIN LISPRO 100 [IU]/ML
0-4 INJECTION, SOLUTION INTRAVENOUS; SUBCUTANEOUS
Status: DISCONTINUED | OUTPATIENT
Start: 2025-01-26 | End: 2025-01-26

## 2025-01-26 RX ORDER — AMLODIPINE BESYLATE 10 MG/1
10 TABLET ORAL DAILY
Status: DISCONTINUED | OUTPATIENT
Start: 2025-01-26 | End: 2025-01-29 | Stop reason: HOSPADM

## 2025-01-26 RX ORDER — MAGNESIUM HYDROXIDE/ALUMINUM HYDROXICE/SIMETHICONE 120; 1200; 1200 MG/30ML; MG/30ML; MG/30ML
30 SUSPENSION ORAL EVERY 4 HOURS
Status: COMPLETED | OUTPATIENT
Start: 2025-01-26 | End: 2025-01-26

## 2025-01-26 RX ORDER — INSULIN GLARGINE 100 [IU]/ML
10 INJECTION, SOLUTION SUBCUTANEOUS 2 TIMES DAILY
Status: DISCONTINUED | OUTPATIENT
Start: 2025-01-26 | End: 2025-01-29 | Stop reason: HOSPADM

## 2025-01-26 RX ORDER — ATORVASTATIN CALCIUM 40 MG/1
40 TABLET, FILM COATED ORAL DAILY
Status: DISCONTINUED | OUTPATIENT
Start: 2025-01-26 | End: 2025-01-29 | Stop reason: HOSPADM

## 2025-01-26 RX ORDER — ALBUTEROL SULFATE 0.83 MG/ML
2.5 SOLUTION RESPIRATORY (INHALATION) EVERY 4 HOURS PRN
Status: DISCONTINUED | OUTPATIENT
Start: 2025-01-26 | End: 2025-01-29 | Stop reason: HOSPADM

## 2025-01-26 RX ORDER — METOPROLOL TARTRATE 1 MG/ML
5 INJECTION, SOLUTION INTRAVENOUS EVERY 4 HOURS PRN
Status: DISCONTINUED | OUTPATIENT
Start: 2025-01-26 | End: 2025-01-29 | Stop reason: HOSPADM

## 2025-01-26 RX ORDER — GLUCAGON 1 MG/ML
1 KIT INJECTION PRN
Status: DISCONTINUED | OUTPATIENT
Start: 2025-01-26 | End: 2025-01-29 | Stop reason: HOSPADM

## 2025-01-26 RX ORDER — INSULIN LISPRO 100 [IU]/ML
0-8 INJECTION, SOLUTION INTRAVENOUS; SUBCUTANEOUS
Status: DISCONTINUED | OUTPATIENT
Start: 2025-01-26 | End: 2025-01-29 | Stop reason: HOSPADM

## 2025-01-26 RX ORDER — DEXTROSE MONOHYDRATE 100 MG/ML
INJECTION, SOLUTION INTRAVENOUS CONTINUOUS PRN
Status: DISCONTINUED | OUTPATIENT
Start: 2025-01-26 | End: 2025-01-29 | Stop reason: HOSPADM

## 2025-01-26 RX ORDER — FENTANYL CITRATE 50 UG/ML
50 INJECTION, SOLUTION INTRAMUSCULAR; INTRAVENOUS ONCE
Status: DISCONTINUED | OUTPATIENT
Start: 2025-01-26 | End: 2025-01-27

## 2025-01-26 RX ORDER — ASPIRIN 81 MG/1
81 TABLET ORAL DAILY
Status: DISCONTINUED | OUTPATIENT
Start: 2025-01-26 | End: 2025-01-29 | Stop reason: HOSPADM

## 2025-01-26 RX ORDER — FLUTICASONE PROPIONATE 110 UG/1
2 AEROSOL, METERED RESPIRATORY (INHALATION)
Status: DISCONTINUED | OUTPATIENT
Start: 2025-01-26 | End: 2025-01-29 | Stop reason: HOSPADM

## 2025-01-26 RX ADMIN — HEPARIN SODIUM 5000 UNITS: 5000 INJECTION INTRAVENOUS; SUBCUTANEOUS at 00:11

## 2025-01-26 RX ADMIN — FAMOTIDINE 20 MG: 10 INJECTION, SOLUTION INTRAVENOUS at 01:54

## 2025-01-26 RX ADMIN — SODIUM CHLORIDE, PRESERVATIVE FREE 10 ML: 5 INJECTION INTRAVENOUS at 22:07

## 2025-01-26 RX ADMIN — INSULIN LISPRO 2 UNITS: 100 INJECTION, SOLUTION INTRAVENOUS; SUBCUTANEOUS at 11:52

## 2025-01-26 RX ADMIN — INSULIN GLARGINE 10 UNITS: 100 INJECTION, SOLUTION SUBCUTANEOUS at 22:11

## 2025-01-26 RX ADMIN — SODIUM CHLORIDE: 9 INJECTION, SOLUTION INTRAVENOUS at 13:02

## 2025-01-26 RX ADMIN — ATORVASTATIN CALCIUM 40 MG: 40 TABLET, FILM COATED ORAL at 22:07

## 2025-01-26 RX ADMIN — HYDRALAZINE HYDROCHLORIDE 10 MG: 20 INJECTION INTRAMUSCULAR; INTRAVENOUS at 11:47

## 2025-01-26 RX ADMIN — ALUMINUM HYDROXIDE, MAGNESIUM HYDROXIDE, AND SIMETHICONE 30 ML: 1200; 120; 1200 SUSPENSION ORAL at 06:35

## 2025-01-26 RX ADMIN — INSULIN LISPRO 1 UNITS: 100 INJECTION, SOLUTION INTRAVENOUS; SUBCUTANEOUS at 09:22

## 2025-01-26 RX ADMIN — AMLODIPINE BESYLATE 10 MG: 10 TABLET ORAL at 09:25

## 2025-01-26 RX ADMIN — INSULIN LISPRO 4 UNITS: 100 INJECTION, SOLUTION INTRAVENOUS; SUBCUTANEOUS at 18:17

## 2025-01-26 RX ADMIN — FLUTICASONE PROPIONATE 2 PUFF: 110 AEROSOL, METERED RESPIRATORY (INHALATION) at 09:39

## 2025-01-26 RX ADMIN — ALUMINUM HYDROXIDE, MAGNESIUM HYDROXIDE, AND SIMETHICONE 30 ML: 1200; 120; 1200 SUSPENSION ORAL at 10:42

## 2025-01-26 RX ADMIN — GABAPENTIN 300 MG: 300 CAPSULE ORAL at 22:07

## 2025-01-26 RX ADMIN — ASPIRIN 81 MG: 81 TABLET, COATED ORAL at 09:25

## 2025-01-26 RX ADMIN — GABAPENTIN 300 MG: 300 CAPSULE ORAL at 15:45

## 2025-01-26 RX ADMIN — LIDOCAINE HYDROCHLORIDE: 20 SOLUTION ORAL; TOPICAL at 01:54

## 2025-01-26 RX ADMIN — INSULIN LISPRO 8 UNITS: 100 INJECTION, SOLUTION INTRAVENOUS; SUBCUTANEOUS at 00:11

## 2025-01-26 RX ADMIN — SODIUM CHLORIDE: 9 INJECTION, SOLUTION INTRAVENOUS at 00:06

## 2025-01-26 RX ADMIN — SODIUM CHLORIDE 40 MG: 9 INJECTION INTRAMUSCULAR; INTRAVENOUS; SUBCUTANEOUS at 15:45

## 2025-01-26 RX ADMIN — SODIUM CHLORIDE, PRESERVATIVE FREE 10 ML: 5 INJECTION INTRAVENOUS at 09:28

## 2025-01-26 RX ADMIN — FLUTICASONE PROPIONATE 2 PUFF: 110 AEROSOL, METERED RESPIRATORY (INHALATION) at 19:41

## 2025-01-26 RX ADMIN — ALUMINUM HYDROXIDE, MAGNESIUM HYDROXIDE, AND SIMETHICONE 30 ML: 1200; 120; 1200 SUSPENSION ORAL at 15:45

## 2025-01-26 RX ADMIN — INSULIN GLARGINE 10 UNITS: 100 INJECTION, SOLUTION SUBCUTANEOUS at 09:22

## 2025-01-26 RX ADMIN — HEPARIN SODIUM 5000 UNITS: 5000 INJECTION INTRAVENOUS; SUBCUTANEOUS at 09:25

## 2025-01-26 RX ADMIN — DEXTROSE MONOHYDRATE 125 ML: 100 INJECTION, SOLUTION INTRAVENOUS at 04:53

## 2025-01-26 RX ADMIN — GABAPENTIN 300 MG: 300 CAPSULE ORAL at 09:25

## 2025-01-26 RX ADMIN — METOPROLOL TARTRATE 5 MG: 5 INJECTION INTRAVENOUS at 10:44

## 2025-01-26 NOTE — CONSULTS
[facility] Gastroenterology  Consultation Note     .  Chief Complaint:  Nausea with vomiting  Abdominal pain  Intolerant of oral intake    Reason for consult:    Eval acute recurrent abdominal pain  ?  Gastroparesis versus pud    History of present illness:   This is a 54 y.o. female with PMH including uncontrolled DM type II (1/25/2025 A1c 12.4-previously 13), CKD, recurrent back pain who presented to the ED due to complaints of nausea, vomiting, intolerance of oral intake including clears/solids/p.o. medications that has progressively gotten worse over the last 3 weeks.  Patient also reports unintentional weight loss over the last several weeks.  Epigastric pain with dyspepsia.  No hematemesis or rectal bleeding.   Patient unable to tolerate any oral medications including antihypertensive.  Diabetic medications etc..   Upon arrival to the ED, patient had CT abdomen and pelvis with IV contrast that was unremarkable  BMP WNL, alk phos chronically elevated 138, CBC shows slight leukocytosis with WBCs 12.2, hemoglobin 11.3 g/dL, 12/2024 acute hepatitis panel nonreactive    Patient denies any smoking drinking or drug use  No personal or family history of GI malignancy  No previous EGD or colonoscopy      Previous GI history:   See above  Primary GI specialist:    Past Medical/Social/Family History:  Past Medical History:   Diagnosis Date    Asthma     Chronic back pain     GERD (gastroesophageal reflux disease)     HLD (hyperlipidemia) 1/27/2014    Hypertension     Irregular periods/menstrual cycles 2017    Neuropathy     Obesity     Type II or unspecified type diabetes mellitus without mention of complication, not stated as uncontrolled 2007    On Insulin, Lantus nightly & Novolog 3 times with meals    Wears glasses      Past Surgical History:   Procedure Laterality Date    ENDOMETRIAL ABLATION  02/27/2018    RI HYSTEROSCOPY ENDOMETRIAL ABLATION N/A 2/27/2018    ENDOMETRIAL ABLATION WITH SHAHEED performed by

## 2025-01-26 NOTE — H&P
[facility]  Office: [phone]  Flaco Santizo DO, Vincenzo Mathur DO, Mike Kurtz DO, Dany Pozo DO, Jade Fuller MD, Reyna Rodriguez MD, Nolan Villanueva MD, Isela Rosenberg MD,  Colt Castelan MD, Akila Bustillos MD, Karissa Mckeon MD,  Ludwig Petersen DO, Juan Antonio Chowdhury MD, Emerson Mcadams MD, Celestino Santizo DO, Caridad Miles MD,  Placido Davila DO, Yee Heath MD, Tamara Clinton MD, Vanessa Blake MD, Xochitl Cohn MD,  Connor Fulton MD, Sofie Allan MD, Vaibhav Raza MD, Velasquez Bowen MD, Darren Wood MD, Jyotsna Payne MD, Augustin Dominguez DO, Dov Bhatia MD, Ludwig Riley MD, Mohsin Reza, MD, Shirley Waterhouse, CNP,  Evi Abraham CNP, Augustin Peña, CNP,  Jo Magana, DNP, Carmen Yates, CNP, Kayla Encarnacion, CNP, Bertha Ashby, CNP, Mae Bosch, CNP, Jessica Crawford, PA-C, Dilcia Gaspar PA-C, Wendy Guy, CNP, Homer Watts, CNP,  Krissy Galeana, CNP, Annalisa Sullivan, CNP, Zahida Sharp, CNP,  Teresa Vital, CNS, Jennifer Lai, CNP, Jacqueline Gramajo, CNP,   Magalie Ojeda, CNP         [facility]   IN-PATIENT SERVICE   [facility]    HISTORY AND PHYSICAL EXAMINATION            Date:   1/26/2025  Patient name:  Lexie Mireles  Date of admission:  1/25/2025  6:05 PM  MRN:   [id number]  Account:  [id number]  YOB: 1970  PCP:    Viktor Fox APRN - CNP  Room:   [address] [place] [address]  Code Status:    Full Code    Chief Complaint:     Chief Complaint   Patient presents with    Nausea     With emesis    Abdominal Pain     X 2 weeks  Unable to keep food down; taking water per pt   \" Because I have been having problems\"    History Obtained From:     patient    History of Present Illness:     Lexie Mireles is a 54 y.o.  female w/ CKD 3, DM2 longstanding w/ hyperglycemia (A1C 11/2024 17.5) w/ recurrent acute back pain/ abdominal pain s/p multiple evaluations over past few months.  Patient now returns with Nausea (With emesis) and

## 2025-01-26 NOTE — ED NOTES
EPOC ran, BG over 700.  
Pt   
Pt came to ed via triage w complaint of abd pain + emesis for 2 weeks. States that she is a type 2 diabetic and has not been checking her sugars and taking her insulin. Denies blood in vomit or stool. States slight SOB d/t asthma.    Pt connected to telemetry, all alarms on and audible.   VSS, NAD, AOx4. Patient resting in bed, respirations even and unlabored. Call light in reach.    
Pt to CT   
Smoking status: Never    Smokeless tobacco: Never    Tobacco comments:     pt is around smokers a lot   Vaping Use    Vaping status: Never Used   Substance and Sexual Activity    Alcohol use: Not Currently     Comment: occasionally    Drug use: No    Sexual activity: Yes     Partners: Male     Social Determinants of Health     Financial Resource Strain: Low Risk  (12/5/2024)    Received from [facility]    Overall Financial Resource Strain (CARDIA)     Difficulty of Paying Living Expenses: Not hard at all   Food Insecurity: No Food Insecurity (1/8/2025)    Hunger Vital Sign     Worried About Running Out of Food in the Last Year: Never true     Ran Out of Food in the Last Year: Never true   Transportation Needs: No Transportation Needs (1/8/2025)    PRAPARE - Transportation     Lack of Transportation (Medical): No     Lack of Transportation (Non-Medical): No   Physical Activity: Insufficiently Active (9/25/2023)    Exercise Vital Sign     Days of Exercise per Week: 3 days     Minutes of Exercise per Session: 20 min   Stress: No Stress Concern Present (9/25/2023)    [employer] - Occupational Stress Questionnaire     Feeling of Stress : Only a little   Social Connections: Moderately Isolated (9/25/2023)    Social Connection and Isolation Panel [NHANES]     Frequency of Communication with Friends and Family: Three times a week     Frequency of Social Gatherings with Friends and Family: Once a week     Attends Christianity Services: Never     Active Member of Clubs or Organizations: No     Attends Club or Organization Meetings: Never     Marital Status:    Intimate Partner Violence: Unknown (12/5/2024)    Received from [facility]    Humiliation, Afraid, Rape, and Kick questionnaire     Fear of Current or Ex-Partner: No   Housing Stability: Low Risk  (1/8/2025)    Housing Stability Vital Sign     Unable to Pay for Housing in the Last Year: No     Number of Times

## 2025-01-26 NOTE — PLAN OF CARE
Problem: Chronic Conditions and Co-morbidities  Goal: Patient's chronic conditions and co-morbidity symptoms are monitored and maintained or improved  1/26/2025 1850 by Milligan, Matthew, RN  Outcome: Progressing  Flowsheets (Taken 1/26/2025 1850)  Care Plan - Patient's Chronic Conditions and Co-Morbidity Symptoms are Monitored and Maintained or Improved:   Monitor and assess patient's chronic conditions and comorbid symptoms for stability, deterioration, or improvement   Update acute care plan with appropriate goals if chronic or comorbid symptoms are exacerbated and prevent overall improvement and discharge   Collaborate with multidisciplinary team to address chronic and comorbid conditions and prevent exacerbation or deterioration  1/26/2025 0508 by Margarita Alegre, RN  Outcome: Progressing  Flowsheets (Taken 1/26/2025 0000)  Care Plan - Patient's Chronic Conditions and Co-Morbidity Symptoms are Monitored and Maintained or Improved: Monitor and assess patient's chronic conditions and comorbid symptoms for stability, deterioration, or improvement     Problem: Discharge Planning  Goal: Discharge to home or other facility with appropriate resources  1/26/2025 1850 by Milligan, Matthew, RN  Outcome: Progressing  Flowsheets (Taken 1/26/2025 1850)  Discharge to home or other facility with appropriate resources:   Identify barriers to discharge with patient and caregiver   Identify discharge learning needs (meds, wound care, etc)   Refer to discharge planning if patient needs post-hospital services based on physician order or complex needs related to functional status, cognitive ability or social support system   Arrange for needed discharge resources and transportation as appropriate  1/26/2025 0508 by Margarita Alegre, RN  Outcome: Progressing  Flowsheets (Taken 1/26/2025 0000)  Discharge to home or other facility with appropriate resources: Identify barriers to discharge with patient and caregiver     Problem:

## 2025-01-26 NOTE — PLAN OF CARE
Problem: Chronic Conditions and Co-morbidities  Goal: Patient's chronic conditions and co-morbidity symptoms are monitored and maintained or improved  Outcome: Progressing  Flowsheets (Taken 1/26/2025 0000)  Care Plan - Patient's Chronic Conditions and Co-Morbidity Symptoms are Monitored and Maintained or Improved: Monitor and assess patient's chronic conditions and comorbid symptoms for stability, deterioration, or improvement     Problem: Discharge Planning  Goal: Discharge to home or other facility with appropriate resources  Outcome: Progressing  Flowsheets (Taken 1/26/2025 0000)  Discharge to home or other facility with appropriate resources: Identify barriers to discharge with patient and caregiver     Problem: Pain  Goal: Verbalizes/displays adequate comfort level or baseline comfort level  Outcome: Progressing

## 2025-01-27 ENCOUNTER — APPOINTMENT (OUTPATIENT)
Dept: NUCLEAR MEDICINE | Age: 55
DRG: 638 | End: 2025-01-27
Payer: MEDICARE

## 2025-01-27 ENCOUNTER — ANESTHESIA EVENT (OUTPATIENT)
Dept: OPERATING ROOM | Age: 55
DRG: 638 | End: 2025-01-27
Payer: MEDICARE

## 2025-01-27 ENCOUNTER — ANESTHESIA (OUTPATIENT)
Dept: OPERATING ROOM | Age: 55
DRG: 638 | End: 2025-01-27
Payer: MEDICARE

## 2025-01-27 PROBLEM — E11.65 TYPE 2 DIABETES MELLITUS WITH HYPERGLYCEMIA, WITH LONG-TERM CURRENT USE OF INSULIN (HCC): Status: ACTIVE | Noted: 2025-01-27

## 2025-01-27 PROBLEM — Z79.4 TYPE 2 DIABETES MELLITUS WITH HYPERGLYCEMIA, WITH LONG-TERM CURRENT USE OF INSULIN (HCC): Status: ACTIVE | Noted: 2025-01-27

## 2025-01-27 PROBLEM — E86.0 DEHYDRATION: Status: ACTIVE | Noted: 2025-01-27

## 2025-01-27 LAB
ANION GAP SERPL CALCULATED.3IONS-SCNC: 11 MMOL/L (ref 9–16)
ANION GAP SERPL CALCULATED.3IONS-SCNC: 12 MMOL/L (ref 9–16)
ANION GAP SERPL CALCULATED.3IONS-SCNC: 12 MMOL/L (ref 9–16)
ANION GAP SERPL CALCULATED.3IONS-SCNC: 13 MMOL/L (ref 9–16)
BASOPHILS # BLD: 0.07 K/UL (ref 0–0.2)
BASOPHILS NFR BLD: 1 % (ref 0–2)
BUN SERPL-MCNC: 14 MG/DL (ref 6–20)
BUN SERPL-MCNC: 15 MG/DL (ref 6–20)
BUN SERPL-MCNC: 15 MG/DL (ref 6–20)
BUN SERPL-MCNC: 18 MG/DL (ref 6–20)
CALCIUM SERPL-MCNC: 8 MG/DL (ref 8.6–10.4)
CALCIUM SERPL-MCNC: 8.8 MG/DL (ref 8.6–10.4)
CALCIUM SERPL-MCNC: 8.9 MG/DL (ref 8.6–10.4)
CALCIUM SERPL-MCNC: 8.9 MG/DL (ref 8.6–10.4)
CHLORIDE SERPL-SCNC: 100 MMOL/L (ref 98–107)
CHLORIDE SERPL-SCNC: 100 MMOL/L (ref 98–107)
CHLORIDE SERPL-SCNC: 101 MMOL/L (ref 98–107)
CHLORIDE SERPL-SCNC: 98 MMOL/L (ref 98–107)
CHOLEST SERPL-MCNC: 178 MG/DL (ref 0–199)
CHOLESTEROL/HDL RATIO: 4.3
CO2 SERPL-SCNC: 20 MMOL/L (ref 20–31)
CO2 SERPL-SCNC: 20 MMOL/L (ref 20–31)
CO2 SERPL-SCNC: 23 MMOL/L (ref 20–31)
CO2 SERPL-SCNC: 23 MMOL/L (ref 20–31)
CREAT SERPL-MCNC: 1 MG/DL (ref 0.6–0.9)
CREAT SERPL-MCNC: 1.1 MG/DL (ref 0.6–0.9)
CREAT SERPL-MCNC: 1.1 MG/DL (ref 0.6–0.9)
CREAT SERPL-MCNC: 1.2 MG/DL (ref 0.6–0.9)
EKG ATRIAL RATE: 54 BPM
EKG P AXIS: 39 DEGREES
EKG P-R INTERVAL: 166 MS
EKG Q-T INTERVAL: 438 MS
EKG QRS DURATION: 98 MS
EKG QTC CALCULATION (BAZETT): 415 MS
EKG R AXIS: 11 DEGREES
EKG T AXIS: 12 DEGREES
EKG VENTRICULAR RATE: 54 BPM
EOSINOPHIL # BLD: 0.14 K/UL (ref 0–0.44)
EOSINOPHILS RELATIVE PERCENT: 1 % (ref 1–4)
ERYTHROCYTE [DISTWIDTH] IN BLOOD BY AUTOMATED COUNT: 14.5 % (ref 11.8–14.4)
GFR, ESTIMATED: 54 ML/MIN/1.73M2
GFR, ESTIMATED: 60 ML/MIN/1.73M2
GFR, ESTIMATED: 60 ML/MIN/1.73M2
GFR, ESTIMATED: 68 ML/MIN/1.73M2
GLUCOSE BLD-MCNC: 120 MG/DL (ref 65–105)
GLUCOSE BLD-MCNC: 123 MG/DL (ref 65–105)
GLUCOSE BLD-MCNC: 125 MG/DL (ref 65–105)
GLUCOSE BLD-MCNC: 128 MG/DL (ref 65–105)
GLUCOSE BLD-MCNC: 132 MG/DL (ref 65–105)
GLUCOSE BLD-MCNC: 141 MG/DL (ref 65–105)
GLUCOSE BLD-MCNC: 162 MG/DL (ref 65–105)
GLUCOSE BLD-MCNC: 278 MG/DL (ref 65–105)
GLUCOSE SERPL-MCNC: 118 MG/DL (ref 74–99)
GLUCOSE SERPL-MCNC: 130 MG/DL (ref 74–99)
GLUCOSE SERPL-MCNC: 132 MG/DL (ref 74–99)
GLUCOSE SERPL-MCNC: 188 MG/DL (ref 74–99)
HCT VFR BLD AUTO: 30.9 % (ref 36.3–47.1)
HDLC SERPL-MCNC: 41 MG/DL
HGB BLD-MCNC: 9.9 G/DL (ref 11.9–15.1)
IMM GRANULOCYTES # BLD AUTO: 0.06 K/UL (ref 0–0.3)
IMM GRANULOCYTES NFR BLD: 1 %
LDLC SERPL CALC-MCNC: 98 MG/DL (ref 0–100)
LYMPHOCYTES NFR BLD: 2.6 K/UL (ref 1.1–3.7)
LYMPHOCYTES RELATIVE PERCENT: 22 % (ref 24–43)
MCH RBC QN AUTO: 26.7 PG (ref 25.2–33.5)
MCHC RBC AUTO-ENTMCNC: 32 G/DL (ref 28.4–34.8)
MCV RBC AUTO: 83.3 FL (ref 82.6–102.9)
MICROORGANISM SPEC CULT: NORMAL
MONOCYTES NFR BLD: 0.53 K/UL (ref 0.1–1.2)
MONOCYTES NFR BLD: 4 % (ref 3–12)
NEUTROPHILS NFR BLD: 71 % (ref 36–65)
NEUTS SEG NFR BLD: 8.65 K/UL (ref 1.5–8.1)
NRBC BLD-RTO: 0 PER 100 WBC
PLATELET # BLD AUTO: 349 K/UL (ref 138–453)
PMV BLD AUTO: 11.2 FL (ref 8.1–13.5)
POTASSIUM SERPL-SCNC: 2.9 MMOL/L (ref 3.7–5.3)
POTASSIUM SERPL-SCNC: 3.3 MMOL/L (ref 3.7–5.3)
POTASSIUM SERPL-SCNC: 3.9 MMOL/L (ref 3.7–5.3)
POTASSIUM SERPL-SCNC: 4.1 MMOL/L (ref 3.7–5.3)
RBC # BLD AUTO: 3.71 M/UL (ref 3.95–5.11)
RBC # BLD: ABNORMAL 10*6/UL
SODIUM SERPL-SCNC: 130 MMOL/L (ref 136–145)
SODIUM SERPL-SCNC: 133 MMOL/L (ref 136–145)
SODIUM SERPL-SCNC: 135 MMOL/L (ref 136–145)
SODIUM SERPL-SCNC: 135 MMOL/L (ref 136–145)
SPECIMEN DESCRIPTION: NORMAL
TRIGL SERPL-MCNC: 195 MG/DL
VLDLC SERPL CALC-MCNC: 39 MG/DL (ref 1–30)
WBC OTHER # BLD: 12.1 K/UL (ref 3.5–11.3)

## 2025-01-27 PROCEDURE — 0DJD8ZZ INSPECTION OF LOWER INTESTINAL TRACT, VIA NATURAL OR ARTIFICIAL OPENING ENDOSCOPIC: ICD-10-PCS | Performed by: INTERNAL MEDICINE

## 2025-01-27 PROCEDURE — 6360000002 HC RX W HCPCS: Performed by: INTERNAL MEDICINE

## 2025-01-27 PROCEDURE — 80048 BASIC METABOLIC PNL TOTAL CA: CPT

## 2025-01-27 PROCEDURE — 93010 ELECTROCARDIOGRAM REPORT: CPT | Performed by: INTERNAL MEDICINE

## 2025-01-27 PROCEDURE — 88342 IMHCHEM/IMCYTCHM 1ST ANTB: CPT

## 2025-01-27 PROCEDURE — 6370000000 HC RX 637 (ALT 250 FOR IP): Performed by: INTERNAL MEDICINE

## 2025-01-27 PROCEDURE — 3609012400 HC EGD TRANSORAL BIOPSY SINGLE/MULTIPLE: Performed by: INTERNAL MEDICINE

## 2025-01-27 PROCEDURE — 85025 COMPLETE CBC W/AUTO DIFF WBC: CPT

## 2025-01-27 PROCEDURE — 6360000002 HC RX W HCPCS: Performed by: NURSE ANESTHETIST, CERTIFIED REGISTERED

## 2025-01-27 PROCEDURE — 2580000003 HC RX 258: Performed by: NURSE PRACTITIONER

## 2025-01-27 PROCEDURE — 6360000002 HC RX W HCPCS: Performed by: NURSE PRACTITIONER

## 2025-01-27 PROCEDURE — 7100000001 HC PACU RECOVERY - ADDTL 15 MIN: Performed by: INTERNAL MEDICINE

## 2025-01-27 PROCEDURE — 1200000000 HC SEMI PRIVATE

## 2025-01-27 PROCEDURE — 2709999900 HC NON-CHARGEABLE SUPPLY: Performed by: INTERNAL MEDICINE

## 2025-01-27 PROCEDURE — 94640 AIRWAY INHALATION TREATMENT: CPT

## 2025-01-27 PROCEDURE — 80061 LIPID PANEL: CPT

## 2025-01-27 PROCEDURE — 2500000003 HC RX 250 WO HCPCS: Performed by: INTERNAL MEDICINE

## 2025-01-27 PROCEDURE — 36415 COLL VENOUS BLD VENIPUNCTURE: CPT

## 2025-01-27 PROCEDURE — 2580000003 HC RX 258: Performed by: NURSE ANESTHETIST, CERTIFIED REGISTERED

## 2025-01-27 PROCEDURE — 6370000000 HC RX 637 (ALT 250 FOR IP): Performed by: NURSE PRACTITIONER

## 2025-01-27 PROCEDURE — 97116 GAIT TRAINING THERAPY: CPT

## 2025-01-27 PROCEDURE — 97535 SELF CARE MNGMENT TRAINING: CPT

## 2025-01-27 PROCEDURE — 97166 OT EVAL MOD COMPLEX 45 MIN: CPT

## 2025-01-27 PROCEDURE — 3700000000 HC ANESTHESIA ATTENDED CARE: Performed by: INTERNAL MEDICINE

## 2025-01-27 PROCEDURE — 3700000001 HC ADD 15 MINUTES (ANESTHESIA): Performed by: INTERNAL MEDICINE

## 2025-01-27 PROCEDURE — 2580000003 HC RX 258: Performed by: INTERNAL MEDICINE

## 2025-01-27 PROCEDURE — 7100000000 HC PACU RECOVERY - FIRST 15 MIN: Performed by: INTERNAL MEDICINE

## 2025-01-27 PROCEDURE — 82947 ASSAY GLUCOSE BLOOD QUANT: CPT

## 2025-01-27 PROCEDURE — 97162 PT EVAL MOD COMPLEX 30 MIN: CPT

## 2025-01-27 PROCEDURE — 88305 TISSUE EXAM BY PATHOLOGIST: CPT

## 2025-01-27 RX ORDER — PROPOFOL 10 MG/ML
INJECTION, EMULSION INTRAVENOUS
Status: DISCONTINUED | OUTPATIENT
Start: 2025-01-27 | End: 2025-01-27 | Stop reason: SDUPTHER

## 2025-01-27 RX ORDER — NALOXONE HYDROCHLORIDE 0.4 MG/ML
INJECTION, SOLUTION INTRAMUSCULAR; INTRAVENOUS; SUBCUTANEOUS PRN
Status: DISCONTINUED | OUTPATIENT
Start: 2025-01-27 | End: 2025-01-27 | Stop reason: HOSPADM

## 2025-01-27 RX ORDER — SODIUM CHLORIDE 9 MG/ML
INJECTION, SOLUTION INTRAVENOUS PRN
Status: DISCONTINUED | OUTPATIENT
Start: 2025-01-27 | End: 2025-01-27 | Stop reason: HOSPADM

## 2025-01-27 RX ORDER — DIPHENHYDRAMINE HYDROCHLORIDE 50 MG/ML
12.5 INJECTION INTRAMUSCULAR; INTRAVENOUS
Status: DISCONTINUED | OUTPATIENT
Start: 2025-01-27 | End: 2025-01-27 | Stop reason: HOSPADM

## 2025-01-27 RX ORDER — METOCLOPRAMIDE HYDROCHLORIDE 5 MG/ML
10 INJECTION INTRAMUSCULAR; INTRAVENOUS
Status: DISCONTINUED | OUTPATIENT
Start: 2025-01-27 | End: 2025-01-27 | Stop reason: HOSPADM

## 2025-01-27 RX ORDER — HYDRALAZINE HYDROCHLORIDE 20 MG/ML
10 INJECTION INTRAMUSCULAR; INTRAVENOUS
Status: DISCONTINUED | OUTPATIENT
Start: 2025-01-27 | End: 2025-01-27 | Stop reason: HOSPADM

## 2025-01-27 RX ORDER — BISACODYL 5 MG/1
20 TABLET, DELAYED RELEASE ORAL ONCE
Status: COMPLETED | OUTPATIENT
Start: 2025-01-27 | End: 2025-01-27

## 2025-01-27 RX ORDER — SODIUM CHLORIDE 0.9 % (FLUSH) 0.9 %
5-40 SYRINGE (ML) INJECTION EVERY 12 HOURS SCHEDULED
Status: DISCONTINUED | OUTPATIENT
Start: 2025-01-27 | End: 2025-01-27 | Stop reason: HOSPADM

## 2025-01-27 RX ORDER — SODIUM CHLORIDE, SODIUM LACTATE, POTASSIUM CHLORIDE, CALCIUM CHLORIDE 600; 310; 30; 20 MG/100ML; MG/100ML; MG/100ML; MG/100ML
INJECTION, SOLUTION INTRAVENOUS
Status: DISCONTINUED | OUTPATIENT
Start: 2025-01-27 | End: 2025-01-27 | Stop reason: SDUPTHER

## 2025-01-27 RX ORDER — LIDOCAINE HYDROCHLORIDE 10 MG/ML
INJECTION, SOLUTION EPIDURAL; INFILTRATION; INTRACAUDAL; PERINEURAL
Status: DISCONTINUED | OUTPATIENT
Start: 2025-01-27 | End: 2025-01-27 | Stop reason: SDUPTHER

## 2025-01-27 RX ORDER — DROPERIDOL 2.5 MG/ML
0.62 INJECTION, SOLUTION INTRAMUSCULAR; INTRAVENOUS
Status: DISCONTINUED | OUTPATIENT
Start: 2025-01-27 | End: 2025-01-27 | Stop reason: HOSPADM

## 2025-01-27 RX ORDER — SODIUM CHLORIDE 0.9 % (FLUSH) 0.9 %
5-40 SYRINGE (ML) INJECTION PRN
Status: DISCONTINUED | OUTPATIENT
Start: 2025-01-27 | End: 2025-01-27 | Stop reason: HOSPADM

## 2025-01-27 RX ORDER — MEPERIDINE HYDROCHLORIDE 50 MG/ML
12.5 INJECTION INTRAMUSCULAR; INTRAVENOUS; SUBCUTANEOUS EVERY 5 MIN PRN
Status: DISCONTINUED | OUTPATIENT
Start: 2025-01-27 | End: 2025-01-27 | Stop reason: HOSPADM

## 2025-01-27 RX ADMIN — ONDANSETRON 4 MG: 4 TABLET, ORALLY DISINTEGRATING ORAL at 19:19

## 2025-01-27 RX ADMIN — POTASSIUM CHLORIDE 10 MEQ: 7.46 INJECTION, SOLUTION INTRAVENOUS at 06:08

## 2025-01-27 RX ADMIN — HYDRALAZINE HYDROCHLORIDE 10 MG: 20 INJECTION INTRAMUSCULAR; INTRAVENOUS at 12:18

## 2025-01-27 RX ADMIN — GABAPENTIN 300 MG: 300 CAPSULE ORAL at 14:34

## 2025-01-27 RX ADMIN — SODIUM CHLORIDE, PRESERVATIVE FREE 10 ML: 5 INJECTION INTRAVENOUS at 20:29

## 2025-01-27 RX ADMIN — POTASSIUM CHLORIDE 10 MEQ: 7.46 INJECTION, SOLUTION INTRAVENOUS at 16:29

## 2025-01-27 RX ADMIN — POTASSIUM CHLORIDE 10 MEQ: 7.46 INJECTION, SOLUTION INTRAVENOUS at 14:33

## 2025-01-27 RX ADMIN — PROPOFOL 180 MG: 10 INJECTION, EMULSION INTRAVENOUS at 07:18

## 2025-01-27 RX ADMIN — SODIUM CHLORIDE 40 MG: 9 INJECTION INTRAMUSCULAR; INTRAVENOUS; SUBCUTANEOUS at 05:36

## 2025-01-27 RX ADMIN — POTASSIUM CHLORIDE 10 MEQ: 7.46 INJECTION, SOLUTION INTRAVENOUS at 11:17

## 2025-01-27 RX ADMIN — SODIUM CHLORIDE, POTASSIUM CHLORIDE, SODIUM LACTATE AND CALCIUM CHLORIDE: 600; 310; 30; 20 INJECTION, SOLUTION INTRAVENOUS at 07:12

## 2025-01-27 RX ADMIN — LIDOCAINE HYDROCHLORIDE 50 MG: 10 INJECTION, SOLUTION EPIDURAL; INFILTRATION; INTRACAUDAL; PERINEURAL at 07:18

## 2025-01-27 RX ADMIN — ATORVASTATIN CALCIUM 40 MG: 40 TABLET, FILM COATED ORAL at 20:25

## 2025-01-27 RX ADMIN — POLYETHYLENE GLYCOL 3350, SODIUM SULFATE ANHYDROUS, SODIUM BICARBONATE, SODIUM CHLORIDE, POTASSIUM CHLORIDE 4000 ML: 236; 22.74; 6.74; 5.86; 2.97 POWDER, FOR SOLUTION ORAL at 11:58

## 2025-01-27 RX ADMIN — SODIUM CHLORIDE 40 MG: 9 INJECTION INTRAMUSCULAR; INTRAVENOUS; SUBCUTANEOUS at 14:33

## 2025-01-27 RX ADMIN — POTASSIUM CHLORIDE 10 MEQ: 7.46 INJECTION, SOLUTION INTRAVENOUS at 13:14

## 2025-01-27 RX ADMIN — FLUTICASONE PROPIONATE 2 PUFF: 110 AEROSOL, METERED RESPIRATORY (INHALATION) at 20:25

## 2025-01-27 RX ADMIN — GABAPENTIN 300 MG: 300 CAPSULE ORAL at 20:25

## 2025-01-27 RX ADMIN — POTASSIUM CHLORIDE 10 MEQ: 7.46 INJECTION, SOLUTION INTRAVENOUS at 07:42

## 2025-01-27 RX ADMIN — BISACODYL 20 MG: 5 TABLET, COATED ORAL at 11:58

## 2025-01-27 RX ADMIN — POTASSIUM CHLORIDE 10 MEQ: 7.46 INJECTION, SOLUTION INTRAVENOUS at 15:34

## 2025-01-27 ASSESSMENT — PAIN - FUNCTIONAL ASSESSMENT
PAIN_FUNCTIONAL_ASSESSMENT: NONE - DENIES PAIN
PAIN_FUNCTIONAL_ASSESSMENT: NONE - DENIES PAIN

## 2025-01-27 NOTE — ANESTHESIA POSTPROCEDURE EVALUATION
Department of Anesthesiology  Postprocedure Note    Patient: Lexie Mireles  MRN: [id number]  YOB: 1970  Date of evaluation: 1/27/2025    Procedure Summary       Date: 01/27/25 Room / Location: [address]    Anesthesia Start: 0712 Anesthesia Stop: 0737    Procedure: ESOPHAGOGASTRODUODENOSCOPY BIOPSY Diagnosis: Nausea and vomiting, unspecified vomiting type    Surgeons: Beth Olea MD Responsible Provider: Jeffrey Ennis MD    Anesthesia Type: MAC ASA Status: 3            Anesthesia Type: No value filed.    Dmoinique Phase I: Dominique Score: 10    Dominique Phase II:      Anesthesia Post Evaluation    Patient location during evaluation: bedside  Patient participation: complete - patient participated  Level of consciousness: awake and alert  Airway patency: patent  Nausea & Vomiting: no nausea and no vomiting  Cardiovascular status: hemodynamically stable  Respiratory status: acceptable  Hydration status: stable  Comments: BP (!) 171/106   Pulse (!) 104   Temp 97 °F (36.1 °C) (Temporal)   Resp 14   Ht 1.499 m (4' 11\")   Wt 54 kg (119 lb)   LMP 09/20/2023 (Exact Date)   SpO2 100%   BMI 24.04 kg/m²     Pain management: adequate    No notable events documented.

## 2025-01-27 NOTE — ANESTHESIA PRE PROCEDURE
Current medications:    Current Facility-Administered Medications   Medication Dose Route Frequency Provider Last Rate Last Admin   • [Transfer Hold] fentaNYL (SUBLIMAZE) injection 50 mcg  50 mcg IntraVENous Once Waterhouse, Shirley Ann, APRN - CNP       • [Transfer Hold] albuterol (PROVENTIL) (2.5 MG/3ML) 0.083% nebulizer solution 2.5 mg  2.5 mg Nebulization Q4H PRN Caridad Miles MD       • [Transfer Hold] amLODIPine (NORVASC) tablet 10 mg  10 mg Oral Daily Caridad Miles MD   10 mg at 01/26/25 0925   • [Transfer Hold] aspirin EC tablet 81 mg  81 mg Oral Daily Caridad Miles MD   81 mg at 01/26/25 0925   • [Transfer Hold] fluticasone (FLOVENT HFA) 110 MCG/ACT inhaler 2 puff  2 puff Inhalation BID RT Caridad Miles MD   2 puff at 01/26/25 1941   • [Transfer Hold] diclofenac sodium (VOLTAREN) 1 % gel 4 g  4 g Topical 4x Daily Caridad Miles MD       • [Transfer Hold] gabapentin (NEURONTIN) capsule 300 mg  300 mg Oral TID Caridad Miles MD   300 mg at 01/26/25 2207   • [Transfer Hold] atorvastatin (LIPITOR) tablet 40 mg  40 mg Oral Daily Caridad Miles MD   40 mg at 01/26/25 2207   • [Transfer Hold] insulin glargine (LANTUS) injection vial 10 Units  10 Units SubCUTAneous BID Caridad Miles MD   10 Units at 01/26/25 2211   • [Transfer Hold] glucose chewable tablet 16 g  4 tablet Oral PRN Caridad Miles MD       • [Transfer Hold] dextrose bolus 10% 125 mL  125 mL IntraVENous PRN Caridad Miles MD        Or   • [Transfer Hold] dextrose bolus 10% 250 mL  250 mL IntraVENous PRN Caridad Miles MD       • [Transfer Hold] glucagon injection 1 mg  1 mg SubCUTAneous PRN Caridad Miles MD       • [Transfer Hold] dextrose 10 % infusion   IntraVENous Continuous PRN Caridad Miles MD       • [Transfer Hold] metoprolol (LOPRESSOR) injection 5 mg  5 mg IntraVENous Q4H PRN Jyotsna Payne MD   5 mg at 01/26/25 1044   •

## 2025-01-27 NOTE — CARE COORDINATION
Case Management Assessment  Initial Evaluation    Date/Time of Evaluation: 1/27/2025 1423  Assessment Completed by: Henrietta Saldivar    If patient is discharged prior to next notation, then this note serves as note for discharge by case management.    Patient Name: Lexie Mireles                   YOB: 1970  Diagnosis: Dehydration [E86.0]  Hyperglycemia [R73.9]  Type 2 diabetes mellitus with hyperglycemia, with long-term current use of insulin (HCC) [E11.65, Z79.4]  Nausea and vomiting, unspecified vomiting type [R11.2]                   Date / Time: 1/25/2025  6:05 PM    Patient Admission Status: Inpatient   Readmission Risk (Low < 19, Mod (19-27), High > 27): Readmission Risk Score: 17.7    Current PCP: Viktor Fox APRN - CNP  PCP verified by CM? (P) Yes (KELLEN Smith)    Chart Reviewed: Yes      History Provided by: (P) Patient  Patient Orientation: (P) Alert and Oriented, Person, Place, Situation, Self    Patient Cognition: (P) Alert    Hospitalization in the last 30 days (Readmission):  No    If yes, Readmission Assessment in CM Navigator will be completed.    Advance Directives:      Code Status: Full Code   Patient's Primary Decision Maker is: (P) Legal Next of Kin      Discharge Planning:    Patient lives with: (P) Spouse/Significant Other, Children, Other (Comment) (17 yr old son lives at home) Type of Home: (P) House  Primary Care Giver: (P) Self  Patient Support Systems include: (P) Spouse/Significant Other   Current Financial resources: (P) Medicare  Current community resources:    Current services prior to admission: (P) Durable Medical Equipment            Current DME: (P) Glucometer, Other (Comment) (has a rollator)            Type of Home Care services:  (P) None    ADLS  Prior functional level: (P) Independent in ADLs/IADLs  Current functional level: (P) Independent in ADLs/IADLs    PT AM-PAC: 18 /24  OT AM-PAC: 19 /24    Family can provide assistance at DC: (P)

## 2025-01-27 NOTE — PLAN OF CARE
Problem: Chronic Conditions and Co-morbidities  Goal: Patient's chronic conditions and co-morbidity symptoms are monitored and maintained or improved  1/27/2025 0437 by Margarita Alegre RN  Outcome: Progressing     Problem: Discharge Planning  Goal: Discharge to home or other facility with appropriate resources  1/27/2025 0437 by Margarita Alegre RN  Outcome: Progressing     Problem: Pain  Goal: Verbalizes/displays adequate comfort level or baseline comfort level  1/27/2025 0437 by Margarita Alegre RN  Outcome: Progressing

## 2025-01-27 NOTE — PLAN OF CARE
Problem: Chronic Conditions and Co-morbidities  Goal: Patient's chronic conditions and co-morbidity symptoms are monitored and maintained or improved  1/27/2025 1814 by Lionel Castrejon RN  Outcome: Progressing  Flowsheets (Taken 1/27/2025 0945)  Care Plan - Patient's Chronic Conditions and Co-Morbidity Symptoms are Monitored and Maintained or Improved:   Monitor and assess patient's chronic conditions and comorbid symptoms for stability, deterioration, or improvement   Collaborate with multidisciplinary team to address chronic and comorbid conditions and prevent exacerbation or deterioration   Update acute care plan with appropriate goals if chronic or comorbid symptoms are exacerbated and prevent overall improvement and discharge  1/27/2025 0437 by Margarita Alegre RN  Outcome: Progressing     Problem: Discharge Planning  Goal: Discharge to home or other facility with appropriate resources  1/27/2025 1814 by Lionel Castrejon RN  Outcome: Progressing  Flowsheets (Taken 1/27/2025 0945)  Discharge to home or other facility with appropriate resources:   Identify barriers to discharge with patient and caregiver   Arrange for needed discharge resources and transportation as appropriate   Refer to discharge planning if patient needs post-hospital services based on physician order or complex needs related to functional status, cognitive ability or social support system   Identify discharge learning needs (meds, wound care, etc)  1/27/2025 0437 by Margarita Alegre RN  Outcome: Progressing     Problem: Pain  Goal: Verbalizes/displays adequate comfort level or baseline comfort level  1/27/2025 1814 by Lionel Castrejon, RN  Outcome: Progressing  1/27/2025 0437 by Margarita Alegre RN  Outcome: Progressing     Problem: Skin/Tissue Integrity  Goal: Skin integrity remains intact  Description: 1.  Monitor for areas of redness and/or skin breakdown  2.  Assess vascular access sites hourly  3.  Every 4-6 hours minimum:  Change oxygen

## 2025-01-27 NOTE — OP NOTE
EGD report    Esophagogastroduodenoscopy (EGD) Procedure Note    Procedure:  EGD with biopsies    Procedure Date: 1/27/2025    Indications:  Nausea/vomiting, abdominal pain, anemia    Sedation:  MAC    Attending Physician:  Dr. Beth Olea MD    Assistant:  None    Procedure Details:    Informed consent was obtained for the procedure, including sedation. Risks of infection, perforation, hemorrhage, adverse drug reaction, and aspiration were discussed. The patient was placed in the left lateral decubitus position. The patient was monitored continuously with ECG tracing, pulse oximetry, blood pressure monitoring, and direct observation.      The gastroscope was inserted into the mouth and advanced under direct vision to second portion of the duodenum.  A careful inspection was made as the gastroscope was withdrawn, including a retroflexed view of the proximal stomach; findings and interventions are described below. Appropriate photodocumentation was obtained.    Findings:  Retropharyngeal area was grossly normal appearing     Esophagus: Mild reflux changes noted characterized as erythema in the distal esophagus, biopsies obtained                          Esophagogastric markings: Diaphragmatic hiatus-37 cm; GE junction-37 cm     Stomach:    Fundus: normal    Body: normal    Antrum: Mild patchy erythema, biopsies obtained to rule out H. pylori     Duodenum:     Bulb: normal    First part: Normal    Second Part: Normal  Biopsies obtained to rule out celiac    Complications:  None           Estimated blood loss:  Minimal    Disposition:  [facility]           Condition: Stable    Specimen Removed: Distal esophagus, stomach, duodenum    Impression:    Mild reflux changes noted characterized as erythema in the distal esophagus, biopsies obtained  Mild erythematous gastropathy, biopsies obtained to rule out H. pylori.  Normal duodenum, biopsies obtained to rule out celiac disease    Recommendations:  Follow pathology

## 2025-01-28 ENCOUNTER — ANESTHESIA EVENT (OUTPATIENT)
Dept: OPERATING ROOM | Age: 55
DRG: 638 | End: 2025-01-28
Payer: MEDICARE

## 2025-01-28 ENCOUNTER — ANESTHESIA (OUTPATIENT)
Dept: OPERATING ROOM | Age: 55
DRG: 638 | End: 2025-01-28
Payer: MEDICARE

## 2025-01-28 PROBLEM — F14.10 COCAINE ABUSE (HCC): Status: ACTIVE | Noted: 2025-01-28

## 2025-01-28 PROBLEM — E55.9 VITAMIN D DEFICIENCY: Status: ACTIVE | Noted: 2025-01-28

## 2025-01-28 LAB
ANION GAP SERPL CALCULATED.3IONS-SCNC: 14 MMOL/L (ref 9–16)
ANION GAP SERPL CALCULATED.3IONS-SCNC: 15 MMOL/L (ref 9–16)
ANION GAP SERPL CALCULATED.3IONS-SCNC: 17 MMOL/L (ref 9–16)
BASOPHILS # BLD: 0.04 K/UL (ref 0–0.2)
BASOPHILS NFR BLD: 0 % (ref 0–2)
BUN SERPL-MCNC: 10 MG/DL (ref 6–20)
BUN SERPL-MCNC: 12 MG/DL (ref 6–20)
BUN SERPL-MCNC: 13 MG/DL (ref 6–20)
CALCIUM SERPL-MCNC: 8.5 MG/DL (ref 8.6–10.4)
CALCIUM SERPL-MCNC: 8.6 MG/DL (ref 8.6–10.4)
CALCIUM SERPL-MCNC: 8.7 MG/DL (ref 8.6–10.4)
CHLORIDE SERPL-SCNC: 102 MMOL/L (ref 98–107)
CHLORIDE SERPL-SCNC: 102 MMOL/L (ref 98–107)
CHLORIDE SERPL-SCNC: 104 MMOL/L (ref 98–107)
CO2 SERPL-SCNC: 19 MMOL/L (ref 20–31)
CO2 SERPL-SCNC: 20 MMOL/L (ref 20–31)
CO2 SERPL-SCNC: 21 MMOL/L (ref 20–31)
CREAT SERPL-MCNC: 1 MG/DL (ref 0.6–0.9)
CREAT SERPL-MCNC: 1 MG/DL (ref 0.6–0.9)
CREAT SERPL-MCNC: 1.2 MG/DL (ref 0.6–0.9)
EKG ATRIAL RATE: 113 BPM
EKG P AXIS: 63 DEGREES
EKG P-R INTERVAL: 124 MS
EKG Q-T INTERVAL: 352 MS
EKG QRS DURATION: 72 MS
EKG QTC CALCULATION (BAZETT): 482 MS
EKG R AXIS: 39 DEGREES
EKG T AXIS: 51 DEGREES
EKG VENTRICULAR RATE: 113 BPM
EOSINOPHIL # BLD: 0.14 K/UL (ref 0–0.44)
EOSINOPHILS RELATIVE PERCENT: 2 % (ref 1–4)
ERYTHROCYTE [DISTWIDTH] IN BLOOD BY AUTOMATED COUNT: 14.6 % (ref 11.8–14.4)
GFR, ESTIMATED: 54 ML/MIN/1.73M2
GFR, ESTIMATED: 68 ML/MIN/1.73M2
GFR, ESTIMATED: 68 ML/MIN/1.73M2
GLUCOSE BLD-MCNC: 108 MG/DL (ref 65–105)
GLUCOSE BLD-MCNC: 309 MG/DL (ref 65–105)
GLUCOSE BLD-MCNC: 371 MG/DL (ref 65–105)
GLUCOSE BLD-MCNC: 94 MG/DL (ref 65–105)
GLUCOSE SERPL-MCNC: 110 MG/DL (ref 74–99)
GLUCOSE SERPL-MCNC: 343 MG/DL (ref 74–99)
GLUCOSE SERPL-MCNC: 97 MG/DL (ref 74–99)
HCT VFR BLD AUTO: 30.3 % (ref 36.3–47.1)
HGB BLD-MCNC: 9.6 G/DL (ref 11.9–15.1)
IMM GRANULOCYTES # BLD AUTO: 0.05 K/UL (ref 0–0.3)
IMM GRANULOCYTES NFR BLD: 1 %
LYMPHOCYTES NFR BLD: 1.96 K/UL (ref 1.1–3.7)
LYMPHOCYTES RELATIVE PERCENT: 21 % (ref 24–43)
MCH RBC QN AUTO: 26.4 PG (ref 25.2–33.5)
MCHC RBC AUTO-ENTMCNC: 31.7 G/DL (ref 28.4–34.8)
MCV RBC AUTO: 83.5 FL (ref 82.6–102.9)
MONOCYTES NFR BLD: 0.51 K/UL (ref 0.1–1.2)
MONOCYTES NFR BLD: 6 % (ref 3–12)
NEUTROPHILS NFR BLD: 70 % (ref 36–65)
NEUTS SEG NFR BLD: 6.65 K/UL (ref 1.5–8.1)
NRBC BLD-RTO: 0 PER 100 WBC
PLATELET # BLD AUTO: 317 K/UL (ref 138–453)
PMV BLD AUTO: 11.6 FL (ref 8.1–13.5)
POTASSIUM SERPL-SCNC: 3.3 MMOL/L (ref 3.7–5.3)
POTASSIUM SERPL-SCNC: 3.4 MMOL/L (ref 3.7–5.3)
POTASSIUM SERPL-SCNC: 3.7 MMOL/L (ref 3.7–5.3)
RBC # BLD AUTO: 3.63 M/UL (ref 3.95–5.11)
RBC # BLD: ABNORMAL 10*6/UL
SODIUM SERPL-SCNC: 136 MMOL/L (ref 136–145)
SODIUM SERPL-SCNC: 139 MMOL/L (ref 136–145)
SODIUM SERPL-SCNC: 139 MMOL/L (ref 136–145)
WBC OTHER # BLD: 9.4 K/UL (ref 3.5–11.3)

## 2025-01-28 PROCEDURE — 3609027000 HC COLONOSCOPY: Performed by: INTERNAL MEDICINE

## 2025-01-28 PROCEDURE — 80048 BASIC METABOLIC PNL TOTAL CA: CPT

## 2025-01-28 PROCEDURE — 2580000003 HC RX 258: Performed by: INTERNAL MEDICINE

## 2025-01-28 PROCEDURE — 2580000003 HC RX 258: Performed by: NURSE ANESTHETIST, CERTIFIED REGISTERED

## 2025-01-28 PROCEDURE — 3700000000 HC ANESTHESIA ATTENDED CARE: Performed by: INTERNAL MEDICINE

## 2025-01-28 PROCEDURE — 0DB68ZX EXCISION OF STOMACH, VIA NATURAL OR ARTIFICIAL OPENING ENDOSCOPIC, DIAGNOSTIC: ICD-10-PCS | Performed by: INTERNAL MEDICINE

## 2025-01-28 PROCEDURE — 6360000002 HC RX W HCPCS: Performed by: INTERNAL MEDICINE

## 2025-01-28 PROCEDURE — 6360000002 HC RX W HCPCS: Performed by: NURSE ANESTHETIST, CERTIFIED REGISTERED

## 2025-01-28 PROCEDURE — 94761 N-INVAS EAR/PLS OXIMETRY MLT: CPT

## 2025-01-28 PROCEDURE — 94640 AIRWAY INHALATION TREATMENT: CPT

## 2025-01-28 PROCEDURE — 6360000002 HC RX W HCPCS: Performed by: ANESTHESIOLOGY

## 2025-01-28 PROCEDURE — 1200000000 HC SEMI PRIVATE

## 2025-01-28 PROCEDURE — 2500000003 HC RX 250 WO HCPCS: Performed by: INTERNAL MEDICINE

## 2025-01-28 PROCEDURE — 85025 COMPLETE CBC W/AUTO DIFF WBC: CPT

## 2025-01-28 PROCEDURE — 0DB98ZX EXCISION OF DUODENUM, VIA NATURAL OR ARTIFICIAL OPENING ENDOSCOPIC, DIAGNOSTIC: ICD-10-PCS | Performed by: INTERNAL MEDICINE

## 2025-01-28 PROCEDURE — 36415 COLL VENOUS BLD VENIPUNCTURE: CPT

## 2025-01-28 PROCEDURE — 7100000000 HC PACU RECOVERY - FIRST 15 MIN: Performed by: INTERNAL MEDICINE

## 2025-01-28 PROCEDURE — 3700000001 HC ADD 15 MINUTES (ANESTHESIA): Performed by: INTERNAL MEDICINE

## 2025-01-28 PROCEDURE — 6370000000 HC RX 637 (ALT 250 FOR IP): Performed by: INTERNAL MEDICINE

## 2025-01-28 PROCEDURE — 99232 SBSQ HOSP IP/OBS MODERATE 35: CPT | Performed by: INTERNAL MEDICINE

## 2025-01-28 PROCEDURE — 7100000001 HC PACU RECOVERY - ADDTL 15 MIN: Performed by: INTERNAL MEDICINE

## 2025-01-28 PROCEDURE — G0108 DIAB MANAGE TRN  PER INDIV: HCPCS

## 2025-01-28 PROCEDURE — 0DB38ZX EXCISION OF LOWER ESOPHAGUS, VIA NATURAL OR ARTIFICIAL OPENING ENDOSCOPIC, DIAGNOSTIC: ICD-10-PCS | Performed by: INTERNAL MEDICINE

## 2025-01-28 PROCEDURE — 82947 ASSAY GLUCOSE BLOOD QUANT: CPT

## 2025-01-28 RX ORDER — FENTANYL CITRATE 50 UG/ML
50 INJECTION, SOLUTION INTRAMUSCULAR; INTRAVENOUS EVERY 5 MIN PRN
Status: DISCONTINUED | OUTPATIENT
Start: 2025-01-28 | End: 2025-01-28 | Stop reason: HOSPADM

## 2025-01-28 RX ORDER — SODIUM CHLORIDE 0.9 % (FLUSH) 0.9 %
5-40 SYRINGE (ML) INJECTION EVERY 12 HOURS SCHEDULED
Status: DISCONTINUED | OUTPATIENT
Start: 2025-01-28 | End: 2025-01-28 | Stop reason: HOSPADM

## 2025-01-28 RX ORDER — POLYETHYLENE GLYCOL 3350 17 G/17G
17 POWDER, FOR SOLUTION ORAL 2 TIMES DAILY
Status: DISCONTINUED | OUTPATIENT
Start: 2025-01-28 | End: 2025-01-29 | Stop reason: HOSPADM

## 2025-01-28 RX ORDER — PROPOFOL 10 MG/ML
INJECTION, EMULSION INTRAVENOUS
Status: DISCONTINUED | OUTPATIENT
Start: 2025-01-28 | End: 2025-01-28 | Stop reason: SDUPTHER

## 2025-01-28 RX ORDER — DIPHENHYDRAMINE HYDROCHLORIDE 50 MG/ML
12.5 INJECTION INTRAMUSCULAR; INTRAVENOUS
Status: DISCONTINUED | OUTPATIENT
Start: 2025-01-28 | End: 2025-01-28 | Stop reason: HOSPADM

## 2025-01-28 RX ORDER — NALOXONE HYDROCHLORIDE 0.4 MG/ML
INJECTION, SOLUTION INTRAMUSCULAR; INTRAVENOUS; SUBCUTANEOUS PRN
Status: DISCONTINUED | OUTPATIENT
Start: 2025-01-28 | End: 2025-01-28 | Stop reason: HOSPADM

## 2025-01-28 RX ORDER — SODIUM CHLORIDE 9 MG/ML
INJECTION, SOLUTION INTRAVENOUS
Status: DISCONTINUED | OUTPATIENT
Start: 2025-01-28 | End: 2025-01-28 | Stop reason: SDUPTHER

## 2025-01-28 RX ORDER — LIDOCAINE HYDROCHLORIDE 10 MG/ML
INJECTION, SOLUTION EPIDURAL; INFILTRATION; INTRACAUDAL; PERINEURAL
Status: DISCONTINUED | OUTPATIENT
Start: 2025-01-28 | End: 2025-01-28 | Stop reason: SDUPTHER

## 2025-01-28 RX ORDER — SODIUM CHLORIDE 0.9 % (FLUSH) 0.9 %
5-40 SYRINGE (ML) INJECTION PRN
Status: DISCONTINUED | OUTPATIENT
Start: 2025-01-28 | End: 2025-01-28 | Stop reason: HOSPADM

## 2025-01-28 RX ORDER — SODIUM CHLORIDE 9 MG/ML
INJECTION, SOLUTION INTRAVENOUS PRN
Status: DISCONTINUED | OUTPATIENT
Start: 2025-01-28 | End: 2025-01-28 | Stop reason: HOSPADM

## 2025-01-28 RX ORDER — FENTANYL CITRATE 50 UG/ML
25 INJECTION, SOLUTION INTRAMUSCULAR; INTRAVENOUS EVERY 5 MIN PRN
Status: DISCONTINUED | OUTPATIENT
Start: 2025-01-28 | End: 2025-01-28 | Stop reason: HOSPADM

## 2025-01-28 RX ORDER — LABETALOL HYDROCHLORIDE 5 MG/ML
5 INJECTION, SOLUTION INTRAVENOUS
Status: COMPLETED | OUTPATIENT
Start: 2025-01-28 | End: 2025-01-28

## 2025-01-28 RX ORDER — ONDANSETRON 2 MG/ML
4 INJECTION INTRAMUSCULAR; INTRAVENOUS
Status: DISCONTINUED | OUTPATIENT
Start: 2025-01-28 | End: 2025-01-28 | Stop reason: HOSPADM

## 2025-01-28 RX ADMIN — HYDRALAZINE HYDROCHLORIDE 10 MG: 20 INJECTION INTRAMUSCULAR; INTRAVENOUS at 13:42

## 2025-01-28 RX ADMIN — SODIUM CHLORIDE: 9 INJECTION, SOLUTION INTRAVENOUS at 09:45

## 2025-01-28 RX ADMIN — Medication 5 MG: at 11:06

## 2025-01-28 RX ADMIN — INSULIN GLARGINE 10 UNITS: 100 INJECTION, SOLUTION SUBCUTANEOUS at 20:16

## 2025-01-28 RX ADMIN — SODIUM CHLORIDE 40 MG: 9 INJECTION INTRAMUSCULAR; INTRAVENOUS; SUBCUTANEOUS at 13:42

## 2025-01-28 RX ADMIN — PROPOFOL 150 MCG/KG/MIN: 10 INJECTION, EMULSION INTRAVENOUS at 10:03

## 2025-01-28 RX ADMIN — POLYETHYLENE GLYCOL 3350 17 G: 17 POWDER, FOR SOLUTION ORAL at 20:15

## 2025-01-28 RX ADMIN — POLYETHYLENE GLYCOL 3350 17 G: 17 POWDER, FOR SOLUTION ORAL at 11:38

## 2025-01-28 RX ADMIN — GABAPENTIN 300 MG: 300 CAPSULE ORAL at 13:45

## 2025-01-28 RX ADMIN — SODIUM CHLORIDE: 9 INJECTION, SOLUTION INTRAVENOUS at 11:45

## 2025-01-28 RX ADMIN — PROPOFOL 40 MG: 10 INJECTION, EMULSION INTRAVENOUS at 10:02

## 2025-01-28 RX ADMIN — INSULIN LISPRO 8 UNITS: 100 INJECTION, SOLUTION INTRAVENOUS; SUBCUTANEOUS at 20:16

## 2025-01-28 RX ADMIN — METOPROLOL TARTRATE 5 MG: 5 INJECTION INTRAVENOUS at 14:56

## 2025-01-28 RX ADMIN — LIDOCAINE HYDROCHLORIDE 50 MG: 10 INJECTION, SOLUTION EPIDURAL; INFILTRATION; INTRACAUDAL; PERINEURAL at 10:02

## 2025-01-28 RX ADMIN — AMLODIPINE BESYLATE 10 MG: 10 TABLET ORAL at 11:39

## 2025-01-28 RX ADMIN — GABAPENTIN 300 MG: 300 CAPSULE ORAL at 20:14

## 2025-01-28 RX ADMIN — Medication 5 MG: at 10:56

## 2025-01-28 RX ADMIN — FLUTICASONE PROPIONATE 2 PUFF: 110 AEROSOL, METERED RESPIRATORY (INHALATION) at 20:32

## 2025-01-28 RX ADMIN — INSULIN LISPRO 6 UNITS: 100 INJECTION, SOLUTION INTRAVENOUS; SUBCUTANEOUS at 18:23

## 2025-01-28 RX ADMIN — ATORVASTATIN CALCIUM 40 MG: 40 TABLET, FILM COATED ORAL at 20:14

## 2025-01-28 RX ADMIN — SODIUM CHLORIDE 40 MG: 9 INJECTION INTRAMUSCULAR; INTRAVENOUS; SUBCUTANEOUS at 02:48

## 2025-01-28 RX ADMIN — SODIUM CHLORIDE, PRESERVATIVE FREE 10 ML: 5 INJECTION INTRAVENOUS at 20:19

## 2025-01-28 RX ADMIN — ASPIRIN 81 MG: 81 TABLET, COATED ORAL at 11:38

## 2025-01-28 ASSESSMENT — PAIN SCALES - GENERAL
PAINLEVEL_OUTOF10: 0

## 2025-01-28 ASSESSMENT — PAIN - FUNCTIONAL ASSESSMENT: PAIN_FUNCTIONAL_ASSESSMENT: NONE - DENIES PAIN

## 2025-01-28 NOTE — OP NOTE
Colonoscopy report    Colonoscopy Procedure Note    Procedure:  Colonoscopy    Procedure Date: 1/28/2025    Indications:  Abdominal pain, anemia    Sedation:  MAC    Attending Physician:  Dr. Beth Olea MD.     Assistant Physician: None    Consent:   Informed consent was obtained for the procedure after explaining the risks including bleeding, perforation, aspiration, arrhythmia, risks related to sedation, reaction to medications and rarely death, benefits and alternatives to the patient. The patient verbalized understanding and agreed to proceed with the procedure.       Procedure Details:  The patient was placed in the left lateral decubitus position.  Oxygen and cardiac monitoring equipment was attached. The patient's vital signs were monitored continuously  throughout the procedure. After appropriate sedation was achieved, a rectal examination was performed.  The pediatric colonoscope was inserted into the rectum and advanced under direct vision to the terminal ileum.  The quality of the colonic preparation was fair.  A careful inspection was made as the colonoscope was withdrawn, including a retroflexed view of the rectum; findings and interventions are described below.  Appropriate photodocumentation was obtained.           Complications:  None    Estimated blood loss:  Minimal           Disposition:  [facility]           Condition: stable    Withdrawal Time: 9 minutes    Findings:   The bowel prep was fair.  Few scattered diverticula noted throughout the colon.  The terminal ileum and the colonic mucosa appeared unremarkable.  No inflammation or polyps/masses noted throughout.  Retroflexion examination in the rectum was limited due to narrow canal however presence of small internal and external hemorrhoids noted    Specimen Removed: None    Endoscopic Impression:    Fair bowel prep.  Mild diverticulosis.  No inflammation, polyps and masses noted throughout the colon.  The terminal ileum was also

## 2025-01-28 NOTE — ANESTHESIA PRE PROCEDURE
Answered  Tobacco comments: pt is around smokers a lot      Vital Signs (Current):   Vitals:    01/27/25 1910 01/27/25 2032 01/28/25 0000 01/28/25 0400   BP: (!) 155/104  (!) 143/94 (!) 151/101   Pulse: (!) 117 (!) 115 (!) 115 (!) 114   Resp: 18 18 20 14   Temp: 97.6 °F (36.4 °C)  97.8 °F (36.6 °C) 98.3 °F (36.8 °C)   TempSrc: Oral  Oral Oral   SpO2: 96% 98% 100% 97%   Weight:       Height:                                                  BP Readings from Last 3 Encounters:   01/28/25 (!) 151/101   01/08/25 (!) 169/105   12/27/24 (!) 149/100       NPO Status: Time of last liquid consumption: 2100                        Time of last solid consumption: 2100                        Date of last liquid consumption: 01/26/25                        Date of last solid food consumption: 01/26/25    BMI:   Wt Readings from Last 3 Encounters:   01/27/25 54 kg (119 lb)   01/08/25 60.3 kg (133 lb)   12/15/24 64.1 kg (141 lb 5 oz)     Body mass index is 24.04 kg/m².    CBC:   Lab Results   Component Value Date/Time    WBC 9.4 01/28/2025 04:41 AM    RBC 3.63 01/28/2025 04:41 AM    HGB 9.6 01/28/2025 04:41 AM    HCT 30.3 01/28/2025 04:41 AM    MCV 83.5 01/28/2025 04:41 AM    RDW 14.6 01/28/2025 04:41 AM     01/28/2025 04:41 AM       CMP:   Lab Results   Component Value Date/Time     01/28/2025 04:41 AM    K 3.3 01/28/2025 04:41 AM     01/28/2025 04:41 AM    CO2 21 01/28/2025 04:41 AM    BUN 13 01/28/2025 04:41 AM    CREATININE 1.0 01/28/2025 04:41 AM    GFRAA >60 06/06/2022 08:41 AM    LABGLOM 68 01/28/2025 04:41 AM    LABGLOM >60 03/30/2023 09:27 AM    GLUCOSE 97 01/28/2025 04:41 AM    CALCIUM 8.6 01/28/2025 04:41 AM    BILITOT 0.4 01/25/2025 06:44 PM    ALKPHOS 138 01/25/2025 06:44 PM    AST 15 01/25/2025 06:44 PM    ALT 15 01/25/2025 06:44 PM       POC Tests:   Recent Labs     01/25/25  1842 01/25/25  2144 01/27/25 2024   POCGLU >700*   < > 125*   POCNA 133*  --   --    POCK 4.7*  --   --    POCCL 91*  --

## 2025-01-28 NOTE — PLAN OF CARE
Problem: Chronic Conditions and Co-morbidities  Goal: Patient's chronic conditions and co-morbidity symptoms are monitored and maintained or improved  Recent Flowsheet Documentation  Taken 1/27/2025 2000 by Shavonne Darling RN  Care Plan - Patient's Chronic Conditions and Co-Morbidity Symptoms are Monitored and Maintained or Improved: Monitor and assess patient's chronic conditions and comorbid symptoms for stability, deterioration, or improvement  1/27/2025 1814 by Lionel Castrejon RN  Outcome: Progressing  Flowsheets (Taken 1/27/2025 0945)  Care Plan - Patient's Chronic Conditions and Co-Morbidity Symptoms are Monitored and Maintained or Improved:   Monitor and assess patient's chronic conditions and comorbid symptoms for stability, deterioration, or improvement   Collaborate with multidisciplinary team to address chronic and comorbid conditions and prevent exacerbation or deterioration   Update acute care plan with appropriate goals if chronic or comorbid symptoms are exacerbated and prevent overall improvement and discharge     Problem: Discharge Planning  Goal: Discharge to home or other facility with appropriate resources  Recent Flowsheet Documentation  Taken 1/27/2025 2000 by Shavonne Darling RN  Discharge to home or other facility with appropriate resources: Identify barriers to discharge with patient and caregiver  1/27/2025 1814 by Lionel Castrejon RN  Outcome: Progressing  Flowsheets (Taken 1/27/2025 0945)  Discharge to home or other facility with appropriate resources:   Identify barriers to discharge with patient and caregiver   Arrange for needed discharge resources and transportation as appropriate   Refer to discharge planning if patient needs post-hospital services based on physician order or complex needs related to functional status, cognitive ability or social support system   Identify discharge learning needs (meds, wound care, etc)     Problem: Pain  Goal: Verbalizes/displays

## 2025-01-28 NOTE — ANESTHESIA POSTPROCEDURE EVALUATION
Department of Anesthesiology  Postprocedure Note    Patient: Lexie Mireles  MRN: [id number]  YOB: 1970  Date of evaluation: 1/28/2025    Procedure Summary       Date: 01/28/25 Room / Location: [address]    Anesthesia Start: 0957 Anesthesia Stop: 1023    Procedure: COLONOSCOPY DIAGNOSTIC Diagnosis:       Weight loss      (Weight loss [R63.4])    Surgeons: Beth Olea MD Responsible Provider: Ovidio Bautista MD    Anesthesia Type: MAC ASA Status: 3            Anesthesia Type: No value filed.    Dominique Phase I: Dominique Score: 9    Dominique Phase II:      Anesthesia Post Evaluation    Patient location during evaluation: PACU  Patient participation: complete - patient participated  Level of consciousness: awake  Pain score: 1  Airway patency: patent  Nausea & Vomiting: no nausea and no vomiting  Cardiovascular status: blood pressure returned to baseline and hemodynamically stable  Respiratory status: acceptable  Hydration status: euvolemic  Pain management: adequate    No notable events documented.

## 2025-01-28 NOTE — PLAN OF CARE
Problem: Chronic Conditions and Co-morbidities  Goal: Patient's chronic conditions and co-morbidity symptoms are monitored and maintained or improved  Outcome: Progressing  Flowsheets (Taken 1/28/2025 1130)  Care Plan - Patient's Chronic Conditions and Co-Morbidity Symptoms are Monitored and Maintained or Improved: Monitor and assess patient's chronic conditions and comorbid symptoms for stability, deterioration, or improvement     Problem: Discharge Planning  Goal: Discharge to home or other facility with appropriate resources  Outcome: Progressing  Flowsheets (Taken 1/28/2025 1130)  Discharge to home or other facility with appropriate resources: Identify barriers to discharge with patient and caregiver     Problem: Pain  Goal: Verbalizes/displays adequate comfort level or baseline comfort level  Outcome: Progressing  Flowsheets (Taken 1/26/2025 1850 by Milligan, Matthew, RN)  Verbalizes/displays adequate comfort level or baseline comfort level:   Encourage patient to monitor pain and request assistance   Administer analgesics based on type and severity of pain and evaluate response   Consider cultural and social influences on pain and pain management   Assess pain using appropriate pain scale   Implement non-pharmacological measures as appropriate and evaluate response   Notify Licensed Independent Practitioner if interventions unsuccessful or patient reports new pain     Problem: Skin/Tissue Integrity  Goal: Skin integrity remains intact  Description: 1.  Monitor for areas of redness and/or skin breakdown  2.  Assess vascular access sites hourly  3.  Every 4-6 hours minimum:  Change oxygen saturation probe site  4.  Every 4-6 hours:  If on nasal continuous positive airway pressure, respiratory therapy assess nares and determine need for appliance change or resting period  Outcome: Progressing     Problem: Safety - Adult  Goal: Free from fall injury  Outcome: Progressing     Problem: Gastrointestinal -

## 2025-01-29 ENCOUNTER — APPOINTMENT (OUTPATIENT)
Dept: NUCLEAR MEDICINE | Age: 55
DRG: 638 | End: 2025-01-29
Payer: MEDICARE

## 2025-01-29 VITALS
SYSTOLIC BLOOD PRESSURE: 153 MMHG | HEIGHT: 59 IN | DIASTOLIC BLOOD PRESSURE: 92 MMHG | WEIGHT: 119 LBS | HEART RATE: 117 BPM | TEMPERATURE: 98.1 F | RESPIRATION RATE: 25 BRPM | OXYGEN SATURATION: 100 % | BODY MASS INDEX: 23.99 KG/M2

## 2025-01-29 LAB
ANION GAP SERPL CALCULATED.3IONS-SCNC: 9 MMOL/L (ref 9–16)
BUN SERPL-MCNC: 12 MG/DL (ref 6–20)
CALCIUM SERPL-MCNC: 8.3 MG/DL (ref 8.6–10.4)
CHLORIDE SERPL-SCNC: 103 MMOL/L (ref 98–107)
CO2 SERPL-SCNC: 23 MMOL/L (ref 20–31)
CREAT SERPL-MCNC: 1.2 MG/DL (ref 0.6–0.9)
GFR, ESTIMATED: 54 ML/MIN/1.73M2
GLUCOSE BLD-MCNC: 170 MG/DL (ref 65–105)
GLUCOSE BLD-MCNC: 334 MG/DL (ref 65–105)
GLUCOSE SERPL-MCNC: 185 MG/DL (ref 74–99)
MAGNESIUM SERPL-MCNC: 1.7 MG/DL (ref 1.6–2.6)
POTASSIUM SERPL-SCNC: 3.2 MMOL/L (ref 3.7–5.3)
SODIUM SERPL-SCNC: 135 MMOL/L (ref 136–145)
SURGICAL PATHOLOGY REPORT: NORMAL

## 2025-01-29 PROCEDURE — 83735 ASSAY OF MAGNESIUM: CPT

## 2025-01-29 PROCEDURE — 94761 N-INVAS EAR/PLS OXIMETRY MLT: CPT

## 2025-01-29 PROCEDURE — 3430000000 HC RX DIAGNOSTIC RADIOPHARMACEUTICAL: Performed by: INTERNAL MEDICINE

## 2025-01-29 PROCEDURE — 6360000002 HC RX W HCPCS: Performed by: INTERNAL MEDICINE

## 2025-01-29 PROCEDURE — 6370000000 HC RX 637 (ALT 250 FOR IP): Performed by: INTERNAL MEDICINE

## 2025-01-29 PROCEDURE — 82947 ASSAY GLUCOSE BLOOD QUANT: CPT

## 2025-01-29 PROCEDURE — 94640 AIRWAY INHALATION TREATMENT: CPT

## 2025-01-29 PROCEDURE — A9541 TC99M SULFUR COLLOID: HCPCS | Performed by: INTERNAL MEDICINE

## 2025-01-29 PROCEDURE — 78264 GASTRIC EMPTYING IMG STUDY: CPT

## 2025-01-29 PROCEDURE — G0108 DIAB MANAGE TRN  PER INDIV: HCPCS

## 2025-01-29 PROCEDURE — 97535 SELF CARE MNGMENT TRAINING: CPT

## 2025-01-29 PROCEDURE — 36415 COLL VENOUS BLD VENIPUNCTURE: CPT

## 2025-01-29 PROCEDURE — 97530 THERAPEUTIC ACTIVITIES: CPT

## 2025-01-29 PROCEDURE — 2500000003 HC RX 250 WO HCPCS: Performed by: INTERNAL MEDICINE

## 2025-01-29 PROCEDURE — 2580000003 HC RX 258: Performed by: INTERNAL MEDICINE

## 2025-01-29 PROCEDURE — 80048 BASIC METABOLIC PNL TOTAL CA: CPT

## 2025-01-29 RX ORDER — PANTOPRAZOLE SODIUM 40 MG/1
40 TABLET, DELAYED RELEASE ORAL
Qty: 30 TABLET | Refills: 1 | Status: SHIPPED | OUTPATIENT
Start: 2025-01-29

## 2025-01-29 RX ORDER — METOCLOPRAMIDE 5 MG/1
5 TABLET ORAL 3 TIMES DAILY PRN
Qty: 90 TABLET | Refills: 0 | Status: SHIPPED | OUTPATIENT
Start: 2025-01-29

## 2025-01-29 RX ORDER — MAGNESIUM SULFATE 1 G/100ML
1000 INJECTION INTRAVENOUS ONCE
Status: COMPLETED | OUTPATIENT
Start: 2025-01-29 | End: 2025-01-29

## 2025-01-29 RX ORDER — POLYETHYLENE GLYCOL 3350 17 G/17G
17 POWDER, FOR SOLUTION ORAL DAILY
COMMUNITY
Start: 2025-01-29 | End: 2025-02-28

## 2025-01-29 RX ADMIN — FLUTICASONE PROPIONATE 2 PUFF: 110 AEROSOL, METERED RESPIRATORY (INHALATION) at 08:09

## 2025-01-29 RX ADMIN — SODIUM CHLORIDE 40 MG: 9 INJECTION INTRAMUSCULAR; INTRAVENOUS; SUBCUTANEOUS at 15:22

## 2025-01-29 RX ADMIN — ASPIRIN 81 MG: 81 TABLET, COATED ORAL at 08:54

## 2025-01-29 RX ADMIN — GABAPENTIN 300 MG: 300 CAPSULE ORAL at 08:54

## 2025-01-29 RX ADMIN — Medication 1 MILLICURIE: at 10:05

## 2025-01-29 RX ADMIN — AMLODIPINE BESYLATE 10 MG: 10 TABLET ORAL at 08:54

## 2025-01-29 RX ADMIN — INSULIN LISPRO 6 UNITS: 100 INJECTION, SOLUTION INTRAVENOUS; SUBCUTANEOUS at 15:23

## 2025-01-29 RX ADMIN — MAGNESIUM SULFATE HEPTAHYDRATE 1000 MG: 1 INJECTION, SOLUTION INTRAVENOUS at 15:16

## 2025-01-29 RX ADMIN — SODIUM CHLORIDE, PRESERVATIVE FREE 10 ML: 5 INJECTION INTRAVENOUS at 08:54

## 2025-01-29 RX ADMIN — GABAPENTIN 300 MG: 300 CAPSULE ORAL at 16:15

## 2025-01-29 RX ADMIN — SODIUM CHLORIDE 40 MG: 9 INJECTION INTRAMUSCULAR; INTRAVENOUS; SUBCUTANEOUS at 03:47

## 2025-01-29 RX ADMIN — INSULIN GLARGINE 10 UNITS: 100 INJECTION, SOLUTION SUBCUTANEOUS at 08:55

## 2025-01-29 ASSESSMENT — PAIN SCALES - GENERAL: PAINLEVEL_OUTOF10: 0

## 2025-01-29 NOTE — PLAN OF CARE
Problem: Chronic Conditions and Co-morbidities  Goal: Patient's chronic conditions and co-morbidity symptoms are monitored and maintained or improved  1/29/2025 0604 by Scarlet Guadarrama RN  Outcome: Progressing  Flowsheets (Taken 1/28/2025 2000)  Care Plan - Patient's Chronic Conditions and Co-Morbidity Symptoms are Monitored and Maintained or Improved: Monitor and assess patient's chronic conditions and comorbid symptoms for stability, deterioration, or improvement  1/28/2025 1716 by Дмитрий Severino, RN  Outcome: Progressing  Flowsheets (Taken 1/28/2025 1130)  Care Plan - Patient's Chronic Conditions and Co-Morbidity Symptoms are Monitored and Maintained or Improved: Monitor and assess patient's chronic conditions and comorbid symptoms for stability, deterioration, or improvement     Problem: Discharge Planning  Goal: Discharge to home or other facility with appropriate resources  1/29/2025 0604 by Scarlet Guadarrama RN  Outcome: Progressing  Flowsheets (Taken 1/28/2025 2000)  Discharge to home or other facility with appropriate resources: Identify barriers to discharge with patient and caregiver  1/28/2025 1716 by Дмитрий Severino RN  Outcome: Progressing  Flowsheets (Taken 1/28/2025 1130)  Discharge to home or other facility with appropriate resources: Identify barriers to discharge with patient and caregiver     Problem: Safety - Adult  Goal: Free from fall injury  1/29/2025 0604 by Scarlet Guadarrama RN  Outcome: Progressing  Flowsheets (Taken 1/28/2025 2000)  Free From Fall Injury: Instruct family/caregiver on patient safety  1/28/2025 1716 by Дмитрий Severino, RN  Outcome: Progressing

## 2025-01-29 NOTE — PLAN OF CARE
Problem: Chronic Conditions and Co-morbidities  Goal: Patient's chronic conditions and co-morbidity symptoms are monitored and maintained or improved  1/29/2025 1752 by Marysol Gambino RN  Outcome: Progressing  1/29/2025 0604 by Scarlet Guadarrama RN  Outcome: Progressing  Flowsheets (Taken 1/28/2025 2000)  Care Plan - Patient's Chronic Conditions and Co-Morbidity Symptoms are Monitored and Maintained or Improved: Monitor and assess patient's chronic conditions and comorbid symptoms for stability, deterioration, or improvement     Problem: Discharge Planning  Goal: Discharge to home or other facility with appropriate resources  1/29/2025 1752 by Marysol Gambino RN  Outcome: Progressing  1/29/2025 0604 by Scarlet Guadarrama RN  Outcome: Progressing  Flowsheets (Taken 1/28/2025 2000)  Discharge to home or other facility with appropriate resources: Identify barriers to discharge with patient and caregiver     Problem: Pain  Goal: Verbalizes/displays adequate comfort level or baseline comfort level  1/29/2025 1752 by Marysol Gambino RN  Outcome: Progressing  1/29/2025 0604 by Scarlet Guadarrama RN  Outcome: Progressing  Flowsheets  Taken 1/29/2025 0351  Verbalizes/displays adequate comfort level or baseline comfort level: Encourage patient to monitor pain and request assistance  Taken 1/28/2025 1943  Verbalizes/displays adequate comfort level or baseline comfort level: Encourage patient to monitor pain and request assistance     Problem: Skin/Tissue Integrity  Goal: Skin integrity remains intact  Description: 1.  Monitor for areas of redness and/or skin breakdown  2.  Assess vascular access sites hourly  3.  Every 4-6 hours minimum:  Change oxygen saturation probe site  4.  Every 4-6 hours:  If on nasal continuous positive airway pressure, respiratory therapy assess nares and determine need for appliance change or resting period  1/29/2025 1752 by Marysol Gambino RN  Outcome: Progressing  1/29/2025 0604 by Scarlet Guadarrama

## 2025-01-29 NOTE — PROGRESS NOTES
Pharmacy Note     Renal Dose Adjustment    Lexie Mireles is a 54 y.o. female. Pharmacist assessment of renally cleared medications.    Recent Labs     01/25/25 1844   BUN 24*       Recent Labs     01/25/25 1842 01/25/25 1844   CREATININE 1.3* 1.4*       Estimated Creatinine Clearance: 36 mL/min (A) (based on SCr of 1.4 mg/dL (H)).  Estimated CrCl using Ideal Body Weight: 36 mL/min (based on IBW 48.8 kg)    Height:   Ht Readings from Last 1 Encounters:   01/25/25 1.499 m (4' 11\")     Weight:  Wt Readings from Last 1 Encounters:   01/25/25 54.4 kg (119 lb 14.9 oz)       The following medication dose has been adjusted based upon renal function per P&T Guidelines:             Famotidine 20mg twice daily changed to once daily.       
    Pharmacist Review and Automatic Dose Adjustment of Prophylactic Enoxaparin    Reviewed reason(s) for admission/hospital problem list    The reviewing pharmacist has made an adjustment to the ordered enoxaparin dose or converted to UFH per the approved [facility] protocol and table as identified below.        Lexie Mireles is a 54 y.o. female.     Recent Labs     01/25/25  1842 01/25/25  1844   CREATININE 1.3* 1.4*       Estimated Creatinine Clearance: 36 mL/min (A) (based on SCr of 1.4 mg/dL (H)).    Recent Labs     01/25/25  1844   HGB 11.3*   HCT 33.9*        No results for input(s): \"INR\" in the last 72 hours.    Height:   Ht Readings from Last 1 Encounters:   01/08/25 1.499 m (4' 11\")     Weight:  Wt Readings from Last 1 Encounters:   01/25/25 56.7 kg (125 lb)               Plan: Based upon the patient's weight and renal function    Ordered: Enoxaparin 40mg SUBQ Daily    Changed/converted to    New Order: Heparin 5,000 units SUBQ TID      Thank you,  Evert Wilkerson, [facility]  1/25/2025, 10:38 PM    
Addendum at 1pm  Patient is now in her room. She states that she has had diabetes for 18 years. She states that she had GDM with her 4th pregnancy and then the diabetes did not resolve. She reports a strong family hx of diabetes.    She states that she has a good relationship with her PCP. She states that she was in the midst of establishing with [facility] Endo. They were getting ready to start her on CGM but then she missed an appointment. We called the office together - there was no answer - I left a detailed voicemail asking that they give the patient a return phone call to schedule an appt.     Patient states that prior to admission she was taking Farxiga, Rybelsus, (she states endo instructed her to stop glipizide)  She also was taking long acting insulin 15 units bid  Patient states that endo added mealtime insulin.     We discussed medications in detail and all diabetes survival skills.    Thank you for the referral.  Joanne Mo RN      Inpatient Diabetes Education    Referral on Lexie Mireles for       Lab Results   Component Value Date    LABA1C 12.4 (H) 01/25/2025    LABA1C 17.5 (H) 11/06/2024    LABA1C 14.0 07/24/2024     Current location of patient per [facility] is not in her room but OR.    Per chart review, patient was seen in the past by our department - she had diabetes education sessions in 2021. Patient is known to have diabetes. Her last office visit with her PCP was on 1-8-25 according to EPIC. Lexie may see Endocrinology - [facility].     Her home diabetes medications may include orals and long acting insulin. She may test her blood sugar with a glucometer.     RECOMMENDATIONS FOR OUTPATIENT PLAN:    Diabetes Education / HCP follow -up:   _x_ Would recommend follow-up education at outpatient diabetes education at [facility]. An ordered is needed for this service and can be placed via EHR. Discharge Navigator --- Med Reconciliation -- New orders for discharge tab -- 
Diabetes Education Follow up  Progress note from yesterday copied below for reference.  OT working with patient but they states that it is okay for me to interact with the patient.  Lexie states that she has not heard back from Endo office yet to try and reschedule her missed appointment.  I called [facility] Endo at [phone]. I left a voicemail again today asking that they reach out to patient/request reschedule.     Educational materials given yesterday remains at bedside. I added insulin teaching sheet to the materials - it explains the difference between long and short acting insulin.  Joanne Mo RN    Addendum at 1pm on 1-28-25  Patient is now in her room. She states that she has had diabetes for 18 years. She states that she had GDM with her 4th pregnancy and then the diabetes did not resolve. She reports a strong family hx of diabetes.    She states that she has a good relationship with her PCP. She states that she was in the midst of establishing with [facility] Endo. They were getting ready to start her on CGM but then she missed an appointment. We called the office together - there was no answer - I left a detailed voicemail asking that they give the patient a return phone call to schedule an appt.     Patient states that prior to admission she was taking Farxiga, Rybelsus, (she states endo instructed her to stop glipizide)  She also was taking long acting insulin 15 units bid  Patient states that endo added mealtime insulin.     We discussed medications in detail and all diabetes survival skills.    Thank you for the referral.  Joanne Mo RN      Inpatient Diabetes Education    Referral on Lexie Mireles for       Lab Results   Component Value Date    LABA1C 12.4 (H) 01/25/2025    LABA1C 17.5 (H) 11/06/2024    LABA1C 14.0 07/24/2024     Current location of patient per [facility] is not in her room but OR.    Per chart review, patient was seen in the past by our department - she had diabetes 
[facility]  Office: [phone]  Flaco Santizo DO, Vincenzo Mathur, DO, Mike Kurtz DO, Dany Pozo, DO, Jade Fuller MD, Reyna Rodriguez MD, Nolan Villanueva MD, Isela Rosenberg MD,  Colt Castelan MD, Akila Bustillos MD, Karissa Mckeon MD,  Ludwig Petersen DO, Juan Antonio Chowdhury MD, Emerson Mcadams MD, Celestino Santizo DO, Caridad Miles MD,  Placido Davila DO, Yee Heath MD, Tamara Clinton MD, Vanessa Blake MD, Xochitl Cohn MD,  Connor Fulton MD, Sofie Allan MD, Vaibhav Raza MD, Velasquez Bowen MD, Darren Wood MD, Jyotsna Payne MD, Augustin Dominguez DO, Dov Bhatia MD, Ludwig Riley MD, Mohsin Reza, MD, Shirley Waterhouse, CNP,  Evi Abraham CNP, Augustin Peña, CNP,  Jo Magana, DNP, Carmen Yates, CNP, Kayla Encarnacion, CNP, Bertha Ashby, CNP, Mae Bosch, CNP, Jessica Crawford, PA-C, Dilcia Gaspar PA-C, Wendy Guy, CNP, Homer Watts, CNP,  Krissy Galeana, CNP, Annalisa Sullivan, CNP, Zahida Sharp, CNP,  Teresa Vital, CNS, Jennifer Lai CNP, Jacqueline Gramajo, CNP,   Magalie Ojeda, CNP         [facility]   IN-PATIENT SERVICE   [facility]    Progress Note    1/28/2025    7:52 AM    Name:   Lexie Mireles  MRN:     [id number]     Acct:      [id number]   Room:   0135/0135-01   Day:  3  Admit Date:  1/25/2025  6:05 PM    PCP:   Viktor Fox APRN - CNP  Code Status:  Full Code    Subjective:     C/C:   Chief Complaint   Patient presents with    Nausea     With emesis    Abdominal Pain     X 2 weeks  Unable to keep food down; taking water per pt     Interval History Status: improved.     Pt is s/p colonoscopy.  She was only found to have some internal/ external hemorrhoids and mild diverticulosis.  No N/V today, she is tolerating a po diet.  BS labile since she was NPO and just had juice.  HbA1C- 12.5, waiting for GES tomorrow    Brief History:     Per my partner:  \"Lexie Mireles is a 54 y.o. female w/ CKD 3, DM2 longstanding w/ 
[facility]  Office: [phone]  Flaco Santizo DO, Vincenzo Mathur, DO, Mike Kurtz DO, Dany Pozo, DO, Jade Fuller MD, Reyna Rodriguez MD, Nolan Villanueva MD, Isela Rosenberg MD,  Colt Castelan MD, Akila Bustillos MD, Karissa Mckeon MD,  Ludwig Petersen DO, Juan Antonio Chowdhury MD, Emerson Mcadams MD, Celestino Santizo DO, Caridad Miles MD,  Placido Davila DO, Yee Heath MD, Tamara Clinton MD, Vanessa Blake MD, Xochitl Cohn MD,  Connor Fulton MD, Sofie Allan MD, Vaibhav Raza MD, Velasquez Bowen MD, Darren Wood MD, Jyotsna Payne MD, Augustin Dominguez DO, Dov Bhatia MD, Ludwig Riley MD, Mohsin Reza, MD, Shirley Waterhouse, CNP,  Evi Abraham CNP, Augustin Peña, CNP,  Jo Magana, DNP, Carmen Yates, CNP, Kayla Encarnacion, CNP, Bertha Ashby, CNP, Mae Bosch, CNP, Jessica Crawford, PA-C, Dilcia Gaspar PA-C, Wendy Guy, CNP, Homer Watts, CNP,  Krissy Galeana, CNP, Annalisa Sullivan, CNP, Zahida Sharp, CNP,  Teresa Vital, CNS, Jennifer Lai CNP, Jacqueline Gramajo, CNP,   Magalie Ojeda, CNP         [facility]   IN-PATIENT SERVICE   [facility]    Progress Note    1/27/2025    12:33 PM    Name:   Lexie Mireles  MRN:     [id number]     Acct:      [id number]   Room:   0135/0135-01   Day:  2  Admit Date:  1/25/2025  6:05 PM    PCP:   Viktor Fox APRN - CNP  Code Status:  Full Code    Subjective:     C/C:   Chief Complaint   Patient presents with    Nausea     With emesis    Abdominal Pain     X 2 weeks  Unable to keep food down; taking water per pt     Interval History Status: improved.     Patient seen and examined at bedside this morning.  No acute events overnight.  Patient had EGD that showed erythema distal esophagus and erythematous gastropathy.  Biopsies were taken and patient started on PPI.  Patient diet advanced to clear liquid diet and bowel prep is to be started for colonoscopy planned for tomorrow.  Gastric emptying study 
Occupational Therapy    [facility]  Occupational Therapy Not Seen Note    DATE: 2025    NAME: Lexie Mireles  MRN: [id number]   : 1970      Patient not seen this date for Occupational Therapy due to:    Surgery/Procedure: Pt currently off floor for COLONOSCOPY DIAGNOSTIC    Will continue to follow as able.     Electronically signed by ERIN Edmondson on 2025 at 10:12 AM    
Occupational Therapy  Occupational Therapy Daily Treatment Note  Facility/Department: [address] STEPDOWN     Patient Name: Lexie Mireles        MRN: [id number]    : 1970    Date of Service: 2025      Past Medical History:  has a past medical history of Asthma, Chronic back pain, GERD (gastroesophageal reflux disease), HLD (hyperlipidemia), Hypertension, Irregular periods/menstrual cycles, Neuropathy, Obesity, Type II or unspecified type diabetes mellitus without mention of complication, not stated as uncontrolled, and Wears glasses.  Past Surgical History:  has a past surgical history that includes Tubal ligation (); Endometrial ablation (2018); pr hysteroscopy endometrial ablation (N/A, 2018); Esophagogastroduodenoscopy (2025); and Upper gastrointestinal endoscopy (N/A, 2025).    Discharge Recommendations  Discharge Recommendations: Patient would benefit from continued therapy after discharge  OT Equipment Recommendations  Equipment Needed: Yes  ADL Assistive Devices: Shower Chair with back    Assessment  Performance deficits / Impairments: Decreased functional mobility ;Decreased ADL status;Decreased endurance;Decreased balance;Decreased high-level IADLs;Decreased safe awareness  Assessment: Pt would continue to benefit from OT services to address deficits listed above and improve overall functional performance prior to discharge.  Prognosis: Good  Activity Tolerance  Activity Tolerance: Patient Tolerated treatment well  Safety Devices  Type of Devices: Call light within reach;Gait belt;Left in chair;Nurse notified  Restraints  Restraints Initially in Place: No    AM-PAC  AM-PAC Daily Activity - Inpatient   How much help is needed for putting on and taking off regular lower body clothing?: A Little  How much help is needed for bathing (which includes washing, rinsing, drying)?: A Little  How much help is needed for toileting (which includes using toilet, bedpan, or urinal)?: A 
Occupational Therapy  Occupational Therapy Initial Evaluation  Facility/Department: [facility] 1C STEPDOWN   Patient Name: Lexie Mireles        MRN: [id number]    : 1970    Date of Service: 2025    Chief Complaint   Patient presents with    Nausea     With emesis    Abdominal Pain     X 2 weeks  Unable to keep food down; taking water per pt     Past Medical History:  has a past medical history of Asthma, Chronic back pain, GERD (gastroesophageal reflux disease), HLD (hyperlipidemia), Hypertension, Irregular periods/menstrual cycles, Neuropathy, Obesity, Type II or unspecified type diabetes mellitus without mention of complication, not stated as uncontrolled, and Wears glasses.  Past Surgical History:  has a past surgical history that includes Tubal ligation (); Endometrial ablation (2018); pr hysteroscopy endometrial ablation (N/A, 2018); and Esophagogastroduodenoscopy (2025).    Discharge Recommendations  Discharge Recommendations: Patient would benefit from continued therapy after discharge  OT Equipment Recommendations  Equipment Needed: Yes  Mobility Devices: ADL Assistive Devices  ADL Assistive Devices: Shower Chair with back    Assessment  Performance deficits / Impairments: Decreased functional mobility ;Decreased ADL status;Decreased endurance;Decreased balance;Decreased high-level IADLs;Decreased safe awareness  Assessment: Pt would continue to benefit from OT services to address deficits listed above and improve overall functional performance prior to discharge.  Prognosis: Good  Decision Making: Medium Complexity  REQUIRES OT FOLLOW-UP: Yes  Activity Tolerance  Activity Tolerance: Patient Tolerated treatment well  Safety Devices  Type of Devices: Call light within reach;Gait belt;Left in chair  Restraints  Restraints Initially in Place: No    AM-PAC  AM-PAC Daily Activity - Inpatient   How much help is needed for putting on and taking off regular lower body clothing?: A 
[facility]  Office: [phone]  Flaco Santizo DO, Vincenzo Mathur, DO, Mike Kurtz DO, Dany Pozo, DO, Jade Fuller MD, Reyna Rodriguez MD, Nolan Villanueva MD, Isela Rosenberg MD,  Colt Castelan MD, Akila Bustillos MD, Karissa Mckeon MD,  Ludwig Petersen DO, Juan Antonio Chowdhury MD, Emerson Mcadams MD, Celestino Santizo DO, Caridad Miles MD,  Placido Davila DO, Yee Heath MD, Tamara Clinton MD, Vanessa Blake MD, Xochitl Cohn MD,  Connor Fulton MD, Sofie Allan MD, Vaibhav Raza MD, Velasquez Bowen MD, Darren Wood MD, Jyotsna Payne MD, Augustin Dominguez DO, Dov Bhatia MD, Ludwig Riley MD, Mohsin Reza, MD, Shirley Waterhouse, CNP,  Evi Abraham CNP, Augustin Peña, CNP,  Jo Magana, DNP, Carmen Yates, CNP, Kayla Encarnacion, CNP, Bertha Ashby, CNP, Mae Bosch, CNP, Jessica Crawford, PA-C, Dilcia Gaspar PA-C, Wendy Guy, CNP, Homer Watts, CNP,  Krissy Galeana, CNP, Annalisa Sullivan, CNP, Zahida Sharp, CNP,  Teresa Vital, CNS, Jennifer Lai CNP, Jacqueline Gramajo, CNP,   Magalie Ojeda, CNP         [facility]   IN-PATIENT SERVICE   [facility]    Progress Note    1/29/2025    7:47 AM    Name:   Lexie Mireles  MRN:     [id number]     Acct:      [id number]   Room:   0135/0135-01   Day:  4  Admit Date:  1/25/2025  6:05 PM    PCP:   Viktor Fox APRN - CNP  Code Status:  Full Code    Subjective:     C/C:   Chief Complaint   Patient presents with    Nausea     With emesis    Abdominal Pain     X 2 weeks  Unable to keep food down; taking water per pt     Interval History Status: improved.     Pt is s/p colonoscopy.  She was only found to have some internal/ external hemorrhoids and mild diverticulosis.  No N/V today, she is tolerating a po diet.  BS labile since she was NPO and just had juice.  HbA1C- 12.5, waiting for GES tomorrow    Brief History:     Per my partner:  \"Lexie Mireles is a 54 y.o. female w/ CKD 3, DM2 longstanding w/ 
Patient transferred back to 135 via stretcher. No family present in waiting room.  
Physical Therapy        Physical Therapy Cancel Note      DATE: 2025    NAME: Lexie Mireles  MRN: [id number]   : 1970      Patient not seen this date for Physical Therapy due to:    Surgery/Procedure: Pt off the floor for colonoscopy. PT to continue to follow and address as indicated.       Electronically signed by Asia Wiggins PT on 2025 at 11:00 AM     
Physical Therapy  Facility/Department: [address] STEPDOWN  Physical Therapy Initial Assessment    Name: Lexie Mireles  : 1970  MRN: [id number]  Date of Service: 2025    Discharge Recommendations:  Patient would benefit from continued therapy after discharge   PT Equipment Recommendations  Equipment Needed: No      Patient Diagnosis(es): The primary encounter diagnosis was Nausea and vomiting, unspecified vomiting type. Diagnoses of Dehydration and Type 2 diabetes mellitus with hyperglycemia, with long-term current use of insulin (HCC) were also pertinent to this visit.  Past Medical History:  has a past medical history of Asthma, Chronic back pain, GERD (gastroesophageal reflux disease), HLD (hyperlipidemia), Hypertension, Irregular periods/menstrual cycles, Neuropathy, Obesity, Type II or unspecified type diabetes mellitus without mention of complication, not stated as uncontrolled, and Wears glasses.  Past Surgical History:  has a past surgical history that includes Tubal ligation (); Endometrial ablation (2018); pr hysteroscopy endometrial ablation (N/A, 2018); and Esophagogastroduodenoscopy (2025).    Assessment  Body Structures, Functions, Activity Limitations Requiring Skilled Therapeutic Intervention: Decreased strength;Decreased endurance;Decreased balance;Decreased safe awareness  Assessment: Pt able to ambulate 60 ft with rw and CGA becoming Florecita with fatigue, no loss of balance.  Pt CGA for transfers with device.  Pt will benefit from continued therapy to improve functional mobility and balance, endurance.  Therapy Prognosis: Good  Decision Making: Medium Complexity  Requires PT Follow-Up: Yes  Activity Tolerance  Activity Tolerance: Patient limited by fatigue;Patient tolerated treatment well    Plan  Physical Therapy Plan  General Plan:  (5-6x/week)  Current Treatment Recommendations: Strengthening, Balance training, Functional mobility training, Transfer training, 
Report called to Arline OLIVAS.  
November  Repeat A1C is now 12.4  Elevated beta hydroxybutyrate  Continue Lantus 10 units BID and medium dose ISS, adjust as needed    on admission and now 233  Continue supportive care    GABRIEL with CKD 2 - improving   BMP daily  Cr 1.4 on admission  Cr 1.1 this am  Continue IVF    Pseudohyponatremia 2/2 hyperglycemia  Resolved  BMP daily  Continue supportive care    Recurrent acute abdominal pain, likely gastroparesis   status post multiple ED evaluations and CT images all unremarkable  Suspect possible gastroparesis with longstanding uncontrolled diabetes for over 17 years  GI consulted overnight  Gastric emptying study ordered    Recurrent lumbago  Status post prior workup unremarkable with recent MRI, CT imaging all unremarkable  Prior rheumatologic workup similarly unremarkable  Consider further imaging if persistent concern, patient is high risk for epidural abscess, discitis with uncontrolled blood sugars but low clinical suspicion currently    Type 2 diabetes with neuropathy  Continue gabapentin         Jyotsna Payne MD  1/26/2025  10:43 AM

## 2025-01-29 NOTE — CARE COORDINATION
Dx: Dehydration [E86.0]  Hyperglycemia [R73.9]  Type 2 diabetes mellitus with hyperglycemia, with long-term current use of insulin (HCC) [E11.65, Z79.4]  Nausea and vomiting, unspecified vomiting type [R11.2]    Admit date: 1/25/2025  Hospital day:4      ______________________________________      Patients goal/ Transitional Planning:   Plan remains home independent with , denies any needs    [facility] RISK OF UNPLANNED READMISSION 2.0             17.4 Total Score

## 2025-01-29 NOTE — DISCHARGE SUMMARY
[facility]  Office: [phone]  Flaco Santizo DO, Vincenzo Mathur DO, Mike Kurtz DO, Dany Pozo DO, Jade Fuller MD, Reyna Rodriguez MD, Nolan Villanueva MD, Isela Rosenberg MD,  Colt Castelan MD, Akila Bustillos MD, Karissa Mckeon MD,  Ludwig Petersen DO, Juan Antonio Chowdhury MD, Emerson Mcadams MD, Celestino Santizo DO, Caridad Miles MD,  Placido Davila DO, Yee Heath MD, Tamara Clinton MD, Vanessa Blake MD, Xochitl Cohn MD,  Connor Fulton MD, Sofie Allan MD, Vaibhav Raza MD, Velasquez Bowen MD, Darren Wood MD, Jyotsna Payne MD, Augustin Dominguez DO, Dov Bhatia MD, Ludwig Riley MD, Mohsin Reza, MD, Shirley Waterhouse, CNP,  Evi Abraham CNP, Augustin Peña, CNP,  Jo Magana, GRIS, Carmen Yates, CNP, Kayla Encarnacion, CNP, Bertha Ashby, CNP, Mae Bosch, CNP, Jessica Crawford, PA-C, Dilcia Gaspar PA-C, Wendy Guy, CNP, Homer Watts, CNP,  Krissy Galeana, CNP, Annalisa Sullivan, CNP, Zahida Sharp, CNP,  Teresa Vital, CNS, Jennifer Lai CNP, Jacqueline Gramajo, CNP,   Magalie Ojeda, CNP         [facility]   IN-PATIENT SERVICE   [facility]    Discharge Summary     Patient ID: Lexie Mireles  :  1970   MRN: [id number]     ACCOUNT:  [id number]   Patient's PCP: Viktor Fox APRN - CNP  Admit Date: 2025   Discharge Date: 2025     Length of Stay: 4  Code Status:  Full Code  Admitting Physician: No admitting provider for patient encounter.  Discharge Physician: Reyna Rodriguez MD     Active Discharge Diagnoses:     Hospital Problem Lists:  Principal Problem:    Hyperglycemia  Active Problems:    Nausea and vomiting    Anemia    Abdominal pain    Dehydration    Type 2 diabetes mellitus with hyperglycemia, with long-term current use of insulin (HCC)    Vitamin D deficiency    Cocaine abuse (HCC)  Resolved Problems:    * No resolved hospital problems. *      Admission Condition:  poor     Discharged Condition:

## 2025-01-30 ENCOUNTER — CARE COORDINATION (OUTPATIENT)
Dept: CARE COORDINATION | Age: 55
End: 2025-01-30

## 2025-01-30 NOTE — CARE COORDINATION
Care Transitions Note    Initial Call - Call within 2 business days of discharge: Yes    Attempted to reach patient for transitions of care follow up. Unable to reach patient.    Outreach Attempts:   HIPAA compliant voicemail left for patient.     Patient: Lexie Mireles    Patient : 1970   MRN: [id number]    Reason for Admission: hyperglycemia  Discharge Date: 25  RURS: Readmission Risk Score: 18.2    Last Discharge Facility       Date Complaint Diagnosis Description Type Department Provider    25 Nausea; Abdominal Pain Nausea and vomiting, unspecified vomiting type ... ED to Hosp-Admission (Discharged) (ADMITTED) [facility] Reyna Rodriguez MD; Heather Boswell...            Was this an external facility discharge? No    Follow Up Appointment:   Patient does not have a follow up appointment scheduled at time of call.  Chart routed to PCP  Future Appointments         Provider Specialty Dept Phone    2025 7:45 AM Viktor Fox, APRN - [facility] [phone]    2025 2:10 PM Ruy Carroll MD Nephrology [phone]            Plan for follow-up on next business day.      Yumi Kam RN

## 2025-01-31 ENCOUNTER — CARE COORDINATION (OUTPATIENT)
Dept: CARE COORDINATION | Age: 55
End: 2025-01-31

## 2025-01-31 NOTE — CARE COORDINATION
Care Transitions Note    Initial Call - Call within 2 business days of discharge: Yes    Attempted to reach patient for transitions of care follow up. Unable to reach patient. Second attempt, left VM. Closing for OLVIN    Outreach Attempts:   Multiple attempts to contact patient at phone numbers on file.   HIPAA compliant voicemail left for patient.     Patient: Lexie Mireles    Patient : 1970   MRN: [id number]    Reason for Admission: nausea and vomiting  Discharge Date: 25  RURS: Readmission Risk Score: 18.2    Last Discharge Facility       Date Complaint Diagnosis Description Type Department Provider    25 Nausea; Abdominal Pain Nausea and vomiting, unspecified vomiting type ... ED to Hosp-Admission (Discharged) (ADMITTED) [facility] Reyna Rodriguez MD; Heather Boswell...            Was this an external facility discharge? No    Follow Up Appointment:   Patient has hospital follow up appointment scheduled within 14 days of discharge.    Future Appointments         Provider Specialty Dept Phone    2025 8:00 AM Viktor Fox APRN - [facility] Primary Care [phone]    2025 7:45 AM Viktor Fox APRN - CNP [facility] [phone]    2025 2:10 PM Ruy Carroll MD Nephrology [phone]            No further follow-up call indicated     Yumi Kam RN

## 2025-02-05 ENCOUNTER — OFFICE VISIT (OUTPATIENT)
Dept: PRIMARY CARE CLINIC | Age: 55
End: 2025-02-05

## 2025-02-05 ENCOUNTER — HOSPITAL ENCOUNTER (OUTPATIENT)
Age: 55
Discharge: HOME | End: 2025-02-05
Payer: MEDICARE

## 2025-02-05 VITALS
SYSTOLIC BLOOD PRESSURE: 131 MMHG | HEART RATE: 113 BPM | OXYGEN SATURATION: 99 % | WEIGHT: 125 LBS | DIASTOLIC BLOOD PRESSURE: 96 MMHG | HEIGHT: 59 IN | BODY MASS INDEX: 25.2 KG/M2 | TEMPERATURE: 97.3 F

## 2025-02-05 DIAGNOSIS — Z09 HOSPITAL DISCHARGE FOLLOW-UP: ICD-10-CM

## 2025-02-05 DIAGNOSIS — K30 DELAYED GASTRIC EMPTYING: Primary | ICD-10-CM

## 2025-02-05 DIAGNOSIS — Z79.4 DIABETES MELLITUS TYPE 2, INSULIN DEPENDENT (HCC): ICD-10-CM

## 2025-02-05 DIAGNOSIS — E11.9 DIABETES MELLITUS TYPE 2, INSULIN DEPENDENT (HCC): ICD-10-CM

## 2025-02-05 DIAGNOSIS — R79.89 LOW VITAMIN D LEVEL: ICD-10-CM

## 2025-02-05 LAB
ANION GAP SERPL CALCULATED.3IONS-SCNC: 12 MMOL/L (ref 9–16)
BUN SERPL-MCNC: 16 MG/DL (ref 6–20)
CALCIUM SERPL-MCNC: 9.4 MG/DL (ref 8.6–10.4)
CHLORIDE SERPL-SCNC: 97 MMOL/L (ref 98–107)
CO2 SERPL-SCNC: 26 MMOL/L (ref 20–31)
CREAT SERPL-MCNC: 1.1 MG/DL (ref 0.6–0.9)
GFR, ESTIMATED: 60 ML/MIN/1.73M2
GLUCOSE SERPL-MCNC: 341 MG/DL (ref 74–99)
POTASSIUM SERPL-SCNC: 4.2 MMOL/L (ref 3.7–5.3)
SODIUM SERPL-SCNC: 135 MMOL/L (ref 136–145)

## 2025-02-05 PROCEDURE — 80048 BASIC METABOLIC PNL TOTAL CA: CPT

## 2025-02-05 PROCEDURE — 36415 COLL VENOUS BLD VENIPUNCTURE: CPT

## 2025-02-05 RX ORDER — INSULIN GLARGINE 100 [IU]/ML
15 INJECTION, SOLUTION SUBCUTANEOUS 2 TIMES DAILY
Qty: 5 ADJUSTABLE DOSE PRE-FILLED PEN SYRINGE | Refills: 2 | Status: SHIPPED | OUTPATIENT
Start: 2025-02-05

## 2025-02-05 RX ORDER — DAPAGLIFLOZIN 10 MG/1
10 TABLET, FILM COATED ORAL EVERY MORNING
Qty: 90 TABLET | Refills: 1 | Status: SHIPPED | OUTPATIENT
Start: 2025-02-05

## 2025-02-05 RX ORDER — POLYETHYLENE GLYCOL 3350 17 G/17G
17 POWDER, FOR SOLUTION ORAL DAILY
Qty: 527 G | Refills: 0 | Status: SHIPPED | OUTPATIENT
Start: 2025-02-05 | End: 2025-03-07

## 2025-02-05 RX ORDER — ERGOCALCIFEROL 1.25 MG/1
50000 CAPSULE, LIQUID FILLED ORAL WEEKLY
Qty: 12 CAPSULE | Refills: 1 | Status: SHIPPED | OUTPATIENT
Start: 2025-02-05

## 2025-02-05 RX ORDER — ORAL SEMAGLUTIDE 7 MG/1
7 TABLET ORAL
Qty: 90 TABLET | Refills: 1 | Status: SHIPPED | OUTPATIENT
Start: 2025-02-05

## 2025-02-05 RX ORDER — GLIPIZIDE 5 MG/1
5 TABLET ORAL
Qty: 180 TABLET | Refills: 1 | Status: SHIPPED | OUTPATIENT
Start: 2025-02-05

## 2025-02-05 RX ORDER — SIMVASTATIN 80 MG
80 TABLET ORAL NIGHTLY
Qty: 90 TABLET | Refills: 1 | Status: SHIPPED | OUTPATIENT
Start: 2025-02-05

## 2025-02-05 NOTE — PROGRESS NOTES
Post-Discharge Transitional Care  Follow Up      Lexie Mireles   YOB: 1970    Date of Office Visit:  2/5/2025  Date of Hospital Admission: 1/25/25  Date of Hospital Discharge: 1/29/25  Risk of hospital readmission (high >=14%. Medium >=10%) :Readmission Risk Score: 18.2      Care management risk score Rising risk (score 2-5) and Complex Care (Scores >=6): No Risk Score On File     Non face to face  following discharge, date last encounter closed (first attempt may have been earlier): 01/31/2025    Call initiated 2 business days of discharge: Yes    ASSESSMENT/PLAN:   Delayed gastric emptying  -     Beth Lanza MD, Gastroenterology, [facility]  Diabetes mellitus type 2, insulin dependent (HCC)  -     dapagliflozin (FARXIGA) 10 MG tablet; Take 1 tablet by mouth every morning, Disp-90 tablet, R-1Normal  -     glipiZIDE (GLUCOTROL) 5 MG tablet; Take 1 tablet by mouth 2 times daily (before meals), Disp-180 tablet, R-1Normal  -     Semaglutide (RYBELSUS) 7 MG TABS; Take 7 mg by mouth every morning (before breakfast) or first food or drink of the day. Start after 30 days of the 3mg daily dose., Disp-90 tablet, R-1Normal  -     simvastatin (ZOCOR) 80 MG tablet; Take 1 tablet by mouth nightly, Disp-90 tablet, R-1Normal  -     Basic Metabolic Panel; Future  Low vitamin D level  -     vitamin D (ERGOCALCIFEROL) 1.25 MG (52252 UT) CAPS capsule; Take 1 capsule by mouth once a week, Disp-12 capsule, R-1Normal      Medical Decision Making: moderate complexity  No follow-ups on file.           Subjective:   HPI:  Follow up of Hospital problems/diagnosis(es):   History of Present Illness  The patient is a 54-year-old female here for a hospital follow-up.    She experienced a severe illness characterized by an inability to retain food or liquids, which led to a significant increase in her blood glucose levels and subsequent hospitalization. During her hospital stay, she underwent extensive testing,

## 2025-02-26 PROBLEM — E86.0 DEHYDRATION: Status: RESOLVED | Noted: 2025-01-27 | Resolved: 2025-02-26

## 2025-03-05 ENCOUNTER — TELEPHONE (OUTPATIENT)
Dept: GASTROENTEROLOGY | Age: 55
End: 2025-03-05

## 2025-03-05 DIAGNOSIS — R10.84 GENERALIZED ABDOMINAL PAIN: Primary | ICD-10-CM

## 2025-03-05 NOTE — TELEPHONE ENCOUNTER
Patient had   metoclopramide (REGLAN) 5 MG tablet called in while in hospital for nausea  Patient wanting to see if she can get refill until she is seen  Please advise  Thank you

## 2025-03-06 RX ORDER — METOCLOPRAMIDE 5 MG/1
5 TABLET ORAL 3 TIMES DAILY PRN
Qty: 90 TABLET | Refills: 0 | Status: SHIPPED | OUTPATIENT
Start: 2025-03-06

## 2025-03-15 ENCOUNTER — HOSPITAL ENCOUNTER (INPATIENT)
Age: 55
LOS: 7 days | Discharge: HOME HEALTH | DRG: 641 | End: 2025-03-23
Attending: EMERGENCY MEDICINE | Admitting: PSYCHIATRY & NEUROLOGY
Payer: MEDICARE

## 2025-03-15 ENCOUNTER — APPOINTMENT (OUTPATIENT)
Dept: CT IMAGING | Age: 55
DRG: 641 | End: 2025-03-15
Payer: MEDICARE

## 2025-03-15 ENCOUNTER — APPOINTMENT (OUTPATIENT)
Dept: GENERAL RADIOLOGY | Age: 55
DRG: 641 | End: 2025-03-15
Payer: MEDICARE

## 2025-03-15 DIAGNOSIS — R55 SYNCOPE, UNSPECIFIED SYNCOPE TYPE: ICD-10-CM

## 2025-03-15 DIAGNOSIS — R41.82 ALTERED MENTAL STATUS, UNSPECIFIED ALTERED MENTAL STATUS TYPE: Primary | ICD-10-CM

## 2025-03-15 DIAGNOSIS — R07.9 CHEST PAIN, UNSPECIFIED TYPE: ICD-10-CM

## 2025-03-15 LAB
ALBUMIN SERPL-MCNC: 3.5 G/DL (ref 3.5–5.2)
ALBUMIN/GLOB SERPL: 1.3 {RATIO} (ref 1–2.5)
ALP SERPL-CCNC: 85 U/L (ref 35–104)
ALT SERPL-CCNC: 11 U/L (ref 10–35)
AMMONIA PLAS-SCNC: 24 UMOL/L (ref 11–51)
ANION GAP SERPL CALCULATED.3IONS-SCNC: 13 MMOL/L (ref 9–16)
APAP SERPL-MCNC: <5 UG/ML (ref 10–30)
AST SERPL-CCNC: 14 U/L (ref 10–35)
BASOPHILS # BLD: 0.05 K/UL (ref 0–0.2)
BASOPHILS NFR BLD: 0 % (ref 0–2)
BILIRUB SERPL-MCNC: 0.2 MG/DL (ref 0–1.2)
BUN BLD-MCNC: 34 MG/DL (ref 8–26)
BUN SERPL-MCNC: 32 MG/DL (ref 6–20)
CA-I BLD-SCNC: 1.26 MMOL/L (ref 1.15–1.33)
CALCIUM SERPL-MCNC: 9.4 MG/DL (ref 8.6–10.4)
CHLORIDE BLD-SCNC: 103 MMOL/L (ref 98–107)
CHLORIDE SERPL-SCNC: 104 MMOL/L (ref 98–107)
CO2 BLD CALC-SCNC: 27 MMOL/L (ref 22–30)
CO2 SERPL-SCNC: 23 MMOL/L (ref 20–31)
CREAT SERPL-MCNC: 1.5 MG/DL (ref 0.6–0.9)
EGFR, POC: 42 ML/MIN/1.73M2
EOSINOPHIL # BLD: 0.16 K/UL (ref 0–0.44)
EOSINOPHILS RELATIVE PERCENT: 1 % (ref 1–4)
ERYTHROCYTE [DISTWIDTH] IN BLOOD BY AUTOMATED COUNT: 14.6 % (ref 11.8–14.4)
ETHANOL PERCENT: <0.01 %
ETHANOLAMINE SERPL-MCNC: <10 MG/DL (ref 0–0.08)
GFR, ESTIMATED: 42 ML/MIN/1.73M2
GLUCOSE BLD-MCNC: 184 MG/DL (ref 74–100)
GLUCOSE SERPL-MCNC: 174 MG/DL (ref 74–99)
HCG SERPL QL: NEGATIVE
HCO3 VENOUS: 27.6 MMOL/L (ref 22–29)
HCT VFR BLD AUTO: 29.1 % (ref 36.3–47.1)
HCT VFR BLD AUTO: 31 % (ref 36–46)
HGB BLD-MCNC: 9.2 G/DL (ref 11.9–15.1)
IMM GRANULOCYTES # BLD AUTO: 0.08 K/UL (ref 0–0.3)
IMM GRANULOCYTES NFR BLD: 1 %
INR PPP: 0.9
LYMPHOCYTES NFR BLD: 2.62 K/UL (ref 1.1–3.7)
LYMPHOCYTES RELATIVE PERCENT: 21 % (ref 24–43)
MAGNESIUM SERPL-MCNC: 2.2 MG/DL (ref 1.6–2.6)
MCH RBC QN AUTO: 26.2 PG (ref 25.2–33.5)
MCHC RBC AUTO-ENTMCNC: 31.6 G/DL (ref 28.4–34.8)
MCV RBC AUTO: 82.9 FL (ref 82.6–102.9)
MONOCYTES NFR BLD: 0.83 K/UL (ref 0.1–1.2)
MONOCYTES NFR BLD: 7 % (ref 3–12)
NEUTROPHILS NFR BLD: 70 % (ref 36–65)
NEUTS SEG NFR BLD: 8.52 K/UL (ref 1.5–8.1)
NRBC BLD-RTO: 0 PER 100 WBC
O2 SAT, VEN: 59.1 % (ref 60–85)
PARTIAL THROMBOPLASTIN TIME: 23 SEC (ref 23–36.5)
PCO2 VENOUS: 46 MM HG (ref 41–51)
PH VENOUS: 7.39 (ref 7.32–7.43)
PLATELET # BLD AUTO: 357 K/UL (ref 138–453)
PMV BLD AUTO: 10.3 FL (ref 8.1–13.5)
PO2 VENOUS: 31.5 MM HG (ref 30–50)
POC ANION GAP: 10 MMOL/L (ref 7–16)
POC CREATININE: 1.5 MG/DL (ref 0.51–1.19)
POC HEMOGLOBIN (CALC): 10.4 G/DL (ref 12–16)
POC LACTIC ACID: 1.8 MMOL/L (ref 0.56–1.39)
POSITIVE BASE EXCESS, VEN: 2.1 MMOL/L (ref 0–3)
POTASSIUM BLD-SCNC: 3.8 MMOL/L (ref 3.5–4.5)
POTASSIUM SERPL-SCNC: 4 MMOL/L (ref 3.7–5.3)
PROT SERPL-MCNC: 6.3 G/DL (ref 6.6–8.7)
PROTHROMBIN TIME: 12.2 SEC (ref 11.7–14.9)
RBC # BLD AUTO: 3.51 M/UL (ref 3.95–5.11)
RBC # BLD: ABNORMAL 10*6/UL
SALICYLATES SERPL-MCNC: <0.5 MG/DL (ref 0–10)
SODIUM BLD-SCNC: 139 MMOL/L (ref 138–146)
SODIUM SERPL-SCNC: 140 MMOL/L (ref 136–145)
TROPONIN I SERPL HS-MCNC: 40 NG/L (ref 0–14)
TSH SERPL DL<=0.05 MIU/L-ACNC: 2.42 UIU/ML (ref 0.27–4.2)
WBC OTHER # BLD: 12.3 K/UL (ref 3.5–11.3)

## 2025-03-15 PROCEDURE — G0480 DRUG TEST DEF 1-7 CLASSES: HCPCS

## 2025-03-15 PROCEDURE — 6370000000 HC RX 637 (ALT 250 FOR IP)

## 2025-03-15 PROCEDURE — 80053 COMPREHEN METABOLIC PANEL: CPT

## 2025-03-15 PROCEDURE — 96365 THER/PROPH/DIAG IV INF INIT: CPT

## 2025-03-15 PROCEDURE — 82565 ASSAY OF CREATININE: CPT

## 2025-03-15 PROCEDURE — 99285 EMERGENCY DEPT VISIT HI MDM: CPT

## 2025-03-15 PROCEDURE — 6360000004 HC RX CONTRAST MEDICATION

## 2025-03-15 PROCEDURE — 83735 ASSAY OF MAGNESIUM: CPT

## 2025-03-15 PROCEDURE — 84703 CHORIONIC GONADOTROPIN ASSAY: CPT

## 2025-03-15 PROCEDURE — 70496 CT ANGIOGRAPHY HEAD: CPT

## 2025-03-15 PROCEDURE — 71045 X-RAY EXAM CHEST 1 VIEW: CPT

## 2025-03-15 PROCEDURE — 70450 CT HEAD/BRAIN W/O DYE: CPT

## 2025-03-15 PROCEDURE — 84484 ASSAY OF TROPONIN QUANT: CPT

## 2025-03-15 PROCEDURE — 85610 PROTHROMBIN TIME: CPT

## 2025-03-15 PROCEDURE — 85730 THROMBOPLASTIN TIME PARTIAL: CPT

## 2025-03-15 PROCEDURE — 93005 ELECTROCARDIOGRAM TRACING: CPT

## 2025-03-15 PROCEDURE — 82330 ASSAY OF CALCIUM: CPT

## 2025-03-15 PROCEDURE — 80143 DRUG ASSAY ACETAMINOPHEN: CPT

## 2025-03-15 PROCEDURE — 81001 URINALYSIS AUTO W/SCOPE: CPT

## 2025-03-15 PROCEDURE — 83605 ASSAY OF LACTIC ACID: CPT

## 2025-03-15 PROCEDURE — 80307 DRUG TEST PRSMV CHEM ANLYZR: CPT

## 2025-03-15 PROCEDURE — 85025 COMPLETE CBC W/AUTO DIFF WBC: CPT

## 2025-03-15 PROCEDURE — 6360000002 HC RX W HCPCS

## 2025-03-15 PROCEDURE — 84443 ASSAY THYROID STIM HORMONE: CPT

## 2025-03-15 PROCEDURE — 82140 ASSAY OF AMMONIA: CPT

## 2025-03-15 PROCEDURE — 80179 DRUG ASSAY SALICYLATE: CPT

## 2025-03-15 PROCEDURE — 99222 1ST HOSP IP/OBS MODERATE 55: CPT | Performed by: STUDENT IN AN ORGANIZED HEALTH CARE EDUCATION/TRAINING PROGRAM

## 2025-03-15 PROCEDURE — 82947 ASSAY GLUCOSE BLOOD QUANT: CPT

## 2025-03-15 PROCEDURE — 84520 ASSAY OF UREA NITROGEN: CPT

## 2025-03-15 PROCEDURE — 85014 HEMATOCRIT: CPT

## 2025-03-15 PROCEDURE — 80051 ELECTROLYTE PANEL: CPT

## 2025-03-15 PROCEDURE — 82803 BLOOD GASES ANY COMBINATION: CPT

## 2025-03-15 RX ORDER — CYCLOBENZAPRINE HCL 10 MG
10 TABLET ORAL ONCE
Status: COMPLETED | OUTPATIENT
Start: 2025-03-15 | End: 2025-03-15

## 2025-03-15 RX ORDER — IOPAMIDOL 755 MG/ML
75 INJECTION, SOLUTION INTRAVASCULAR
Status: COMPLETED | OUTPATIENT
Start: 2025-03-15 | End: 2025-03-15

## 2025-03-15 RX ORDER — MAGNESIUM SULFATE 1 G/100ML
1000 INJECTION INTRAVENOUS ONCE
Status: COMPLETED | OUTPATIENT
Start: 2025-03-15 | End: 2025-03-16

## 2025-03-15 RX ADMIN — CYCLOBENZAPRINE 10 MG: 10 TABLET, FILM COATED ORAL at 23:37

## 2025-03-15 RX ADMIN — IOPAMIDOL 75 ML: 755 INJECTION, SOLUTION INTRAVENOUS at 22:34

## 2025-03-15 RX ADMIN — MAGNESIUM SULFATE HEPTAHYDRATE 1000 MG: 1 INJECTION, SOLUTION INTRAVENOUS at 23:37

## 2025-03-15 ASSESSMENT — LIFESTYLE VARIABLES
HOW MANY STANDARD DRINKS CONTAINING ALCOHOL DO YOU HAVE ON A TYPICAL DAY: PATIENT DOES NOT DRINK
HOW OFTEN DO YOU HAVE A DRINK CONTAINING ALCOHOL: NEVER

## 2025-03-15 ASSESSMENT — PAIN - FUNCTIONAL ASSESSMENT: PAIN_FUNCTIONAL_ASSESSMENT: NONE - DENIES PAIN

## 2025-03-16 ENCOUNTER — APPOINTMENT (OUTPATIENT)
Dept: MRI IMAGING | Age: 55
DRG: 641 | End: 2025-03-16
Payer: MEDICARE

## 2025-03-16 PROBLEM — R53.1 EPISODE OF GENERALIZED WEAKNESS: Status: ACTIVE | Noted: 2025-03-16

## 2025-03-16 PROBLEM — R56.9 OBSERVED SEIZURE-LIKE ACTIVITY (HCC): Status: ACTIVE | Noted: 2025-03-16

## 2025-03-16 PROBLEM — R41.82 ALTERED MENTAL STATUS: Status: ACTIVE | Noted: 2025-03-16

## 2025-03-16 LAB
AMPHET UR QL SCN: NEGATIVE
BACTERIA URNS QL MICRO: ABNORMAL
BARBITURATES UR QL SCN: NEGATIVE
BENZODIAZ UR QL: NEGATIVE
BILIRUB UR QL STRIP: NEGATIVE
CANNABINOIDS UR QL SCN: NEGATIVE
CASTS #/AREA URNS LPF: ABNORMAL /LPF (ref 0–8)
CHOLEST SERPL-MCNC: 223 MG/DL (ref 0–199)
CHOLESTEROL/HDL RATIO: 4.6
CLARITY UR: CLEAR
COCAINE UR QL SCN: NEGATIVE
COLOR UR: YELLOW
EPI CELLS #/AREA URNS HPF: ABNORMAL /HPF (ref 0–5)
EST. AVERAGE GLUCOSE BLD GHB EST-MCNC: 243 MG/DL
FENTANYL UR QL: NEGATIVE
GLUCOSE BLD-MCNC: 166 MG/DL (ref 65–105)
GLUCOSE BLD-MCNC: 236 MG/DL (ref 65–105)
GLUCOSE BLD-MCNC: 272 MG/DL (ref 65–105)
GLUCOSE BLD-MCNC: 290 MG/DL (ref 65–105)
GLUCOSE UR STRIP-MCNC: NEGATIVE MG/DL
HBA1C MFR BLD: 10.1 % (ref 4–6)
HDLC SERPL-MCNC: 49 MG/DL
HGB UR QL STRIP.AUTO: NEGATIVE
KETONES UR STRIP-MCNC: NEGATIVE MG/DL
LDLC SERPL CALC-MCNC: 149 MG/DL (ref 0–100)
LEUKOCYTE ESTERASE UR QL STRIP: NEGATIVE
METHADONE UR QL: NEGATIVE
NITRITE UR QL STRIP: NEGATIVE
OPIATES UR QL SCN: NEGATIVE
OXYCODONE UR QL SCN: NEGATIVE
PCP UR QL SCN: NEGATIVE
PH UR STRIP: 5.5 [PH] (ref 5–8)
PROT UR STRIP-MCNC: ABNORMAL MG/DL
RBC #/AREA URNS HPF: ABNORMAL /HPF (ref 0–4)
SP GR UR STRIP: 1.02 (ref 1–1.03)
TEST INFORMATION: NORMAL
TRIGL SERPL-MCNC: 124 MG/DL
TROPONIN I SERPL HS-MCNC: 35 NG/L (ref 0–14)
TSH SERPL DL<=0.05 MIU/L-ACNC: 2.24 UIU/ML (ref 0.27–4.2)
UROBILINOGEN UR STRIP-ACNC: NORMAL EU/DL (ref 0–1)
VLDLC SERPL CALC-MCNC: 25 MG/DL (ref 1–30)
WBC #/AREA URNS HPF: ABNORMAL /HPF (ref 0–5)

## 2025-03-16 PROCEDURE — 6360000002 HC RX W HCPCS

## 2025-03-16 PROCEDURE — 6370000000 HC RX 637 (ALT 250 FOR IP)

## 2025-03-16 PROCEDURE — 2500000003 HC RX 250 WO HCPCS

## 2025-03-16 PROCEDURE — 99222 1ST HOSP IP/OBS MODERATE 55: CPT | Performed by: PSYCHIATRY & NEUROLOGY

## 2025-03-16 PROCEDURE — 80061 LIPID PANEL: CPT

## 2025-03-16 PROCEDURE — 2060000000 HC ICU INTERMEDIATE R&B

## 2025-03-16 PROCEDURE — 36415 COLL VENOUS BLD VENIPUNCTURE: CPT

## 2025-03-16 PROCEDURE — 83036 HEMOGLOBIN GLYCOSYLATED A1C: CPT

## 2025-03-16 PROCEDURE — 97530 THERAPEUTIC ACTIVITIES: CPT

## 2025-03-16 PROCEDURE — 92523 SPEECH SOUND LANG COMPREHEN: CPT

## 2025-03-16 PROCEDURE — 96366 THER/PROPH/DIAG IV INF ADDON: CPT

## 2025-03-16 PROCEDURE — 97162 PT EVAL MOD COMPLEX 30 MIN: CPT

## 2025-03-16 PROCEDURE — 70551 MRI BRAIN STEM W/O DYE: CPT

## 2025-03-16 PROCEDURE — 82947 ASSAY GLUCOSE BLOOD QUANT: CPT

## 2025-03-16 RX ORDER — ASPIRIN 81 MG/1
81 TABLET ORAL DAILY
Status: DISCONTINUED | OUTPATIENT
Start: 2025-03-16 | End: 2025-03-23 | Stop reason: HOSPADM

## 2025-03-16 RX ORDER — ONDANSETRON 4 MG/1
4 TABLET, ORALLY DISINTEGRATING ORAL EVERY 8 HOURS PRN
Status: DISCONTINUED | OUTPATIENT
Start: 2025-03-16 | End: 2025-03-23 | Stop reason: HOSPADM

## 2025-03-16 RX ORDER — GLUCAGON 1 MG/ML
1 KIT INJECTION PRN
Status: DISCONTINUED | OUTPATIENT
Start: 2025-03-16 | End: 2025-03-23 | Stop reason: HOSPADM

## 2025-03-16 RX ORDER — GABAPENTIN 300 MG/1
300 CAPSULE ORAL 3 TIMES DAILY
Status: DISCONTINUED | OUTPATIENT
Start: 2025-03-16 | End: 2025-03-23 | Stop reason: HOSPADM

## 2025-03-16 RX ORDER — SODIUM CHLORIDE 0.9 % (FLUSH) 0.9 %
5-40 SYRINGE (ML) INJECTION PRN
Status: DISCONTINUED | OUTPATIENT
Start: 2025-03-16 | End: 2025-03-23 | Stop reason: HOSPADM

## 2025-03-16 RX ORDER — INSULIN GLARGINE 100 [IU]/ML
10 INJECTION, SOLUTION SUBCUTANEOUS 2 TIMES DAILY
Status: DISCONTINUED | OUTPATIENT
Start: 2025-03-16 | End: 2025-03-23 | Stop reason: HOSPADM

## 2025-03-16 RX ORDER — SODIUM CHLORIDE 9 MG/ML
INJECTION, SOLUTION INTRAVENOUS PRN
Status: DISCONTINUED | OUTPATIENT
Start: 2025-03-16 | End: 2025-03-23 | Stop reason: HOSPADM

## 2025-03-16 RX ORDER — POLYETHYLENE GLYCOL 3350 17 G/17G
17 POWDER, FOR SOLUTION ORAL DAILY PRN
Status: DISCONTINUED | OUTPATIENT
Start: 2025-03-16 | End: 2025-03-23 | Stop reason: HOSPADM

## 2025-03-16 RX ORDER — ENOXAPARIN SODIUM 100 MG/ML
40 INJECTION SUBCUTANEOUS DAILY
Status: DISCONTINUED | OUTPATIENT
Start: 2025-03-16 | End: 2025-03-23 | Stop reason: HOSPADM

## 2025-03-16 RX ORDER — ERGOCALCIFEROL 1.25 MG/1
50000 CAPSULE, LIQUID FILLED ORAL WEEKLY
Status: DISCONTINUED | OUTPATIENT
Start: 2025-03-16 | End: 2025-03-23 | Stop reason: HOSPADM

## 2025-03-16 RX ORDER — SODIUM CHLORIDE 0.9 % (FLUSH) 0.9 %
5-40 SYRINGE (ML) INJECTION EVERY 12 HOURS SCHEDULED
Status: DISCONTINUED | OUTPATIENT
Start: 2025-03-16 | End: 2025-03-23 | Stop reason: HOSPADM

## 2025-03-16 RX ORDER — LABETALOL HYDROCHLORIDE 5 MG/ML
10 INJECTION, SOLUTION INTRAVENOUS EVERY 10 MIN PRN
Status: DISCONTINUED | OUTPATIENT
Start: 2025-03-16 | End: 2025-03-17

## 2025-03-16 RX ORDER — METOCLOPRAMIDE 10 MG/1
5 TABLET ORAL 3 TIMES DAILY PRN
Status: CANCELLED | OUTPATIENT
Start: 2025-03-16

## 2025-03-16 RX ORDER — 0.9 % SODIUM CHLORIDE 0.9 %
500 INTRAVENOUS SOLUTION INTRAVENOUS ONCE
Status: DISCONTINUED | OUTPATIENT
Start: 2025-03-16 | End: 2025-03-23 | Stop reason: HOSPADM

## 2025-03-16 RX ORDER — ALBUTEROL SULFATE 0.83 MG/ML
2.5 SOLUTION RESPIRATORY (INHALATION) 4 TIMES DAILY PRN
Status: DISCONTINUED | OUTPATIENT
Start: 2025-03-16 | End: 2025-03-23 | Stop reason: HOSPADM

## 2025-03-16 RX ORDER — INSULIN LISPRO 100 [IU]/ML
0-8 INJECTION, SOLUTION INTRAVENOUS; SUBCUTANEOUS
Status: DISCONTINUED | OUTPATIENT
Start: 2025-03-16 | End: 2025-03-23 | Stop reason: HOSPADM

## 2025-03-16 RX ORDER — GLIPIZIDE 5 MG/1
5 TABLET ORAL
Status: DISCONTINUED | OUTPATIENT
Start: 2025-03-16 | End: 2025-03-23 | Stop reason: HOSPADM

## 2025-03-16 RX ORDER — AMLODIPINE BESYLATE 10 MG/1
10 TABLET ORAL DAILY
Status: DISCONTINUED | OUTPATIENT
Start: 2025-03-16 | End: 2025-03-23 | Stop reason: HOSPADM

## 2025-03-16 RX ORDER — DEXTROSE MONOHYDRATE 100 MG/ML
INJECTION, SOLUTION INTRAVENOUS CONTINUOUS PRN
Status: DISCONTINUED | OUTPATIENT
Start: 2025-03-16 | End: 2025-03-23 | Stop reason: HOSPADM

## 2025-03-16 RX ORDER — PANTOPRAZOLE SODIUM 40 MG/1
40 TABLET, DELAYED RELEASE ORAL
Status: CANCELLED | OUTPATIENT
Start: 2025-03-16

## 2025-03-16 RX ORDER — ALBUTEROL SULFATE 90 UG/1
2 INHALANT RESPIRATORY (INHALATION) EVERY 6 HOURS PRN
Status: DISCONTINUED | OUTPATIENT
Start: 2025-03-16 | End: 2025-03-23 | Stop reason: HOSPADM

## 2025-03-16 RX ORDER — ONDANSETRON 2 MG/ML
4 INJECTION INTRAMUSCULAR; INTRAVENOUS EVERY 6 HOURS PRN
Status: DISCONTINUED | OUTPATIENT
Start: 2025-03-16 | End: 2025-03-23 | Stop reason: HOSPADM

## 2025-03-16 RX ORDER — GABAPENTIN 300 MG/1
300 CAPSULE ORAL 3 TIMES DAILY
Status: DISCONTINUED | OUTPATIENT
Start: 2025-03-16 | End: 2025-03-16

## 2025-03-16 RX ADMIN — ERGOCALCIFEROL 50000 UNITS: 1.25 CAPSULE ORAL at 09:35

## 2025-03-16 RX ADMIN — INSULIN GLARGINE 10 UNITS: 100 INJECTION, SOLUTION SUBCUTANEOUS at 20:08

## 2025-03-16 RX ADMIN — INSULIN LISPRO 4 UNITS: 100 INJECTION, SOLUTION INTRAVENOUS; SUBCUTANEOUS at 20:08

## 2025-03-16 RX ADMIN — GABAPENTIN 300 MG: 300 CAPSULE ORAL at 09:33

## 2025-03-16 RX ADMIN — ENOXAPARIN SODIUM 40 MG: 100 INJECTION SUBCUTANEOUS at 09:33

## 2025-03-16 RX ADMIN — GABAPENTIN 300 MG: 300 CAPSULE ORAL at 13:26

## 2025-03-16 RX ADMIN — SODIUM CHLORIDE, PRESERVATIVE FREE 10 ML: 5 INJECTION INTRAVENOUS at 09:34

## 2025-03-16 RX ADMIN — GABAPENTIN 300 MG: 300 CAPSULE ORAL at 05:18

## 2025-03-16 RX ADMIN — INSULIN GLARGINE 10 UNITS: 100 INJECTION, SOLUTION SUBCUTANEOUS at 09:33

## 2025-03-16 RX ADMIN — Medication 10 MG: at 04:21

## 2025-03-16 RX ADMIN — ASPIRIN 81 MG: 81 TABLET, COATED ORAL at 09:33

## 2025-03-16 RX ADMIN — GABAPENTIN 300 MG: 300 CAPSULE ORAL at 20:07

## 2025-03-16 NOTE — ED PROVIDER NOTES
Faculty Sign-Out Attestation  Handoff taken on the following patient from prior Attending Physician: Josh  Note Started: 10:57 PM EDT    I was available and discussed any additional care issues that arose and coordinated the management plans with the resident(s) caring for the patient during my duty period. Any areas of disagreement with resident’s documentation of care or procedures are noted on the chart. I was personally present for the key portions of any/all procedures during my duty period. I have documented in the chart those procedures where I was not present during the key portions.    Loc, stroke alert, needing admit,   ---neuro admit    Vishal Alonzo DO  Attending Physician      Vishal Alonzo DO  03/15/25 2257       Vishal Alonzo DO  03/16/25 0142

## 2025-03-16 NOTE — PROGRESS NOTES
Physical Therapy  Facility/Department: [address] STEPDOWN   Physical Therapy Initial Evaluation    Patient Name: Lexie Mireles        MRN: [id number]    : 1970    Date of Service: 3/16/2025    Chief Complaint   Patient presents with    Dizziness     Lexie Mireles is a 54 y.o. -American female with past medical history of HTN, HLD, poorly controlled diabetes, diabetic neuropathy, CKD stage IIIa, cocaine abuse, who presented to ED with altered mental status.    Past Medical History:  has a past medical history of Asthma, Chronic back pain, GERD (gastroesophageal reflux disease), HLD (hyperlipidemia), Hypertension, Irregular periods/menstrual cycles, Neuropathy, Obesity, Observed seizure-like activity (HCC), Type II or unspecified type diabetes mellitus without mention of complication, not stated as uncontrolled, and Wears glasses.  Past Surgical History:  has a past surgical history that includes Tubal ligation (); Endometrial ablation (2018); pr hysteroscopy endometrial ablation (N/A, 2018); Esophagogastroduodenoscopy (2025); Upper gastrointestinal endoscopy (N/A, 2025); and Colonoscopy (N/A, 2025).    Discharge Recommendations   Further therapy recommended at discharge.The patient should be able to tolerate at least 3 hours of therapy per day over 5 days or 15 hours over 7 days.   This patient may benefit from a Physical Medicine and Rehab consult.     PT Equipment Recommendations  Equipment Needed: No (pt reports owning 4 WW, however, pt currently is unsafe to complete transfers/ambulation without skilled assistance)    Assessment  Body Structures, Functions, Activity Limitations Requiring Skilled Therapeutic Intervention: Decreased functional mobility , Decreased ADL status, Decreased body mechanics, Decreased strength, Decreased high-level IADLs, Decreased balance, Decreased endurance, Decreased safe awareness, Decreased coordination, Decreased posture  Assessment: Pt  Daughter (18 yo daughter)  Type of Home: House  Home Layout: Two level, Performs ADL's on one level, Able to Live on Main level with bedroom/bathroom  Home Access: Stairs to enter without rails  Entrance Stairs - Number of Steps: 3  Bathroom Shower/Tub: Tub/Shower unit  Bathroom Toilet: Standard  Home Equipment:  (Pt reports)  Receives Help From: Family  Prior Level of Assist for ADLs: Independent  Prior Level of Assist for Homemaking: Needs assistance ( completes most tasks)  Prior Level of Assist for Ambulation: Independent household ambulator, with or without device  Prior Level of Assist for Transfers: Independent  Active : No  Patient's  Info:   Occupation: On disability  Additional Comments: Pt reports  works during the days, can provide only PRN support    Vision/Hearing  Vision  Vision: Impaired  Vision Exceptions: Wears glasses at all times  Hearing  Hearing: Within functional limits    Objective  Orientation  Overall Orientation Status: Within Functional Limits  Cognition  Overall Cognitive Status: Exceptions  Following Commands: Follows multistep commands with increased time  Safety Judgement: Decreased awareness of need for assistance;Decreased awareness of need for safety  Problem Solving: Assistance required to generate solutions;Assistance required to correct errors made;Decreased awareness of errors  Insights: Decreased awareness of deficits  Initiation: Requires cues for some  Sequencing: Requires cues for some         AROM RLE (degrees)  RLE AROM: WFL  AROM LLE (degrees)  LLE AROM : WFL  AROM RUE (degrees)  RUE AROM : WFL  AROM LUE (degrees)  LUE AROM : WFL          Strength RLE  Strength RLE: Exception  R Hip Flexion: 3+/5  R Knee Flexion: 4-/5  R Knee Extension: 4-/5  R Ankle Dorsiflexion: 4-/5  R Ankle Plantar flexion: 4-/5  Strength LLE  Strength LLE: Exception  L Hip Flexion: 4/5  L Knee Flexion: 4+/5  L Knee Extension: 4+/5  L Ankle Dorsiflexion: 4+/5  L Ankle

## 2025-03-16 NOTE — CONSULTS
Stroke Neurology Consult Note  Stroke Alert @10:16 PM  Arrival at bedside @10:16 PM    Reason for Consult:  ED Stroke Alert  Requesting Physician:  ED team   Endovascular Neurosurgeon: Angel Spain MD  Stroke Team: Ten Lao MD  History Obtained From:  patient, spouse  Chief Complaint:  Acute neurological deficits   Allergies:  Morphine    History of Presenting Illness     Lexie Mireles is a 54 y.o. -American female with past medical history of HTN, HLD, poorly controlled diabetes, diabetic neuropathy, CKD stage IIIa, cocaine abuse, who presented to ED with altered mental status    Patient was at dinner tonight around 9:30 PM when she was feeling generalized weakness and dizzy, was trying to ambulate to the bathroom with the assistance of her , patient progressively got worse and felt on her knees as her  was supporting her however she was confused at that time, event lasted for about 20 minutes, she was brought in to ED and was noted to continuously confused, did not know that she was going to the hospital.  No clear seizure-like activity however  noted that she was shaking.  No urinary or fecal incontinence.  Has never experienced this before.   reported that she was feeling weak and dizzy a week ago.  Patient does not participate with exam, is lethargic, needs repeated to prompting to participate in exam.      LKW: 9:30 PM on 3/15/2025  NIHSS: 14  Modified [place] Scale: 0  SBP: 120s  Glucose: 174  CT head without contrast: No acute abnormalities  TNK: Not a candidate due to relative contraindication with potential seizures  Endovascular: Not indicated    Review of Systems  Unable to obtain due to altered mental status    Past Histories          Diagnosis Date    Asthma     Chronic back pain     GERD (gastroesophageal reflux disease)     HLD (hyperlipidemia) 1/27/2014    Hypertension     Irregular periods/menstrual cycles 2017    Neuropathy     Obesity     Type II or  presents for evaluation of acute neurological deficits.       Impression:  Differential Diagnosis: Acute ischemic stroke, possible seizure event with postictal state, possible metabolic/toxic encephalopathy  Suspected stroke etiology: This was NOT a stroke        Disposition: General Neurology Care Status - prefer 1st floor (1C)    Stroke admission order set: [id number] - GEN Ischemic Stroke Non-Thrombolytic Focused      Plan:   Blood pressure goal: SBP goal 120-180  Antithrombotic therapy: Aspirin 81 mg once daily  DVT Prophylaxis: Enoxaparin   High intensity statin therapy (long-term goal LDL < 70)  Diagnostics: MRI brain without contrast, unable to obtain with contrast due to CKD  Recommend obtaining EEG  Frequent neuro-checks per order set protocol  Basic labs: CBC, CMP, coagulation panel, troponin  Stroke labwork: HgbA1C, lipid panel  Intravenous hydration with normal saline at 75cc per hour  Swallow evaluation prior to advancing diet   Tight glucose control (long-term goal HgbA1c < 6%)  Continuous cardiac telemetry to monitor for arrhythmia  Head of bed > 30 degrees for aspiration prevention and aspiration precautions ordered.  Patient/family given stroke educational materials and education that includes: Personal Risk Factors for Stroke, Stroke Warning Signs/Symptoms, Emergency Actions/Activation of the EMS, Follow-Up after discharge; Medications Prescribed, Smoking Cessation/Risks.   Physical therapy, occupational therapy, speech therapy consults  Consulted social work and case management for help with discharge  UA UDS pending  Metabolic workup      Discussed with Ten Lao MD.    Carolina Mobley MD   3/15/2025  10:34 PM

## 2025-03-16 NOTE — PLAN OF CARE
Problem: Chronic Conditions and Co-morbidities  Goal: Patient's chronic conditions and co-morbidity symptoms are monitored and maintained or improved  Outcome: Progressing     Problem: Discharge Planning  Goal: Discharge to home or other facility with appropriate resources  Outcome: Progressing  Flowsheets (Taken 3/16/2025 0800)  Discharge to home or other facility with appropriate resources: Identify barriers to discharge with patient and caregiver     Problem: Safety - Adult  Goal: Free from fall injury  Outcome: Progressing

## 2025-03-16 NOTE — ED NOTES
ED to inpatient nurses report      Chief Complaint:  Chief Complaint   Patient presents with    Dizziness     Present to ED from: Home via EMS    MOA:     LOC: alert and orientated to name, place, date  Mobility: Requires assistance * 2  Oxygen Baseline: Room air    Current needs required: None   Pending ED orders: None  Present condition: Stable    Why did the patient come to the ED? Pt brought to room 14 from triage via wheelchair with c/o having a syncopal episode at home. Pt's  reports that pt stated she was tired and wanted to go sit down in the next room. Upon ambulating she became weak and collapsed in his arms. Pt is confused and extremely weak. No other complaints at this time. Breathing is non-labored.   What is the plan? Admission  Any procedures or intervention occur? Line, labs, CT  Any safety concerns?? None    Mental Status:  Level of Consciousness: Alert (0)    Psych Assessment:   Psychosocial  Psychosocial (WDL): Within Defined Limits  Vital signs   Vitals:    03/15/25 2345 03/15/25 2346 03/16/25 0000 03/16/25 0015   BP:  (!) 157/102 (!) 129/95 (!) 146/107   Pulse: (!) 110  (!) 102 (!) 102   Resp: 20  13 13   Temp:       TempSrc:       SpO2: 100%  95% 96%   Weight:       Height:            Vitals:  Patient Vitals for the past 24 hrs:   BP Temp Temp src Pulse Resp SpO2 Height Weight   03/16/25 0015 (!) 146/107 -- -- (!) 102 13 96 % -- --   03/16/25 0000 (!) 129/95 -- -- (!) 102 13 95 % -- --   03/15/25 2346 (!) 157/102 -- -- -- -- -- -- --   03/15/25 2345 -- -- -- (!) 110 20 100 % -- --   03/15/25 2230 (!) 133/90 -- -- (!) 112 18 99 % -- --   03/15/25 2215 112/84 97.7 °F (36.5 °C) Oral (!) 104 20 98 % 1.499 m (4' 11\") 56.7 kg (125 lb)      Visit Vitals  BP (!) 146/107   Pulse (!) 102   Temp 97.7 °F (36.5 °C) (Oral)   Resp 13   Ht 1.499 m (4' 11\")   Wt 56.7 kg (125 lb)   SpO2 96%   BMI 25.25 kg/m²        LDAs:   Peripheral IV 03/15/25 Left;Dorsal Forearm (Active)       Ambulatory

## 2025-03-16 NOTE — ED PROVIDER NOTES
[facility]     Emergency Department     Faculty Attestation    I performed a history and physical examination of the patient and discussed management with the resident. I reviewed the resident’s note and agree with the documented findings and plan of care. Any areas of disagreement are noted on the chart. I was personally present for the key portions of any procedures. I have documented in the chart those procedures where I was not present during the key portions. I have reviewed the emergency nurses triage note. I agree with the chief complaint, past medical history, past surgical history, allergies, medications, social and family history as documented unless otherwise noted below.        For Physician Assistant/ Nurse Practitioner cases/documentation I have personally evaluated this patient and have completed at least one if not all key elements of the E/M (history, physical exam, and MDM). Additional findings are as noted.  I have personally seen and evaluated the patient.  I find the patient's history and physical exam are consistent with the NP/PA documentation.  I agree with the care provided, treatment rendered, disposition and follow-up plan.    Prior to arrival the patient had an episode of unresponsiveness for approximately 50 seconds according to family member since that time the patient has not been behaving normally and on exam the patient is eyes are open answer some questions appropriately however clearly with altered mental status at the present time the patient has no obvious facial droop or specific focal motor deficit patient is a known diabetic with a blood sugar of 184 this evening of by point-of-care no new medications or other explanation for altered mental status stroke alert called      Critical Care     Zaire Moreno M.D.  Attending Emergency  Physician           Zaire Moreno MD  03/15/25 2379

## 2025-03-16 NOTE — PROGRESS NOTES
Facility/Department: [address] STE[address]OWN  Initial Speech/Language/Cognitive Assessment    NAME: Lexie Mireles  : 1970   MRN: [id number]  ADMISSION DATE: 3/15/2025  ADMITTING DIAGNOSIS: has Obesity; GERD (gastroesophageal reflux disease); Hypertension, essential; RAD (reactive airway disease); Diabetes mellitus type 2, insulin dependent (HCC); Bilateral knee pain; Left shoulder pain; Patellofemoral syndrome; Peripheral neuropathy; Hypercholesteremia; Abnormal uterine bleeding (AUB); Abnormal glandular Papanicolaou smear of cervix - NOS; Immunization due; Status post endometrial ablation; Right rotator cuff tendonitis; Tendinopathy of right biceps tendon; Calcific tendonitis of left shoulder; Hyperglycemia, unspecified; Syncope; Diabetic peripheral neuropathy (HCC); Post-menopausal bleeding; Vulvovaginitis; Left flank pain; Flank pain; Stage 3a chronic kidney disease (HCC); Hyperglycemia; Nausea and vomiting; Anemia; Abdominal pain; Type 2 diabetes mellitus with hyperglycemia, with long-term current use of insulin (HCC); Vitamin D deficiency; Cocaine abuse (HCC); and Altered mental status on their problem list.      Date of Eval: 3/16/2025   Evaluating Therapist: AILEEN Woods    RECENT RESULTS  CT OF HEAD/MRI: IMPRESSION:  No acute intracranial abnormality.    Primary Complaint: Lexie Mireles is a 54 y.o. -American female with past medical history of HTN, HLD, poorly controlled diabetes, diabetic neuropathy, CKD stage IIIa, cocaine abuse, who presented to ED with altered mental status     Patient was at dinner tonight around 9:30 PM when she was feeling generalized weakness and dizzy, was trying to ambulate to the bathroom with the assistance of her , patient progressively got worse and felt on her knees as her  was supporting her however she was confused at that time, event lasted for about 20 minutes, she was brought in to ED and was noted to continuously confused, did not know that

## 2025-03-16 NOTE — ED PROVIDER NOTES
[facility] EMERGENCY DEPARTMENT  Emergency Department Encounter  Emergency Medicine Resident     Pt Name:Lexie Mireles  MRN: [id number]  Birthdate 1970  Date of evaluation: 3/15/25  PCP:  Viktor Fox APRN - CNP  Note Started: 10:22 PM EDT      CHIEF COMPLAINT       Chief Complaint   Patient presents with    Dizziness       HISTORY OF PRESENT ILLNESS  (Location/Symptom, Timing/Onset, Context/Setting, Quality, Duration, Modifying Factors, Severity.)      Lexie Mireles is a 54 y.o. female who presents with venous and syncope.  Patient is accompanied by her  who states that approximately 45 minutes ago the patient stated she was dizzy the  was helping her walk into the other room and then she had an episode of syncope.  The  states that this is never happened before and the patient does not have any history of seizures.  The patient is unable to answer any questions is very confused.  When asked a question the patient just states that she is very tired.  He has been states that the patient did not fall did not hit her head.   denies any alcohol or drug use tonight.  Denies any new medications that the patient is recently started on.  The patient is a diabetic however has not changed her insulin dose recently.  Patient's point-of-care glucose in the emergency department was 140s.      PAST MEDICAL / SURGICAL / SOCIAL / FAMILY HISTORY      has a past medical history of Asthma, Chronic back pain, GERD (gastroesophageal reflux disease), HLD (hyperlipidemia), Hypertension, Irregular periods/menstrual cycles, Neuropathy, Obesity, Type II or unspecified type diabetes mellitus without mention of complication, not stated as uncontrolled, and Wears glasses.       has a past surgical history that includes Tubal ligation (2008); Endometrial ablation (02/27/2018); pr hysteroscopy endometrial ablation (N/A, 02/27/2018); Esophagogastroduodenoscopy (01/27/2025); Upper  Services: Never     Active Member of Clubs or Organizations: No     Attends Club or Organization Meetings: Never     Marital Status:    Intimate Partner Violence: Unknown (12/5/2024)    Received from [facility]    Humiliation, Afraid, Rape, and Kick questionnaire     Fear of Current or Ex-Partner: No     Emotionally Abused: Not on file     Physically Abused: Not on file     Sexually Abused: Not on file   Housing Stability: Low Risk  (1/26/2025)    Housing Stability Vital Sign     Unable to Pay for Housing in the Last Year: No     Number of Times Moved in the Last Year: 0     Homeless in the Last Year: No       Family History   Problem Relation Age of Onset    Diabetes Mother     High Cholesterol Mother     High Blood Pressure Mother     Asthma Mother     No Known Problems Father     Asthma Sister     Asthma Son     Diabetes Maternal Grandmother     No Known Problems Paternal Grandmother     No Known Problems Paternal Grandfather     Breast Cancer Maternal Aunt     Diabetes Maternal Aunt     Diabetes Maternal Uncle        Allergies:  Morphine    Home Medications:  Prior to Admission medications    Medication Sig Start Date End Date Taking? Authorizing Provider   metoclopramide (REGLAN) 5 MG tablet Take 1 tablet by mouth 3 times daily as needed (nausea, before meals) 3/6/25   Beth Olea MD   dapagliflozin (FARXIGA) 10 MG tablet Take 1 tablet by mouth every morning 2/5/25   Viktor Fox APRN - CNP   glipiZIDE (GLUCOTROL) 5 MG tablet Take 1 tablet by mouth 2 times daily (before meals) 2/5/25   Viktor Fox APRN - CNP   insulin glargine (LANTUS SOLOSTAR) 100 UNIT/ML injection pen Inject 15 Units into the skin 2 times daily 2/5/25   Viktor Fox APRN - CNP   Semaglutide (RYBELSUS) 7 MG TABS Take 7 mg by mouth every morning (before breakfast) or first food or drink of the day. Start after 30 days of the 3mg daily dose. 2/5/25   Viktor Fox APRN - CNP

## 2025-03-16 NOTE — H&P
was NRS chest x-ray was negative for acute process.      Past Medical History:     Past Medical History:   Diagnosis Date    Asthma     Chronic back pain     GERD (gastroesophageal reflux disease)     HLD (hyperlipidemia) 1/27/2014    Hypertension     Irregular periods/menstrual cycles 2017    Neuropathy     Obesity     Type II or unspecified type diabetes mellitus without mention of complication, not stated as uncontrolled 2007    On Insulin, Lantus nightly & Novolog 3 times with meals    Wears glasses         Past Surgical History:     Past Surgical History:   Procedure Laterality Date    COLONOSCOPY N/A 1/28/2025    COLONOSCOPY DIAGNOSTIC performed by Beth Olea MD at [facility] OR    ENDOMETRIAL ABLATION  02/27/2018    ESOPHAGOGASTRODUODENOSCOPY  01/27/2025    Biopsy    [place] HYSTEROSCOPY ENDOMETRIAL ABLATION N/A 02/27/2018    ENDOMETRIAL ABLATION WITH SHAHEED performed by Stanley Persaud MD at [facility] OR    TUBAL LIGATION  2008    UPPER GASTROINTESTINAL ENDOSCOPY N/A 1/27/2025    ESOPHAGOGASTRODUODENOSCOPY BIOPSY performed by Beth Olea MD at [facility] OR        Medications Prior to Admission:     Prior to Admission medications    Medication Sig Start Date End Date Taking? Authorizing Provider   metoclopramide (REGLAN) 5 MG tablet Take 1 tablet by mouth 3 times daily as needed (nausea, before meals) 3/6/25   Beth Olea MD   dapagliflozin (FARXIGA) 10 MG tablet Take 1 tablet by mouth every morning 2/5/25   Viktor Fox APRN - CNP   glipiZIDE (GLUCOTROL) 5 MG tablet Take 1 tablet by mouth 2 times daily (before meals) 2/5/25   Viktor Fox APRN - CNP   insulin glargine (LANTUS SOLOSTAR) 100 UNIT/ML injection pen Inject 15 Units into the skin 2 times daily 2/5/25   Viktor Fox APRN - CNP   Semaglutide (RYBELSUS) 7 MG TABS Take 7 mg by mouth every morning (before breakfast) or first food or drink of the day. Start after 30 days of the 3mg daily dose. 2/5/25   Viktor Fox APRN -    Comprehensive Metabolic Panel    Collection Time: 03/15/25 10:25 PM   Result Value Ref Range    Sodium 140 136 - 145 mmol/L    Potassium 4.0 3.7 - 5.3 mmol/L    Chloride 104 98 - 107 mmol/L    CO2 23 20 - 31 mmol/L    Anion Gap 13 9 - 16 mmol/L    Glucose 174 (H) 74 - 99 mg/dL    BUN 32 (H) 6 - 20 mg/dL    Creatinine 1.5 (H) 0.6 - 0.9 mg/dL    Est, Glom Filt Rate 42 (L) >60 mL/min/1.73m2    Calcium 9.4 8.6 - 10.4 mg/dL    Total Protein 6.3 (L) 6.6 - 8.7 g/dL    Albumin 3.5 3.5 - 5.2 g/dL    Albumin/Globulin Ratio 1.3 1.0 - 2.5    Total Bilirubin 0.2 0.0 - 1.2 mg/dL    Alkaline Phosphatase 85 35 - 104 U/L    ALT 11 10 - 35 U/L    AST 14 10 - 35 U/L   CBC with Auto Differential    Collection Time: 03/15/25 10:25 PM   Result Value Ref Range    WBC 12.3 (H) 3.5 - 11.3 k/uL    RBC 3.51 (L) 3.95 - 5.11 m/uL    Hemoglobin 9.2 (L) 11.9 - 15.1 g/dL    Hematocrit 29.1 (L) 36.3 - 47.1 %    MCV 82.9 82.6 - 102.9 fL    MCH 26.2 25.2 - 33.5 pg    MCHC 31.6 28.4 - 34.8 g/dL    RDW 14.6 (H) 11.8 - 14.4 %    Platelets 357 138 - 453 k/uL    MPV 10.3 8.1 - 13.5 fL    NRBC Automated 0.0 0.0 per 100 WBC    Neutrophils % 70 (H) 36 - 65 %    Lymphocytes % 21 (L) 24 - 43 %    Monocytes % 7 3 - 12 %    Eosinophils % 1 1 - 4 %    Basophils % 0 0 - 2 %    Immature Granulocytes % 1 (H) 0 %    Neutrophils Absolute 8.52 (H) 1.50 - 8.10 k/uL    Lymphocytes Absolute 2.62 1.10 - 3.70 k/uL    Monocytes Absolute 0.83 0.10 - 1.20 k/uL    Eosinophils Absolute 0.16 0.00 - 0.44 k/uL    Basophils Absolute 0.05 0.00 - 0.20 k/uL    Immature Granulocytes Absolute 0.08 0.00 - 0.30 k/uL    RBC Morphology ANISOCYTOSIS PRESENT    Protime-INR    Collection Time: 03/15/25 10:25 PM   Result Value Ref Range    Protime 12.2 11.7 - 14.9 sec    INR 0.9    APTT    Collection Time: 03/15/25 10:25 PM   Result Value Ref Range    APTT 23.0 23.0 - 36.5 sec   Ethanol    Collection Time: 03/15/25 10:25 PM   Result Value Ref Range    Ethanol Lvl <10 <10 mg/dL    Ethanol

## 2025-03-16 NOTE — CARE COORDINATION
Case Management Assessment  Initial Evaluation    Date/Time of Evaluation: 3/16/2025 2:44 PM  Assessment Completed by: Samantha Toscano RN    If patient is discharged prior to next notation, then this note serves as note for discharge by case management.    Patient Name: Lexie Mireles                   YOB: 1970  Diagnosis: Altered mental status [R41.82]  Altered mental status, unspecified altered mental status type [R41.82]                   Date / Time: 3/15/2025  9:59 PM    Patient Admission Status: Inpatient   Readmission Risk (Low < 19, Mod (19-27), High > 27): Readmission Risk Score: 23.5    Current PCP: Viktor Fox APRN - CNP  PCP verified by CM? Yes    Chart Reviewed: Yes      History Provided by: Patient  Patient Orientation: Alert and Oriented, Person, Place, Situation    Patient Cognition: Alert    Hospitalization in the last 30 days (Readmission):  No    If yes, Readmission Assessment in  Navigator will be completed.    Advance Directives:      Code Status: Full Code   Patient's Primary Decision Maker is: Legal Next of Kin      Discharge Planning:    Patient lives with: Spouse/Significant Other Type of Home: House  Primary Care Giver: Self  Patient Support Systems include: Spouse/Significant Other   Current Financial resources: Medicare  Current community resources:    Current services prior to admission: Durable Medical Equipment            Current DME: Walker, Glucometer            Type of Home Care services:  None    ADLS  Prior functional level: Independent in ADLs/IADLs  Current functional level: Independent in ADLs/IADLs    PT AM-PAC: 13 /24  OT AM-PAC:   /24    Family can provide assistance at DC: Yes  Would you like Case Management to discuss the discharge plan with any other family members/significant others, and if so, who? No  Plans to Return to Present Housing: Yes  Other Identified Issues/Barriers to RETURNING to current housing: n/a  Potential Assistance needed at  discharge: N/A            Potential DME:    Patient expects to discharge to: House  Plan for transportation at discharge: Family    Financial    Payor: [place]ARE / Plan: [employer] MEDICARE / Product Type: *No Product type* /     Does insurance require precert for SNF: Yes    Potential assistance Purchasing Medications: No  Meds-to-Beds request: Yes      [facility] [address] [place] [address] (), [place] - [address] [facility] -  [phone] - F [phone]  [address] [place] [address] () [place] [address]  Phone: [phone] Fax: [phone]      Notes:    Factors facilitating achievement of predicted outcomes: Family support, Cooperative, and Pleasant    Barriers to discharge: medical clearance    Additional Case Management Notes: Patient plan is to go home with support from her .        Case Management Services Information Letter Provided [x]     The Plan for Transition of Care is related to the following treatment goals of Altered mental status [R41.82]  Altered mental status, unspecified altered mental status type [R41.82]    IF APPLICABLE: The Patient and/or patient representative Lexie and her family were provided with a choice of provider and agrees with the discharge plan. Freedom of choice list with basic dialogue that supports the patient's individualized plan of care/goals and shares the quality data associated with the providers was provided to: Patient   Patient Representative Name:       The Patient and/or Patient Representative Agree with the Discharge Plan? Yes    Samantha Toscano RN  Case Management Department  Ph: [phone]

## 2025-03-16 NOTE — ED TRIAGE NOTES
Pt brought to room 14 from triage via wheelchair with c/o having a syncopal episode at home. Pt's  reports that pt stated she was tired and wanted to go sit down in the next room. Upon ambulating she became weak and collapsed in his arms. Pt is confused and extremely weak. No other complaints at this time. Breathing is non-labored. Call light is within reach.

## 2025-03-17 LAB
ANION GAP SERPL CALCULATED.3IONS-SCNC: 12 MMOL/L (ref 9–16)
BUN SERPL-MCNC: 35 MG/DL (ref 6–20)
CALCIUM SERPL-MCNC: 8.9 MG/DL (ref 8.6–10.4)
CHLORIDE SERPL-SCNC: 104 MMOL/L (ref 98–107)
CO2 SERPL-SCNC: 22 MMOL/L (ref 20–31)
CREAT SERPL-MCNC: 1.3 MG/DL (ref 0.6–0.9)
EKG ATRIAL RATE: 101 BPM
EKG P AXIS: 54 DEGREES
EKG P-R INTERVAL: 146 MS
EKG Q-T INTERVAL: 360 MS
EKG QRS DURATION: 70 MS
EKG QTC CALCULATION (BAZETT): 466 MS
EKG R AXIS: 17 DEGREES
EKG T AXIS: 59 DEGREES
EKG VENTRICULAR RATE: 101 BPM
ERYTHROCYTE [DISTWIDTH] IN BLOOD BY AUTOMATED COUNT: 14.7 % (ref 11.8–14.4)
GFR, ESTIMATED: 49 ML/MIN/1.73M2
GLUCOSE BLD-MCNC: 143 MG/DL (ref 65–105)
GLUCOSE BLD-MCNC: 161 MG/DL (ref 65–105)
GLUCOSE BLD-MCNC: 226 MG/DL (ref 65–105)
GLUCOSE BLD-MCNC: 287 MG/DL (ref 65–105)
GLUCOSE SERPL-MCNC: 189 MG/DL (ref 74–99)
HCT VFR BLD AUTO: 28.5 % (ref 36.3–47.1)
HGB BLD-MCNC: 9.1 G/DL (ref 11.9–15.1)
MCH RBC QN AUTO: 26.7 PG (ref 25.2–33.5)
MCHC RBC AUTO-ENTMCNC: 31.9 G/DL (ref 28.4–34.8)
MCV RBC AUTO: 83.6 FL (ref 82.6–102.9)
NRBC BLD-RTO: 0 PER 100 WBC
PLATELET # BLD AUTO: 325 K/UL (ref 138–453)
PMV BLD AUTO: 10.7 FL (ref 8.1–13.5)
POTASSIUM SERPL-SCNC: 4.5 MMOL/L (ref 3.7–5.3)
RBC # BLD AUTO: 3.41 M/UL (ref 3.95–5.11)
SODIUM SERPL-SCNC: 138 MMOL/L (ref 136–145)
WBC OTHER # BLD: 12.4 K/UL (ref 3.5–11.3)

## 2025-03-17 PROCEDURE — 97166 OT EVAL MOD COMPLEX 45 MIN: CPT

## 2025-03-17 PROCEDURE — 2500000003 HC RX 250 WO HCPCS

## 2025-03-17 PROCEDURE — 97130 THER IVNTJ EA ADDL 15 MIN: CPT

## 2025-03-17 PROCEDURE — 36415 COLL VENOUS BLD VENIPUNCTURE: CPT

## 2025-03-17 PROCEDURE — 95819 EEG AWAKE AND ASLEEP: CPT

## 2025-03-17 PROCEDURE — 85027 COMPLETE CBC AUTOMATED: CPT

## 2025-03-17 PROCEDURE — 97129 THER IVNTJ 1ST 15 MIN: CPT

## 2025-03-17 PROCEDURE — 80048 BASIC METABOLIC PNL TOTAL CA: CPT

## 2025-03-17 PROCEDURE — 6360000002 HC RX W HCPCS

## 2025-03-17 PROCEDURE — 97535 SELF CARE MNGMENT TRAINING: CPT

## 2025-03-17 PROCEDURE — 2060000000 HC ICU INTERMEDIATE R&B

## 2025-03-17 PROCEDURE — 6370000000 HC RX 637 (ALT 250 FOR IP)

## 2025-03-17 PROCEDURE — 95819 EEG AWAKE AND ASLEEP: CPT | Performed by: PSYCHIATRY & NEUROLOGY

## 2025-03-17 PROCEDURE — 97530 THERAPEUTIC ACTIVITIES: CPT

## 2025-03-17 PROCEDURE — 99233 SBSQ HOSP IP/OBS HIGH 50: CPT | Performed by: PSYCHIATRY & NEUROLOGY

## 2025-03-17 PROCEDURE — 82947 ASSAY GLUCOSE BLOOD QUANT: CPT

## 2025-03-17 RX ORDER — KETOROLAC TROMETHAMINE 15 MG/ML
15 INJECTION, SOLUTION INTRAMUSCULAR; INTRAVENOUS ONCE
Status: COMPLETED | OUTPATIENT
Start: 2025-03-17 | End: 2025-03-17

## 2025-03-17 RX ORDER — LABETALOL HYDROCHLORIDE 5 MG/ML
10 INJECTION, SOLUTION INTRAVENOUS EVERY 6 HOURS PRN
Status: DISCONTINUED | OUTPATIENT
Start: 2025-03-17 | End: 2025-03-23 | Stop reason: HOSPADM

## 2025-03-17 RX ORDER — ACETAMINOPHEN 325 MG/1
650 TABLET ORAL EVERY 4 HOURS PRN
Status: DISCONTINUED | OUTPATIENT
Start: 2025-03-17 | End: 2025-03-23 | Stop reason: HOSPADM

## 2025-03-17 RX ADMIN — SODIUM CHLORIDE, PRESERVATIVE FREE 10 ML: 5 INJECTION INTRAVENOUS at 08:42

## 2025-03-17 RX ADMIN — GABAPENTIN 300 MG: 300 CAPSULE ORAL at 13:54

## 2025-03-17 RX ADMIN — GABAPENTIN 300 MG: 300 CAPSULE ORAL at 20:57

## 2025-03-17 RX ADMIN — KETOROLAC TROMETHAMINE 15 MG: 15 INJECTION, SOLUTION INTRAMUSCULAR; INTRAVENOUS at 18:25

## 2025-03-17 RX ADMIN — INSULIN GLARGINE 10 UNITS: 100 INJECTION, SOLUTION SUBCUTANEOUS at 20:57

## 2025-03-17 RX ADMIN — ACETAMINOPHEN 650 MG: 325 TABLET ORAL at 16:13

## 2025-03-17 RX ADMIN — INSULIN LISPRO 4 UNITS: 100 INJECTION, SOLUTION INTRAVENOUS; SUBCUTANEOUS at 18:13

## 2025-03-17 RX ADMIN — INSULIN LISPRO 2 UNITS: 100 INJECTION, SOLUTION INTRAVENOUS; SUBCUTANEOUS at 12:38

## 2025-03-17 RX ADMIN — GABAPENTIN 300 MG: 300 CAPSULE ORAL at 08:42

## 2025-03-17 RX ADMIN — SODIUM CHLORIDE, PRESERVATIVE FREE 10 ML: 5 INJECTION INTRAVENOUS at 20:57

## 2025-03-17 RX ADMIN — INSULIN GLARGINE 10 UNITS: 100 INJECTION, SOLUTION SUBCUTANEOUS at 08:42

## 2025-03-17 RX ADMIN — ENOXAPARIN SODIUM 40 MG: 100 INJECTION SUBCUTANEOUS at 08:42

## 2025-03-17 RX ADMIN — ASPIRIN 81 MG: 81 TABLET, COATED ORAL at 08:42

## 2025-03-17 RX ADMIN — KETOROLAC TROMETHAMINE 15 MG: 15 INJECTION, SOLUTION INTRAMUSCULAR; INTRAVENOUS at 04:01

## 2025-03-17 ASSESSMENT — PAIN SCALES - GENERAL
PAINLEVEL_OUTOF10: 9
PAINLEVEL_OUTOF10: 7
PAINLEVEL_OUTOF10: 8

## 2025-03-17 ASSESSMENT — PAIN DESCRIPTION - LOCATION
LOCATION: BACK;LEG
LOCATION: LEG;BACK
LOCATION: BACK;LEG

## 2025-03-17 NOTE — PROGRESS NOTES
[facility]   IN-PATIENT SERVICE   [facility]    Progress Note             Date:   3/17/2025  Patient name:  Lexie Mireles  Date of admission:  3/15/2025  9:59 PM  MRN:   [id number]  Account:  [id number]  YOB: 1970  PCP:    Viktor Fox APRN - CNP  Room:   [address] [place] [address]  Code Status:    Full Code    Chief Complaint:     Chief Complaint   Patient presents with    Dizziness       Interval hx:     The patient was seen and examined at bedside. Is vitally stable, alert and oriented x 4. No acute events overnight.  The patient stated that she is no more feeling dizzy, denies any acute symptoms  Her orthostats are negative, MRI normal      Brief History of Present Illness:     The patient is a 54 y.o.  Non- / non  female who presents with Dizziness   and she is admitted to the hospital for the management of  AMS     54 y.o. -American female with past medical history of HTN, HLD, poorly controlled diabetes, diabetic neuropathy, CKD stage IIIa, cocaine abuse, who presented to ED with altered mental status     Patient was at dinner tonight around 9:30 PM when she was feeling generalized weakness and dizzy, was trying to ambulate to the bathroom with the assistance of her , patient progressively got worse and felt on her knees as her  was supporting her however she was confused at that time, event lasted for about 20 minutes, she was brought in to ED and was noted to continuously confused, did not know that she was going to the hospital.  No clear seizure-like activity however  noted that she was shaking.  No urinary or fecal incontinence.  Has never experienced this before.   reported that she was feeling weak and dizzy a week ago.  Patient does not participate with exam, is lethargic, needs repeated to prompting to participate in exam.  Later on patient was in severe distress due to right lower extremity pain.     Initial  TSH  MRI brain without contrast (cannot use contrast due to CKD) is unremarkable  CT head unremarkable CTA head and neck no large vessel occlusion or significant stenosis  Routine EEG  Continue aspirin 81 mg daily, statin       HTN  At home on Norvasc 10  BP goal 120-180  Currently hold Norvasc  Continue to monitor     Type II diabetes melitis  Current A1c 10.1 with POCT glucose 189  At home on glipizide 5 twice daily, Lantus 15 twice daily  Currently on Lantus 10 twice daily along with medium dose sliding scale  POCT glucose  Hypoglycemia protocol    CKD stage III:  Creatinine on the baseline initially 1.5 currently 1.3  Daily BMP  Avoid nephrotoxic drugs      Diabetic nephropathy  Resume home dose gabapentin 300 mg 3 times daily     History of gastroparesis:  Resume home dose of Reglan  Avoid NSAIDs, recommend small frequent meals     Reactive airway disease  Continue albuterol with respiratory treatments     History Vitamin D deficiency  Continue vitamin D supplements     DVT prophylaxis on subcu Lovenox  Diet: Advance as tolerated  PT OT eval and treat           Follow-up further recommendations after discussing case with the attending.  The plan was discussed with the patient, patient's family and the medical staff.   Consultations:   IP CONSULT TO CASE MANAGEMENT    Patient is admitted as inpatient status because of co-morbidities listed above, severity of signs and symptoms as outlined, requirement for current medical therapies and most importantly because of direct risk to patient if care not provided in a hospital setting.    Taylor Pompa MD  Neurology Resident PGY-4   3/17/2025  7:33 AM    Copy sent to Viktor Ca, APRN - CNP

## 2025-03-17 NOTE — PROGRESS NOTES
Occupational Therapy  Occupational Therapy Initial Evaluation  Facility/Department: [facility] 5C STEPDOWN   Patient Name: Lexie Mireles        MRN: [id number]    : 1970    Date of Service: 3/17/2025    Chief Complaint   Patient presents with    Dizziness     Past Medical History:  has a past medical history of Asthma, Chronic back pain, GERD (gastroesophageal reflux disease), HLD (hyperlipidemia), Hypertension, Irregular periods/menstrual cycles, Neuropathy, Obesity, Observed seizure-like activity (HCC), Type II or unspecified type diabetes mellitus without mention of complication, not stated as uncontrolled, and Wears glasses.  Past Surgical History:  has a past surgical history that includes Tubal ligation (); Endometrial ablation (2018); pr hysteroscopy endometrial ablation (N/A, 2018); Esophagogastroduodenoscopy (2025); Upper gastrointestinal endoscopy (N/A, 2025); and Colonoscopy (N/A, 2025).    Discharge Recommendations  Discharge Recommendations: Patient would benefit from continued therapy after discharge  OT Equipment Recommendations  Equipment Needed: Yes  Mobility Devices: Walker, ADL Assistive Devices  Walker: Rolling  ADL Assistive Devices: Transfer Tub Bench, Grab Bars - toilet, Grab Bars - shower    Assessment  Performance deficits / Impairments: Decreased functional mobility ;Decreased ADL status;Decreased ROM;Decreased strength;Decreased high-level IADLs;Decreased balance;Decreased endurance;Decreased safe awareness;Decreased cognition  Assessment: Pt agreeable and pleasant for eval on this date. Pt supine in bed upon entry. Pt completed bed mobility mod I. Once seated EOB, pt performed ROM, MMT, and LB dressing. Pt completed sit <> stand transfers with mod A and RW placed in front of pt. Pt completed functional mobility from bed to bathroom and returned to chair with mod A - min A  and use of RW. Pt completed ADLs consisting of facial hygiene and oral care standing  sinkside. See balance section for details about B knee buckling. Pt demonstrates increased fatigue and decreased endurance throughout session. Pt noted LE feeling very weak at baseline and leading to falls. Pt was left sitting upright in the recliner with call light and phone in reach, chair alarm on, and nurse notified. Pt would benefit from continued OT services to address decreased functional mobility, ADL/IADL performance, strength and ROM, safety awareness, balance, and endurance in order to promote a return back to prior level of functioning.  Prognosis: Good  Decision Making: Medium Complexity  REQUIRES OT FOLLOW-UP: Yes  Activity Tolerance  Activity Tolerance: Patient Tolerated treatment well  Safety Devices  Type of Devices: Call light within reach;Chair alarm in place;Gait belt;Left in chair;Nurse notified  Restraints  Restraints Initially in Place: No    AM-PAC  AM-PAC Daily Activity - Inpatient   How much help is needed for putting on and taking off regular lower body clothing?: A Lot  How much help is needed for bathing (which includes washing, rinsing, drying)?: A Little  How much help is needed for toileting (which includes using toilet, bedpan, or urinal)?: A Lot  How much help is needed for putting on and taking off regular upper body clothing?: None  How much help is needed for taking care of personal grooming?: None  How much help for eating meals?: None  AM-PAC Inpatient Daily Activity Raw Score: 19  AM-PAC Inpatient ADL T-Scale Score : 40.22  ADL Inpatient CMS 0-100% Score: 42.8  ADL Inpatient CMS G-Code Modifier : CK    Restrictions/Precautions  Restrictions/Precautions  Activity Level: Up as Tolerated  Required Braces or Orthoses?: No  O2 Device: None (Room air)    Subjective  General  Patient assessed for rehabilitation services?: Yes  Family / Caregiver Present: No  Diagnosis: altered mental status  General Comment  Comments: RN ok'd eval on this date. Pt agreeable and

## 2025-03-17 NOTE — PLAN OF CARE
Problem: Chronic Conditions and Co-morbidities  Goal: Patient's chronic conditions and co-morbidity symptoms are monitored and maintained or improved  Outcome: Progressing     Problem: Discharge Planning  Goal: Discharge to home or other facility with appropriate resources  Outcome: Progressing     Problem: Safety - Adult  Goal: Free from fall injury  Outcome: Progressing     Problem: Neurosensory - Adult  Goal: Achieves stable or improved neurological status  Reactivated  Goal: Absence of seizures  Reactivated     Problem: Musculoskeletal - Adult  Goal: Return mobility to safest level of function  Reactivated     Problem: Genitourinary - Adult  Goal: Absence of urinary retention  Reactivated

## 2025-03-17 NOTE — PROGRESS NOTES
Speech Language Pathology  [facility]    Cognitive Treatment Note    Date: 3/17/2025  Patient’s Name: Lexie Mireles  MRN: [id number]  Diagnosis:   Patient Active Problem List   Diagnosis Code    Obesity E66.9    GERD (gastroesophageal reflux disease) K21.9    Hypertension, essential I10    RAD (reactive airway disease) J45.909    Diabetes mellitus type 2, insulin dependent ([facility]) E11.9, Z79.4    Bilateral knee pain M25.561, M25.562    Left shoulder pain M25.512    Patellofemoral syndrome M22.2X9    Peripheral neuropathy G62.9    Hypercholesteremia E78.00    Abnormal uterine bleeding (AUB) N93.9    Abnormal glandular Papanicolaou smear of cervix - NOS R87.619    Immunization due Z23    Status post endometrial ablation Z98.890    Right rotator cuff tendonitis M75.81    Tendinopathy of right biceps tendon M67.921    Calcific tendonitis of left shoulder M75.32    Hyperglycemia, unspecified R73.9    Syncope R55    Diabetic peripheral neuropathy ([facility]) E11.42    Post-menopausal bleeding N95.0    Vulvovaginitis N76.0    Left flank pain R10.9    Flank pain R10.9    Stage 3a chronic kidney disease ([facility]) N18.31    Hyperglycemia R73.9    Nausea and vomiting R11.2    Anemia D64.9    Abdominal pain R10.9    Type 2 diabetes mellitus with hyperglycemia, with long-term current use of insulin ([facility]) E11.65, Z79.4    Vitamin D deficiency E55.9    Cocaine abuse ([facility]) F14.10    Altered mental status R41.82    Observed seizure-like activity ([facility]) R56.9    Episode of generalized weakness R53.1       Pain: 0/10    Cognitive Treatment    Treatment time: 7178-1563      Subjective: [x] Alert [x] Cooperative     [] Confused     [] Agitated    [] Lethargic      Objective/Assessment:    Recall:   Delayed Recall: 1/3 increased to 2/3 with max verbal cues , 1/3 increased to 3/3 with verbal cues ,  3/3 independently     Functional Memory Paragraphs: 12/15 increased to 15/15 with min verbal cues and repetition    Category Inclusion:  12/20 increased to 20/20 with repetition and min verbal cues     Problem Solving/Reasoning:   Answering Questions: 8/8 independently     Stating Situational Problems: 4/6 increased to 6/6 with min verbal cues     Category Members 4 [place]: 7/9 increased to 9/9 with min verbal cues     Other: Pt. Provided with education regarding memory compensatory strategies. Pt. Encouraged to utilize strategies once discharged from the hospital. Pt. Verbalized understanding.  Tip sheep left at bedside.      Plan:  [x] Continue ST services    [] Discharge from ST:      Discharge recommendations: [x]  Further therapy recommended at discharge.The patient should be able to tolerate at least 3 hours of therapy per day over 5 days or 15 hours over 7 days. [] Further therapy recommended at discharge.   [] No therapy recommended at discharge.          Completed by: Jorge Moreno  Clinician    Cosigned By:   Bertha Baires M.A. CCC/SLP

## 2025-03-17 NOTE — FLOWSHEET NOTE
03/17/25 1004   Vitals   Orthostatic B/P and Pulse? Yes   Blood Pressure Lying 159/107   Pulse Lying 99 PER MINUTE   Blood Pressure Sitting 160/115   Pulse Sitting 100 PER MINUTE   Blood Pressure Standing 147/103   Pulse Standing 103 PER MINUTE

## 2025-03-17 NOTE — PLAN OF CARE
Problem: Chronic Conditions and Co-morbidities  Goal: Patient's chronic conditions and co-morbidity symptoms are monitored and maintained or improved  3/17/2025 1925 by Della Salmeron RN  Outcome: Progressing  3/17/2025 1830 by Any Lomeli RN  Outcome: Progressing     Problem: Discharge Planning  Goal: Discharge to home or other facility with appropriate resources  3/17/2025 1925 by Della Salmeron RN  Outcome: Progressing  3/17/2025 1830 by Any Lomeli RN  Outcome: Progressing     Problem: Safety - Adult  Goal: Free from fall injury  3/17/2025 1925 by Della Salmeron RN  Outcome: Progressing  3/17/2025 1830 by Any Lomeli RN  Outcome: Progressing     Problem: Neurosensory - Adult  Goal: Achieves stable or improved neurological status  3/17/2025 1925 by Della Salmeron RN  Outcome: Progressing  3/17/2025 1830 by Any Lomeli RN  Reactivated  Goal: Absence of seizures  3/17/2025 1925 by Della Salmeron RN  Outcome: Progressing  3/17/2025 1830 by Any Lomeli RN  Reactivated     Problem: Musculoskeletal - Adult  Goal: Return mobility to safest level of function  3/17/2025 1925 by Della Salmeron RN  Outcome: Progressing  3/17/2025 1830 by Any Lomeli RN  Reactivated     Problem: Genitourinary - Adult  Goal: Absence of urinary retention  3/17/2025 1925 by Della Salmeron RN  Outcome: Progressing  3/17/2025 1830 by Any Lomeli RN  Reactivated

## 2025-03-18 LAB
GLUCOSE BLD-MCNC: 139 MG/DL (ref 65–105)
GLUCOSE BLD-MCNC: 149 MG/DL (ref 65–105)
GLUCOSE BLD-MCNC: 179 MG/DL (ref 65–105)
GLUCOSE BLD-MCNC: 233 MG/DL (ref 65–105)

## 2025-03-18 PROCEDURE — 97130 THER IVNTJ EA ADDL 15 MIN: CPT

## 2025-03-18 PROCEDURE — 6360000002 HC RX W HCPCS

## 2025-03-18 PROCEDURE — 82947 ASSAY GLUCOSE BLOOD QUANT: CPT

## 2025-03-18 PROCEDURE — 97129 THER IVNTJ 1ST 15 MIN: CPT

## 2025-03-18 PROCEDURE — 6370000000 HC RX 637 (ALT 250 FOR IP)

## 2025-03-18 PROCEDURE — 2060000000 HC ICU INTERMEDIATE R&B

## 2025-03-18 PROCEDURE — 97116 GAIT TRAINING THERAPY: CPT

## 2025-03-18 PROCEDURE — 99232 SBSQ HOSP IP/OBS MODERATE 35: CPT | Performed by: PSYCHIATRY & NEUROLOGY

## 2025-03-18 PROCEDURE — 2500000003 HC RX 250 WO HCPCS

## 2025-03-18 PROCEDURE — 97110 THERAPEUTIC EXERCISES: CPT

## 2025-03-18 RX ORDER — OXYCODONE AND ACETAMINOPHEN 5; 325 MG/1; MG/1
1 TABLET ORAL ONCE
Refills: 0 | Status: DISCONTINUED | OUTPATIENT
Start: 2025-03-18 | End: 2025-03-18

## 2025-03-18 RX ORDER — KETOROLAC TROMETHAMINE 15 MG/ML
30 INJECTION, SOLUTION INTRAMUSCULAR; INTRAVENOUS ONCE
Status: COMPLETED | OUTPATIENT
Start: 2025-03-18 | End: 2025-03-18

## 2025-03-18 RX ORDER — OXYCODONE AND ACETAMINOPHEN 5; 325 MG/1; MG/1
1 TABLET ORAL
Refills: 0 | Status: DISCONTINUED | OUTPATIENT
Start: 2025-03-18 | End: 2025-03-21

## 2025-03-18 RX ORDER — KETOROLAC TROMETHAMINE 15 MG/ML
15 INJECTION, SOLUTION INTRAMUSCULAR; INTRAVENOUS ONCE
Status: COMPLETED | OUTPATIENT
Start: 2025-03-18 | End: 2025-03-18

## 2025-03-18 RX ADMIN — SODIUM CHLORIDE, PRESERVATIVE FREE 5 ML: 5 INJECTION INTRAVENOUS at 20:03

## 2025-03-18 RX ADMIN — SODIUM CHLORIDE, PRESERVATIVE FREE 10 ML: 5 INJECTION INTRAVENOUS at 07:53

## 2025-03-18 RX ADMIN — KETOROLAC TROMETHAMINE 30 MG: 15 INJECTION, SOLUTION INTRAMUSCULAR; INTRAVENOUS at 03:09

## 2025-03-18 RX ADMIN — INSULIN GLARGINE 10 UNITS: 100 INJECTION, SOLUTION SUBCUTANEOUS at 09:13

## 2025-03-18 RX ADMIN — GABAPENTIN 300 MG: 300 CAPSULE ORAL at 07:53

## 2025-03-18 RX ADMIN — GABAPENTIN 300 MG: 300 CAPSULE ORAL at 13:44

## 2025-03-18 RX ADMIN — AMLODIPINE BESYLATE 10 MG: 10 TABLET ORAL at 07:53

## 2025-03-18 RX ADMIN — OXYCODONE HYDROCHLORIDE AND ACETAMINOPHEN 1 TABLET: 5; 325 TABLET ORAL at 20:05

## 2025-03-18 RX ADMIN — ENOXAPARIN SODIUM 40 MG: 100 INJECTION SUBCUTANEOUS at 07:53

## 2025-03-18 RX ADMIN — ASPIRIN 81 MG: 81 TABLET, COATED ORAL at 07:53

## 2025-03-18 RX ADMIN — Medication 10 MG: at 20:04

## 2025-03-18 RX ADMIN — KETOROLAC TROMETHAMINE 15 MG: 15 INJECTION, SOLUTION INTRAMUSCULAR; INTRAVENOUS at 17:35

## 2025-03-18 RX ADMIN — INSULIN LISPRO 4 UNITS: 100 INJECTION, SOLUTION INTRAVENOUS; SUBCUTANEOUS at 22:30

## 2025-03-18 RX ADMIN — GABAPENTIN 300 MG: 300 CAPSULE ORAL at 20:04

## 2025-03-18 RX ADMIN — INSULIN GLARGINE 10 UNITS: 100 INJECTION, SOLUTION SUBCUTANEOUS at 20:03

## 2025-03-18 ASSESSMENT — PAIN SCALES - GENERAL
PAINLEVEL_OUTOF10: 7
PAINLEVEL_OUTOF10: 10
PAINLEVEL_OUTOF10: 9

## 2025-03-18 ASSESSMENT — PAIN DESCRIPTION - LOCATION
LOCATION: BACK;LEG
LOCATION: LEG
LOCATION: BACK
LOCATION: BACK;LEG

## 2025-03-18 ASSESSMENT — PAIN DESCRIPTION - ORIENTATION
ORIENTATION: RIGHT;LEFT
ORIENTATION: RIGHT;LEFT

## 2025-03-18 ASSESSMENT — PAIN DESCRIPTION - DESCRIPTORS: DESCRIPTORS: ACHING

## 2025-03-18 NOTE — CARE COORDINATION
Post Acute Facility/Agency List     Provided patient with the following list, the list includes the overall star ratings obtained from CMS per the Medicare Web site (www.Medicare.gov):     [] Long Term Acute Care Facilities  [x] Acute Inpatient Rehabilitation Facilities  [x] Skilled Nursing Facilities  [] Hospice Facilities  [] Home Care    Provided verbal instructions on how to utilize the QR Code to obtain additional detailed star ratings from www.Medicare.gov     offered to print and provide the detailed list:    []Accepted   [x]Declined

## 2025-03-18 NOTE — PROGRESS NOTES
Physical Therapy  Facility/Department: [address] STEPDOWN   Physical Therapy Daily Treatment Note    Patient Name: Lexie Mireles        MRN: [id number]    : 1970    Date of Service: 3/18/2025    Chief Complaint   Patient presents with    Dizziness     Past Medical History:  has a past medical history of Asthma, Chronic back pain, GERD (gastroesophageal reflux disease), HLD (hyperlipidemia), Hypertension, Irregular periods/menstrual cycles, Neuropathy, Obesity, Observed seizure-like activity (HCC), Type II or unspecified type diabetes mellitus without mention of complication, not stated as uncontrolled, and Wears glasses.  Past Surgical History:  has a past surgical history that includes Tubal ligation (); Endometrial ablation (2018); pr hysteroscopy endometrial ablation (N/A, 2018); Esophagogastroduodenoscopy (2025); Upper gastrointestinal endoscopy (N/A, 2025); and Colonoscopy (N/A, 2025).    Discharge Recommendations  Discharge Recommendations: Patient would benefit from continued therapy after discharge  PT Equipment Recommendations  Equipment Needed: No  Other: Pt unsafe to attempt mobility without skilled assistance.    Assessment  Body Structures, Functions, Activity Limitations Requiring Skilled Therapeutic Intervention: Decreased functional mobility ;Decreased ADL status;Decreased body mechanics;Decreased strength;Decreased high-level IADLs;Decreased balance;Decreased endurance;Decreased safe awareness;Decreased coordination;Decreased posture  Assessment: Pt ambulated ~13ft total with RW requiring min/modA. Pt unsteady during gait with BLE buckling noted, demonstrating significant weakness. Pt is currently a high fall risk and would be unsafe to return to prior living arrangements. Recommending continued intense PT to address all deficits and maximize safety and independence with mobility.  Therapy Prognosis: Good  Requires PT Follow-Up: Yes  Activity Tolerance  Activity  Tolerance: Patient limited by endurance;Patient limited by pain  Safety Devices  Type of Devices: Call light within reach;Chair alarm in place;Gait belt;Left in chair;Nurse notified;Patient at risk for falls  Restraints  Restraints Initially in Place: No    AM-PAC  AM-[facility] Basic Mobility - Inpatient   How much help is needed turning from your back to your side while in a flat bed without using bedrails?: A Little  How much help is needed moving from lying on your back to sitting on the side of a flat bed without using bedrails?: A Little  How much help is needed moving to and from a bed to a chair?: A Lot  How much help is needed standing up from a chair using your arms?: A Lot  How much help is needed walking in hospital room?: A Lot  How much help is needed climbing 3-5 steps with a railing?: Total  AM-PAC Inpatient Mobility Raw Score : 13  AM-PAC Inpatient T-Scale Score : 36.74  Mobility Inpatient CMS 0-100% Score: 64.91  Mobility Inpatient CMS G-Code Modifier : CL    Restrictions/Precautions  Restrictions/Precautions  Restrictions/Precautions: Fall Risk  Activity Level: Up as Tolerated  Required Braces or Orthoses?: No  O2 Device: None (Room air)    Subjective  General  Patient assessed for rehabilitation services?: Yes  Response To Previous Treatment: Patient with no complaints from previous session.  Family/Caregiver Present: No  Follows Commands: Within Functional Limits  General  General Comments: Pt retired to seated in chair at end of session with call light in reach, positioned for comfort.  Subjective  Subjective: Pt and RN agreeable to PT this morning. Pt supine in bed upon arrival, pleasant and cooperative throughout session.  Pain  Pre-Pain: 5  Post-Pain: 7  Pain Location: Right;Left;Hip;Knee (pain noted in BLEs and lower back)  Pain Descriptor: Aching  Pain Interventions: Repositioning;Rest    Objective  Orientation  Overall Orientation Status: Within Functional Limits  Cognition  Overall Cognitive

## 2025-03-18 NOTE — CARE COORDINATION
Case Management   Daily Progress Note       Patient Name: Lexie Mireles                   YOB: 1970  Diagnosis: Altered mental status [R41.82]  Altered mental status, unspecified altered mental status type [R41.82]                       GMLOS: 2.6 days  Length of Stay: 2  days    Anticipated Discharge Date: To be determined    Readmission Risk (Low < 19, Mod (19-27), High > 27): Readmission Risk Score: 21.9      Patient Transitional Goal: Rehab    Current Transitional Plan    [] Home Independently    [] Home with HC    [x] Skilled Nursing Facility    [x] Acute Rehabilitation    [] Long Term Acute Care (LTAC)    [] Other:     Plan for the Stay (Medical Management) :  Echo needed        Workflow Continuation (Additional Notes) :  5111 talked with patient. Wanting rehab-ARU and SNF lists given. 1230 mother Saul chose [facility]. No to SNF-she said she would take care of her if ARU denied. 1248 faxed referral to [facility].      Annalisa Martin RN  March 18, 2025

## 2025-03-18 NOTE — PLAN OF CARE
Problem: Chronic Conditions and Co-morbidities  Goal: Patient's chronic conditions and co-morbidity symptoms are monitored and maintained or improved  Outcome: Progressing     Problem: Discharge Planning  Goal: Discharge to home or other facility with appropriate resources  Outcome: Progressing     Problem: Safety - Adult  Goal: Free from fall injury  Outcome: Progressing     Problem: Neurosensory - Adult  Goal: Achieves stable or improved neurological status  Outcome: Progressing  Goal: Absence of seizures  Outcome: Progressing     Problem: Musculoskeletal - Adult  Goal: Return mobility to safest level of function  Outcome: Progressing     Problem: Genitourinary - Adult  Goal: Absence of urinary retention  Outcome: Progressing     Problem: Pain  Goal: Verbalizes/displays adequate comfort level or baseline comfort level  Outcome: Progressing

## 2025-03-18 NOTE — PROGRESS NOTES
[facility] Neurology   IN-PATIENT SERVICE   [facility]    Progress Note             Date:   3/18/2025  Patient name:  Lexie Mireles  Date of admission:  3/15/2025  9:59 PM  MRN:   [id number]  Account:  [id number]  YOB: 1970  PCP:    Viktor Fox APRN - CNP  Room:   [address] [place] [address]  Code Status:    Full Code    Chief Complaint:     Chief Complaint   Patient presents with    Dizziness       Interval hx:     Patient was seen and examined at bedside.   No acute events.   MRI shows no acute finding.   Mildy hypertensive today (3/18)  On Norvasc       Brief History of Present Illness:     The patient is a 54 y.o.  Non- / non  female who presents with Dizziness   and she is admitted to the hospital for the management of  AMS      54 y.o. -American female with past medical history of HTN, HLD, poorly controlled diabetes, diabetic neuropathy, CKD stage IIIa, cocaine abuse, who presented to ED with altered mental status     Patient was at dinner tonight around 9:30 PM when she was feeling generalized weakness and dizzy, was trying to ambulate to the bathroom with the assistance of her , patient progressively got worse and felt on her knees as her  was supporting her however she was confused at that time, event lasted for about 20 minutes, she was brought in to ED and was noted to continuously confused, did not know that she was going to the hospital.  No clear seizure-like activity however  noted that she was shaking.  No urinary or fecal incontinence.  Has never experienced this before.   reported that she was feeling weak and dizzy a week ago.  Patient does not participate with exam, is lethargic, needs repeated to prompting to participate in exam.  Later on patient was in severe distress due to right lower extremity pain.     Initial blood pressure of 120s.  Blood sugars 174.  Initial NIH of 14. LKW: 9:30 PM on 3/15/2025.  CT head  7/24/24   Viktor Fox APRN - CNP   Insulin Pen Needle (B-D ULTRAFINE III SHORT PEN) 31G X 8 MM MISC Inject 1 each into the skin daily May substitute with any brand 7/24/24   Viktor Fox APRN - CNP   Lancets MISC 1 each by Does not apply route 4 times daily 7/24/24   Viktor Fox APRN - CNP   Handicap Placard MISC by Does not apply route 7/24/24   Viktor Fox APRN - CNP   clotrimazole-betamethasone (LOTRISONE) 1-0.05 % cream Apply topically 2 times daily. 9/26/23   Samy Williamson MD   Insulin Pen Needle ([employer] PEN NEEDLES 31G) 31G X 8 MM MISC 1 each by Does not apply route daily 3/29/23   Viktor Fox APRN - CNP   Handicap Placard MISC by Does not apply route Exp March 2028 3/29/23   Viktor Fox APRN - CNP   Blood Glucose Monitoring Suppl (TRUE METRIX METER) w/Device KIT Use as directed to check blood glucose. 6/27/22   Viktor Fox APRN - CNP        Allergies:     Morphine    Social History:     Tobacco:    reports that she has never smoked. She has never used smokeless tobacco.  Alcohol:      reports that she does not currently use alcohol.  Drug Use:  reports no history of drug use.    Family History:     Family History   Problem Relation Age of Onset    Diabetes Mother     High Cholesterol Mother     High Blood Pressure Mother     Asthma Mother     No Known Problems Father     Asthma Sister     Asthma Son     Diabetes Maternal Grandmother     No Known Problems Paternal Grandmother     No Known Problems Paternal Grandfather     Breast Cancer Maternal Aunt     Diabetes Maternal Aunt     Diabetes Maternal Uncle        Review of Systems:     Review of Systems   Constitutional:  Negative for chills and fever.   HENT:  Negative for trouble swallowing and voice change.    Eyes:  Negative for photophobia and visual disturbance.   Respiratory:  Negative for chest tightness, shortness of breath and wheezing.    Cardiovascular:  Negative for

## 2025-03-18 NOTE — PROGRESS NOTES
Speech Language Pathology  [facility]    Cognitive Treatment Note    Date: 3/18/2025  Patient’s Name: Lexie Mireles  MRN: [id number]  Diagnosis:   Patient Active Problem List   Diagnosis Code    Obesity E66.9    GERD (gastroesophageal reflux disease) K21.9    Hypertension, essential I10    RAD (reactive airway disease) J45.909    Diabetes mellitus type 2, insulin dependent ([facility]) E11.9, Z79.4    Bilateral knee pain M25.561, M25.562    Left shoulder pain M25.512    Patellofemoral syndrome M22.2X9    Peripheral neuropathy G62.9    Hypercholesteremia E78.00    Abnormal uterine bleeding (AUB) N93.9    Abnormal glandular Papanicolaou smear of cervix - NOS R87.619    Immunization due Z23    Status post endometrial ablation Z98.890    Right rotator cuff tendonitis M75.81    Tendinopathy of right biceps tendon M67.921    Calcific tendonitis of left shoulder M75.32    Hyperglycemia, unspecified R73.9    Syncope R55    Diabetic peripheral neuropathy ([facility]) E11.42    Post-menopausal bleeding N95.0    Vulvovaginitis N76.0    Left flank pain R10.9    Flank pain R10.9    Stage 3a chronic kidney disease ([facility]) N18.31    Hyperglycemia R73.9    Nausea and vomiting R11.2    Anemia D64.9    Abdominal pain R10.9    Type 2 diabetes mellitus with hyperglycemia, with long-term current use of insulin ([facility]) E11.65, Z79.4    Vitamin D deficiency E55.9    Cocaine abuse ([facility]) F14.10    Altered mental status R41.82    Observed seizure-like activity ([facility]) R56.9    Episode of generalized weakness R53.1       Pain: 0/10    Cognitive Treatment    Treatment time: 2916-3777      Subjective: [x] Alert [x] Cooperative     [] Confused     [] Agitated    [] Lethargic      Objective/Assessment:    Recall:   Delayed Recall: 3/3 independently- 3 words    Functional Memory F[place]io: 4/5 with max verbal cues     Category Exclusion: 14/18 increased to 18/18 with min phonemic cues     Problem Solving/Reasoning:     Category 5 Members [place]: 23/25

## 2025-03-19 ENCOUNTER — APPOINTMENT (OUTPATIENT)
Dept: MRI IMAGING | Age: 55
DRG: 641 | End: 2025-03-19
Payer: MEDICARE

## 2025-03-19 ENCOUNTER — APPOINTMENT (OUTPATIENT)
Age: 55
DRG: 641 | End: 2025-03-19
Payer: MEDICARE

## 2025-03-19 LAB
ECHO AO ASC DIAM: 2.7 CM
ECHO AO ASCENDING AORTA INDEX: 1.79 CM/M2
ECHO AO ROOT DIAM: 2.6 CM
ECHO AO ROOT INDEX: 1.72 CM/M2
ECHO AR MAX VEL PISA: 4.3 M/S
ECHO AV AREA PEAK VELOCITY: 2 CM2
ECHO AV AREA VTI: 1.9 CM2
ECHO AV AREA/BSA PEAK VELOCITY: 1.3 CM2/M2
ECHO AV AREA/BSA VTI: 1.3 CM2/M2
ECHO AV MEAN GRADIENT: 2 MMHG
ECHO AV MEAN VELOCITY: 0.7 M/S
ECHO AV PEAK GRADIENT: 5 MMHG
ECHO AV PEAK VELOCITY: 1.1 M/S
ECHO AV REGURGITANT PHT: 302 MS
ECHO AV VELOCITY RATIO: 0.73
ECHO AV VTI: 17.7 CM
ECHO BSA: 1.54 M2
ECHO EST RA PRESSURE: 3 MMHG
ECHO IVC PROX: 1.5 CM
ECHO LA AREA 4C: 8.9 CM2
ECHO LA DIAMETER INDEX: 1.85 CM/M2
ECHO LA DIAMETER: 2.8 CM
ECHO LA MAJOR AXIS: 3.4 CM
ECHO LA TO AORTIC ROOT RATIO: 1.08
ECHO LA VOL MOD A4C: 18 ML (ref 22–52)
ECHO LA VOLUME INDEX MOD A4C: 12 ML/M2 (ref 16–34)
ECHO LV E' LATERAL VELOCITY: 6.64 CM/S
ECHO LV E' SEPTAL VELOCITY: 3.15 CM/S
ECHO LV EDV A2C: 32 ML
ECHO LV EDV A4C: 36 ML
ECHO LV EDV INDEX A4C: 24 ML/M2
ECHO LV EDV NDEX A2C: 21 ML/M2
ECHO LV EF PHYSICIAN: 58 %
ECHO LV EJECTION FRACTION A2C: 49 %
ECHO LV EJECTION FRACTION A4C: 64 %
ECHO LV EJECTION FRACTION BIPLANE: 58 % (ref 55–100)
ECHO LV ESV A2C: 16 ML
ECHO LV ESV A4C: 13 ML
ECHO LV ESV INDEX A2C: 11 ML/M2
ECHO LV ESV INDEX A4C: 9 ML/M2
ECHO LV FRACTIONAL SHORTENING: 32 % (ref 28–44)
ECHO LV GLOBAL LONGITUDINAL STRAIN (GLS): -15.2 %
ECHO LV INTERNAL DIMENSION DIASTOLE INDEX: 2.05 CM/M2
ECHO LV INTERNAL DIMENSION DIASTOLIC: 3.1 CM (ref 3.9–5.3)
ECHO LV INTERNAL DIMENSION SYSTOLIC INDEX: 1.39 CM/M2
ECHO LV INTERNAL DIMENSION SYSTOLIC: 2.1 CM
ECHO LV IVSD: 1.4 CM (ref 0.6–0.9)
ECHO LV MASS 2D: 146.7 G (ref 67–162)
ECHO LV MASS INDEX 2D: 97.1 G/M2 (ref 43–95)
ECHO LV POSTERIOR WALL DIASTOLIC: 1.4 CM (ref 0.6–0.9)
ECHO LV RELATIVE WALL THICKNESS RATIO: 0.9
ECHO LVOT AREA: 2.8 CM2
ECHO LVOT AV VTI INDEX: 0.68
ECHO LVOT DIAM: 1.9 CM
ECHO LVOT MEAN GRADIENT: 1 MMHG
ECHO LVOT PEAK GRADIENT: 2 MMHG
ECHO LVOT PEAK VELOCITY: 0.8 M/S
ECHO LVOT STROKE VOLUME INDEX: 22.7 ML/M2
ECHO LVOT SV: 34.3 ML
ECHO LVOT VTI: 12.1 CM
ECHO MV A VELOCITY: 0.4 M/S
ECHO MV AREA VTI: 1.5 CM2
ECHO MV E DECELERATION TIME (DT): 79 MS
ECHO MV E VELOCITY: 1.36 M/S
ECHO MV E/A RATIO: 3.4
ECHO MV E/E' LATERAL: 20.48
ECHO MV E/E' RATIO (AVERAGED): 31.83
ECHO MV E/E' SEPTAL: 43.17
ECHO MV LVOT VTI INDEX: 1.9
ECHO MV MAX VELOCITY: 1.5 M/S
ECHO MV MEAN GRADIENT: 3 MMHG
ECHO MV MEAN VELOCITY: 0.8 M/S
ECHO MV PEAK GRADIENT: 9 MMHG
ECHO MV VTI: 23 CM
ECHO RIGHT VENTRICULAR SYSTOLIC PRESSURE (RVSP): 28 MMHG
ECHO RV FREE WALL PEAK S': 12.8 CM/S
ECHO RV TAPSE: 1.9 CM (ref 1.7–?)
ECHO TV REGURGITANT MAX VELOCITY: 2.48 M/S
ECHO TV REGURGITANT PEAK GRADIENT: 25 MMHG
GLUCOSE BLD-MCNC: 159 MG/DL (ref 65–105)
GLUCOSE BLD-MCNC: 159 MG/DL (ref 65–105)
GLUCOSE BLD-MCNC: 200 MG/DL (ref 65–105)
GLUCOSE BLD-MCNC: 214 MG/DL (ref 65–105)

## 2025-03-19 PROCEDURE — 93356 MYOCRD STRAIN IMG SPCKL TRCK: CPT | Performed by: INTERNAL MEDICINE

## 2025-03-19 PROCEDURE — 93306 TTE W/DOPPLER COMPLETE: CPT

## 2025-03-19 PROCEDURE — 97130 THER IVNTJ EA ADDL 15 MIN: CPT

## 2025-03-19 PROCEDURE — 2060000000 HC ICU INTERMEDIATE R&B

## 2025-03-19 PROCEDURE — 97535 SELF CARE MNGMENT TRAINING: CPT

## 2025-03-19 PROCEDURE — 6370000000 HC RX 637 (ALT 250 FOR IP)

## 2025-03-19 PROCEDURE — 99232 SBSQ HOSP IP/OBS MODERATE 35: CPT | Performed by: PSYCHIATRY & NEUROLOGY

## 2025-03-19 PROCEDURE — 93306 TTE W/DOPPLER COMPLETE: CPT | Performed by: INTERNAL MEDICINE

## 2025-03-19 PROCEDURE — 2500000003 HC RX 250 WO HCPCS

## 2025-03-19 PROCEDURE — 6360000002 HC RX W HCPCS

## 2025-03-19 PROCEDURE — 97129 THER IVNTJ 1ST 15 MIN: CPT

## 2025-03-19 PROCEDURE — 97530 THERAPEUTIC ACTIVITIES: CPT

## 2025-03-19 PROCEDURE — 97110 THERAPEUTIC EXERCISES: CPT

## 2025-03-19 PROCEDURE — 82947 ASSAY GLUCOSE BLOOD QUANT: CPT

## 2025-03-19 PROCEDURE — 97116 GAIT TRAINING THERAPY: CPT

## 2025-03-19 PROCEDURE — 72141 MRI NECK SPINE W/O DYE: CPT

## 2025-03-19 RX ADMIN — ENOXAPARIN SODIUM 40 MG: 100 INJECTION SUBCUTANEOUS at 09:03

## 2025-03-19 RX ADMIN — INSULIN GLARGINE 10 UNITS: 100 INJECTION, SOLUTION SUBCUTANEOUS at 20:40

## 2025-03-19 RX ADMIN — GABAPENTIN 300 MG: 300 CAPSULE ORAL at 15:00

## 2025-03-19 RX ADMIN — GABAPENTIN 300 MG: 300 CAPSULE ORAL at 09:03

## 2025-03-19 RX ADMIN — AMLODIPINE BESYLATE 10 MG: 10 TABLET ORAL at 09:03

## 2025-03-19 RX ADMIN — SODIUM CHLORIDE, PRESERVATIVE FREE 10 ML: 5 INJECTION INTRAVENOUS at 09:04

## 2025-03-19 RX ADMIN — ASPIRIN 81 MG: 81 TABLET, COATED ORAL at 09:03

## 2025-03-19 RX ADMIN — INSULIN GLARGINE 10 UNITS: 100 INJECTION, SOLUTION SUBCUTANEOUS at 09:04

## 2025-03-19 RX ADMIN — SODIUM CHLORIDE, PRESERVATIVE FREE 10 ML: 5 INJECTION INTRAVENOUS at 21:37

## 2025-03-19 RX ADMIN — INSULIN LISPRO 2 UNITS: 100 INJECTION, SOLUTION INTRAVENOUS; SUBCUTANEOUS at 17:36

## 2025-03-19 RX ADMIN — INSULIN LISPRO 2 UNITS: 100 INJECTION, SOLUTION INTRAVENOUS; SUBCUTANEOUS at 12:59

## 2025-03-19 RX ADMIN — GABAPENTIN 300 MG: 300 CAPSULE ORAL at 20:40

## 2025-03-19 RX ADMIN — OXYCODONE HYDROCHLORIDE AND ACETAMINOPHEN 1 TABLET: 5; 325 TABLET ORAL at 22:07

## 2025-03-19 ASSESSMENT — PAIN DESCRIPTION - ORIENTATION: ORIENTATION: RIGHT;LEFT

## 2025-03-19 ASSESSMENT — PAIN SCALES - GENERAL
PAINLEVEL_OUTOF10: 5
PAINLEVEL_OUTOF10: 10

## 2025-03-19 ASSESSMENT — PAIN DESCRIPTION - LOCATION: LOCATION: LEG

## 2025-03-19 NOTE — CARE COORDINATION
Case Management   Daily Progress Note       Patient Name: Lexie Mireles                   YOB: 1970  Diagnosis: Altered mental status [R41.82]  Altered mental status, unspecified altered mental status type [R41.82]                       GMLOS: 2.6 days  Length of Stay: 3  days    Anticipated Discharge Date: To be determined    Readmission Risk (Low < 19, Mod (19-27), High > 27): Readmission Risk Score: 20.6      Patient Transitional Goal: Rehab    Current Transitional Plan    [] Home Independently    [] Home with HC    [] Skilled Nursing Facility    [x] Acute Rehabilitation    [] Long Term Acute Care (LTAC)    [] Other:     Plan for the Stay (Medical Management) :          Workflow Continuation (Additional Notes) :  1035 called Amira at [facility] to ask about referral. States she didn't receive one. Referral has failed. Refaxed referral.    1555 called [facility] and talked with Amira-not accepted-no diagnosis. 1600 informed patient of denial to [facility]. She would like to try [facility]. If denied then home. No to SNF. Faxed referral to [facility]    1705 call from Cherry at [facility]-can accept.          Annalisa Martin, BRENDON  March 19, 2025

## 2025-03-19 NOTE — PROGRESS NOTES
[facility] Neurology   IN-PATIENT SERVICE   [facility]    Progress Note             Date:   3/19/2025  Patient name:  Lexie Mireles  Date of admission:  3/15/2025  9:59 PM  MRN:   [id number]  Account:  [id number]  YOB: 1970  PCP:    Viktor Fox APRN - CNP  Room:   [address] [place] [address]  Code Status:    Full Code    Chief Complaint:     Chief Complaint   Patient presents with    Dizziness       Interval hx:     The patient was seen and examined at bedside. Is vitally stable, alert and oriented x 4. No acute events overnight.  The patient stated that she is no more feeling dizzy, denies any acute symptoms  Her orthostats are negative, MRI normal  Mildly hypertensive blood pressure 151/104 on Norvasc  She did work with therapy and ambulated 13 feet total with requiring min/moderate assistance.  As per therapy patient is unsteady during gait with buckling noted and significant weakness high risk.  Will follow up on MRI cervical spine to rule out myelopathy      Brief History of Present Illness:     The patient is a 54 y.o.  Non- / non  female who presents with Dizziness   and she is admitted to the hospital for the management of  AMS     54 y.o. -American female with past medical history of HTN, HLD, poorly controlled diabetes, diabetic neuropathy, CKD stage IIIa, cocaine abuse, who presented to ED with altered mental status     Patient was at dinner tonight around 9:30 PM when she was feeling generalized weakness and dizzy, was trying to ambulate to the bathroom with the assistance of her , patient progressively got worse and felt on her knees as her  was supporting her however she was confused at that time, event lasted for about 20 minutes, she was brought in to ED and was noted to continuously confused, did not know that she was going to the hospital.  No clear seizure-like activity however  noted that she was shaking.  No urinary or  daily  Currently on Lantus 10 twice daily along with medium dose sliding scale  POCT glucose  Hypoglycemia protocol    CKD stage III:  Creatinine on the baseline initially 1.5 currently 1.3  Daily BMP  Avoid nephrotoxic drugs      Diabetic nephropathy  Resume home dose gabapentin 300 mg 3 times daily     History of gastroparesis:  Resume home dose of Reglan  Avoid NSAIDs, recommend small frequent meals     Reactive airway disease  Continue albuterol with respiratory treatments     History Vitamin D deficiency  Continue vitamin D supplements     DVT prophylaxis on subcu Lovenox  Diet: Advance as tolerated  PT OT eval and treat           Follow-up further recommendations after discussing case with the attending.  The plan was discussed with the patient, patient's family and the medical staff.   Consultations:   IP CONSULT TO CASE MANAGEMENT    Patient is admitted as inpatient status because of co-morbidities listed above, severity of signs and symptoms as outlined, requirement for current medical therapies and most importantly because of direct risk to patient if care not provided in a hospital setting.    Taylor Pompa MD  Internal Medicine Resident, PGY-2  [facility], [place], [place]  3/19/2025, 8:05 AM      Copy sent to Viktor Ca, APRN - CNP

## 2025-03-19 NOTE — PROGRESS NOTES
Physician Progress Note      PATIENT:               TEJ EUGENE  CSN #:                  [id number]  :                       1970  ADMIT DATE:       3/15/2025 9:59 PM  DISCH DATE:  RESPONDING  PROVIDER #:        MARÍA MOULTON          QUERY TEXT:    Patient admitted with AMS. Noted to have concern for syncope, with BUN 35. If   possible, please document after study the etiology of the syncope:    The medical record reflects the following:  Risk Factors: Unknown at this time    Clinical Indicators: ED physician notes: Pt stated she was dizzy the    was helping her walk into the other room and then she had an episode of   syncope.    BUN 32 and 35. MRI, CTH, and CTA H/N negative for acute process. UDS, UA,   serum NH3 and TSH unremarkable.    3/18 Neuro progress note: Concern for syncope: Dizziness followed by a fall.   Orthostats negative.  Treatment: Encourage oral intake    Thank-you,  Carlita Warren RN, CDS  Options provided:  -- Syncope due to dehydration  -- Syncope due to other, please specify  -- Other - I will add my own diagnosis  -- Disagree - Not applicable / Not valid  -- Disagree - Clinically unable to determine / Unknown  -- Refer to Clinical Documentation Reviewer    PROVIDER RESPONSE TEXT:    The syncope is due to dehydration    Query created by: Carlita Warren on 3/19/2025 11:58 AM      Electronically signed by:  MARÍA MOULTON 3/19/2025 12:01 PM

## 2025-03-19 NOTE — DISCHARGE INSTR - COC
Continuity of Care Form    Patient Name: Lexie Mireles   :  1970  MRN:  [id number]    Admit date:  3/15/2025  Discharge date:  3/23/2025    Code Status Order: Full Code   Advance Directives:     Admitting Physician:  Sara Palacios MD  PCP: Viktor Fox, APRN - CNP    Discharging Nurse: Shabana King BSRN  Discharging Hospital Unit/Room#: 0548/0548-01  Discharging Unit Phone Number: [phone]    Emergency Contact:   Extended Emergency Contact Information  Primary Emergency Contact: Pramod Mireles  Home Phone: [phone]  Mobile Phone: [phone]  Relation: Spouse  Secondary Emergency [place] [address]  Home Phone: [phone]  Relation: Parent    Past Surgical History:  Past Surgical History:   Procedure Laterality Date    COLONOSCOPY N/A 2025    COLONOSCOPY DIAGNOSTIC performed by Beth Olea MD at [facility] OR    ENDOMETRIAL ABLATION  2018    ESOPHAGOGASTRODUODENOSCOPY  2025    Biopsy    NV HYSTEROSCOPY ENDOMETRIAL ABLATION N/A 2018    ENDOMETRIAL ABLATION WITH SHAHEED performed by Stanley Persaud MD at [facility] OR    TUBAL LIGATION      UPPER GASTROINTESTINAL ENDOSCOPY N/A 2025    ESOPHAGOGASTRODUODENOSCOPY BIOPSY performed by Beth Olea MD at [facility] OR       Immunization History:   Immunization History   Administered Date(s) Administered    COVID-19, MODERNA BLUE border, Primary or Immunocompromised, (age 12y+), IM, 100 mcg/0.5mL 2021    COVID-19, [place] Vaccine, Vaccine Unspecified 2021    Influenza 10/01/2013    Influenza Virus Vaccine 10/01/2013, 2015    Influenza, AFLURIA (age 3 y+), FLUZONE, (age 6 mo+), Quadv MDV, 0.5mL 10/07/2016, 2017    Influenza, FLUCELVAX, (age 6 mo+) IM, Trivalent PF, 0.5mL 2024    Influenza, FLUCELVAX, (age 6 mo+), MDCK, Quadv PF, 0.5mL 2022, 10/18/2023    Influenza, FLUZONE High Dose, (age 65 y+), IM, Trivalent PF, 0.5mL 2011    PPD Test 10/14/2014, 10/25/2016            Discharging to Facility/ Agency   Name:   Address:  Phone:  Fax:    Dialysis Facility (if applicable)   Name:  Address:  Dialysis Schedule:  Phone:  Fax:    / signature: {Esignature:[id number]}    PHYSICIAN SECTION    Prognosis: Fair    Condition at Discharge: Stable    Rehab Potential (if transferring to Rehab): Fair    Recommended Labs or Other Treatments After Discharge:     Physician Certification: I certify the above information and transfer of Lexie Mireles  is necessary for the continuing treatment of the diagnosis listed and that she requires Skilled Nursing Facility for greater 30 days.     Update Admission H&P: No change in H&P    PHYSICIAN SIGNATURE:  Electronically signed by Bk Pro MD on 3/19/25 at 2:00 PM EDT

## 2025-03-19 NOTE — PROGRESS NOTES
Physical Therapy  Facility/Department: [address] STEPDOWN   Physical Therapy Daily Treatment Note    Patient Name: Lexie Mireles        MRN: [id number]    : 1970    Date of Service: 3/19/2025    Chief Complaint   Patient presents with    Dizziness     Past Medical History:  has a past medical history of Asthma, Chronic back pain, GERD (gastroesophageal reflux disease), HLD (hyperlipidemia), Hypertension, Irregular periods/menstrual cycles, Neuropathy, Obesity, Observed seizure-like activity (HCC), Type II or unspecified type diabetes mellitus without mention of complication, not stated as uncontrolled, and Wears glasses.  Past Surgical History:  has a past surgical history that includes Tubal ligation (); Endometrial ablation (2018); pr hysteroscopy endometrial ablation (N/A, 2018); Esophagogastroduodenoscopy (2025); Upper gastrointestinal endoscopy (N/A, 2025); and Colonoscopy (N/A, 2025).    Discharge Recommendations  Discharge Recommendations: Patient would benefit from continued therapy after discharge Further therapy recommended at discharge.The patient should be able to tolerate at least 3 hours of therapy per day over 5 days or 15 hours over 7 days.   This patient may benefit from a Physical Medicine and Rehab consult.    PT Equipment Recommendations  Equipment Needed: No  Other: Pt unsafe to attempt mobility without skilled assistance.  Pt owns a rollator.    Assessment  Body Structures, Functions, Activity Limitations Requiring Skilled Therapeutic Intervention: Decreased functional mobility ;Decreased ADL status;Decreased body mechanics;Decreased strength;Decreased high-level IADLs;Decreased balance;Decreased endurance;Decreased safe awareness;Decreased coordination;Decreased posture  Assessment: Pt ambulated in 12 ft - 15 ft trials x2 RW Florecita. Pt unsteady during gait with BLE buckling and tremoring. Pt is currently a high fall risk and would be unsafe to return to prior

## 2025-03-19 NOTE — PROGRESS NOTES
Occupational Therapy  Occupational Therapy Daily Treatment Note  Facility/Department: [address] STEPDOWN   Patient Name: Lexie Mireles        MRN: [id number]    : 1970    Date of Service: 3/19/2025    Chief Complaint   Patient presents with    Dizziness     Past Medical History:  has a past medical history of Asthma, Chronic back pain, GERD (gastroesophageal reflux disease), HLD (hyperlipidemia), Hypertension, Irregular periods/menstrual cycles, Neuropathy, Obesity, Observed seizure-like activity (HCC), Type II or unspecified type diabetes mellitus without mention of complication, not stated as uncontrolled, and Wears glasses.  Past Surgical History:  has a past surgical history that includes Tubal ligation (); Endometrial ablation (2018); pr hysteroscopy endometrial ablation (N/A, 2018); Esophagogastroduodenoscopy (2025); Upper gastrointestinal endoscopy (N/A, 2025); and Colonoscopy (N/A, 2025).    Discharge Recommendations  Discharge Recommendations: Patient would benefit from continued therapy after discharge       Assessment  Performance deficits / Impairments: Decreased functional mobility ;Decreased endurance;Decreased balance;Decreased coordination;Decreased ADL status;Decreased safe awareness;Decreased strength  Assessment: Pt would benefit from continued OT services to improve safety with ADLs and functional mobility tasks.  Prognosis: Good  Activity Tolerance  Activity Tolerance: Patient Tolerated treatment well;Patient limited by fatigue  Safety Devices  Type of Devices: Call light within reach;Patient at risk for falls;Nurse notified;Left in bed;Bed alarm in place  Restraints  Restraints Initially in Place: No    AM-PAC  AM-PAC Daily Activity - Inpatient   How much help is needed for putting on and taking off regular lower body clothing?: A Little  How much help is needed for bathing (which includes washing, rinsing, drying)?: A Little  How much help is needed for  Learning: None  Education Outcome: Verbalized understanding;Demonstrated understanding;Continued education needed    Goals  Short Term Goals  Time Frame for Short Term Goals: Pt will, by discharge.  Short Term Goal 1: demo functional mobility/transfers mod I with LRAD PRN to increase ADL participation  Short Term Goal 2: demo LB ADLs and toileting mod I with AE PRN  Short Term Goal 3: demo increased safety awareness with 0 VCs to promote a safe return to prior level of functioning  Short Term Goal 4: demo increased dynamic standing tolerance to ~ 8 min mod I while engaging in a therapeutic activity to increase ADL performance  Short Term Goal 5: demo LB ADLs mod I with AE PRN  Short Term Goal 6: demo use of 2 energy conservation techniques while engaging in a therapeutic activity to promote a safe return back to prior level of functioning    Plan  Occupational Therapy Plan  Times Per Week: 4-5x/week  Times Per Day: Once a day  Current Treatment Recommendations: Strengthening, Balance training, Functional mobility training, Endurance training, Cognitive reorientation, Safety education & training, Patient/Caregiver education & training, Equipment evaluation, education, & procurement, Self-Care / ADL, Home management training    Minutes  OT Individual Minutes  Time In: 1508  Time Out: 1552  Minutes: 44  Time Code Minutes   Timed Code Treatment Minutes: 44 Minutes    Electronically signed by Denzel MOYER on 3/19/25 at 4:24 PM EDT  Sybil KHAN on 3/19/25 at 4:24 PM EDT

## 2025-03-19 NOTE — PROGRESS NOTES
Speech Language Pathology  [facility]    Cognitive Treatment Note    Date: 3/19/2025  Patient’s Name: Lexie Mireles  MRN: [id number]  Diagnosis:   Patient Active Problem List   Diagnosis Code    Obesity E66.9    GERD (gastroesophageal reflux disease) K21.9    Hypertension, essential I10    RAD (reactive airway disease) J45.909    Diabetes mellitus type 2, insulin dependent ([facility]) E11.9, Z79.4    Bilateral knee pain M25.561, M25.562    Left shoulder pain M25.512    Patellofemoral syndrome M22.2X9    Peripheral neuropathy G62.9    Hypercholesteremia E78.00    Abnormal uterine bleeding (AUB) N93.9    Abnormal glandular Papanicolaou smear of cervix - NOS R87.619    Immunization due Z23    Status post endometrial ablation Z98.890    Right rotator cuff tendonitis M75.81    Tendinopathy of right biceps tendon M67.921    Calcific tendonitis of left shoulder M75.32    Hyperglycemia, unspecified R73.9    Syncope R55    Diabetic peripheral neuropathy ([facility]) E11.42    Post-menopausal bleeding N95.0    Vulvovaginitis N76.0    Left flank pain R10.9    Flank pain R10.9    Stage 3a chronic kidney disease ([facility]) N18.31    Hyperglycemia R73.9    Nausea and vomiting R11.2    Anemia D64.9    Abdominal pain R10.9    Type 2 diabetes mellitus with hyperglycemia, with long-term current use of insulin ([facility]) E11.65, Z79.4    Vitamin D deficiency E55.9    Cocaine abuse ([facility]) F14.10    Altered mental status R41.82    Observed seizure-like activity ([facility]) R56.9    Episode of generalized weakness R53.1       Pain: 0/10    Cognitive Treatment    Treatment time: 3330-6278      Subjective: [x] Alert [x] Cooperative     [] Confused     [] Agitated    [] Lethargic      Objective/Assessment:    Recall:   Picture Retention Exercise 18: 8/10 to 10/10 with mod verbal cues      Problem Solving/Reasoning:   Emotions- Describing Situations: 8/12 to 12/12 with min verbal cues     Consequences: 14/18 to 18/18 with min to mod verbal cues      Missing Equipment: 5/7 to 7/7 with max verbal cues     Plan:  [x] Continue ST services    [] Discharge from ST:      Discharge recommendations: [x]  Further therapy recommended at discharge.The patient should be able to tolerate at least 3 hours of therapy per day over 5 days or 15 hours over 7 days. [] Further therapy recommended at discharge.   [] No therapy recommended at discharge.          Completed by: Jorge Moreno  Clinician    Cosigned By: Juanito Glover M.S., CCC-SLP

## 2025-03-20 LAB
GLUCOSE BLD-MCNC: 137 MG/DL (ref 65–105)
GLUCOSE BLD-MCNC: 161 MG/DL (ref 65–105)
GLUCOSE BLD-MCNC: 219 MG/DL (ref 65–105)

## 2025-03-20 PROCEDURE — 97530 THERAPEUTIC ACTIVITIES: CPT

## 2025-03-20 PROCEDURE — 2500000003 HC RX 250 WO HCPCS

## 2025-03-20 PROCEDURE — 2060000000 HC ICU INTERMEDIATE R&B

## 2025-03-20 PROCEDURE — 6370000000 HC RX 637 (ALT 250 FOR IP)

## 2025-03-20 PROCEDURE — 6360000002 HC RX W HCPCS

## 2025-03-20 PROCEDURE — 99232 SBSQ HOSP IP/OBS MODERATE 35: CPT | Performed by: PSYCHIATRY & NEUROLOGY

## 2025-03-20 PROCEDURE — 82947 ASSAY GLUCOSE BLOOD QUANT: CPT

## 2025-03-20 PROCEDURE — 97110 THERAPEUTIC EXERCISES: CPT

## 2025-03-20 RX ORDER — KETOROLAC TROMETHAMINE 15 MG/ML
30 INJECTION, SOLUTION INTRAMUSCULAR; INTRAVENOUS ONCE
Status: COMPLETED | OUTPATIENT
Start: 2025-03-20 | End: 2025-03-20

## 2025-03-20 RX ADMIN — AMLODIPINE BESYLATE 10 MG: 10 TABLET ORAL at 09:05

## 2025-03-20 RX ADMIN — OXYCODONE HYDROCHLORIDE AND ACETAMINOPHEN 1 TABLET: 5; 325 TABLET ORAL at 21:24

## 2025-03-20 RX ADMIN — GABAPENTIN 300 MG: 300 CAPSULE ORAL at 15:05

## 2025-03-20 RX ADMIN — ASPIRIN 81 MG: 81 TABLET, COATED ORAL at 09:05

## 2025-03-20 RX ADMIN — SODIUM CHLORIDE, PRESERVATIVE FREE 10 ML: 5 INJECTION INTRAVENOUS at 09:06

## 2025-03-20 RX ADMIN — INSULIN GLARGINE 10 UNITS: 100 INJECTION, SOLUTION SUBCUTANEOUS at 21:24

## 2025-03-20 RX ADMIN — KETOROLAC TROMETHAMINE 30 MG: 15 INJECTION, SOLUTION INTRAMUSCULAR; INTRAVENOUS at 12:40

## 2025-03-20 RX ADMIN — ENOXAPARIN SODIUM 40 MG: 100 INJECTION SUBCUTANEOUS at 09:05

## 2025-03-20 RX ADMIN — SODIUM CHLORIDE, PRESERVATIVE FREE 10 ML: 5 INJECTION INTRAVENOUS at 21:24

## 2025-03-20 RX ADMIN — INSULIN LISPRO 2 UNITS: 100 INJECTION, SOLUTION INTRAVENOUS; SUBCUTANEOUS at 18:36

## 2025-03-20 RX ADMIN — GABAPENTIN 300 MG: 300 CAPSULE ORAL at 09:05

## 2025-03-20 RX ADMIN — INSULIN GLARGINE 10 UNITS: 100 INJECTION, SOLUTION SUBCUTANEOUS at 09:05

## 2025-03-20 RX ADMIN — GABAPENTIN 300 MG: 300 CAPSULE ORAL at 21:24

## 2025-03-20 ASSESSMENT — PAIN SCALES - GENERAL: PAINLEVEL_OUTOF10: 8

## 2025-03-20 ASSESSMENT — PAIN DESCRIPTION - ORIENTATION: ORIENTATION: LEFT;RIGHT

## 2025-03-20 ASSESSMENT — PAIN DESCRIPTION - LOCATION
LOCATION: BACK;LEG
LOCATION: BACK;LEG

## 2025-03-20 NOTE — PLAN OF CARE
Problem: Chronic Conditions and Co-morbidities  Goal: Patient's chronic conditions and co-morbidity symptoms are monitored and maintained or improved  Outcome: Progressing     Problem: Discharge Planning  Goal: Discharge to home or other facility with appropriate resources  Outcome: Progressing     Problem: Safety - Adult  Goal: Free from fall injury  Outcome: Progressing     Problem: Neurosensory - Adult  Goal: Achieves stable or improved neurological status  Outcome: Progressing  Goal: Absence of seizures  Outcome: Progressing     Problem: Musculoskeletal - Adult  Goal: Return mobility to safest level of function  Outcome: Progressing     Problem: Genitourinary - Adult  Goal: Absence of urinary retention  Outcome: Progressing     Problem: Pain  Goal: Verbalizes/displays adequate comfort level or baseline comfort level  Outcome: Progressing     Problem: Cardiovascular - Adult  Goal: Maintains optimal cardiac output and hemodynamic stability  Outcome: Progressing  Goal: Absence of cardiac dysrhythmias or at baseline  Outcome: Progressing

## 2025-03-20 NOTE — PLAN OF CARE
Problem: Chronic Conditions and Co-morbidities  Goal: Patient's chronic conditions and co-morbidity symptoms are monitored and maintained or improved  3/19/2025 2314 by Maddie Bustamante RN  Outcome: Progressing  3/19/2025 1840 by Kellie Funez RN  Outcome: Progressing     Problem: Discharge Planning  Goal: Discharge to home or other facility with appropriate resources  3/19/2025 2314 by Maddie Bustamante RN  Outcome: Progressing  3/19/2025 1840 by Kellie Funez RN  Outcome: Progressing     Problem: Safety - Adult  Goal: Free from fall injury  3/19/2025 2314 by Maddie Bustamante RN  Outcome: Progressing  3/19/2025 1840 by Kellie Funez RN  Outcome: Progressing     Problem: Neurosensory - Adult  Goal: Achieves stable or improved neurological status  3/19/2025 2314 by Maddie Bustamante RN  Outcome: Progressing  3/19/2025 1840 by Kellie Funez RN  Outcome: Progressing  Goal: Absence of seizures  3/19/2025 2314 by Maddie Bustamante RN  Outcome: Progressing  3/19/2025 1840 by Kellie Funez RN  Outcome: Progressing     Problem: Musculoskeletal - Adult  Goal: Return mobility to safest level of function  3/19/2025 2314 by Maddie Bustamante RN  Outcome: Progressing  3/19/2025 1840 by Kellie Funez RN  Outcome: Progressing     Problem: Genitourinary - Adult  Goal: Absence of urinary retention  3/19/2025 2314 by Maddie Bustamante RN  Outcome: Progressing  3/19/2025 1840 by Kellie Funez RN  Outcome: Progressing     Problem: Pain  Goal: Verbalizes/displays adequate comfort level or baseline comfort level  3/19/2025 2314 by Maddie Bustamante RN  Outcome: Progressing  3/19/2025 1840 by Kellie Funez RN  Outcome: Progressing     Problem: Cardiovascular - Adult  Goal: Maintains optimal cardiac output and hemodynamic stability  Outcome: Progressing  Goal: Absence of cardiac dysrhythmias or at baseline  Outcome: Progressing

## 2025-03-20 NOTE — PROGRESS NOTES
Physical Therapy  Facility/Department: [address] STEPDOWN   Physical Therapy Daily Treatment Note    Patient Name: Lexie Mireles        MRN: [id number]    : 1970    Date of Service: 3/20/2025    Chief Complaint   Patient presents with    Dizziness     Past Medical History:  has a past medical history of Asthma, Chronic back pain, GERD (gastroesophageal reflux disease), HLD (hyperlipidemia), Hypertension, Irregular periods/menstrual cycles, Neuropathy, Obesity, Observed seizure-like activity (HCC), Type II or unspecified type diabetes mellitus without mention of complication, not stated as uncontrolled, and Wears glasses.  Past Surgical History:  has a past surgical history that includes Tubal ligation (); Endometrial ablation (2018); pr hysteroscopy endometrial ablation (N/A, 2018); Esophagogastroduodenoscopy (2025); Upper gastrointestinal endoscopy (N/A, 2025); and Colonoscopy (N/A, 2025).    Discharge Recommendations  Discharge Recommendations: Patient would benefit from continued therapy after discharge Further therapy recommended at discharge.The patient should be able to tolerate at least 3 hours of therapy per day over 5 days or 15 hours over 7 days.   This patient may benefit from a Physical Medicine and Rehab consult.    PT Equipment Recommendations  Equipment Needed: No  Other: Pt unsafe to attempt mobility without skilled assistance.  Pt owns a rollator.    Assessment  Body Structures, Functions, Activity Limitations Requiring Skilled Therapeutic Intervention: Decreased functional mobility ;Decreased ADL status;Decreased body mechanics;Decreased strength;Decreased high-level IADLs;Decreased balance;Decreased endurance;Decreased safe awareness;Decreased coordination;Decreased posture  Assessment: Pt completed stand pivot transfers with EDILSON Bernabe.  Pt unable to tolerate ambulation today d/t low back pain; RN notified.  Previously, pt ambulated in 12 ft - 15 ft trials x2 RW

## 2025-03-20 NOTE — PROGRESS NOTES
[facility]   IN-PATIENT SERVICE   [facility]    Progress Note             Date:   3/20/2025  Patient name:  Lexie Mireles  Date of admission:  3/15/2025  9:59 PM  MRN:   [id number]  Account:  [id number]  YOB: 1970  PCP:    Viktor Fox APRN - CNP  Room:   [address] [place] [address]  Code Status:    Full Code    Chief Complaint:     Chief Complaint   Patient presents with    Dizziness       Interval hx:     Patient was seen and examined at bedside.   No acute events.   MRI shows no acute finding.   Mildy hypertensive today (3/18)  On Norvasc   Continues to work with PT  Pending placement      Brief History of Present Illness:     The patient is a 54 y.o.  Non- / non  female who presents with Dizziness   and she is admitted to the hospital for the management of  AMS      54 y.o. -American female with past medical history of HTN, HLD, poorly controlled diabetes, diabetic neuropathy, CKD stage IIIa, cocaine abuse, who presented to ED with altered mental status     Patient was at dinner tonight around 9:30 PM when she was feeling generalized weakness and dizzy, was trying to ambulate to the bathroom with the assistance of her , patient progressively got worse and felt on her knees as her  was supporting her however she was confused at that time, event lasted for about 20 minutes, she was brought in to ED and was noted to continuously confused, did not know that she was going to the hospital.  No clear seizure-like activity however  noted that she was shaking.  No urinary or fecal incontinence.  Has never experienced this before.   reported that she was feeling weak and dizzy a week ago.  Patient does not participate with exam, is lethargic, needs repeated to prompting to participate in exam.  Later on patient was in severe distress due to right lower extremity pain.     Initial blood pressure of 120s.  Blood sugars 174.  Initial  echo  Encourage oral intake     Altered mental status  Lab workup with UDS, UA, serum ammonia unremarkable  Normal TSH  MRI brain without contrast (cannot use contrast due to CKD) is unremarkable  CT head unremarkable CTA head and neck no large vessel occlusion or significant stenosis  Routine EEG  Continue aspirin 81 mg daily, statin        HTN  At home on Norvasc 10  BP goal 120-180  Currently hold Norvasc  Continue to monitor     Type II diabetes melitis  Current A1c 10.1 with POCT glucose 189  At home on glipizide 5 twice daily, Lantus 15 twice daily  Currently on Lantus 10 twice daily along with medium dose sliding scale  POCT glucose  Hypoglycemia protocol     CKD stage III:  Creatinine on the baseline initially 1.5 currently 1.3  Daily BMP  Avoid nephrotoxic drugs        Diabetic nephropathy  Resume home dose gabapentin 300 mg 3 times daily      History of gastroparesis:  Resume home dose of Reglan  Avoid NSAIDs, recommend small frequent meals     Reactive airway disease  Continue albuterol with respiratory treatments     History Vitamin D deficiency  Continue vitamin D supplements     DVT prophylaxis on subcu Lovenox  Diet: Advance as tolerated  PT OT eval and treat              Follow-up further recommendations after discussing case with the attending.  The plan was discussed with the patient, patient's family and the medical staff.   Consultations:   IP CONSULT TO CASE MANAGEMENT    Patient is admitted as inpatient status because of co-morbidities listed above, severity of signs and symptoms as outlined, requirement for current medical therapies and most importantly because of direct risk to patient if care not provided in a hospital setting.    Leonidas Weber DO  Neurology Resident PGY-4   3/20/2025  12:09 PM    Copy sent to Viktor Ca, APRN - CNP

## 2025-03-20 NOTE — CARE COORDINATION
Pre-Cert initiated in [facility] for [facility]. Auth ID: [id number]. Status is Pending. CM notified.

## 2025-03-21 ENCOUNTER — APPOINTMENT (OUTPATIENT)
Dept: NUCLEAR MEDICINE | Age: 55
DRG: 641 | End: 2025-03-21
Payer: MEDICARE

## 2025-03-21 PROBLEM — R07.9 CHEST PAIN: Status: ACTIVE | Noted: 2025-03-21

## 2025-03-21 LAB
ALBUMIN SERPL-MCNC: 3.1 G/DL (ref 3.5–5.2)
ALBUMIN/GLOB SERPL: 1 {RATIO} (ref 1–2.5)
ALP SERPL-CCNC: 115 U/L (ref 35–104)
ALT SERPL-CCNC: 25 U/L (ref 10–35)
ANION GAP SERPL CALCULATED.3IONS-SCNC: 12 MMOL/L (ref 9–16)
AST SERPL-CCNC: 26 U/L (ref 10–35)
BILIRUB SERPL-MCNC: <0.2 MG/DL (ref 0–1.2)
BUN SERPL-MCNC: 30 MG/DL (ref 6–20)
CALCIUM SERPL-MCNC: 9.1 MG/DL (ref 8.6–10.4)
CHLORIDE SERPL-SCNC: 101 MMOL/L (ref 98–107)
CO2 SERPL-SCNC: 21 MMOL/L (ref 20–31)
CREAT SERPL-MCNC: 1.1 MG/DL (ref 0.6–0.9)
EKG ATRIAL RATE: 106 BPM
EKG P AXIS: 42 DEGREES
EKG P-R INTERVAL: 144 MS
EKG Q-T INTERVAL: 358 MS
EKG QRS DURATION: 68 MS
EKG QTC CALCULATION (BAZETT): 475 MS
EKG R AXIS: 4 DEGREES
EKG T AXIS: 57 DEGREES
EKG VENTRICULAR RATE: 106 BPM
ERYTHROCYTE [DISTWIDTH] IN BLOOD BY AUTOMATED COUNT: 14.4 % (ref 11.8–14.4)
GFR, ESTIMATED: 60 ML/MIN/1.73M2
GLUCOSE BLD-MCNC: 102 MG/DL (ref 65–105)
GLUCOSE BLD-MCNC: 151 MG/DL (ref 65–105)
GLUCOSE BLD-MCNC: 211 MG/DL (ref 65–105)
GLUCOSE BLD-MCNC: 88 MG/DL (ref 65–105)
GLUCOSE SERPL-MCNC: 164 MG/DL (ref 74–99)
HCT VFR BLD AUTO: 30.3 % (ref 36.3–47.1)
HGB BLD-MCNC: 9.6 G/DL (ref 11.9–15.1)
MCH RBC QN AUTO: 26.6 PG (ref 25.2–33.5)
MCHC RBC AUTO-ENTMCNC: 31.7 G/DL (ref 28.4–34.8)
MCV RBC AUTO: 83.9 FL (ref 82.6–102.9)
NRBC BLD-RTO: 0 PER 100 WBC
PLATELET # BLD AUTO: 375 K/UL (ref 138–453)
PMV BLD AUTO: 9.3 FL (ref 8.1–13.5)
POTASSIUM SERPL-SCNC: 4.1 MMOL/L (ref 3.7–5.3)
PROT SERPL-MCNC: 6.2 G/DL (ref 6.6–8.7)
RBC # BLD AUTO: 3.61 M/UL (ref 3.95–5.11)
SODIUM SERPL-SCNC: 134 MMOL/L (ref 136–145)
TROPONIN I SERPL HS-MCNC: 25 NG/L (ref 0–14)
WBC OTHER # BLD: 11.5 K/UL (ref 3.5–11.3)

## 2025-03-21 PROCEDURE — 97130 THER IVNTJ EA ADDL 15 MIN: CPT

## 2025-03-21 PROCEDURE — 6360000002 HC RX W HCPCS

## 2025-03-21 PROCEDURE — 84484 ASSAY OF TROPONIN QUANT: CPT

## 2025-03-21 PROCEDURE — 97530 THERAPEUTIC ACTIVITIES: CPT

## 2025-03-21 PROCEDURE — 85027 COMPLETE CBC AUTOMATED: CPT

## 2025-03-21 PROCEDURE — 80053 COMPREHEN METABOLIC PANEL: CPT

## 2025-03-21 PROCEDURE — 93005 ELECTROCARDIOGRAM TRACING: CPT | Performed by: PSYCHIATRY & NEUROLOGY

## 2025-03-21 PROCEDURE — 6370000000 HC RX 637 (ALT 250 FOR IP)

## 2025-03-21 PROCEDURE — 2060000000 HC ICU INTERMEDIATE R&B

## 2025-03-21 PROCEDURE — 97129 THER IVNTJ 1ST 15 MIN: CPT

## 2025-03-21 PROCEDURE — 36415 COLL VENOUS BLD VENIPUNCTURE: CPT

## 2025-03-21 PROCEDURE — 2500000003 HC RX 250 WO HCPCS

## 2025-03-21 PROCEDURE — 99232 SBSQ HOSP IP/OBS MODERATE 35: CPT | Performed by: PSYCHIATRY & NEUROLOGY

## 2025-03-21 PROCEDURE — 97535 SELF CARE MNGMENT TRAINING: CPT

## 2025-03-21 PROCEDURE — 93010 ELECTROCARDIOGRAM REPORT: CPT | Performed by: INTERNAL MEDICINE

## 2025-03-21 PROCEDURE — 99222 1ST HOSP IP/OBS MODERATE 55: CPT | Performed by: INTERNAL MEDICINE

## 2025-03-21 PROCEDURE — 82947 ASSAY GLUCOSE BLOOD QUANT: CPT

## 2025-03-21 RX ORDER — CARVEDILOL 6.25 MG/1
3.12 TABLET ORAL 2 TIMES DAILY WITH MEALS
Status: DISCONTINUED | OUTPATIENT
Start: 2025-03-21 | End: 2025-03-23 | Stop reason: HOSPADM

## 2025-03-21 RX ORDER — PANTOPRAZOLE SODIUM 40 MG/1
40 TABLET, DELAYED RELEASE ORAL
Status: DISCONTINUED | OUTPATIENT
Start: 2025-03-21 | End: 2025-03-21

## 2025-03-21 RX ORDER — OXYCODONE AND ACETAMINOPHEN 5; 325 MG/1; MG/1
1 TABLET ORAL EVERY 8 HOURS PRN
Refills: 0 | Status: DISCONTINUED | OUTPATIENT
Start: 2025-03-21 | End: 2025-03-23 | Stop reason: HOSPADM

## 2025-03-21 RX ORDER — PANTOPRAZOLE SODIUM 40 MG/1
40 TABLET, DELAYED RELEASE ORAL
Status: DISCONTINUED | OUTPATIENT
Start: 2025-03-21 | End: 2025-03-23 | Stop reason: HOSPADM

## 2025-03-21 RX ORDER — CALCIUM CARBONATE 500 MG/1
500 TABLET, CHEWABLE ORAL DAILY PRN
Status: DISCONTINUED | OUTPATIENT
Start: 2025-03-21 | End: 2025-03-23 | Stop reason: HOSPADM

## 2025-03-21 RX ADMIN — GABAPENTIN 300 MG: 300 CAPSULE ORAL at 22:14

## 2025-03-21 RX ADMIN — PANTOPRAZOLE SODIUM 40 MG: 40 TABLET, DELAYED RELEASE ORAL at 16:08

## 2025-03-21 RX ADMIN — INSULIN LISPRO 2 UNITS: 100 INJECTION, SOLUTION INTRAVENOUS; SUBCUTANEOUS at 18:09

## 2025-03-21 RX ADMIN — SODIUM CHLORIDE, PRESERVATIVE FREE 10 ML: 5 INJECTION INTRAVENOUS at 22:15

## 2025-03-21 RX ADMIN — GABAPENTIN 300 MG: 300 CAPSULE ORAL at 09:20

## 2025-03-21 RX ADMIN — ACETAMINOPHEN 650 MG: 325 TABLET ORAL at 16:07

## 2025-03-21 RX ADMIN — GABAPENTIN 300 MG: 300 CAPSULE ORAL at 13:46

## 2025-03-21 RX ADMIN — ASPIRIN 81 MG: 81 TABLET, COATED ORAL at 09:20

## 2025-03-21 RX ADMIN — AMLODIPINE BESYLATE 10 MG: 10 TABLET ORAL at 09:20

## 2025-03-21 RX ADMIN — ENOXAPARIN SODIUM 40 MG: 100 INJECTION SUBCUTANEOUS at 09:19

## 2025-03-21 RX ADMIN — CARVEDILOL 3.12 MG: 6.25 TABLET, FILM COATED ORAL at 13:46

## 2025-03-21 RX ADMIN — INSULIN GLARGINE 10 UNITS: 100 INJECTION, SOLUTION SUBCUTANEOUS at 09:20

## 2025-03-21 RX ADMIN — PANTOPRAZOLE SODIUM 40 MG: 40 TABLET, DELAYED RELEASE ORAL at 12:16

## 2025-03-21 RX ADMIN — OXYCODONE HYDROCHLORIDE AND ACETAMINOPHEN 1 TABLET: 5; 325 TABLET ORAL at 18:12

## 2025-03-21 ASSESSMENT — PAIN SCALES - WONG BAKER: WONGBAKER_NUMERICALRESPONSE: NO HURT

## 2025-03-21 ASSESSMENT — PAIN SCALES - GENERAL
PAINLEVEL_OUTOF10: 4
PAINLEVEL_OUTOF10: 8

## 2025-03-21 ASSESSMENT — PAIN DESCRIPTION - ORIENTATION: ORIENTATION: LOWER

## 2025-03-21 ASSESSMENT — PAIN DESCRIPTION - LOCATION: LOCATION: BACK

## 2025-03-21 NOTE — PLAN OF CARE
Problem: Chronic Conditions and Co-morbidities  Goal: Patient's chronic conditions and co-morbidity symptoms are monitored and maintained or improved  3/21/2025 1826 by Jennifer Jeffries RN  Outcome: Progressing  3/21/2025 1826 by Jennifer Jeffries RN  Outcome: Progressing     Problem: Discharge Planning  Goal: Discharge to home or other facility with appropriate resources  3/21/2025 1826 by Jennifer Jeffries RN  Outcome: Progressing  3/21/2025 1826 by Jennifer Jeffries RN  Outcome: Progressing     Problem: Safety - Adult  Goal: Free from fall injury  3/21/2025 1826 by Jennifer Jeffries RN  Outcome: Progressing  3/21/2025 1826 by Jennifer Jeffries RN  Outcome: Progressing     Problem: Neurosensory - Adult  Goal: Achieves stable or improved neurological status  3/21/2025 1826 by Jennifer Jeffries RN  Outcome: Progressing  3/21/2025 1826 by Jennifer Jeffries RN  Outcome: Progressing  Goal: Absence of seizures  3/21/2025 1826 by Jennifer Jeffries RN  Outcome: Progressing  3/21/2025 1826 by Jennifer Jeffries RN  Outcome: Progressing     Problem: Musculoskeletal - Adult  Goal: Return mobility to safest level of function  3/21/2025 1826 by Jennifer Jeffries RN  Outcome: Progressing  3/21/2025 1826 by Jennifer Jeffries RN  Outcome: Progressing     Problem: Genitourinary - Adult  Goal: Absence of urinary retention  3/21/2025 1826 by Jennifer Jeffries RN  Outcome: Progressing  3/21/2025 1826 by Jennifer Jeffries RN  Outcome: Progressing     Problem: Pain  Goal: Verbalizes/displays adequate comfort level or baseline comfort level  3/21/2025 1826 by Jennifer Jeffries RN  Outcome: Progressing  3/21/2025 1826 by Jennifer Jeffries RN  Outcome: Progressing     Problem: Cardiovascular - Adult  Goal: Maintains optimal cardiac output and hemodynamic stability  3/21/2025 1826 by eJnnifer Jeffreis RN  Outcome: Progressing  3/21/2025 1826 by Jennifer Jeffries RN  Outcome: Progressing  Goal: Absence of cardiac dysrhythmias or at baseline  3/21/2025 1826 by Jennifer Jeffries RN  Outcome:

## 2025-03-21 NOTE — DISCHARGE INSTRUCTIONS
You came to the hospital for concerns of possible confusion, fall had concerns for syncope.  Your syncope workup was negative.

## 2025-03-21 NOTE — CARE COORDINATION
Case Management   Daily Progress Note       Patient Name: Lexie Mireles                   YOB: 1970  Diagnosis: Altered mental status [R41.82]  Altered mental status, unspecified altered mental status type [R41.82]                       GMLOS: 2.6 days  Length of Stay: 5  days    Anticipated Discharge Date: Longer than expected LOS    Readmission Risk (Low < 19, Mod (19-27), High > 27): Readmission Risk Score: 14.9      Patient Transitional Goal: Rehab    Current Transitional Plan    [] Home Independently    [] Home with HC    [] Skilled Nursing Facility    [x] Acute Rehabilitation    [] Long Term Acute Care (LTAC)    [] Other:     Plan for the Stay (Medical Management) :          Workflow Continuation (Additional Notes) :  Call from Ed at [facility]-offering peer to peer [phone] by 8AM on 3-. Needs name, ID#, and  when called. Ref#[id number]. Sent updated clinicals to [facility] thru computer. PS Dr. Pompa.      Annalisa Martin, RN  2025

## 2025-03-21 NOTE — CARE COORDINATION
Transitional planning-[facility]-still pending. Updates sent.

## 2025-03-21 NOTE — CONSULTS
[facility]  Office: [phone]  Flaco Santizo DO, Vincenzo Mathur DO, Mike Kurtz DO, Dany Pozo DO, Jade Fuller MD, Reyna Rodriguez MD, Nolan Villanueva MD, Isela Rosenberg MD,  Colt Castelan MD, Akila Bustillos MD, Karissa Mcekon MD,  Ludwig Petersen DO, Juan Antonio Chowdhury MD, Emerson Mcadams MD, Celestino Santizo DO, Caridad Miles MD,  Placido Davila DO, Yee Heath MD, Tamara Clinton MD, Vanessa Blake MD, Xochitl Cohn MD,  Connor Fulton MD, Sofie Allan MD, Vaibhav Raza MD, Velasquez Bowen MD, Darren Wood MD, Jyotsna Payne MD, Augustin Dominguez DO, Dov Bhatia MD, Ludwig Riley MD, Mohsin Reza, MD, Shirley Waterhouse, CNP,  Evi Abraham CNP, Augustin Peña, CNP,  Jo Magana, DNP, Carmen Yates, CNP, Kayla Encarnacion, CNP, Bertha Ashby, CNP, Mae Bosch CNP, MONE Cline-MERLINE, Wedny Guy, CNP, Homer Watts, CNP,  Krissy Galeana, CNP, Annalisa Sullivan, CNP, Zahida Sharp, CNP,  Teresa Vital, CNS, Jennifer Lia CNP, Jacqueline Gramajo CNP,   Magalie Ojeda, CNP         [facility]   IN-PATIENT SERVICE   [facility]    CONSULTATION / HISTORY AND PHYSICAL EXAMINATION            Date:   3/21/2025  Patient name:  Lexie Mireles  Date of admission:  3/15/2025  9:59 PM  MRN:   [id number]  Account:  [id number]  YOB: 1970  PCP:    Viktor Fox APRN - CNP  Room:   [address] [place] [address]  Code Status:    Full Code    Physician Requesting Consult: Bk Pro MD    Reason for Consult:  chest pain    Chief Complaint:     Chief Complaint   Patient presents with    Dizziness       History Obtained From:     patient, electronic medical record    History of Present Illness:     54 y.o. -American female with past medical history of HTN, HLD, poorly controlled diabetes, diabetic neuropathy, CKD stage IIIa, cocaine abuse, who presented to ED with altered mental status     Patient was at dinner tonight around 9:30 PM when

## 2025-03-21 NOTE — PROGRESS NOTES
[facility] Neurology   IN-PATIENT SERVICE   [facility]    Progress Note             Date:   3/21/2025  Patient name:  Lexie Mireles  Date of admission:  3/15/2025  9:59 PM  MRN:   [id number]  Account:  [id number]  YOB: 1970  PCP:    Viktor Fox APRN - CNP  Room:   [address] [place] [address]  Code Status:    Full Code    Chief Complaint:     Chief Complaint   Patient presents with    Dizziness       Interval hx:     Patient was seen and examined at bedside.   No acute overnight events  She continues to be mildly hypertensive and tachycardic so we will add Coreg  She endorses having dull chest pain 5 on 10 associated with some tingling and numbness, on nonradiating not associated nausea vomiting diaphoresis.  EKG done was unremarkable to suggestive sinus tachycardia.  She also did endorse having some GERD this morning for which she got Tums    Patient denied acute rehab but need peer to peer    Brief History of Present Illness:     The patient is a 54 y.o.  Non- / non  female who presents with Dizziness   and she is admitted to the hospital for the management of  AMS      54 y.o. -American female with past medical history of HTN, HLD, poorly controlled diabetes, diabetic neuropathy, CKD stage IIIa, cocaine abuse, who presented to ED with altered mental status     Patient was at dinner tonight around 9:30 PM when she was feeling generalized weakness and dizzy, was trying to ambulate to the bathroom with the assistance of her , patient progressively got worse and felt on her knees as her  was supporting her however she was confused at that time, event lasted for about 20 minutes, she was brought in to ED and was noted to continuously confused, did not know that she was going to the hospital.  No clear seizure-like activity however  noted that she was shaking.  No urinary or fecal incontinence.  Has never experienced this before.   reported      Concerns for syncope:  Dizziness followed by a fall  Orthostats negative  Follow-up on the echo  Encourage oral intake\\    Atypical chest pain:  EKG unremarkable  Continue to monitor for now  Medicine consult     Altered mental status: Resolved  Continue aspirin 81 mg daily, statin        HTN  Blood pressure slightly elevated with tachycardia  At home on Norvasc 10  BP goa SBP l 120  Currently on Norvasc will add Coreg  Continue to monitor to keep SBP less than 140     Type II diabetes melitis  Current A1c 10.1 with POCT glucose 189  At home on glipizide 5 twice daily, Lantus 15 twice daily  Currently on Lantus 10 twice daily along with medium dose sliding scale  POCT glucose  Hypoglycemia protocol     CKD stage III:  Creatinine on the baseline initially 1.5 currently 1.3  Daily BMP  Avoid nephrotoxic drugs        Diabetic nephropathy  Resume home dose gabapentin 300 mg 3 times daily      History of gastroparesis:  Resume home dose of Reglan  Avoid NSAIDs, recommend small frequent meals     Reactive airway disease  Continue albuterol with respiratory treatments     History Vitamin D deficiency  Continue vitamin D supplements     DVT prophylaxis on subcu Lovenox  Diet: Advance as tolerated  PT OT eval and treat              Follow-up further recommendations after discussing case with the attending.  The plan was discussed with the patient, patient's family and the medical staff.   Consultations:   IP CONSULT TO CASE MANAGEMENT    Patient is admitted as inpatient status because of co-morbidities listed above, severity of signs and symptoms as outlined, requirement for current medical therapies and most importantly because of direct risk to patient if care not provided in a hospital setting.    Taylor Pompa MD  Internal Medicine Resident, PGY-2  [facility], [place], [place]  3/21/2025, 7:39 AM      Copy sent to Viktor Ca, APRN - CNP

## 2025-03-21 NOTE — PROGRESS NOTES
Occupational Therapy  Occupational Therapy Daily Treatment Note  Facility/Department: [address] STE[address]   Patient Name: eLxie Mireles        MRN: [id number]    : 1970    Date of Service: 3/21/2025    Chief Complaint   Patient presents with    Dizziness     Past Medical History:  has a past medical history of Asthma, Chronic back pain, GERD (gastroesophageal reflux disease), HLD (hyperlipidemia), Hypertension, Irregular periods/menstrual cycles, Neuropathy, Obesity, Observed seizure-like activity (HCC), Type II or unspecified type diabetes mellitus without mention of complication, not stated as uncontrolled, and Wears glasses.  Past Surgical History:  has a past surgical history that includes Tubal ligation (); Endometrial ablation (2018); pr hysteroscopy endometrial ablation (N/A, 2018); Esophagogastroduodenoscopy (2025); Upper gastrointestinal endoscopy (N/A, 2025); and Colonoscopy (N/A, 2025).    Discharge Recommendations  Discharge Recommendations: Patient would benefit from continued therapy after discharge  OT Equipment Recommendations  Mobility Devices: Walker  Walker: Rolling    Assessment  Performance deficits / Impairments: Decreased functional mobility ;Decreased endurance;Decreased balance;Decreased coordination;Decreased ADL status;Decreased safe awareness;Decreased strength  Prognosis: Good  Activity Tolerance  Activity Tolerance: Patient Tolerated treatment well;Patient limited by pain  Safety Devices  Type of Devices: Call light within reach;Patient at risk for falls;Nurse notified;Left in bed;Bed alarm in place;Gait belt    AM-PAC  AM-PAC Daily Activity - Inpatient   How much help is needed for putting on and taking off regular lower body clothing?: A Little  How much help is needed for bathing (which includes washing, rinsing, drying)?: A Little  How much help is needed for toileting (which includes using toilet, bedpan, or urinal)?: A Little  How much help is  in standing with x10 reps and rest breaks. Pt completes BUE exs partial seated and partial standing. Pt was ed with handout for EC/WS applications with ADLs.    Balance  Balance  Sitting: Without support (SBA sitting EOB and recliner with static/dynamic movements)  Standing: With support (several STS with prolonged stand times. MIN A for dynamic standing, CGA static with bi/unilateral UE support on RW. MIN A for mobility with heavy UE support. ~15mins total)    Transfers/Mobility  Bed mobility  Supine to Sit: Minimal assistance  Sit to Supine: Minimal assistance  Scooting: Contact guard assistance  Bed Mobility Comments: HOB elevated out of bed with MIN A for trunk use of bed rail. MIN A back to bed with HOB flat ed on log roll tech for ease of back pain.         Transfers  Sit to stand: Minimal assistance  Stand to sit: Minimal assistance  Transfer Comments: MIN A for several STS from recliner, demos good hand placements         Functional Mobility: Minimal assistance  Functional Mobility Skilled Clinical Factors: pt completes x2 stand pivots with RW and MIN A from EOB<>recliner with slow pace, slow motor planning, unsteadiness, EVAN knee buckel at the end of transfers.       Exercises  OT Exercises  A/AROM Exercises: patient complete BUE AROM exercises to increase overall strength and endurance to increase IND with all ADL tasks. patient completed 10x reps of shoulder/chest presses, elbow flexion/extension. good carryover, required rest breaks between due to fatigue.    Patient Education  Patient Education  Education Given To: Patient  Education Provided: Plan of Care;Precautions;Home Exercise Program;ADL Adaptive Strategies;Transfer Training;Energy Conservation;Mobility Training;Equipment;Fall Prevention Strategies  Education Method: Demonstration;Verbal  Barriers to Learning: None  Education Outcome: Verbalized understanding;Demonstrated understanding;Continued education needed    Goals  Short Term Goals  Time

## 2025-03-21 NOTE — PROGRESS NOTES
Speech Language Pathology  [facility]    Cognitive Treatment Note    Date: 3/21/2025  Patient’s Name: Lexie Mireles  MRN: [id number]  Diagnosis:   Patient Active Problem List   Diagnosis Code    Obesity E66.9    GERD (gastroesophageal reflux disease) K21.9    Hypertension, essential I10    RAD (reactive airway disease) J45.909    Diabetes mellitus type 2, insulin dependent ([facility]) E11.9, Z79.4    Bilateral knee pain M25.561, M25.562    Left shoulder pain M25.512    Patellofemoral syndrome M22.2X9    Peripheral neuropathy G62.9    Hypercholesteremia E78.00    Abnormal uterine bleeding (AUB) N93.9    Abnormal glandular Papanicolaou smear of cervix - NOS R87.619    Immunization due Z23    Status post endometrial ablation Z98.890    Right rotator cuff tendonitis M75.81    Tendinopathy of right biceps tendon M67.921    Calcific tendonitis of left shoulder M75.32    Hyperglycemia, unspecified R73.9    Syncope R55    Diabetic peripheral neuropathy ([facility]) E11.42    Post-menopausal bleeding N95.0    Vulvovaginitis N76.0    Left flank pain R10.9    Flank pain R10.9    Stage 3a chronic kidney disease ([facility]) N18.31    Hyperglycemia R73.9    Nausea and vomiting R11.2    Anemia D64.9    Abdominal pain R10.9    Type 2 diabetes mellitus with hyperglycemia, with long-term current use of insulin ([facility]) E11.65, Z79.4    Vitamin D deficiency E55.9    Cocaine abuse ([facility]) F14.10    Altered mental status R41.82    Observed seizure-like activity ([facility]) R56.9    Episode of generalized weakness R53.1       Pain: 0/10    Cognitive Treatment    Treatment time: 7542-9181      Subjective: [] Alert [] Cooperative     [] Confused     [] Agitated    [] Lethargic      Objective/Assessment:    Recall:   Delayed Recall: 1/3 to 3/3 with min verbal cues , 2/3 to 3/3 with min verbal cues ,  2/3 to 3/3 with min verbal cues     Multiple Definitions: 7/8 to 8/8 with min verbal cues     Scrambled Sentences: 7/10 to 10/10 with mod verbal cues and

## 2025-03-22 ENCOUNTER — APPOINTMENT (OUTPATIENT)
Dept: NUCLEAR MEDICINE | Age: 55
DRG: 641 | End: 2025-03-22
Payer: MEDICARE

## 2025-03-22 ENCOUNTER — HOSPITAL ENCOUNTER (INPATIENT)
Age: 55
Discharge: HOME | DRG: 641 | End: 2025-03-24
Payer: MEDICARE

## 2025-03-22 VITALS — HEART RATE: 109 BPM | DIASTOLIC BLOOD PRESSURE: 106 MMHG | SYSTOLIC BLOOD PRESSURE: 169 MMHG

## 2025-03-22 PROBLEM — R79.89 ELEVATED TROPONIN: Status: ACTIVE | Noted: 2025-03-22

## 2025-03-22 LAB
ECHO BSA: 1.54 M2
GLUCOSE BLD-MCNC: 133 MG/DL (ref 65–105)
GLUCOSE BLD-MCNC: 185 MG/DL (ref 65–105)
GLUCOSE BLD-MCNC: 257 MG/DL (ref 65–105)
GLUCOSE BLD-MCNC: 282 MG/DL (ref 65–105)
STRESS BASELINE DIAS BP: 106 MMHG
STRESS BASELINE HR: 109 BPM
STRESS BASELINE SYS BP: 169 MMHG
STRESS ESTIMATED WORKLOAD: 1 METS
STRESS PEAK DIAS BP: 107 MMHG
STRESS PEAK SYS BP: 173 MMHG
STRESS PERCENT HR ACHIEVED: 65 %
STRESS POST PEAK HR: 110 BPM
STRESS RATE PRESSURE PRODUCT: NORMAL BPM*MMHG
STRESS TARGET HR: 168 BPM

## 2025-03-22 PROCEDURE — A9500 TC99M SESTAMIBI: HCPCS | Performed by: INTERNAL MEDICINE

## 2025-03-22 PROCEDURE — 6370000000 HC RX 637 (ALT 250 FOR IP)

## 2025-03-22 PROCEDURE — 6360000002 HC RX W HCPCS

## 2025-03-22 PROCEDURE — 93018 CV STRESS TEST I&R ONLY: CPT | Performed by: INTERNAL MEDICINE

## 2025-03-22 PROCEDURE — 78452 HT MUSCLE IMAGE SPECT MULT: CPT

## 2025-03-22 PROCEDURE — 93016 CV STRESS TEST SUPVJ ONLY: CPT | Performed by: INTERNAL MEDICINE

## 2025-03-22 PROCEDURE — 3430000000 HC RX DIAGNOSTIC RADIOPHARMACEUTICAL: Performed by: INTERNAL MEDICINE

## 2025-03-22 PROCEDURE — 2500000003 HC RX 250 WO HCPCS: Performed by: INTERNAL MEDICINE

## 2025-03-22 PROCEDURE — 82947 ASSAY GLUCOSE BLOOD QUANT: CPT

## 2025-03-22 PROCEDURE — 6360000002 HC RX W HCPCS: Performed by: INTERNAL MEDICINE

## 2025-03-22 PROCEDURE — 2060000000 HC ICU INTERMEDIATE R&B

## 2025-03-22 PROCEDURE — 99232 SBSQ HOSP IP/OBS MODERATE 35: CPT | Performed by: PSYCHIATRY & NEUROLOGY

## 2025-03-22 PROCEDURE — 2500000003 HC RX 250 WO HCPCS

## 2025-03-22 PROCEDURE — 99223 1ST HOSP IP/OBS HIGH 75: CPT | Performed by: INTERNAL MEDICINE

## 2025-03-22 PROCEDURE — 99232 SBSQ HOSP IP/OBS MODERATE 35: CPT | Performed by: INTERNAL MEDICINE

## 2025-03-22 PROCEDURE — 93017 CV STRESS TEST TRACING ONLY: CPT

## 2025-03-22 RX ORDER — ALBUTEROL SULFATE 90 UG/1
2 INHALANT RESPIRATORY (INHALATION) PRN
Status: DISCONTINUED | OUTPATIENT
Start: 2025-03-22 | End: 2025-03-22 | Stop reason: ALTCHOICE

## 2025-03-22 RX ORDER — REGADENOSON 0.08 MG/ML
0.4 INJECTION, SOLUTION INTRAVENOUS
Status: COMPLETED | OUTPATIENT
Start: 2025-03-22 | End: 2025-03-22

## 2025-03-22 RX ORDER — NITROGLYCERIN 0.4 MG/1
0.4 TABLET SUBLINGUAL EVERY 5 MIN PRN
Status: DISCONTINUED | OUTPATIENT
Start: 2025-03-22 | End: 2025-03-22 | Stop reason: ALTCHOICE

## 2025-03-22 RX ORDER — SODIUM CHLORIDE 0.9 % (FLUSH) 0.9 %
10 SYRINGE (ML) INJECTION PRN
Status: DISCONTINUED | OUTPATIENT
Start: 2025-03-22 | End: 2025-03-23 | Stop reason: HOSPADM

## 2025-03-22 RX ORDER — AMINOPHYLLINE 25 MG/ML
50 INJECTION, SOLUTION INTRAVENOUS PRN
Status: DISCONTINUED | OUTPATIENT
Start: 2025-03-22 | End: 2025-03-22 | Stop reason: ALTCHOICE

## 2025-03-22 RX ORDER — METOPROLOL TARTRATE 1 MG/ML
5 INJECTION, SOLUTION INTRAVENOUS EVERY 5 MIN PRN
Status: DISCONTINUED | OUTPATIENT
Start: 2025-03-22 | End: 2025-03-22 | Stop reason: ALTCHOICE

## 2025-03-22 RX ORDER — TETRAKIS(2-METHOXYISOBUTYLISOCYANIDE)COPPER(I) TETRAFLUOROBORATE 1 MG/ML
13.6 INJECTION, POWDER, LYOPHILIZED, FOR SOLUTION INTRAVENOUS
Status: COMPLETED | OUTPATIENT
Start: 2025-03-22 | End: 2025-03-22

## 2025-03-22 RX ORDER — ATROPINE SULFATE 0.1 MG/ML
0.5 INJECTION INTRAVENOUS EVERY 5 MIN PRN
Status: DISCONTINUED | OUTPATIENT
Start: 2025-03-22 | End: 2025-03-22 | Stop reason: ALTCHOICE

## 2025-03-22 RX ORDER — SODIUM CHLORIDE 9 MG/ML
500 INJECTION, SOLUTION INTRAVENOUS CONTINUOUS PRN
Status: DISCONTINUED | OUTPATIENT
Start: 2025-03-22 | End: 2025-03-22 | Stop reason: ALTCHOICE

## 2025-03-22 RX ORDER — TETRAKIS(2-METHOXYISOBUTYLISOCYANIDE)COPPER(I) TETRAFLUOROBORATE 1 MG/ML
34.3 INJECTION, POWDER, LYOPHILIZED, FOR SOLUTION INTRAVENOUS
Status: COMPLETED | OUTPATIENT
Start: 2025-03-22 | End: 2025-03-22

## 2025-03-22 RX ORDER — SODIUM CHLORIDE 0.9 % (FLUSH) 0.9 %
5-40 SYRINGE (ML) INJECTION PRN
Status: DISCONTINUED | OUTPATIENT
Start: 2025-03-22 | End: 2025-03-22 | Stop reason: ALTCHOICE

## 2025-03-22 RX ADMIN — CARVEDILOL 3.12 MG: 6.25 TABLET, FILM COATED ORAL at 17:18

## 2025-03-22 RX ADMIN — ENOXAPARIN SODIUM 40 MG: 100 INJECTION SUBCUTANEOUS at 09:45

## 2025-03-22 RX ADMIN — SODIUM CHLORIDE, PRESERVATIVE FREE 5 ML: 5 INJECTION INTRAVENOUS at 21:23

## 2025-03-22 RX ADMIN — INSULIN GLARGINE 10 UNITS: 100 INJECTION, SOLUTION SUBCUTANEOUS at 21:23

## 2025-03-22 RX ADMIN — GABAPENTIN 300 MG: 300 CAPSULE ORAL at 21:24

## 2025-03-22 RX ADMIN — GABAPENTIN 300 MG: 300 CAPSULE ORAL at 09:46

## 2025-03-22 RX ADMIN — Medication 10 MG: at 18:14

## 2025-03-22 RX ADMIN — SODIUM CHLORIDE, PRESERVATIVE FREE 10 ML: 5 INJECTION INTRAVENOUS at 07:43

## 2025-03-22 RX ADMIN — REGADENOSON 0.4 MG: 0.08 INJECTION, SOLUTION INTRAVENOUS at 11:09

## 2025-03-22 RX ADMIN — TETRAKIS(2-METHOXYISOBUTYLISOCYANIDE)COPPER(I) TETRAFLUOROBORATE 13.6 MILLICURIE: 1 INJECTION, POWDER, LYOPHILIZED, FOR SOLUTION INTRAVENOUS at 07:43

## 2025-03-22 RX ADMIN — PANTOPRAZOLE SODIUM 40 MG: 40 TABLET, DELAYED RELEASE ORAL at 17:18

## 2025-03-22 RX ADMIN — GABAPENTIN 300 MG: 300 CAPSULE ORAL at 15:05

## 2025-03-22 RX ADMIN — SODIUM CHLORIDE, PRESERVATIVE FREE 10 ML: 5 INJECTION INTRAVENOUS at 09:46

## 2025-03-22 RX ADMIN — INSULIN LISPRO 4 UNITS: 100 INJECTION, SOLUTION INTRAVENOUS; SUBCUTANEOUS at 17:18

## 2025-03-22 RX ADMIN — OXYCODONE HYDROCHLORIDE AND ACETAMINOPHEN 1 TABLET: 5; 325 TABLET ORAL at 21:24

## 2025-03-22 RX ADMIN — SODIUM CHLORIDE, PRESERVATIVE FREE 10 ML: 5 INJECTION INTRAVENOUS at 11:10

## 2025-03-22 RX ADMIN — SODIUM CHLORIDE, PRESERVATIVE FREE 10 ML: 5 INJECTION INTRAVENOUS at 11:09

## 2025-03-22 RX ADMIN — ASPIRIN 81 MG: 81 TABLET, COATED ORAL at 09:45

## 2025-03-22 RX ADMIN — INSULIN LISPRO 2 UNITS: 100 INJECTION, SOLUTION INTRAVENOUS; SUBCUTANEOUS at 12:24

## 2025-03-22 RX ADMIN — PANTOPRAZOLE SODIUM 40 MG: 40 TABLET, DELAYED RELEASE ORAL at 06:37

## 2025-03-22 RX ADMIN — INSULIN LISPRO 4 UNITS: 100 INJECTION, SOLUTION INTRAVENOUS; SUBCUTANEOUS at 21:23

## 2025-03-22 RX ADMIN — TETRAKIS(2-METHOXYISOBUTYLISOCYANIDE)COPPER(I) TETRAFLUOROBORATE 34.3 MILLICURIE: 1 INJECTION, POWDER, LYOPHILIZED, FOR SOLUTION INTRAVENOUS at 11:10

## 2025-03-22 ASSESSMENT — PAIN DESCRIPTION - LOCATION: LOCATION: BACK;LEG

## 2025-03-22 ASSESSMENT — PAIN DESCRIPTION - ORIENTATION: ORIENTATION: LEFT;RIGHT

## 2025-03-22 NOTE — PROGRESS NOTES
[facility] Neurology   IN-PATIENT SERVICE   [facility]    Progress Note             Date:   3/22/2025  Patient name:  Lexie Mireles  Date of admission:  3/15/2025  9:59 PM  MRN:   [id number]  Account:  [id number]  YOB: 1970  PCP:    Viktor Fox APRN - CNP  Room:   [address] [place] [address]  Code Status:    Full Code    Chief Complaint:     Chief Complaint   Patient presents with    Dizziness       Interval hx:     No acute events.   Pending placement   Cardiology consulted for chest pain, recommending stress test done today on 3/22/25.   Pending placement.         Brief History of Present Illness:     The patient is a 54 y.o.  Non- / non  female who presents with Dizziness   and she is admitted to the hospital for the management of  AMS      54 y.o. -American female with past medical history of HTN, HLD, poorly controlled diabetes, diabetic neuropathy, CKD stage IIIa, cocaine abuse, who presented to ED with altered mental status     Patient was at dinner tonight around 9:30 PM when she was feeling generalized weakness and dizzy, was trying to ambulate to the bathroom with the assistance of her , patient progressively got worse and felt on her knees as her  was supporting her however she was confused at that time, event lasted for about 20 minutes, she was brought in to ED and was noted to continuously confused, did not know that she was going to the hospital.  No clear seizure-like activity however  noted that she was shaking.  No urinary or fecal incontinence.  Has never experienced this before.   reported that she was feeling weak and dizzy a week ago.  Patient does not participate with exam, is lethargic, needs repeated to prompting to participate in exam.  Later on patient was in severe distress due to right lower extremity pain.     Initial blood pressure of 120s.  Blood sugars 174.  Initial NIH of 14. LKW: 9:30 PM on 3/15/2025.   CT head was negative for acute abnormalities.  CTA head and neck was negative for LVO or critical stenosis.     BMP showed elevation in creatinine of 1.5, GFR 42.  Patient received contrast, WBCs were 12.3.  Hgb was 9.2.  Troponin 40> 35. EKG was NRS chest x-ray was negative for acute process.        Past Medical History:     Past Medical History:   Diagnosis Date    Asthma     Chronic back pain     GERD (gastroesophageal reflux disease)     HLD (hyperlipidemia) 1/27/2014    Hypertension     Irregular periods/menstrual cycles 2017    Neuropathy     Obesity     Observed seizure-like activity (HCC) 3/16/2025    Type II or unspecified type diabetes mellitus without mention of complication, not stated as uncontrolled 2007    On Insulin, Lantus nightly & Novolog 3 times with meals    Wears glasses         Past Surgical History:     Past Surgical History:   Procedure Laterality Date    COLONOSCOPY N/A 1/28/2025    COLONOSCOPY DIAGNOSTIC performed by Beth Olea MD at [facility] OR    ENDOMETRIAL ABLATION  02/27/2018    ESOPHAGOGASTRODUODENOSCOPY  01/27/2025    Biopsy    CT HYSTEROSCOPY ENDOMETRIAL ABLATION N/A 02/27/2018    ENDOMETRIAL ABLATION WITH SHAHEED performed by Stanley Persaud MD at [facility] OR    TUBAL LIGATION  2008    UPPER GASTROINTESTINAL ENDOSCOPY N/A 1/27/2025    ESOPHAGOGASTRODUODENOSCOPY BIOPSY performed by Beth Olea MD at [facility] OR        Medications Prior to Admission:     Prior to Admission medications    Medication Sig Start Date End Date Taking? Authorizing Provider   metoclopramide (REGLAN) 5 MG tablet Take 1 tablet by mouth 3 times daily as needed (nausea, before meals) 3/6/25  Yes Beth Olea MD   glipiZIDE (GLUCOTROL) 5 MG tablet Take 1 tablet by mouth 2 times daily (before meals) 2/5/25  Yes Viktor Fox APRN - CNP   insulin glargine (LANTUS SOLOSTAR) 100 UNIT/ML injection pen Inject 15 Units into the skin 2 times daily 2/5/25  Yes Viktor Fox APRN - CNP   simvastatin

## 2025-03-22 NOTE — PLAN OF CARE
Problem: Chronic Conditions and Co-morbidities  Goal: Patient's chronic conditions and co-morbidity symptoms are monitored and maintained or improved  Outcome: Progressing     Problem: Discharge Planning  Goal: Discharge to home or other facility with appropriate resources  Outcome: Progressing     Problem: Safety - Adult  Goal: Free from fall injury  Outcome: Progressing  Flowsheets (Taken 3/22/2025 1255 by Jorge Unger RN)  Free From Fall Injury:   Instruct family/caregiver on patient safety   Based on caregiver fall risk screen, instruct family/caregiver to ask for assistance with transferring infant if caregiver noted to have fall risk factors     Problem: Neurosensory - Adult  Goal: Achieves stable or improved neurological status  Outcome: Progressing  Goal: Absence of seizures  Outcome: Progressing     Problem: Musculoskeletal - Adult  Goal: Return mobility to safest level of function  Outcome: Progressing     Problem: Genitourinary - Adult  Goal: Absence of urinary retention  Outcome: Progressing     Problem: Pain  Goal: Verbalizes/displays adequate comfort level or baseline comfort level  Outcome: Progressing     Problem: Cardiovascular - Adult  Goal: Maintains optimal cardiac output and hemodynamic stability  Outcome: Progressing  Goal: Absence of cardiac dysrhythmias or at baseline  Outcome: Progressing

## 2025-03-22 NOTE — CONSULTS
[facility] Cardiology    Consult               Today's Date: 3/22/2025  Patient Name: Lexie Mireles  Date of admission: 3/15/2025  9:59 PM  Patient's age: 54 y.o., 1970  Admission Dx: Altered mental status [R41.82]  Altered mental status, unspecified altered mental status type [R41.82]    Requesting Physician: Sara Palacios MD    Cardiac Evaluation Reason: Chest pain    History Obtained From: patient and chart review     History of Present Illness:    This patient 54 y.o. years old with past medical history given below.  Patient was brought into the hospital with an episode of confusion following fall.  Apparently patient felt weak and dizzy/20 1/2024 around 9:30 PM and was trying to ambulate to the bathroom with the assistance of her .  Patient confusion got worse and ultimately she fell on her knees past patient was supported by her .  She was confused afterwards which lasted for about 20 minutes.  There was no clear seizure-like activity but has been noticing abnormal movements.  On arrival to the emergency department patient was still confused.  She reported right lower extremity pain afterwards.  CTA and CT head were negative for any acute abnormalities.  Creatinine was around 1.5 on arrival with baseline creatinine around 1.1-1.2.  Troponins were also slightly elevated but with downward trend.  Cardiology consulted for evaluation of the chest pain which she developed yesterday, felt like a pressure with radiation to the left upper extremity and subsequently subsided with some medications.  Cardiology consulted for further evaluation of chest pain.    Past Medical History:   has a past medical history of Asthma, Chronic back pain, GERD (gastroesophageal reflux disease), HLD (hyperlipidemia), Hypertension, Irregular periods/menstrual cycles, Neuropathy, Obesity, Observed seizure-like activity (HCC), Type II or unspecified type diabetes mellitus without mention of complication, not  has never used smokeless tobacco. She reports that she does not currently use alcohol. She reports that she does not use drugs.     Family History: family history includes Asthma in her mother, sister, and son; Breast Cancer in her maternal aunt; Diabetes in her maternal aunt, maternal grandmother, maternal uncle, and mother; High Blood Pressure in her mother; High Cholesterol in her mother; No Known Problems in her father, paternal grandfather, and paternal grandmother.     REVIEW OF SYSTEMS:    Constitutional: Negative for fatigue, weight loss, loss of appetite   Cardiovascular: as per HPI  Respiratory: as per HPI  Gastrointestinal: Negative for abdominal pain, N/V  Genitourinary: No dysuria, trouble voiding, or hematuria.  Musculoskeletal:  No gait disturbance, No weakness or joint complaints.  Neurological: No headache, diplopia, change in muscle strength, numbness or tingling. No change in gate.   Endocrine: No temperature intolerance. No excessive thirst, fluid intake, or urination. No tremor.  Hematologic/Lymphatic: No abnormal bruising or bleeding    PHYSICAL EXAM:      BP (!) 141/97   Pulse 91   Temp 97.5 °F (36.4 °C) (Oral)   Resp 17   Ht 1.499 m (4' 11\")   Wt 60 kg (132 lb 3.2 oz)   LMP 09/20/2023 (Exact Date)   SpO2 97%   BMI 26.70 kg/m²    Constitutional and General Appearance: alert, cooperative, in no distress   HEENT: atraumatic, normocephalic.   Respiratory:  Clear to auscultation bilaterally  Cardiovascular:  Regular S1 and S2.  No JVD  Peripheral pulses are symmetrical and full   Abdomen:   Soft, non tender   Bowel sounds present  Extremities:  No Le edema or cyanosis   Neurological:  Deferred     LABS:   CBC:   Recent Labs     03/21/25  1443   WBC 11.5*   HGB 9.6*   HCT 30.3*        BMP:   Recent Labs     03/21/25  1443   *   K 4.1   CO2 21   BUN 30*   CREATININE 1.1*   LABGLOM 60*   GLUCOSE 164*     BNP: No results for input(s): \"BNP\" in the last 72 hours.  PT/INR: No

## 2025-03-22 NOTE — PROGRESS NOTES
Occupational Therapy    [facility]  Occupational Therapy Not Seen Note    DATE: 3/22/2025    NAME: Lexie Mireles  MRN: [id number]   : 1970      Patient not seen this date for Occupational Therapy due to:    Surgery/Procedure: Pt off floor at stress test    Next Scheduled Treatment: Attempt 3/23    Electronically signed by ERIN Hester on 3/22/2025 at 1:02 PM

## 2025-03-22 NOTE — PROGRESS NOTES
[facility]  Office: [phone]  Flaco Santizo DO, Vincenzo Mathur DO, Mike Kurtz DO, Dany Pozo DO, Jade Fuller MD, Reyna Rodriguez MD, Nolan Villanueva MD, Isela Rosenberg MD,  Colt Castelan MD, Akila Bustillos MD, Karissa Mckeon MD,  Ludwig Petersen DO, Juan Antonio Chowdhury MD, Emerson Mcadams MD, Celestino Santizo DO, Caridad Miles MD,  Placido Davila DO, Tamara Clinton MD, Vanessa Blake MD, Xochitl Cohn MD,  Connor Fulton MD, Sofie Allan MD, Vaibhav Raza MD, Velasquez Bowen MD, Darren Wood MD, Jyotsna Payne MD, Augustin Dominguez DO, Dov Bhatia MD, Ludwig Riley MD, Mohsin Reza, MD, Shirley Waterhouse, CNP,  Evi Abraham, CNP, Augustin Peña, CNP,  Jo Magana, DNP, Carmen Yates, CNP, Kayla Encarnacion, CNP, Bertha Ashby, CNP, Mae Bosch, CNP, MONE Cline-C, Wendy Guy, CNP, Homer Watts, CNP,  Krissy Galeana, CNP, Annalisa Sullivan, CNP, Zahida Sharp, CNP,  Teresa Vital, CNS, Jennifer Lai, CNP, Jacqueline Gramajo CNP,   Magalie Ojeda, CNP         [facility]   IN-PATIENT SERVICE   [facility]    Progress Note    3/22/2025    11:17 AM    Name:   Lexie Mirelse  MRN:     [id number]     Acct:      [id number]   Room:   [facility]/05-  IP Day:  6  Admit Date:  3/15/2025  9:59 PM    PCP:   Viktor Fox APRN - CNP  Code Status:  Full Code    Subjective:     C/C:   Chief Complaint   Patient presents with    Dizziness     Interval History Status: not changed.     No issues overnight  Chest pain improved  Notified by stress lab of need for cardiology clearance for stress  Cardiology consulted  Discussed with RN    Medications:     Allergies:    Allergies   Allergen Reactions    Morphine Swelling     When given IV morphine pt had swelling in her arm       Current Meds:   Scheduled Meds:    carvedilol  3.125 mg Oral BID WC    pantoprazole  40 mg Oral BID AC    aspirin  81 mg Oral Daily    amLODIPine  10 mg Oral Daily    [Held by

## 2025-03-22 NOTE — PLAN OF CARE
Stress test reviewed, low risk, negative for ischemia.   No further cardiac work up. We will sign off.     KELLEN Mitchell - NP

## 2025-03-22 NOTE — PROGRESS NOTES
I have discussed the care of patient including pertinent history and exam findings, with the Neurology resident.      I have seen and examined the patient and the key elements of all parts of the encounter have been performed by me. I agree with the assessment, plan and orders as documented by the fellow/resident, after I modified exam findings and plan of treatments, and the final version is my approved version of the assessment.       Active problem Syncope . Gait imbalance .The condition is she had cardiac stress test which was normal . She was was denied by insurance acute rehabilitation no wanting nursing home facility . She is walking 12 to 15 feet in PT with bilateral leg giveway unsteady with PT . She reports to have weakness and giveway of legs for several months .MRI cervical spine mild multilevel degenerative disc disease and facet arthropathy. No significant stenosis. She is alert and oriented with normal language process . There are normal cranial nerves . There is normal strength with normal sensation . 53 yo lady with episode of unresponsiveness with confusion . She has history of HTN , hyperlipidemia , DM CKD , cocaine use . She had episode of unresponsiveness on getting up from dining table becoming dizzy then going out being confused on coming to brought to [facility] ER . Head CT normal . CTA head ad neck normal . MRI of Head normal . EEG normal . She is non orthostatic blood pressure supine 159/107 , sitting 160/115 , standing 147/103 . She is walking 13 feet in PT with bilateral leg giveway unsteady with PT recommending SNF . She reports to have weakness and giveway of legs for several months . Urine toxicology negative . Alcohol level < 10   On exam she is alert and oriented wit noma language process , There are normal cranial nerves  There is normal strength with normal sensation with symmetrical reflexes   Impression Syncope . Gait imbalance . Chest pain   Plan  Discharge tomorrow home with home  therapy

## 2025-03-23 VITALS
DIASTOLIC BLOOD PRESSURE: 90 MMHG | BODY MASS INDEX: 26.65 KG/M2 | TEMPERATURE: 98.2 F | HEART RATE: 96 BPM | WEIGHT: 132.2 LBS | OXYGEN SATURATION: 99 % | HEIGHT: 59 IN | RESPIRATION RATE: 9 BRPM | SYSTOLIC BLOOD PRESSURE: 146 MMHG

## 2025-03-23 LAB
GLUCOSE BLD-MCNC: 176 MG/DL (ref 65–105)
GLUCOSE BLD-MCNC: 213 MG/DL (ref 65–105)
GLUCOSE BLD-MCNC: 228 MG/DL (ref 65–105)

## 2025-03-23 PROCEDURE — 6370000000 HC RX 637 (ALT 250 FOR IP)

## 2025-03-23 PROCEDURE — 82947 ASSAY GLUCOSE BLOOD QUANT: CPT

## 2025-03-23 PROCEDURE — 6360000002 HC RX W HCPCS

## 2025-03-23 PROCEDURE — 99231 SBSQ HOSP IP/OBS SF/LOW 25: CPT | Performed by: INTERNAL MEDICINE

## 2025-03-23 PROCEDURE — 99232 SBSQ HOSP IP/OBS MODERATE 35: CPT | Performed by: PSYCHIATRY & NEUROLOGY

## 2025-03-23 PROCEDURE — 2500000003 HC RX 250 WO HCPCS

## 2025-03-23 RX ORDER — CARVEDILOL 3.12 MG/1
3.12 TABLET ORAL 2 TIMES DAILY WITH MEALS
Qty: 60 TABLET | Refills: 3 | Status: SHIPPED | OUTPATIENT
Start: 2025-03-24

## 2025-03-23 RX ORDER — NAPROXEN 500 MG/1
500 TABLET ORAL 2 TIMES DAILY PRN
Qty: 60 TABLET | Refills: 0 | Status: SHIPPED | OUTPATIENT
Start: 2025-03-23

## 2025-03-23 RX ADMIN — AMLODIPINE BESYLATE 10 MG: 10 TABLET ORAL at 08:46

## 2025-03-23 RX ADMIN — ENOXAPARIN SODIUM 40 MG: 100 INJECTION SUBCUTANEOUS at 08:45

## 2025-03-23 RX ADMIN — INSULIN GLARGINE 10 UNITS: 100 INJECTION, SOLUTION SUBCUTANEOUS at 08:51

## 2025-03-23 RX ADMIN — CARVEDILOL 3.12 MG: 6.25 TABLET, FILM COATED ORAL at 08:47

## 2025-03-23 RX ADMIN — PANTOPRAZOLE SODIUM 40 MG: 40 TABLET, DELAYED RELEASE ORAL at 05:46

## 2025-03-23 RX ADMIN — GABAPENTIN 300 MG: 300 CAPSULE ORAL at 16:43

## 2025-03-23 RX ADMIN — INSULIN LISPRO 2 UNITS: 100 INJECTION, SOLUTION INTRAVENOUS; SUBCUTANEOUS at 12:07

## 2025-03-23 RX ADMIN — ASPIRIN 81 MG: 81 TABLET, COATED ORAL at 08:47

## 2025-03-23 RX ADMIN — GABAPENTIN 300 MG: 300 CAPSULE ORAL at 08:47

## 2025-03-23 RX ADMIN — CARVEDILOL 3.12 MG: 6.25 TABLET, FILM COATED ORAL at 16:43

## 2025-03-23 RX ADMIN — ERGOCALCIFEROL 50000 UNITS: 1.25 CAPSULE ORAL at 08:46

## 2025-03-23 RX ADMIN — INSULIN LISPRO 2 UNITS: 100 INJECTION, SOLUTION INTRAVENOUS; SUBCUTANEOUS at 16:40

## 2025-03-23 RX ADMIN — SODIUM CHLORIDE, PRESERVATIVE FREE 10 ML: 5 INJECTION INTRAVENOUS at 08:45

## 2025-03-23 NOTE — PROGRESS NOTES
[facility] Neurology   IN-PATIENT SERVICE   [facility]    Progress Note             Date:   3/23/2025  Patient name:  Lexie Mireles  Date of admission:  3/15/2025  9:59 PM  MRN:   [id number]  Account:  [id number]  YOB: 1970  PCP:    Viktor Fox APRN - CNP  Room:   [address] [place] [address]  Code Status:    Full Code    Chief Complaint:     Chief Complaint   Patient presents with    Dizziness       Interval hx:     No acute events.   Pending placement   Cardiology consulted for chest pain, recommending stress test done today on 3/22/25.   Stress test shows no evidence of stress induced ischemia. Normal LVEF 63%. No infarction detected.   Likely plan to DC home with therapy.         Brief History of Present Illness:     The patient is a 54 y.o.  Non- / non  female who presents with Dizziness   and she is admitted to the hospital for the management of  AMS      54 y.o. -American female with past medical history of HTN, HLD, poorly controlled diabetes, diabetic neuropathy, CKD stage IIIa, cocaine abuse, who presented to ED with altered mental status     Patient was at dinner tonight around 9:30 PM when she was feeling generalized weakness and dizzy, was trying to ambulate to the bathroom with the assistance of her , patient progressively got worse and felt on her knees as her  was supporting her however she was confused at that time, event lasted for about 20 minutes, she was brought in to ED and was noted to continuously confused, did not know that she was going to the hospital.  No clear seizure-like activity however  noted that she was shaking.  No urinary or fecal incontinence.  Has never experienced this before.   reported that she was feeling weak and dizzy a week ago.  Patient does not participate with exam, is lethargic, needs repeated to prompting to participate in exam.  Later on patient was in severe distress due to right lower  injection pen Inject 15 Units into the skin 2 times daily 2/5/25  Yes Viktor Fox APRN - CNP   simvastatin (ZOCOR) 80 MG tablet Take 1 tablet by mouth nightly 2/5/25  Yes Viktor Fox APRN - CNP   vitamin D (ERGOCALCIFEROL) 1.25 MG (78966 UT) CAPS capsule Take 1 capsule by mouth once a week 2/5/25  Yes Viktor Fox APRN - CNP   pantoprazole (PROTONIX) 40 MG tablet Take 1 tablet by mouth every morning (before breakfast) 1/29/25  Yes Reyna Rodriguez MD   amLODIPine (NORVASC) 10 MG tablet Take 1 tablet by mouth daily 1/8/25  Yes Viktor Fox APRN - CNP   diclofenac sodium (VOLTAREN) 1 % GEL Apply 4 g topically 4 times daily 11/22/24  Yes Brynn Babcock PA-C   gabapentin (NEURONTIN) 300 MG capsule Take 1 capsule by mouth 3 times daily for 60 days. Intended supply: 30 days 7/24/24 11/6/25 Yes Viktor Fox APRN - CNP   aspirin (ASPIRIN LOW DOSE) 81 MG EC tablet Take 1 tablet by mouth daily 12/5/23  Yes Viktor Fox APRN - CNP   dapagliflozin (FARXIGA) 10 MG tablet Take 1 tablet by mouth every morning 2/5/25   Viktor Fox APRN - CNP   Semaglutide (RYBELSUS) 7 MG TABS Take 7 mg by mouth every morning (before breakfast) or first food or drink of the day. Start after 30 days of the 3mg daily dose.  Patient not taking: Reported on 3/16/2025 2/5/25   Viktor Fox APRN - CNP   albuterol (PROVENTIL) (2.5 MG/3ML) 0.083% nebulizer solution Take 3 mLs by nebulization 4 times daily as needed for Wheezing 7/29/24   Viktor Fox APRN - CNP   albuterol sulfate HFA (PROVENTIL;VENTOLIN;PROAIR) 108 (90 Base) MCG/ACT inhaler Inhale 2 puffs into the lungs every 6 hours as needed for Wheezing or Shortness of Breath 7/24/24   Viktor Fox, KELLEN - CNP   beclomethasone (QVAR REDIHALER) 80 MCG/ACT AERB inhaler Inhale 1 puff into the lungs in the morning and 1 puff in the evening. 7/24/24   Viktor Fox, KELLEN Gresham CNP   blood glucose test

## 2025-03-23 NOTE — PROGRESS NOTES
[facility]  Office: [phone]  Flaco Santizo DO, Vincenzo Mathur DO, Mike Kurtz DO, Dany Pozo DO, Jade Fuller MD, Reyna Rodriguez MD, Nolan Villanueva MD, Isela Rosenberg MD,  Colt Castelan MD, Akila Bustillos MD, Karissa Mckeon MD,  Ludwig Petersen DO, Juan Antonio Chowdhury MD, Emerson Mcadams MD, Celestino Santizo DO, Caridad Miles MD,  Placido Davila DO, Tamara Clinton MD, Vanessa Blake MD, Xochitl Cohn MD,  Connor Fulton MD, Sofie Allan MD, Vaibhav Raza MD, Velasquez Bowen MD, Darren Wood MD, Jyotsna Payne MD, Augustin Dominguez DO, Dov Bhatia MD, Ludwig Riley MD, Mohsin Reza, MD, Shirley Waterhouse, CNP,  Evi Abraham, CNP, Augustin Peña, CNP,  Jo Magana, DNP, Carmen Yates, CNP, Kayla Encarnacion, CNP, Bertha Ashby, CNP, Mae Bosch, CNP, MONE Cline-C, Wendy Guy, CNP, Homer Watts, CNP,  Krissy Galeana, CNP, Annalisa Sullivan, CNP, Zahida Sharp, CNP,  Teresa Vital, CNS, Jennifer Lai, CNP, Jacqueline Gramajo CNP,   Magalie Ojeda, CNP         [facility]   IN-PATIENT SERVICE   [facility]    Progress Note    3/23/2025    12:59 PM    Name:   Lexie Mireles  MRN:     [id number]     Acct:      [id number]   Room:   [address] [place] [address]  IP Day:  7  Admit Date:  3/15/2025  9:59 PM    PCP:   Viktor Fox APRN - CNP  Code Status:  Full Code    Subjective:     C/C:   Chief Complaint   Patient presents with    Dizziness     Interval History Status: not changed.     No issues overnight  No further chest pain   Stress negative  No other complaints     Medications:     Allergies:    Allergies   Allergen Reactions    Morphine Swelling     When given IV morphine pt had swelling in her arm       Current Meds:   Scheduled Meds:    carvedilol  3.125 mg Oral BID WC    pantoprazole  40 mg Oral BID AC    aspirin  81 mg Oral Daily    amLODIPine  10 mg Oral Daily    [Held by provider] glipiZIDE  5 mg Oral BID AC    insulin glargine  10 Units      CTA HEAD NECK W CONTRAST  Result Date: 3/15/2025  No large vessel occlusion in the head or neck.     XR CHEST PORTABLE  Result Date: 3/15/2025  1. No acute cardiopulmonary abnormality is identified. 2. Low lung volumes.       Physical Examination:        General appearance:  alert, cooperative and no distress  Mental Status:  oriented to person, place and time and normal affect  Lungs:  clear to auscultation bilaterally, normal effort  Heart:  regular rate and rhythm  Abdomen:  soft, nontender, nondistended, normal bowel sounds  Extremities:  no edema, redness, tenderness in the calves  Skin:  no gross lesions, rashes, induration    Assessment:        Hospital Problems           Last Modified POA    * (Principal) Altered mental status 3/16/2025 Yes    Hypertension, essential 11/13/2024 Yes    Elevated troponin 3/22/2025 Yes    Type 2 diabetes mellitus with hyperglycemia, with long-term current use of insulin (HCC) 3/21/2025 Yes    Observed seizure-like activity (HCC) 3/16/2025 Yes    Episode of generalized weakness 3/16/2025 Yes    Chest pain 3/21/2025 Yes       Plan:        - Vitals, labs, medications  - Continue current meds for DM/HTN  - Stress negative, no further chest pain  - Ok to DC from IM perspective     Thank you for consult, call with questions     Colt Castelan MD  3/23/2025  12:59 PM

## 2025-03-23 NOTE — DISCHARGE SUMMARY
[facility]     Department of Neurology    INPATIENT DISCHARGE SUMMARY        Patient Identification:  Lexie Mireles is a 54 y.o. female.  :  1970  MRN: [id number]     Acct: [id number]   Admit Date:  3/15/2025  Discharge date and time: No discharge date for patient encounter.   Attending Provider: Bk Pro MD                                     Admission Diagnoses:   Altered mental status [R41.82]  Altered mental status, unspecified altered mental status type [R41.82]    Discharge Diagnoses:   Principal Problem:    Altered mental status  Active Problems:    Hypertension, essential    Elevated troponin    Type 2 diabetes mellitus with hyperglycemia, with long-term current use of insulin (HCC)    Observed seizure-like activity (HCC)    Episode of generalized weakness    Chest pain  Resolved Problems:    * No resolved hospital problems. *       Consults:   Internal medicine  Cardiology    Brief Inpatient course:    54 y.o. -American female with past medical history of HTN, HLD, poorly controlled diabetes, diabetic neuropathy, CKD stage IIIa, cocaine abuse, who presented to ED with altered mental status     Patient was at dinner tonight around 9:30 PM when she was feeling generalized weakness and dizzy, was trying to ambulate to the bathroom with the assistance of her , patient progressively got worse and felt on her knees as her  was supporting her however she was confused at that time, event lasted for about 20 minutes, she was brought in to ED and was noted to continuously confused, did not know that she was going to the hospital.  No clear seizure-like activity however  noted that she was shaking.  No urinary or fecal incontinence.  Has never experienced this before.   reported that she was feeling weak and dizzy a week ago.  Patient does not participate with exam, is lethargic, needs repeated to prompting to participate in exam.  Later on  5    Associated Diagnoses: RAD (reactive airway disease), mild intermittent, with acute exacerbation      beclomethasone (QVAR REDIHALER) 80 MCG/ACT AERB inhaler Inhale 1 puff into the lungs in the morning and 1 puff in the evening.  Qty: 1 each, Refills: 5    Associated Diagnoses: RAD (reactive airway disease), mild intermittent, with acute exacerbation      blood glucose test strips (TRUE METRIX BLOOD GLUCOSE TEST) strip Use to check blood glucose 4 times daily;  Before meals and bedtime, and as needed.  Qty: 100 each, Refills: 3    Associated Diagnoses: Diabetes mellitus type 2, insulin dependent (HCC)      !! Insulin Pen Needle (B-D ULTRAFINE III SHORT PEN) 31G X 8 MM MISC Inject 1 each into the skin daily May substitute with any brand  Qty: 100 each, Refills: 11    Associated Diagnoses: Diabetes mellitus type 2, insulin dependent (HCC)      Lancets MISC 1 each by Does not apply route 4 times daily  Qty: 100 each, Refills: 5      !! Handicap Placard MISC by Does not apply route  Qty: 1 each, Refills: 0    Associated Diagnoses: Diabetic peripheral neuropathy (HCC)      clotrimazole-betamethasone (LOTRISONE) 1-0.05 % cream Apply topically 2 times daily.  Qty: 45 g, Refills: 1      !! Insulin Pen Needle ([employer] PEN NEEDLES 31G) 31G X 8 MM MISC 1 each by Does not apply route daily  Qty: 100 each, Refills: 3      !! Handicap Placard MISC by Does not apply route Exp March 2028  Qty: 1 each, Refills: 0    Associated Diagnoses: Degeneration of L4-L5 intervertebral disc      Blood Glucose Monitoring Suppl (TRUE METRIX METER) w/Device KIT Use as directed to check blood glucose.  Qty: 1 kit, Refills: 0       !! - Potential duplicate medications found. Please discuss with provider.          Activity: activity as tolerated    Restrictions:       Diet: regular diet    Follow-up:    Viktor Fox, APRN - CNP  [address] [place] [id number] [phone]    Schedule an appointment as soon as possible for a

## 2025-03-23 NOTE — TRANSITION OF CARE
Post Acute Facility/Agency List     Provided patient with the following list, the list includes the overall star ratings obtained from CMS per the Medicare Web site (www.Medicare.gov):     [] Long Term Acute Care Facilities  [] Acute Inpatient Rehabilitation Facilities  [] Skilled Nursing Facilities  [] Hospice Facilities  [x] Home Care    Provided verbal instructions on how to utilize the QR Code to obtain additional detailed star ratings from www.Medicare.gov    Will place referral once provided    1630 Referral placed for    [facility]  [facility]

## 2025-03-23 NOTE — CARE COORDINATION
Case Management   Daily Progress Note       Patient Name: Lexie Mireles                   YOB: 1970  Diagnosis: Altered mental status [R41.82]  Altered mental status, unspecified altered mental status type [R41.82]                       GMLOS: 2.6 days  Length of Stay: 7  days    Anticipated Discharge Date: Two or more days before discharge    Readmission Risk (Low < 19, Mod (19-27), High > 27): Readmission Risk Score: 21.8      Patient Transitional Goal: Home    Current Transitional Plan    [] Home Independently    [x] Home with HC    [] Skilled Nursing Facility    [] Acute Rehabilitation    [] Long Term Acute Care (LTAC)    [] Other:     Plan for the Stay (Medical Management) :          Workflow Continuation (Additional Notes) : P2P denied. [facility] list provided, will have ride home.         KAREN WINTERS RN  March 23, 2025

## 2025-03-24 ENCOUNTER — CARE COORDINATION (OUTPATIENT)
Dept: CARE COORDINATION | Age: 55
End: 2025-03-24

## 2025-03-24 NOTE — CARE COORDINATION
Care Transitions Note    Initial Call - Call within 2 business days of discharge: Yes    Attempted to reach patient for transitions of care follow up. Unable to reach patient.    Outreach Attempts:   HIPAA compliant voicemail left for patient.     Patient: Lexie Mireles    Patient : 1970   MRN: [id number]    Reason for Admission: Altered mental status, unspecified altered mental status type   Discharge Date: 3/23/25  RURS: Readmission Risk Score: 22.2    Last Discharge Facility       Date Complaint Diagnosis Description Type Department Provider    3/15/25 Dizziness Altered mental status, unspecified altered mental status type ... ED to Hosp-Admission (Discharged) (ADMITTED) [facility] Bk Pro MD; Josh,...            Was this an external facility discharge? No    Follow Up Appointment:   Patient does not have a follow up appointment scheduled at time of call.     Future Appointments         Provider Specialty Dept Phone    3/31/2025 8:15 AM Beth Olea MD Gastroenterology [phone]    2025 2:10 PM Ruy Carroll MD Nephrology [phone]    2025 7:45 AM Viktor Fox, APRN - [facility] [phone]            Plan for follow-up on next business day. 2nd attempt 24 hr HFU      Hannah Mays RN

## 2025-03-25 ENCOUNTER — CARE COORDINATION (OUTPATIENT)
Dept: CARE COORDINATION | Age: 55
End: 2025-03-25

## 2025-03-25 NOTE — CARE COORDINATION
Care Transitions Note    Initial Call - Call within 2 business days of discharge: Yes    Attempted to reach patient for transitions of care follow up. Unable to reach patient.    Outreach Attempts:   HIPAA compliant voicemail left for patient.     Patient: Lexie Mireles    Patient : 1970   MRN: [id number]    Reason for Admission: Altered mental status,  Discharge Date: 3/23/25  RURS: Readmission Risk Score: 22.2    Last Discharge Facility       Date Complaint Diagnosis Description Type Department Provider    3/15/25 Dizziness Altered mental status, unspecified altered mental status type ... ED to Hosp-Admission (Discharged) (ADMITTED) [facility] Bk Pro MD; Josh,...            Was this an external facility discharge? No    Follow Up Appointment:   Patient has hospital follow up appointment scheduled        Future Appointments         Provider Specialty Dept Phone    3/31/2025 8:15 AM Beth Olea MD Gastroenterology [phone]    2025 2:10 PM Ruy Carroll MD Nephrology [phone]    2025 7:45 AM Viktor Fox, APRN - [facility] [phone]            No further follow-up call indicated     Hannah Mays RN

## 2025-04-07 NOTE — PROGRESS NOTES
diagnosis  -- Disagree - Not applicable / Not valid  -- Disagree - Clinically unable to determine / Unknown  -- Refer to Clinical Documentation Reviewer    PROVIDER RESPONSE TEXT:    The patient has toxic metabolic encephalopathy.    Query created by: Carlita Warren on 4/4/2025 8:45 AM      Electronically signed by:  KELY BRADFORD 4/7/2025 2:08 AM

## 2025-04-11 ENCOUNTER — TELEPHONE (OUTPATIENT)
Dept: ADMINISTRATIVE | Age: 55
End: 2025-04-11

## 2025-04-11 DIAGNOSIS — R10.84 GENERALIZED ABDOMINAL PAIN: ICD-10-CM

## 2025-04-11 RX ORDER — METOCLOPRAMIDE 5 MG/1
5 TABLET ORAL 3 TIMES DAILY PRN
Qty: 90 TABLET | Refills: 0 | Status: SHIPPED | OUTPATIENT
Start: 2025-04-11

## 2025-04-11 NOTE — TELEPHONE ENCOUNTER
Patient is requesting a refill on the following medications    metoclopramide (REGLAN) 5 MG tablet     Pharmacy is [facility] on [address]    COMP OUT!

## 2025-04-16 ENCOUNTER — HOSPITAL ENCOUNTER (OUTPATIENT)
Age: 55
Discharge: HOME | End: 2025-04-16
Payer: MEDICARE

## 2025-04-16 ENCOUNTER — RESULTS FOLLOW-UP (OUTPATIENT)
Dept: PRIMARY CARE CLINIC | Age: 55
End: 2025-04-16

## 2025-04-16 ENCOUNTER — OFFICE VISIT (OUTPATIENT)
Dept: PRIMARY CARE CLINIC | Age: 55
End: 2025-04-16
Payer: MEDICARE

## 2025-04-16 VITALS
WEIGHT: 125 LBS | OXYGEN SATURATION: 99 % | SYSTOLIC BLOOD PRESSURE: 142 MMHG | BODY MASS INDEX: 25.2 KG/M2 | DIASTOLIC BLOOD PRESSURE: 86 MMHG | HEART RATE: 89 BPM | TEMPERATURE: 97.3 F | HEIGHT: 59 IN

## 2025-04-16 DIAGNOSIS — E11.9 DIABETES MELLITUS TYPE 2, INSULIN DEPENDENT (HCC): Primary | ICD-10-CM

## 2025-04-16 DIAGNOSIS — M54.50 LUMBAR PAIN: ICD-10-CM

## 2025-04-16 DIAGNOSIS — Z79.4 DIABETES MELLITUS TYPE 2, INSULIN DEPENDENT (HCC): Primary | ICD-10-CM

## 2025-04-16 DIAGNOSIS — E11.9 DIABETES MELLITUS TYPE 2, INSULIN DEPENDENT (HCC): ICD-10-CM

## 2025-04-16 DIAGNOSIS — M51.369 DEGENERATION OF L4-L5 INTERVERTEBRAL DISC: ICD-10-CM

## 2025-04-16 DIAGNOSIS — Z79.4 DIABETES MELLITUS TYPE 2, INSULIN DEPENDENT (HCC): ICD-10-CM

## 2025-04-16 DIAGNOSIS — K30 DELAYED GASTRIC EMPTYING: ICD-10-CM

## 2025-04-16 LAB
25(OH)D3 SERPL-MCNC: 29.5 NG/ML (ref 30–100)
ANION GAP SERPL CALCULATED.3IONS-SCNC: 11 MMOL/L (ref 9–16)
BUN SERPL-MCNC: 25 MG/DL (ref 6–20)
CALCIUM SERPL-MCNC: 9.6 MG/DL (ref 8.6–10.4)
CHLORIDE SERPL-SCNC: 105 MMOL/L (ref 98–107)
CO2 SERPL-SCNC: 24 MMOL/L (ref 20–31)
CREAT SERPL-MCNC: 1.2 MG/DL (ref 0.6–0.9)
ERYTHROCYTE [DISTWIDTH] IN BLOOD BY AUTOMATED COUNT: 14.4 % (ref 11.8–14.4)
GFR, ESTIMATED: 54 ML/MIN/1.73M2
GLUCOSE SERPL-MCNC: 100 MG/DL (ref 74–99)
HCT VFR BLD AUTO: 33.8 % (ref 36.3–47.1)
HGB BLD-MCNC: 10.1 G/DL (ref 11.9–15.1)
MCH RBC QN AUTO: 26 PG (ref 25.2–33.5)
MCHC RBC AUTO-ENTMCNC: 29.9 G/DL (ref 28.4–34.8)
MCV RBC AUTO: 86.9 FL (ref 82.6–102.9)
NRBC BLD-RTO: 0 PER 100 WBC
PLATELET # BLD AUTO: 429 K/UL (ref 138–453)
PMV BLD AUTO: 10.1 FL (ref 8.1–13.5)
POTASSIUM SERPL-SCNC: 4.4 MMOL/L (ref 3.7–5.3)
RBC # BLD AUTO: 3.89 M/UL (ref 3.95–5.11)
SODIUM SERPL-SCNC: 140 MMOL/L (ref 136–145)
WBC OTHER # BLD: 9.4 K/UL (ref 3.5–11.3)

## 2025-04-16 PROCEDURE — 80048 BASIC METABOLIC PNL TOTAL CA: CPT

## 2025-04-16 PROCEDURE — 3077F SYST BP >= 140 MM HG: CPT | Performed by: NURSE PRACTITIONER

## 2025-04-16 PROCEDURE — 3079F DIAST BP 80-89 MM HG: CPT | Performed by: NURSE PRACTITIONER

## 2025-04-16 PROCEDURE — 82306 VITAMIN D 25 HYDROXY: CPT

## 2025-04-16 PROCEDURE — 36415 COLL VENOUS BLD VENIPUNCTURE: CPT

## 2025-04-16 PROCEDURE — 99214 OFFICE O/P EST MOD 30 MIN: CPT | Performed by: NURSE PRACTITIONER

## 2025-04-16 PROCEDURE — 85027 COMPLETE CBC AUTOMATED: CPT

## 2025-04-16 PROCEDURE — 3046F HEMOGLOBIN A1C LEVEL >9.0%: CPT | Performed by: NURSE PRACTITIONER

## 2025-04-16 RX ORDER — GABAPENTIN 300 MG/1
300 CAPSULE ORAL 3 TIMES DAILY
Qty: 90 CAPSULE | Refills: 2 | Status: SHIPPED | OUTPATIENT
Start: 2025-04-16 | End: 2025-06-15

## 2025-04-16 RX ORDER — INSULIN GLARGINE 100 [IU]/ML
18 INJECTION, SOLUTION SUBCUTANEOUS 2 TIMES DAILY
Qty: 5 ADJUSTABLE DOSE PRE-FILLED PEN SYRINGE | Refills: 2 | Status: SHIPPED | OUTPATIENT
Start: 2025-04-16

## 2025-04-16 RX ORDER — ACYCLOVIR 400 MG/1
1 TABLET ORAL
Qty: 3 EACH | Refills: 5 | Status: SHIPPED | OUTPATIENT
Start: 2025-04-16

## 2025-04-16 ASSESSMENT — ENCOUNTER SYMPTOMS
COUGH: 0
SHORTNESS OF BREATH: 0

## 2025-04-16 NOTE — PROGRESS NOTES
[address] [facility] CARE [place] [facility] [address]   4/16/2025    Lexie Mireles is a 54 y.o. female who presents today for her medical conditions and/or complaints as noted below.    Lexie Mireles is scheduled today for Follow-up  .      HPI:     History of Present Illness  Per dc summary:  54 y.o. -American female with past medical history of HTN, HLD, poorly controlled diabetes, diabetic neuropathy, CKD stage IIIa, cocaine abuse, who presented to ED with altered mental status     Patient was at dinner tonight around 9:30 PM when she was feeling generalized weakness and dizzy, was trying to ambulate to the bathroom with the assistance of her , patient progressively got worse and felt on her knees as her  was supporting her however she was confused at that time, event lasted for about 20 minutes, she was brought in to ED and was noted to continuously confused, did not know that she was going to the hospital.  No clear seizure-like activity however  noted that she was shaking.  No urinary or fecal incontinence.  Has never experienced this before.   reported that she was feeling weak and dizzy a week ago.  Patient does not participate with exam, is lethargic, needs repeated to prompting to participate in exam.  Later on patient was in severe distress due to right lower extremity pain.     Initial blood pressure of 120s.  Blood sugars 174.  Initial NIH of 14. LKW: 9:30 PM on 3/15/2025.  CT head was negative for acute abnormalities.  CTA head and neck was negative for LVO or critical stenosis.     BMP showed elevation in creatinine of 1.5, GFR 42.  Patient received contrast, WBCs were 12.3.  Hgb was 9.2.  Troponin 40> 35. EKG was NRS chest x-ray was negative for acute process.     Patient was found to have chest pain during the course of her hospital stay and was evaluated cardiology.  She underwent stress test on 3/22/2025 showing no evidence of stress-induced

## 2025-04-16 NOTE — RESULT ENCOUNTER NOTE
She needs to make an apt with kidney doctor, it looks like she missed her most recent apt.  Vitamin d is better but can still take 1000 units otc like we discussed.  Hemoglobin stable.

## 2025-04-21 PROBLEM — R79.89 ELEVATED TROPONIN: Status: RESOLVED | Noted: 2025-03-22 | Resolved: 2025-04-21

## 2025-05-21 ENCOUNTER — OFFICE VISIT (OUTPATIENT)
Dept: PRIMARY CARE CLINIC | Age: 55
End: 2025-05-21
Payer: MEDICARE

## 2025-05-21 ENCOUNTER — TELEPHONE (OUTPATIENT)
Dept: PRIMARY CARE CLINIC | Age: 55
End: 2025-05-21

## 2025-05-21 ENCOUNTER — TELEPHONE (OUTPATIENT)
Dept: GASTROENTEROLOGY | Age: 55
End: 2025-05-21

## 2025-05-21 VITALS
HEIGHT: 59 IN | WEIGHT: 118 LBS | OXYGEN SATURATION: 100 % | DIASTOLIC BLOOD PRESSURE: 98 MMHG | BODY MASS INDEX: 23.79 KG/M2 | HEART RATE: 93 BPM | SYSTOLIC BLOOD PRESSURE: 128 MMHG

## 2025-05-21 DIAGNOSIS — E11.9 DIABETES MELLITUS TYPE 2, INSULIN DEPENDENT (HCC): Primary | ICD-10-CM

## 2025-05-21 DIAGNOSIS — Z79.4 DIABETES MELLITUS TYPE 2, INSULIN DEPENDENT (HCC): Primary | ICD-10-CM

## 2025-05-21 DIAGNOSIS — R10.84 GENERALIZED ABDOMINAL PAIN: ICD-10-CM

## 2025-05-21 DIAGNOSIS — N18.31 CHRONIC KIDNEY DISEASE, STAGE 3A (HCC): ICD-10-CM

## 2025-05-21 LAB — HBA1C MFR BLD: 8.9 %

## 2025-05-21 PROCEDURE — 3052F HG A1C>EQUAL 8.0%<EQUAL 9.0%: CPT | Performed by: NURSE PRACTITIONER

## 2025-05-21 PROCEDURE — 99214 OFFICE O/P EST MOD 30 MIN: CPT | Performed by: NURSE PRACTITIONER

## 2025-05-21 PROCEDURE — 83036 HEMOGLOBIN GLYCOSYLATED A1C: CPT | Performed by: NURSE PRACTITIONER

## 2025-05-21 PROCEDURE — 3080F DIAST BP >= 90 MM HG: CPT | Performed by: NURSE PRACTITIONER

## 2025-05-21 PROCEDURE — 3074F SYST BP LT 130 MM HG: CPT | Performed by: NURSE PRACTITIONER

## 2025-05-21 RX ORDER — FAMOTIDINE 20 MG/1
20 TABLET, FILM COATED ORAL DAILY
Qty: 30 TABLET | Refills: 3 | Status: SHIPPED | OUTPATIENT
Start: 2025-05-21

## 2025-05-21 NOTE — PROGRESS NOTES
[address] [facility] MAIN FLOOR  [facility] [address]   5/29/2025    Lexie Mireles is a 54 y.o. female who presents today for her medical conditions and/or complaints as noted below.    Lexie Mireles is scheduled today for Leg Pain (1 month f/u ) and Back Pain (Having problems sleeping due to the pain, would like to discuss a different medication )    HPI:     History of Present Illness  The patient is a 54-year-old female who presents for follow-up of abdominal pain.    She reports persistent abdominal discomfort, which has not improved despite discontinuation of her medication. Additionally, she notes a further decrease in weight. Her appetite remains inconsistent, with periods of complete loss of appetite and other times when she can only consume small amounts of food. She experiences early satiety, often feeling full after just one bite. Currently, she is asymptomatic but experienced abdominal pain last night, both before and after eating. An appointment with a gastroenterologist is scheduled for either June or July 2025. Her last consultation with a gastroenterologist was during her hospital stay when she underwent diagnostic testing. She has undergone both endoscopy and colonoscopy, with the former revealing no abnormalities. Her current treatment regimen includes Reglan and Protonix, which provide minimal relief.    Her blood glucose levels have been relatively stable, predominantly in the 100s, with occasional spikes into the 200s. She utilizes a continuous glucose monitor and requires a refill of the sensor. Rybelsus was discontinued.    She reports normal bowel movements.    PAST SURGICAL HISTORY:  Endoscopy  Colonoscopy  Egd biopsy showed:    Path Number: [id number]    -- Diagnosis --  A. DUODENUM, BIOPSY:  Unremarkable duodenal mucosa. No significant  villous blunting or increased intraepithelial lymphocytes.     B. STOMACH, BIOPSY:  Unremarkable gastric mucosa. Immunostain for

## 2025-05-21 NOTE — TELEPHONE ENCOUNTER
Called GI Dr. Olea office to see if pt can be seen sooner than July 2025 appt. She has been losing weight quickly and PCP would like GI to follow up sooner in case it is related to her GI concerns in the past.     GI office to return call to office. At this moment, they do not have any openings sooner than end of July.     Routing to PCP as FYI. Will await return call.

## 2025-06-17 ENCOUNTER — HOSPITAL ENCOUNTER (INPATIENT)
Age: 55
LOS: 2 days | Discharge: HOME | End: 2025-06-20
Attending: EMERGENCY MEDICINE | Admitting: STUDENT IN AN ORGANIZED HEALTH CARE EDUCATION/TRAINING PROGRAM
Payer: MEDICARE

## 2025-06-17 DIAGNOSIS — I16.0 HYPERTENSIVE URGENCY: ICD-10-CM

## 2025-06-17 DIAGNOSIS — R41.82 ALTERED MENTAL STATUS, UNSPECIFIED ALTERED MENTAL STATUS TYPE: ICD-10-CM

## 2025-06-17 DIAGNOSIS — I10 HTN (HYPERTENSION), BENIGN: ICD-10-CM

## 2025-06-17 DIAGNOSIS — R55 SYNCOPE, UNSPECIFIED SYNCOPE TYPE: ICD-10-CM

## 2025-06-17 DIAGNOSIS — E88.89 KETOSIS (HCC): ICD-10-CM

## 2025-06-17 DIAGNOSIS — I95.1 ORTHOSTATIC HYPOTENSION: Primary | ICD-10-CM

## 2025-06-17 DIAGNOSIS — I63.219 CEREBROVASCULAR ACCIDENT (CVA) DUE TO STENOSIS OF VERTEBRAL ARTERY, UNSPECIFIED BLOOD VESSEL LATERALITY (HCC): ICD-10-CM

## 2025-06-17 LAB
ALBUMIN SERPL-MCNC: 3.7 G/DL (ref 3.5–5.2)
ALBUMIN/GLOB SERPL: 1.1 {RATIO} (ref 1–2.5)
ALP SERPL-CCNC: 93 U/L (ref 35–104)
ALT SERPL-CCNC: 7 U/L (ref 10–35)
ANION GAP SERPL CALCULATED.3IONS-SCNC: 20 MMOL/L (ref 9–16)
APAP SERPL-MCNC: 10 UG/ML (ref 10–30)
AST SERPL-CCNC: 13 U/L (ref 10–35)
B-OH-BUTYR SERPL-MCNC: 1.83 MMOL/L (ref 0.02–0.27)
BASOPHILS # BLD: 0.05 K/UL (ref 0–0.2)
BASOPHILS NFR BLD: 0 % (ref 0–2)
BILIRUB SERPL-MCNC: 0.7 MG/DL (ref 0–1.2)
BODY TEMPERATURE: 37
BUN SERPL-MCNC: 25 MG/DL (ref 6–20)
CALCIUM SERPL-MCNC: 9.7 MG/DL (ref 8.6–10.4)
CHLORIDE SERPL-SCNC: 94 MMOL/L (ref 98–107)
CO2 SERPL-SCNC: 20 MMOL/L (ref 20–31)
COHGB MFR BLD: 2.6 % (ref 0–5)
CREAT SERPL-MCNC: 1.2 MG/DL (ref 0.6–0.9)
EOSINOPHIL # BLD: 0.06 K/UL (ref 0–0.44)
EOSINOPHILS RELATIVE PERCENT: 1 % (ref 1–4)
ERYTHROCYTE [DISTWIDTH] IN BLOOD BY AUTOMATED COUNT: 14.2 % (ref 11.8–14.4)
ETHANOL PERCENT: <0.01 %
ETHANOLAMINE SERPL-MCNC: <10 MG/DL (ref 0–0.08)
FIO2 ON VENT: ABNORMAL %
GFR, ESTIMATED: 54 ML/MIN/1.73M2
GLUCOSE SERPL-MCNC: 208 MG/DL (ref 74–99)
HCO3 VENOUS: 26.1 MMOL/L (ref 24–30)
HCT VFR BLD AUTO: 37.1 % (ref 36.3–47.1)
HGB BLD-MCNC: 11.9 G/DL (ref 11.9–15.1)
IMM GRANULOCYTES # BLD AUTO: 0.04 K/UL (ref 0–0.3)
IMM GRANULOCYTES NFR BLD: 0 %
LACTIC ACID, WHOLE BLOOD: 1.6 MMOL/L (ref 0.7–2.1)
LIPASE SERPL-CCNC: 27 U/L (ref 13–60)
LYMPHOCYTES NFR BLD: 2.67 K/UL (ref 1.1–3.7)
LYMPHOCYTES RELATIVE PERCENT: 23 % (ref 24–43)
MAGNESIUM SERPL-MCNC: 1.6 MG/DL (ref 1.6–2.6)
MCH RBC QN AUTO: 26.4 PG (ref 25.2–33.5)
MCHC RBC AUTO-ENTMCNC: 32.1 G/DL (ref 28.4–34.8)
MCV RBC AUTO: 82.3 FL (ref 82.6–102.9)
MONOCYTES NFR BLD: 0.59 K/UL (ref 0.1–1.2)
MONOCYTES NFR BLD: 5 % (ref 3–12)
NEUTROPHILS NFR BLD: 71 % (ref 36–65)
NEUTS SEG NFR BLD: 8.06 K/UL (ref 1.5–8.1)
NRBC BLD-RTO: 0 PER 100 WBC
O2 SAT, VEN: 99.1 % (ref 60–85)
PCO2 VENOUS: 36.2 MM HG (ref 39–55)
PH VENOUS: 7.48 (ref 7.32–7.42)
PLATELET # BLD AUTO: 407 K/UL (ref 138–453)
PMV BLD AUTO: 10.3 FL (ref 8.1–13.5)
PO2 VENOUS: 153.8 MM HG (ref 30–50)
POSITIVE BASE EXCESS, VEN: 2.7 MMOL/L (ref 0–2)
POTASSIUM SERPL-SCNC: 3.2 MMOL/L (ref 3.7–5.3)
PROT SERPL-MCNC: 7.1 G/DL (ref 6.6–8.7)
RBC # BLD AUTO: 4.51 M/UL (ref 3.95–5.11)
RBC # BLD: ABNORMAL 10*6/UL
SALICYLATES SERPL-MCNC: <0.5 MG/DL (ref 0–10)
SODIUM SERPL-SCNC: 134 MMOL/L (ref 136–145)
T4 FREE SERPL-MCNC: 1.3 NG/DL (ref 0.92–1.68)
TROPONIN I SERPL HS-MCNC: 35 NG/L (ref 0–14)
TSH SERPL DL<=0.05 MIU/L-ACNC: 1.56 UIU/ML (ref 0.27–4.2)
WBC OTHER # BLD: 11.5 K/UL (ref 3.5–11.3)

## 2025-06-17 PROCEDURE — 83690 ASSAY OF LIPASE: CPT

## 2025-06-17 PROCEDURE — 84439 ASSAY OF FREE THYROXINE: CPT

## 2025-06-17 PROCEDURE — 80053 COMPREHEN METABOLIC PANEL: CPT

## 2025-06-17 PROCEDURE — 83735 ASSAY OF MAGNESIUM: CPT

## 2025-06-17 PROCEDURE — 84443 ASSAY THYROID STIM HORMONE: CPT

## 2025-06-17 PROCEDURE — 99285 EMERGENCY DEPT VISIT HI MDM: CPT

## 2025-06-17 PROCEDURE — 93005 ELECTROCARDIOGRAM TRACING: CPT

## 2025-06-17 PROCEDURE — 83605 ASSAY OF LACTIC ACID: CPT

## 2025-06-17 PROCEDURE — 36415 COLL VENOUS BLD VENIPUNCTURE: CPT

## 2025-06-17 PROCEDURE — 85025 COMPLETE CBC W/AUTO DIFF WBC: CPT

## 2025-06-17 PROCEDURE — 2580000003 HC RX 258

## 2025-06-17 PROCEDURE — 82010 KETONE BODYS QUAN: CPT

## 2025-06-17 PROCEDURE — G0480 DRUG TEST DEF 1-7 CLASSES: HCPCS

## 2025-06-17 PROCEDURE — 82805 BLOOD GASES W/O2 SATURATION: CPT

## 2025-06-17 PROCEDURE — 80179 DRUG ASSAY SALICYLATE: CPT

## 2025-06-17 PROCEDURE — 80143 DRUG ASSAY ACETAMINOPHEN: CPT

## 2025-06-17 PROCEDURE — 84484 ASSAY OF TROPONIN QUANT: CPT

## 2025-06-17 RX ORDER — MAGNESIUM SULFATE IN WATER 40 MG/ML
2000 INJECTION, SOLUTION INTRAVENOUS ONCE
Status: COMPLETED | OUTPATIENT
Start: 2025-06-17 | End: 2025-06-18

## 2025-06-17 RX ORDER — 0.9 % SODIUM CHLORIDE 0.9 %
1000 INTRAVENOUS SOLUTION INTRAVENOUS ONCE
Status: COMPLETED | OUTPATIENT
Start: 2025-06-17 | End: 2025-06-18

## 2025-06-17 RX ADMIN — SODIUM CHLORIDE 1000 ML: 9 INJECTION, SOLUTION INTRAVENOUS at 23:12

## 2025-06-18 ENCOUNTER — APPOINTMENT (OUTPATIENT)
Dept: CT IMAGING | Age: 55
End: 2025-06-18
Payer: MEDICARE

## 2025-06-18 ENCOUNTER — APPOINTMENT (OUTPATIENT)
Dept: ULTRASOUND IMAGING | Age: 55
End: 2025-06-18
Payer: MEDICARE

## 2025-06-18 ENCOUNTER — APPOINTMENT (OUTPATIENT)
Dept: MRI IMAGING | Age: 55
End: 2025-06-18
Payer: MEDICARE

## 2025-06-18 PROBLEM — I16.0 HYPERTENSIVE URGENCY: Status: ACTIVE | Noted: 2025-06-18

## 2025-06-18 PROBLEM — R55 SYNCOPE AND COLLAPSE: Status: ACTIVE | Noted: 2025-06-18

## 2025-06-18 PROBLEM — G93.40 ENCEPHALOPATHY: Status: ACTIVE | Noted: 2025-06-18

## 2025-06-18 LAB
AMPHET UR QL SCN: NEGATIVE
AMPHET UR QL SCN: NEGATIVE
ANION GAP SERPL CALCULATED.3IONS-SCNC: 16 MMOL/L (ref 9–16)
BACTERIA URNS QL MICRO: NORMAL
BARBITURATES UR QL SCN: NEGATIVE
BARBITURATES UR QL SCN: NEGATIVE
BASOPHILS # BLD: 0.05 K/UL (ref 0–0.2)
BASOPHILS NFR BLD: 1 % (ref 0–2)
BENZODIAZ UR QL: NEGATIVE
BENZODIAZ UR QL: NEGATIVE
BILIRUB UR QL STRIP: NEGATIVE
BUN SERPL-MCNC: 23 MG/DL (ref 6–20)
CALCIUM SERPL-MCNC: 8.9 MG/DL (ref 8.6–10.4)
CANNABINOIDS UR QL SCN: NEGATIVE
CANNABINOIDS UR QL SCN: POSITIVE
CASTS #/AREA URNS LPF: NORMAL /LPF (ref 0–8)
CHLORIDE SERPL-SCNC: 97 MMOL/L (ref 98–107)
CHOLEST SERPL-MCNC: 258 MG/DL (ref 0–199)
CHOLESTEROL/HDL RATIO: 6.5
CK SERPL-CCNC: 45 U/L (ref 26–192)
CLARITY UR: CLEAR
CO2 SERPL-SCNC: 22 MMOL/L (ref 20–31)
COCAINE UR QL SCN: NEGATIVE
COCAINE UR QL SCN: NEGATIVE
COLOR UR: YELLOW
CREAT SERPL-MCNC: 1.2 MG/DL (ref 0.6–0.9)
CRP SERPL HS-MCNC: <3 MG/L (ref 0–5)
EKG ATRIAL RATE: 122 BPM
EKG P AXIS: 62 DEGREES
EKG P-R INTERVAL: 132 MS
EKG Q-T INTERVAL: 344 MS
EKG QRS DURATION: 66 MS
EKG QTC CALCULATION (BAZETT): 490 MS
EKG R AXIS: 26 DEGREES
EKG T AXIS: 52 DEGREES
EKG VENTRICULAR RATE: 122 BPM
EOSINOPHIL # BLD: 0.04 K/UL (ref 0–0.44)
EOSINOPHILS RELATIVE PERCENT: 1 % (ref 1–4)
EPI CELLS #/AREA URNS HPF: NORMAL /HPF (ref 0–5)
ERYTHROCYTE [DISTWIDTH] IN BLOOD BY AUTOMATED COUNT: 14.5 % (ref 11.8–14.4)
FENTANYL UR QL: NEGATIVE
FENTANYL UR QL: NEGATIVE
FOLATE SERPL-MCNC: 11 NG/ML (ref 4.8–24.2)
GFR, ESTIMATED: 54 ML/MIN/1.73M2
GLUCOSE BLD-MCNC: 181 MG/DL (ref 65–105)
GLUCOSE BLD-MCNC: 195 MG/DL (ref 65–105)
GLUCOSE BLD-MCNC: 218 MG/DL (ref 65–105)
GLUCOSE BLD-MCNC: 234 MG/DL (ref 65–105)
GLUCOSE SERPL-MCNC: 190 MG/DL (ref 74–99)
GLUCOSE UR STRIP-MCNC: ABNORMAL MG/DL
HCT VFR BLD AUTO: 30.4 % (ref 36.3–47.1)
HDLC SERPL-MCNC: 40 MG/DL
HGB BLD-MCNC: 10.1 G/DL (ref 11.9–15.1)
HGB UR QL STRIP.AUTO: ABNORMAL
IMM GRANULOCYTES # BLD AUTO: 0.04 K/UL (ref 0–0.3)
IMM GRANULOCYTES NFR BLD: 1 %
KETONES UR STRIP-MCNC: ABNORMAL MG/DL
LDLC SERPL CALC-MCNC: 173 MG/DL (ref 0–100)
LEUKOCYTE ESTERASE UR QL STRIP: ABNORMAL
LYMPHOCYTES NFR BLD: 2.59 K/UL (ref 1.1–3.7)
LYMPHOCYTES RELATIVE PERCENT: 32 % (ref 24–43)
MAGNESIUM SERPL-MCNC: 2.2 MG/DL (ref 1.6–2.6)
MCH RBC QN AUTO: 26.8 PG (ref 25.2–33.5)
MCHC RBC AUTO-ENTMCNC: 33.2 G/DL (ref 28.4–34.8)
MCV RBC AUTO: 80.6 FL (ref 82.6–102.9)
METHADONE UR QL: NEGATIVE
METHADONE UR QL: NEGATIVE
MONOCYTES NFR BLD: 0.39 K/UL (ref 0.1–1.2)
MONOCYTES NFR BLD: 5 % (ref 3–12)
MYOGLOBIN SERPL-MCNC: 39 NG/ML (ref 25–58)
NEUTROPHILS NFR BLD: 60 % (ref 36–65)
NEUTS SEG NFR BLD: 5.07 K/UL (ref 1.5–8.1)
NITRITE UR QL STRIP: NEGATIVE
NRBC BLD-RTO: 0 PER 100 WBC
OPIATES UR QL SCN: NEGATIVE
OPIATES UR QL SCN: NEGATIVE
OXYCODONE UR QL SCN: NEGATIVE
OXYCODONE UR QL SCN: NEGATIVE
PCP UR QL SCN: NEGATIVE
PCP UR QL SCN: NEGATIVE
PH UR STRIP: 5.5 [PH] (ref 5–8)
PLATELET # BLD AUTO: 391 K/UL (ref 138–453)
PMV BLD AUTO: 10.3 FL (ref 8.1–13.5)
POTASSIUM SERPL-SCNC: 3.3 MMOL/L (ref 3.7–5.3)
PROT UR STRIP-MCNC: ABNORMAL MG/DL
RBC # BLD AUTO: 3.77 M/UL (ref 3.95–5.11)
RBC # BLD: ABNORMAL 10*6/UL
RBC #/AREA URNS HPF: NORMAL /HPF (ref 0–4)
SODIUM SERPL-SCNC: 135 MMOL/L (ref 136–145)
SP GR UR STRIP: 1.03 (ref 1–1.03)
TEST INFORMATION: ABNORMAL
TEST INFORMATION: NORMAL
TRICYCLIC ANTIDEPRESSANTS, UR: NEGATIVE
TRIGL SERPL-MCNC: 223 MG/DL
UROBILINOGEN UR STRIP-ACNC: NORMAL EU/DL (ref 0–1)
VIT B12 SERPL-MCNC: 967 PG/ML (ref 232–1245)
VLDLC SERPL CALC-MCNC: 45 MG/DL (ref 1–30)
WBC #/AREA URNS HPF: NORMAL /HPF (ref 0–5)
WBC OTHER # BLD: 8.2 K/UL (ref 3.5–11.3)

## 2025-06-18 PROCEDURE — 82607 VITAMIN B-12: CPT

## 2025-06-18 PROCEDURE — 83874 ASSAY OF MYOGLOBIN: CPT

## 2025-06-18 PROCEDURE — 70496 CT ANGIOGRAPHY HEAD: CPT

## 2025-06-18 PROCEDURE — 96366 THER/PROPH/DIAG IV INF ADDON: CPT

## 2025-06-18 PROCEDURE — 82746 ASSAY OF FOLIC ACID SERUM: CPT

## 2025-06-18 PROCEDURE — 97162 PT EVAL MOD COMPLEX 30 MIN: CPT

## 2025-06-18 PROCEDURE — 85025 COMPLETE CBC W/AUTO DIFF WBC: CPT

## 2025-06-18 PROCEDURE — 6360000002 HC RX W HCPCS

## 2025-06-18 PROCEDURE — 99223 1ST HOSP IP/OBS HIGH 75: CPT | Performed by: PSYCHIATRY & NEUROLOGY

## 2025-06-18 PROCEDURE — 97166 OT EVAL MOD COMPLEX 45 MIN: CPT

## 2025-06-18 PROCEDURE — 80307 DRUG TEST PRSMV CHEM ANLYZR: CPT

## 2025-06-18 PROCEDURE — 2500000003 HC RX 250 WO HCPCS

## 2025-06-18 PROCEDURE — 6370000000 HC RX 637 (ALT 250 FOR IP): Performed by: FAMILY MEDICINE

## 2025-06-18 PROCEDURE — 96375 TX/PRO/DX INJ NEW DRUG ADDON: CPT

## 2025-06-18 PROCEDURE — 6370000000 HC RX 637 (ALT 250 FOR IP): Performed by: NURSE PRACTITIONER

## 2025-06-18 PROCEDURE — 96365 THER/PROPH/DIAG IV INF INIT: CPT

## 2025-06-18 PROCEDURE — 80061 LIPID PANEL: CPT

## 2025-06-18 PROCEDURE — 80048 BASIC METABOLIC PNL TOTAL CA: CPT

## 2025-06-18 PROCEDURE — 95711 VEEG 2-12 HR UNMONITORED: CPT

## 2025-06-18 PROCEDURE — 83735 ASSAY OF MAGNESIUM: CPT

## 2025-06-18 PROCEDURE — 70551 MRI BRAIN STEM W/O DYE: CPT

## 2025-06-18 PROCEDURE — 36415 COLL VENOUS BLD VENIPUNCTURE: CPT

## 2025-06-18 PROCEDURE — 71260 CT THORAX DX C+: CPT

## 2025-06-18 PROCEDURE — 70450 CT HEAD/BRAIN W/O DYE: CPT

## 2025-06-18 PROCEDURE — 81001 URINALYSIS AUTO W/SCOPE: CPT

## 2025-06-18 PROCEDURE — 76770 US EXAM ABDO BACK WALL COMP: CPT

## 2025-06-18 PROCEDURE — 4A10X4Z MONITORING OF CENTRAL NERVOUS ELECTRICAL ACTIVITY, EXTERNAL APPROACH: ICD-10-PCS | Performed by: PSYCHIATRY & NEUROLOGY

## 2025-06-18 PROCEDURE — 82947 ASSAY GLUCOSE BLOOD QUANT: CPT

## 2025-06-18 PROCEDURE — 84484 ASSAY OF TROPONIN QUANT: CPT

## 2025-06-18 PROCEDURE — 99232 SBSQ HOSP IP/OBS MODERATE 35: CPT | Performed by: FAMILY MEDICINE

## 2025-06-18 PROCEDURE — 87086 URINE CULTURE/COLONY COUNT: CPT

## 2025-06-18 PROCEDURE — 6370000000 HC RX 637 (ALT 250 FOR IP)

## 2025-06-18 PROCEDURE — 74177 CT ABD & PELVIS W/CONTRAST: CPT

## 2025-06-18 PROCEDURE — 97530 THERAPEUTIC ACTIVITIES: CPT

## 2025-06-18 PROCEDURE — 2060000000 HC ICU INTERMEDIATE R&B

## 2025-06-18 PROCEDURE — 87040 BLOOD CULTURE FOR BACTERIA: CPT

## 2025-06-18 PROCEDURE — 82550 ASSAY OF CK (CPK): CPT

## 2025-06-18 PROCEDURE — 99222 1ST HOSP IP/OBS MODERATE 55: CPT | Performed by: NURSE PRACTITIONER

## 2025-06-18 PROCEDURE — 6360000004 HC RX CONTRAST MEDICATION

## 2025-06-18 PROCEDURE — 95700 EEG CONT REC W/VID EEG TECH: CPT

## 2025-06-18 PROCEDURE — 86140 C-REACTIVE PROTEIN: CPT

## 2025-06-18 RX ORDER — ACETAMINOPHEN 650 MG/1
650 SUPPOSITORY RECTAL EVERY 6 HOURS PRN
Status: DISCONTINUED | OUTPATIENT
Start: 2025-06-18 | End: 2025-06-20 | Stop reason: HOSPADM

## 2025-06-18 RX ORDER — INSULIN GLARGINE 100 [IU]/ML
18 INJECTION, SOLUTION SUBCUTANEOUS 2 TIMES DAILY
Status: DISCONTINUED | OUTPATIENT
Start: 2025-06-18 | End: 2025-06-19

## 2025-06-18 RX ORDER — ONDANSETRON 4 MG/1
4 TABLET, ORALLY DISINTEGRATING ORAL EVERY 8 HOURS PRN
Status: DISCONTINUED | OUTPATIENT
Start: 2025-06-18 | End: 2025-06-20 | Stop reason: HOSPADM

## 2025-06-18 RX ORDER — SODIUM CHLORIDE 0.9 % (FLUSH) 0.9 %
5-40 SYRINGE (ML) INJECTION EVERY 12 HOURS SCHEDULED
Status: DISCONTINUED | OUTPATIENT
Start: 2025-06-18 | End: 2025-06-20 | Stop reason: HOSPADM

## 2025-06-18 RX ORDER — POTASSIUM CHLORIDE 7.45 MG/ML
10 INJECTION INTRAVENOUS PRN
Status: DISCONTINUED | OUTPATIENT
Start: 2025-06-18 | End: 2025-06-20 | Stop reason: HOSPADM

## 2025-06-18 RX ORDER — MAGNESIUM SULFATE IN WATER 40 MG/ML
2000 INJECTION, SOLUTION INTRAVENOUS PRN
Status: DISCONTINUED | OUTPATIENT
Start: 2025-06-18 | End: 2025-06-20 | Stop reason: HOSPADM

## 2025-06-18 RX ORDER — CARVEDILOL 3.12 MG/1
3.12 TABLET ORAL 2 TIMES DAILY WITH MEALS
Status: DISCONTINUED | OUTPATIENT
Start: 2025-06-18 | End: 2025-06-19

## 2025-06-18 RX ORDER — POTASSIUM CHLORIDE 1500 MG/1
40 TABLET, EXTENDED RELEASE ORAL PRN
Status: DISCONTINUED | OUTPATIENT
Start: 2025-06-18 | End: 2025-06-20 | Stop reason: HOSPADM

## 2025-06-18 RX ORDER — LIDOCAINE 4 G/G
1 PATCH TOPICAL ONCE
Status: COMPLETED | OUTPATIENT
Start: 2025-06-18 | End: 2025-06-18

## 2025-06-18 RX ORDER — ACETAMINOPHEN 325 MG/1
650 TABLET ORAL EVERY 6 HOURS PRN
Status: DISCONTINUED | OUTPATIENT
Start: 2025-06-18 | End: 2025-06-20 | Stop reason: HOSPADM

## 2025-06-18 RX ORDER — CYCLOBENZAPRINE HCL 10 MG
10 TABLET ORAL ONCE
Status: COMPLETED | OUTPATIENT
Start: 2025-06-18 | End: 2025-06-18

## 2025-06-18 RX ORDER — AMLODIPINE BESYLATE 10 MG/1
10 TABLET ORAL ONCE
Status: COMPLETED | OUTPATIENT
Start: 2025-06-18 | End: 2025-06-18

## 2025-06-18 RX ORDER — KETOROLAC TROMETHAMINE 15 MG/ML
15 INJECTION, SOLUTION INTRAMUSCULAR; INTRAVENOUS ONCE
Status: COMPLETED | OUTPATIENT
Start: 2025-06-18 | End: 2025-06-18

## 2025-06-18 RX ORDER — ACETAMINOPHEN 325 MG/1
650 TABLET ORAL ONCE
Status: COMPLETED | OUTPATIENT
Start: 2025-06-18 | End: 2025-06-18

## 2025-06-18 RX ORDER — SODIUM CHLORIDE 0.9 % (FLUSH) 0.9 %
5-40 SYRINGE (ML) INJECTION PRN
Status: DISCONTINUED | OUTPATIENT
Start: 2025-06-18 | End: 2025-06-20 | Stop reason: HOSPADM

## 2025-06-18 RX ORDER — HEPARIN SODIUM 5000 [USP'U]/ML
5000 INJECTION, SOLUTION INTRAVENOUS; SUBCUTANEOUS EVERY 8 HOURS SCHEDULED
Status: DISCONTINUED | OUTPATIENT
Start: 2025-06-18 | End: 2025-06-20 | Stop reason: HOSPADM

## 2025-06-18 RX ORDER — IOPAMIDOL 755 MG/ML
150 INJECTION, SOLUTION INTRAVASCULAR
Status: COMPLETED | OUTPATIENT
Start: 2025-06-18 | End: 2025-06-18

## 2025-06-18 RX ORDER — FLUTICASONE PROPIONATE 110 UG/1
2 AEROSOL, METERED RESPIRATORY (INHALATION)
Status: DISCONTINUED | OUTPATIENT
Start: 2025-06-18 | End: 2025-06-20 | Stop reason: HOSPADM

## 2025-06-18 RX ORDER — ATORVASTATIN CALCIUM 80 MG/1
80 TABLET, FILM COATED ORAL NIGHTLY
Status: DISCONTINUED | OUTPATIENT
Start: 2025-06-18 | End: 2025-06-20 | Stop reason: HOSPADM

## 2025-06-18 RX ORDER — ONDANSETRON 2 MG/ML
4 INJECTION INTRAMUSCULAR; INTRAVENOUS EVERY 6 HOURS PRN
Status: DISCONTINUED | OUTPATIENT
Start: 2025-06-18 | End: 2025-06-20 | Stop reason: HOSPADM

## 2025-06-18 RX ORDER — ATORVASTATIN CALCIUM 20 MG/1
40 TABLET, FILM COATED ORAL NIGHTLY
Status: DISCONTINUED | OUTPATIENT
Start: 2025-06-18 | End: 2025-06-18

## 2025-06-18 RX ORDER — POLYETHYLENE GLYCOL 3350 17 G/17G
17 POWDER, FOR SOLUTION ORAL DAILY PRN
Status: DISCONTINUED | OUTPATIENT
Start: 2025-06-18 | End: 2025-06-20 | Stop reason: HOSPADM

## 2025-06-18 RX ORDER — INSULIN LISPRO 100 [IU]/ML
0-4 INJECTION, SOLUTION INTRAVENOUS; SUBCUTANEOUS
Status: DISCONTINUED | OUTPATIENT
Start: 2025-06-18 | End: 2025-06-20 | Stop reason: HOSPADM

## 2025-06-18 RX ORDER — GLUCAGON 1 MG/ML
1 KIT INJECTION PRN
Status: DISCONTINUED | OUTPATIENT
Start: 2025-06-18 | End: 2025-06-20 | Stop reason: HOSPADM

## 2025-06-18 RX ORDER — DEXTROSE MONOHYDRATE 100 MG/ML
INJECTION, SOLUTION INTRAVENOUS CONTINUOUS PRN
Status: DISCONTINUED | OUTPATIENT
Start: 2025-06-18 | End: 2025-06-20 | Stop reason: HOSPADM

## 2025-06-18 RX ORDER — AMLODIPINE BESYLATE 10 MG/1
10 TABLET ORAL DAILY
Status: CANCELLED | OUTPATIENT
Start: 2025-06-19

## 2025-06-18 RX ORDER — ASPIRIN 81 MG/1
81 TABLET, CHEWABLE ORAL DAILY
Status: DISCONTINUED | OUTPATIENT
Start: 2025-06-18 | End: 2025-06-20 | Stop reason: HOSPADM

## 2025-06-18 RX ORDER — FAMOTIDINE 20 MG/1
20 TABLET, FILM COATED ORAL DAILY
Status: DISCONTINUED | OUTPATIENT
Start: 2025-06-18 | End: 2025-06-20 | Stop reason: HOSPADM

## 2025-06-18 RX ORDER — DEXTROSE MONOHYDRATE, SODIUM CHLORIDE, AND POTASSIUM CHLORIDE 50; 1.49; 4.5 G/1000ML; G/1000ML; G/1000ML
INJECTION, SOLUTION INTRAVENOUS CONTINUOUS
Status: DISCONTINUED | OUTPATIENT
Start: 2025-06-18 | End: 2025-06-19

## 2025-06-18 RX ORDER — LABETALOL HYDROCHLORIDE 5 MG/ML
10 INJECTION, SOLUTION INTRAVENOUS ONCE
Status: COMPLETED | OUTPATIENT
Start: 2025-06-18 | End: 2025-06-18

## 2025-06-18 RX ORDER — SODIUM CHLORIDE 9 MG/ML
INJECTION, SOLUTION INTRAVENOUS PRN
Status: DISCONTINUED | OUTPATIENT
Start: 2025-06-18 | End: 2025-06-20 | Stop reason: HOSPADM

## 2025-06-18 RX ADMIN — CYCLOBENZAPRINE 10 MG: 10 TABLET, FILM COATED ORAL at 00:40

## 2025-06-18 RX ADMIN — INSULIN LISPRO 1 UNITS: 100 INJECTION, SOLUTION INTRAVENOUS; SUBCUTANEOUS at 20:05

## 2025-06-18 RX ADMIN — SODIUM CHLORIDE, PRESERVATIVE FREE 10 ML: 5 INJECTION INTRAVENOUS at 22:19

## 2025-06-18 RX ADMIN — EMPAGLIFLOZIN 10 MG: 10 TABLET, FILM COATED ORAL at 14:30

## 2025-06-18 RX ADMIN — Medication 10 MG: at 02:17

## 2025-06-18 RX ADMIN — ACETAMINOPHEN 650 MG: 325 TABLET ORAL at 11:30

## 2025-06-18 RX ADMIN — HEPARIN SODIUM 5000 UNITS: 5000 INJECTION INTRAVENOUS; SUBCUTANEOUS at 22:19

## 2025-06-18 RX ADMIN — HEPARIN SODIUM 5000 UNITS: 5000 INJECTION INTRAVENOUS; SUBCUTANEOUS at 06:48

## 2025-06-18 RX ADMIN — FAMOTIDINE 20 MG: 20 TABLET, FILM COATED ORAL at 12:07

## 2025-06-18 RX ADMIN — TIZANIDINE 2 MG: 4 TABLET ORAL at 17:25

## 2025-06-18 RX ADMIN — AMLODIPINE BESYLATE 10 MG: 10 TABLET ORAL at 00:49

## 2025-06-18 RX ADMIN — HEPARIN SODIUM 5000 UNITS: 5000 INJECTION INTRAVENOUS; SUBCUTANEOUS at 14:26

## 2025-06-18 RX ADMIN — KETOROLAC TROMETHAMINE 15 MG: 15 INJECTION, SOLUTION INTRAMUSCULAR; INTRAVENOUS at 01:51

## 2025-06-18 RX ADMIN — IOPAMIDOL 150 ML: 755 INJECTION, SOLUTION INTRAVENOUS at 00:09

## 2025-06-18 RX ADMIN — ASPIRIN 81 MG 81 MG: 81 TABLET ORAL at 12:07

## 2025-06-18 RX ADMIN — INSULIN LISPRO 1 UNITS: 100 INJECTION, SOLUTION INTRAVENOUS; SUBCUTANEOUS at 17:45

## 2025-06-18 RX ADMIN — INSULIN GLARGINE 18 UNITS: 100 INJECTION, SOLUTION SUBCUTANEOUS at 20:05

## 2025-06-18 RX ADMIN — SODIUM CHLORIDE, PRESERVATIVE FREE 10 ML: 5 INJECTION INTRAVENOUS at 09:45

## 2025-06-18 RX ADMIN — POTASSIUM CHLORIDE 40 MEQ: 1500 TABLET, EXTENDED RELEASE ORAL at 17:25

## 2025-06-18 RX ADMIN — MAGNESIUM SULFATE HEPTAHYDRATE 2000 MG: 40 INJECTION, SOLUTION INTRAVENOUS at 00:29

## 2025-06-18 RX ADMIN — ACETAMINOPHEN 650 MG: 325 TABLET ORAL at 00:38

## 2025-06-18 RX ADMIN — ATORVASTATIN CALCIUM 80 MG: 80 TABLET, FILM COATED ORAL at 22:19

## 2025-06-18 RX ADMIN — CARVEDILOL 3.12 MG: 3.12 TABLET, FILM COATED ORAL at 17:25

## 2025-06-18 RX ADMIN — POTASSIUM BICARBONATE 40 MEQ: 782 TABLET, EFFERVESCENT ORAL at 00:38

## 2025-06-18 ASSESSMENT — PAIN DESCRIPTION - DESCRIPTORS
DESCRIPTORS: ACHING

## 2025-06-18 ASSESSMENT — ENCOUNTER SYMPTOMS
NAUSEA: 0
COUGH: 0
CONSTIPATION: 0
ABDOMINAL PAIN: 0
STRIDOR: 0
SHORTNESS OF BREATH: 0
BLOOD IN STOOL: 0
WHEEZING: 0
DIARRHEA: 0
VOMITING: 0

## 2025-06-18 ASSESSMENT — PAIN SCALES - GENERAL
PAINLEVEL_OUTOF10: 10
PAINLEVEL_OUTOF10: 8
PAINLEVEL_OUTOF10: 10
PAINLEVEL_OUTOF10: 9
PAINLEVEL_OUTOF10: 0
PAINLEVEL_OUTOF10: 10
PAINLEVEL_OUTOF10: 8
PAINLEVEL_OUTOF10: 9

## 2025-06-18 ASSESSMENT — PAIN DESCRIPTION - ORIENTATION
ORIENTATION: LEFT

## 2025-06-18 ASSESSMENT — PAIN DESCRIPTION - LOCATION
LOCATION: BACK
LOCATION: FLANK
LOCATION: BACK;FLANK
LOCATION: FLANK
LOCATION: FLANK

## 2025-06-18 NOTE — PROGRESS NOTES
Physical Therapy  Facility/Department: [facility] EMERGENCY DEPARTMENT   Physical Therapy Initial Evaluation    Patient Name: Lexie Mireles        MRN: [id number]    : 1970    Date of Service: 2025    Chief Complaint   Patient presents with    Altered Mental Status     Past Medical History:  has a past medical history of Asthma, Chronic back pain, GERD (gastroesophageal reflux disease), HLD (hyperlipidemia), Hypertension, Irregular periods/menstrual cycles, Neuropathy, Obesity, Observed seizure-like activity (HCC), Type II or unspecified type diabetes mellitus without mention of complication, not stated as uncontrolled, and Wears glasses.  Past Surgical History:  has a past surgical history that includes Tubal ligation (); Endometrial ablation (2018); pr hysteroscopy endometrial ablation (N/A, 2018); Esophagogastroduodenoscopy (2025); Upper gastrointestinal endoscopy (N/A, 2025); and Colonoscopy (N/A, 2025).    Discharge Recommendations  Discharge Recommendations: Patient would benefit from continued therapy after discharge  PT Equipment Recommendations  Equipment Needed: No    Assessment  Body Structures, Functions, Activity Limitations Requiring Skilled Therapeutic Intervention: Decreased functional mobility , Decreased endurance, Decreased posture, Decreased strength, Decreased ROM, Decreased balance, Increased pain  Assessment: Pt required Florecita to perform bed mobility, pt able to sit EOB CGA once established, however unable to safely perform further mobility due to hypotension while seated EOB with 89/50 with patient presenting with significant dizziness. Pt will require 24hr support upon discharge. Recommending continued skilled physical therapy to address these deficits and return pt to prior level of function.  Therapy Prognosis: Good  Decision Making: Medium Complexity  Requires PT Follow-Up: Yes  Activity Tolerance  Activity Tolerance: Other

## 2025-06-18 NOTE — ED PROVIDER NOTES
[facility]   Emergency Department  Emergency Medicine Attending Sign-out   Note started: 2:21 AM EDT    Care of Lexie Mireles was assumed from previous attending Dr. Cruz at 2:10 AM and is being seen for Altered Mental Status  .  The patient's initial evaluation and plan have been discussed with the prior provider who initially evaluated the patient.     Attestation  I was available and discussed any additional care issues that arose and coordinated the management plans with the resident(s) caring for the patient during my duty period. Any areas of disagreement with resident's documentation of care or procedures are noted on the chart. I was personally present for the key portions of any/all procedures, during my duty period. I have documented in the chart those procedures where I was not present during the key portions.     BRIEF PATIENT SUMMARY/MDM COURSE PER INITIAL PROVIDER:   RECENT VITALS:     Temp: 99 °F (37.2 °C),  Pulse: (!) 101, Respirations: 18, BP: (!) 191/114, SpO2: 100 %    This patient is a 54 y.o. Female with stenosis of ICA in the past tonight had a syncopal episode and was not waking up was acting altered.  Was hypertensive.  Now back to normal.  CT imaging and CTAs done.    DIAGNOSTICS/MEDICATIONS:     MEDICATIONS GIVEN:  ED Medication Orders (From admission, onward)      Start Ordered     Status Ordering Provider    06/18/25 0230 06/18/25 0215  labetalol (NORMODYNE;TRANDATE) injection 10 mg  ONCE         Last MAR action: Given - by AGATA CUEVAS on 06/18/25 at 0217 DUC, RAMBO    06/18/25 0145 06/18/25 0142  ketorolac (TORADOL) injection 15 mg  ONCE         Last MAR action: Given - by LIZZY CRAFT on 06/18/25 at 0151 DUC, RAMBO    06/18/25 0100 06/18/25 0045  amLODIPine (NORVASC) tablet 10 mg  Once         Last MAR action: Given - by AGATA CUEVAS on 06/18/25 at 0049 DUC, RAMBO    06/18/25 0045 06/18/25 0038  lidocaine 4 % external patch 1 patch  Once    COMPARISON: None. HISTORY: ORDERING SYSTEM PROVIDED HISTORY: AMS, fall TECHNOLOGIST PROVIDED HISTORY: AMS, fall Decision Support Exception - unselect if not a suspected or confirmed emergency medical condition->Emergency Medical Condition (MA) FINDINGS: CTA NECK: AORTIC ARCH/ARCH VESSELS: No dissection or arterial injury.  No significant stenosis of the brachiocephalic or subclavian arteries. CAROTID ARTERIES: No dissection, arterial injury, or hemodynamically significant stenosis by NASCET criteria. VERTEBRAL ARTERIES: No dissection, arterial injury, or significant stenosis. SOFT TISSUES: The lung apices are clear.  No cervical or superior mediastinal lymphadenopathy.  The larynx and pharynx are unremarkable.  No acute abnormality of the salivary and thyroid glands. BONES: No acute osseous abnormality. CTA HEAD: ANTERIOR CIRCULATION: The anterior and middle cerebral arteries demonstrate normal arborization patterns.  There is a severe stenosis of the right paraclinoid ICA.  There is a possible 1.4 mm in diameter left supraclinoid ICA aneurysm versus tortuous ophthalmic artery takeoff.  No aneurysm. POSTERIOR CIRCULATION: No significant stenosis of the basilar or posterior cerebral arteries. No aneurysm. OTHER: No dural venous sinus thrombosis on this non-dedicated study. BRAIN: No mass effect or midline shift. No extra-axial fluid collection. The gray-white differentiation is maintained.     No acute intracranial identified. No large vessel occlusion detected within the head or neck. Severe focal stenosis of the right paraclinoid ICA. Questionable left supraclinoid ICA microaneurysm.     CTA HEAD NECK W CONTRAST  Result Date: 6/18/2025  EXAMINATION: CTA OF THE HEAD AND NECK WITH CONTRAST; CT OF THE HEAD WITHOUT CONTRAST 6/18/2025 12:02 am: TECHNIQUE: CTA of the head and neck was performed with the administration of intravenous contrast. Multiplanar reformatted images are provided for review.  MIP images are  acute intracranial identified. No large vessel occlusion detected within the head or neck. Severe focal stenosis of the right paraclinoid ICA. Questionable left supraclinoid ICA microaneurysm.       OUTSTANDING TASKS / ADDITIONAL COMMENTS   Neuro consult    Gardenia Webb MD  Emergency Medicine Attending  [facility]        Gardenia Webb MD  06/18/25 [id number]

## 2025-06-18 NOTE — ED PROVIDER NOTES
[facility] EMERGENCY DEPARTMENT  Emergency Department Encounter  Emergency Medicine Resident     Pt Name:Lexie Mireles  MRN: [id number]  Birthdate 1970  Date of evaluation: 6/17/25  PCP:  Viktor Fox APRN - CNP  Note Started: 10:36 PM EDT      CHIEF COMPLAINT       Chief Complaint   Patient presents with    Altered Mental Status       HISTORY OF PRESENT ILLNESS  (Location/Symptom, Timing/Onset, Context/Setting, Quality, Duration, Modifying Factors, Severity.)      Lexie Mireles is a 54 y.o. female history of hypertension, GERD, diabetes, who presents with concerns for altered mentation that is been going on for 4 weeks.  Patient presents with her  and her son who notes that the patient has been confused, had decreased oral intake and has been complaining of pain everywhere for about 4 weeks now.  They note that she had 2 episodes of syncope today.  They do not think that she hit her head when this happened.  Patient otherwise has not been ill including fevers, chills.  They deny history of alcohol or drug use.  When I try to ask patient question she has inappropriate answers and is discussing a \"surprise party \".  She has poor focus and is looking around the room.      Chart review:  Patient was admitted to the neuro service on 3/23/2025 for altered mentation.  Had a negative MRI of her brain at that time.    PAST MEDICAL / SURGICAL / SOCIAL / FAMILY HISTORY      has a past medical history of Asthma, Chronic back pain, GERD (gastroesophageal reflux disease), HLD (hyperlipidemia), Hypertension, Irregular periods/menstrual cycles, Neuropathy, Obesity, Observed seizure-like activity (HCC), Type II or unspecified type diabetes mellitus without mention of complication, not stated as uncontrolled, and Wears glasses.       has a past surgical history that includes Tubal ligation (2008); Endometrial ablation (02/27/2018); pr hysteroscopy endometrial ablation (N/A, 02/27/2018);  Esophagogastroduodenoscopy (01/27/2025); Upper gastrointestinal endoscopy (N/A, 1/27/2025); and Colonoscopy (N/A, 1/28/2025).      Social History     Socioeconomic History    Marital status:      Spouse name: Not on file    Number of children: 5    Years of education: Not on file    Highest education level: Not on file   Occupational History    Occupation: Day Care     Comment: DAY CARE - [employer]   Tobacco Use    Smoking status: Never    Smokeless tobacco: Never    Tobacco comments:     pt is around smokers a lot   Vaping Use    Vaping status: Never Used   Substance and Sexual Activity    Alcohol use: Not Currently     Comment: occasionally    Drug use: No    Sexual activity: Yes     Partners: Male   Other Topics Concern    Not on file   Social History Narrative    Not on file     Social Drivers of Health     Financial Resource Strain: Low Risk  (12/5/2024)    Received from [facility]    Overall Financial Resource Strain (CARDIA)     Difficulty of Paying Living Expenses: Not hard at all   Food Insecurity: No Food Insecurity (3/16/2025)    Hunger Vital Sign     Worried About Running Out of Food in the Last Year: Never true     Ran Out of Food in the Last Year: Never true   Transportation Needs: No Transportation Needs (3/16/2025)    PRAPARE - Transportation     Lack of Transportation (Medical): No     Lack of Transportation (Non-Medical): No   Physical Activity: Insufficiently Active (9/25/2023)    Exercise Vital Sign     Days of Exercise per Week: 3 days     Minutes of Exercise per Session: 20 min   Stress: No Stress Concern Present (6/10/2025)    Received from [facility]    [employer] - Occupational Stress Questionnaire     Feeling of Stress : Not at all   Social Connections: Moderately Isolated (9/25/2023)    Social Connection and Isolation Panel [NHANES]     Frequency of Communication with Friends and Family: Three times a week      1.83  Ketosis- like starvation ketosis [TD]   2337 Potassium(!): 3.2  Will replace [TD]   2337 Creatinine(!): 1.2  Baseline for the patient [TD]   2338 Troponin, High Sensitivity(!): 35  Baseline for the patient [TD]   Wed Jun 18, 2025   0036 Patient reevaluated, now oriented, complaining of significant pain in her left flank.  Will plan for pain medication administration.  Patient does not know what happened today to cause her to be altered. [TD]   0045 BP(!): 200/98  Will give home norvasc [TD]   0107 Troponin, High Sensitivity(!): 23  Decreasing 35-20 [TD]   0138 CT chest abdomen  IMPRESSION:  No acute abnormality in the chest, abdomen, or pelvis.   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     Questionable left supraclinoid ICA microaneurysm.        Will speak with soraya [TD]   0215 Informed by nursing that patient is now resting comfortably in bed.  Continues to have elevated blood pressures with diastolics over 100.  Will attempt dose of labetalol.  If her blood pressure continues to be significantly elevated will start Cardene [TD]   0237 BP: 139/83 [TD]   0312 Patient reevaluated at bedside.  Is now alert, awake, appropriate.  Patient states despite what her family says she feels she is only been confused today.  Believes that she is having syncopal episodes.  States they had thought potentially she was having seizure-like activity last time she was here however she is not currently on antiepileptics.  Does note that she has not eaten in many days as she has chronic issues with her stomach.  I do believe the patient is currently in starvation ketosis.  I am also concerned that she needs more of a workup for syncopal episodes [TD]   0341 Spoke with Krissy with [facility] who will admit the patient [TD]      ED Course User Index  [TD] Nerissa Molina DO         CONSULTS:  IP CONSULT TO HOSPITALIST  IP CONSULT TO ENDOVASCULAR NEUROSURGERY    CRITICAL CARE:  There was significant risk of life

## 2025-06-18 NOTE — ED NOTES
Pt arrived to ED through triage for AMS  Per family pt has been confused and weak for 4-6 weeks  Pts son and  at bedside with her   Pts son states that her legs gave out earlier and she fell, he states she didn't hit her head  Son states they didn't get her up or bring her right away because they \"thought she just needed air\"   Pt will answer some questions then become confused   Pt states that she has L flank pain   Per family pt takes meds at home for pain but they do not know what  Family is poor historians, unable to give much information about how pt has been acting at home or her hx  Breathing is non labored and no acute distress is noted.   Pt resting on stretcher, call light within reach.white board updated

## 2025-06-18 NOTE — ED NOTES
Patient resting on stretcher, NAD  RR even and non-labored  Bed locked and in lowest position  Call light within reach  Pt A&O x 4, answering questions and discussing medications with writer

## 2025-06-18 NOTE — PROGRESS NOTES
Occupational Therapy  Occupational Therapy Initial Evaluation  Facility/Department: [facility] EMERGENCY DEPARTMENT   Patient Name: Lexie Mireles        MRN: [id number]    : 1970    Date of Service: 2025    Chief Complaint   Patient presents with    Altered Mental Status     Past Medical History:  has a past medical history of Asthma, Chronic back pain, GERD (gastroesophageal reflux disease), HLD (hyperlipidemia), Hypertension, Irregular periods/menstrual cycles, Neuropathy, Obesity, Observed seizure-like activity (HCC), Type II or unspecified type diabetes mellitus without mention of complication, not stated as uncontrolled, and Wears glasses.  Past Surgical History:  has a past surgical history that includes Tubal ligation (); Endometrial ablation (2018); pr hysteroscopy endometrial ablation (N/A, 2018); Esophagogastroduodenoscopy (2025); Upper gastrointestinal endoscopy (N/A, 2025); and Colonoscopy (N/A, 2025).    Discharge Recommendations  Discharge Recommendations: Patient would benefit from continued therapy after discharge  OT Equipment Recommendations  Equipment Needed: No    Assessment  Performance deficits / Impairments: Decreased functional mobility ;Decreased ADL status;Decreased endurance;Decreased balance;Decreased high-level IADLs;Decreased cognition;Decreased safe awareness  Assessment: Pt would continue to benefit from OT services to address deficits listed above and improve overall functional performance prior to discharge.  Prognosis: Good  Decision Making: Medium Complexity  REQUIRES OT FOLLOW-UP: Yes  Activity Tolerance  Activity Tolerance: Patient Tolerated treatment well;Treatment limited secondary to medical complications (free text)  Activity Tolerance Comments: hypotension  Safety Devices  Type of Devices: Nurse notified;Left in bed;Gait belt;Call light within reach  Restraints  Restraints Initially in Place: No    AM-PAC  AM-PAC Daily  above. BP 89/50 while seated EOB, returning to 125/68 with return to supine. RN informed.)  Standing:  (SHAHRAM d/t hypotension)    Transfers/Mobility  Bed mobility  Supine to Sit: Minimal assistance (Assist provided for trunk progression)  Sit to Supine: Contact guard assistance  Bed Mobility Comments: HOB elevated to 30 degrees. Pt reports significant pain with achieving seated position upon EOB located in the left flank. Pt appears to have significant discomfort and reports significant dizziness and fatigue with mobility.          Transfers  Sit to stand: Unable to assess  Stand to sit: Unable to assess         Functional Mobility: Unable to assess (Comment)  Functional Mobility Skilled Clinical Factors: SHAHRAM d/t hypotension     Patient Education  Patient Education  Education Given To: Patient  Education Provided: Role of Therapy;Plan of Care;ADL Adaptive Strategies;Transfer Training;Energy Conservation;Fall Prevention Strategies  Education Method: Demonstration;Verbal  Barriers to Learning: None  Education Outcome: Verbalized understanding;Demonstrated understanding    Goals  Short Term Goals  Time Frame for Short Term Goals: By discharge, pt will:  Short Term Goal 1: demo UB ADLs Independently.  Short Term Goal 2: demo LB ADLs with Mod IND, adaptive techniques PRN.  Short Term Goal 3: demo safe functional transfers/mobility with SUP, LRD PRN for engagement in ADLs.  Short Term Goal 4: demo dynamic standing during functional activities for 8+ mins with SUP, LRD PRN.  Short Term Goal 5: demo use of 2 EC/WS techniques throughout entire session independently with no vc's.    Plan  Occupational Therapy Plan  Times Per Week: 3x/wk  Current Treatment Recommendations: Balance training, Functional mobility training, Endurance training, Pain management, Cognitive reorientation, Safety education & training, Patient/Caregiver education & training, Equipment evaluation, education, & procurement, Self-Care / ADL,

## 2025-06-18 NOTE — ED PROVIDER NOTES
[facility]  Emergency Department  Faculty Attestation     I performed a history and physical examination of the patient and discussed management with the resident. I reviewed the resident’s note and agree with the documented findings and plan of care. Any areas of disagreement are noted on the chart. I was personally present for the key portions of any procedures. I have documented in the chart those procedures where I was not present during the key portions. I have reviewed the emergency nurses triage note. I agree with the chief complaint, past medical history, past surgical history, allergies, medications, social and family history as documented unless otherwise noted below.    For Physician Assistant/ Nurse Practitioner cases/documentation I have personally evaluated this patient and have completed at least one if not all key elements of the E/M (history, physical exam, and MDM). Additional findings are as noted.    Preliminary note started at 10:46 PM EDT    Primary Care Physician:  Viktor Fox, KELLEN - CNP    Screenings:  [unfilled]    CHIEF COMPLAINT       Chief Complaint   Patient presents with    Altered Mental Status       RECENT VITALS:   BP (!) 164/109   Pulse (!) 111   Temp 99 °F (37.2 °C) (Oral)   Resp 20   LMP 09/20/2023 (Exact Date)   SpO2 99%     LABS:  Labs Reviewed   CBC WITH AUTO DIFFERENTIAL - Abnormal; Notable for the following components:       Result Value    WBC 11.5 (*)     MCV 82.3 (*)     Neutrophils % 71 (*)     Lymphocytes % 23 (*)     All other components within normal limits   COMPREHENSIVE METABOLIC PANEL - Abnormal; Notable for the following components:    Sodium 134 (*)     Potassium 3.2 (*)     Chloride 94 (*)     Anion Gap 20 (*)     Glucose 208 (*)     BUN 25 (*)     Creatinine 1.2 (*)     Est, Glom Filt Rate 54 (*)     ALT 7 (*)     All other components within normal limits   TROPONIN - Abnormal; Notable for the following  tachycardic, hypertensive, currently afebrile, no respiratory distress she has difficulty following commands though she does most.  She moves all sides symmetrically uncertain whether she has drift.  Pupils are 2 mm.  Gaze is conjugate.  Mouth is slightly dry.  No abnormal breathing.  Abdomen is benign.  Impression is altered mentation, encephalopathy, unlikely stroke or bleed.  Plan is IV access, point-of-care chemistries, laboratory studies, CT brain, CT angiogram head/neck, consultations, anticipate admission.            Celestino Cruz MD, FACEP  Attending Emergency  Physician                Celestino Cruz MD  06/17/25 2663       Celestino Cruz MD  06/17/25 3438

## 2025-06-18 NOTE — H&P
[facility]  Office: [phone]  Flaco Santizo DO, Vincenzo Mathur, DO, Mike Kurtz DO, Dany Pozo, DO, Jade Fuller MD, Reyna Rodriguez MD, Nolan Villanueva MD, Isela Rosenberg MD,  Colt Castelan MD, Akila Bustillos MD, Karissa Mckeon MD,  Ludwig Petersen DO, Juan Antonio Chowdhury MD, Emerson Mcadams MD, Celestino Santizo DO, Caridad Miles MD,  Placido Davila DO, Tamara Clinton MD, Vanessa Blake MD, Xochitl Cohn MD,  Connor Fulton MD, Sofie Allan MD, Vaibhav Raza MD, Velasquez Bowen MD, Darren Wood MD, Jyotsna Payne MD, Augustin Dominguez, DO, Gaby Bowens MD, Dariusz Neumann DO, Dov Bhatia MD, Ludwig Riley MD, Mohsin Reza, MD, Tony Parker MD, Shirley Waterhouse, CNP,  Evi Abraham, CNP, Augustin Peña, CNP,  Jo Magana, GRIS, Carmen Yates, CNP, Kayla Encarnacion, CNP, Bertha Ashby, CNP, Mae Bosch, CNP, Jessica Crawford, PA-C, Wendy Guy, CNP, Homer Watts, CNP,  Krissy Galeana, CNP, Annalisa Sullivan, CNP, Abdon Angulo, PA-C, Zahida Sharp, CNP,  Teresa Vital, CNS, Jennifer Lai, CNP, Jacqueline Gramajo, CNP,   Magalie Ojeda, CNP         [facility]   IN-PATIENT SERVICE   [facility]    HISTORY AND PHYSICAL EXAMINATION            Date:   6/18/2025  Patient name:  Lexie Mireles  Date of admission:  6/17/2025 10:34 PM  MRN:   [id number]  Account:  [id number]  YOB: 1970  PCP:    Viktor Fox APRN - CNP  Room:   42/42  Code Status:    Full Code    Chief Complaint:     Chief Complaint   Patient presents with    Altered Mental Status       History Obtained From:     patient, electronic medical record    History of Present Illness:   .  This is a pleasant 54-year-old female with known bad medical history of diabetes, hypertension, hyperlipidemia, peripheral neuropathy, CKD 3A,who was brought to emergency department by EMS following a witnessed syncopal episode during which she was reportedly unresponsive for 2 minutes, she had another

## 2025-06-18 NOTE — ED NOTES
ED to inpatient nurses report      Chief Complaint:  Chief Complaint   Patient presents with    Altered Mental Status     Present to ED from: home    MOA:     LOC: alert and orientated to name, place, date  Mobility: Requires assistance * 1- stand by assistance when having too much pain   Oxygen Baseline: room air      Current needs required: room air    Pending ED orders: blood cultures- unable to obtain at this time   Present condition: stable    Why did the patient come to the ED? Pt arrived to ED through triage for AMS  Per family pt has been confused and weak for 4-6 weeks  Pts son and  at bedside with her   Pts son states that her legs gave out earlier and she fell, he states she didn't hit her head  Son states they didn't get her up or bring her right away because they \"thought she just needed air\"   Pt will answer some questions then become confused   Pt states that she has L flank pain   Per family pt takes meds at home for pain but they do not know what  Family is poor historians, unable to give much information about how pt has been acting at home or her hx  Breathing is non labored and no acute distress is noted.   Pt resting on stretcher, call light within reach.white board updated   What is the plan? Admission   Any procedures or intervention occur? IV placement, CT scan, med administration   Any safety concerns?? none    Mental Status:       Psych Assessment:   Psychosocial  Psychosocial (WDL): (S) Exceptions to WDL (altered)  Vital signs   Vitals:    06/18/25 0230 06/18/25 0245 06/18/25 0300 06/18/25 0315   BP: 139/83 129/76 110/69 (!) 154/79   Pulse: 89 87 90 97   Resp: 14 15 28 12   Temp:       TempSrc:       SpO2: 99% 98% 98% 98%        Vitals:  Patient Vitals for the past 24 hrs:   BP Temp Temp src Pulse Resp SpO2   06/18/25 0315 (!) 154/79 -- -- 97 12 98 %   06/18/25 0300 110/69 -- -- 90 28 98 %   06/18/25 0245 129/76 -- -- 87 15 98 %   06/18/25 0230 139/83 -- -- 89 14 99 %   06/18/25  ESOPHAGOGASTRODUODENOSCOPY  01/27/2025    Biopsy    IL HYSTEROSCOPY ENDOMETRIAL ABLATION N/A 02/27/2018    ENDOMETRIAL ABLATION WITH SHAHEED performed by Stanley Persaud MD at [facility] OR    TUBAL LIGATION  2008    UPPER GASTROINTESTINAL ENDOSCOPY N/A 1/27/2025    ESOPHAGOGASTRODUODENOSCOPY BIOPSY performed by Beth Olea MD at [facility] OR       PAST MEDICAL HISTORY       Past Medical History:   Diagnosis Date    Asthma     Chronic back pain     GERD (gastroesophageal reflux disease)     HLD (hyperlipidemia) 1/27/2014    Hypertension     Irregular periods/menstrual cycles 2017    Neuropathy     Obesity     Observed seizure-like activity (HCC) 3/16/2025    Type II or unspecified type diabetes mellitus without mention of complication, not stated as uncontrolled 2007    On Insulin, Lantus nightly & Novolog 3 times with meals    Wears glasses        Labs:  Labs Reviewed   CBC WITH AUTO DIFFERENTIAL - Abnormal; Notable for the following components:       Result Value    WBC 11.5 (*)     MCV 82.3 (*)     Neutrophils % 71 (*)     Lymphocytes % 23 (*)     All other components within normal limits   COMPREHENSIVE METABOLIC PANEL - Abnormal; Notable for the following components:    Sodium 134 (*)     Potassium 3.2 (*)     Chloride 94 (*)     Anion Gap 20 (*)     Glucose 208 (*)     BUN 25 (*)     Creatinine 1.2 (*)     Est, Glom Filt Rate 54 (*)     ALT 7 (*)     All other components within normal limits   TROPONIN - Abnormal; Notable for the following components:    Troponin, High Sensitivity 35 (*)     All other components within normal limits   URINALYSIS WITH REFLEX TO CULTURE - Abnormal; Notable for the following components:    Glucose, Ur 1+ (*)     Ketones, Urine TRACE (*)     Specific Gravity, UA 1.035 (*)     Urine Hgb TRACE (*)     Protein, UA 2+ (*)     Leukocyte Esterase, Urine TRACE (*)     All other components within normal limits   BLOOD GAS, VENOUS - Abnormal; Notable for the following components:    pH, Taiwo 7.476

## 2025-06-18 NOTE — CONSULTS
Endovascular Neurosurgery Consult    Pt Name: Lexie Mireles  MRN: [id number]  YOB: 1970  Date of evaluation: 6/18/2025  Primary Care Physician: Viktor Fox APRN - CNP  Patient evaluated at the request of  Dr. Cruz, Celestino RICK MD  Reason for evaluation: Severe focal stenosis right paraclinoid ICA    SUBJECTIVE:   History of Chief Complaint:    Lexie Mireles is a 54-year-old female with a complex medical history including hypertension, insulin-dependent type 2 diabetes mellitus, hyperlipidemia (managed with Zocor 80 mg nightly), obesity, abnormal uterine bleeding, chronic cocaine use, diabetic peripheral neuropathy, anemia, and stage IIIa chronic kidney disease. She is also on aspirin 81 mg daily for vascular protection.  She was brought to the emergency department by EMS following a witnessed syncopal episode, during which she was reportedly unresponsive for approximately 2 minutes. There were no signs of seizure activity, such as tongue biting, urinary incontinence, or convulsions. Her family reported that over the past 4 to 6 weeks she had been increasingly confused and weak.   A CT head and CTA head and neck were performed in the ED this time and showed no acute intracranial abnormalities. However, imaging did report severe focal stenosis of the right paraclinoid internal carotid artery and a questionable microaneurysm in the left supraclinoid ICA  By compared to previous study which was stable.    On examination, the patient was alert, awake, and oriented to person, place, and time. Her neurological exam was non-focal, with motor strength rated 4+/5 in all extremities bilaterally, and no other deficits noted. NIH Stroke Scale score was 0.    She had a prior neurology admission in March 2025 for altered mental status and dizziness, which had also resulted in a fall. At that time, CT and CTA of the head and neck showed no evidence of large vessel occlusion, and brain MRI without contrast    GENITOURINARY:  negative for incontinence    MUSCULOSKELETAL:  negative for neck or back pain    NEUROLOGICAL:  As above   PSYCHIATRIC:  negative for anxiety      Review of systems otherwise negative.      OBJECTIVE:   Vitals: BP (!) 207/119   Pulse (!) 106   Temp 99 °F (37.2 °C) (Oral)   Resp 23   LMP 09/20/2023 (Exact Date)   SpO2 100%   General appearance: Lying in bed, NAD,  HEENT: Head: Normocephalic, no lesions, without obvious abnormality.  Neck: no adenopathy, no carotid bruit, no JVD, supple, symmetrical, trachea midline, and thyroid not enlarged, symmetric, no tenderness/mass/nodules  Lungs: clear to auscultation bilaterally  Heart: regular rate and rhythm, S1, S2 normal, no murmur, click, rub or gallop  Abdomen: soft, non-tender; nondistended, bowel sounds normal  Extremities: extremities normal, atraumatic, no cyanosis or edema  Groin: groin c/d/i  Neurologic: Mental status: Alert and oriented x 3, intact language, attention, knowledge  CN: Normal   MOTOR: 4+/5 bilateral upper and lower extremities  SENSORY: No abnormality  COORDINATION: No abnormality    LABS:     Recent Labs     06/17/25  2246   WBC 11.5*   HGB 11.9   HCT 37.1      *   K 3.2*   CL 94*   CO2 20   BUN 25*   CREATININE 1.2*   MG 1.6   CALCIUM 9.7   AST 13   ALT 7*   BILITOT 0.7     Recent Labs     06/17/25  2246   ALKPHOS 93   ALT 7*   AST 13   BILITOT 0.7   LIPASE 27       RADIOLOGY:     CT and CTA was ordered and did not show any acute abnormality with reported severe focal stenosis of right paraclinoid ICA and questionable left supraclinoid ICA microaneurysm.        Quesionable L ICA supraclinoid microaneurysm mention on radiology notes I am unable to find on the images.    IMPRESSIONS:   Lexie PULIDO Chi, 54-year-old female with hypertension, diabetes, CKD IIIa, and cocaine use, presented with witnessed 2-minute syncope  Neurology evaluated; prior March 2025 admission for similar symptoms had negative MRI, CTA, EEG,

## 2025-06-18 NOTE — ED NOTES
Pt back in forth on bed stating her L side hurts  Attempted to complete EKG but unable to due to pt not sitting still  Notfied Dr Molina of pts pain

## 2025-06-18 NOTE — PROGRESS NOTES
Pharmacy Note     Renal Dose Adjustment    Lexie Mireles is a 54 y.o. female. Pharmacist assessment of renally cleared medications.    Recent Labs     06/17/25 2246   BUN 25*       Recent Labs     06/17/25 2246   CREATININE 1.2*       Estimated Creatinine Clearance: 42 mL/min (A) (based on SCr of 1.2 mg/dL (H)).  Estimated CrCl using Ideal Body Weight: 42 mL/min (based on IBW 48.8 kg)    Height:   Ht Readings from Last 1 Encounters:   05/21/25 1.499 m (4' 11\")     Weight:  Wt Readings from Last 1 Encounters:   05/21/25 53.5 kg (118 lb)       The following medication dose has been adjusted based upon renal function per P&T Guidelines:             Famotidine 20mg twice daily changed to once daily.

## 2025-06-18 NOTE — ED NOTES
ED to inpatient nurses report      Chief Complaint:  Chief Complaint   Patient presents with    Altered Mental Status     Present to ED from: home    MOA:     LOC: alert and orientated to name, place, date  Mobility: Requires assistance * 1  Oxygen Baseline: room air    Current needs required: room air    Pending ED orders: patient needs urine drug screen sent.   Present condition: patient awake, alert and oriented.     Why did the patient come to the ED? Altered mental status, obtunded. Increased weakness and intermittent confusion over 4-6 weeks.      Questionable left supraclinoid ICA microaneurysm.  What is the plan? CtA- Severe focal stenosis of the right paraclinoid ICA.   Questionable left supraclinoid ICA microaneurysm.  Neuro endovascular on board.   MRI completed- No acute problems.   LTME in place.   Any procedures or intervention occur? CT, MRI, LTME   Any safety concerns?? High fall risk     Mental Status:  Level of Consciousness: Alert (0)    Psych Assessment:   Psychosocial  Psychosocial (WDL): Exceptions to WDL  Vital signs   Vitals:    06/18/25 0800 06/18/25 0930 06/18/25 1345 06/18/25 1600   BP: 128/80 113/81 (!) 158/82 (!) 159/82   Pulse: 93 98 100 (!) 101   Resp: 13 13 12 13   Temp: 98.2 °F (36.8 °C)  98.1 °F (36.7 °C) 98.3 °F (36.8 °C)   TempSrc: Oral  Oral Oral   SpO2: 99% 99% 100% 99%        Vitals:  Patient Vitals for the past 24 hrs:   BP Temp Temp src Pulse Resp SpO2   06/18/25 1600 (!) 159/82 98.3 °F (36.8 °C) Oral (!) 101 13 99 %   06/18/25 1345 (!) 158/82 98.1 °F (36.7 °C) Oral 100 12 100 %   06/18/25 0930 113/81 -- -- 98 13 99 %   06/18/25 0800 128/80 98.2 °F (36.8 °C) Oral 93 13 99 %   06/18/25 0430 (!) 165/85 -- -- 98 14 97 %   06/18/25 0400 121/77 -- -- 90 14 98 %   06/18/25 0345 134/79 -- -- 94 15 97 %   06/18/25 0330 138/80 -- -- (!) 101 11 96 %   06/18/25 0315 (!) 154/79 -- -- 97 12 98 %   06/18/25 0300 110/69 -- -- 90 28 98 %   06/18/25 0245 129/76 -- -- 87 15 98 %   06/18/25  1924 Tue Jun 17, 2025   2337 Beta-Hydroxybutyrate(!): 1.83  Ketosis- like starvation ketosis [TD]   2337 Potassium(!): 3.2  Will replace [TD]   2337 Creatinine(!): 1.2  Baseline for the patient [TD]   2338 Troponin, High Sensitivity(!): 35  Baseline for the patient [TD]   Wed Jun 18, 2025   0036 Patient reevaluated, now oriented, complaining of significant pain in her left flank.  Will plan for pain medication administration.  Patient does not know what happened today to cause her to be altered. [TD]   0045 BP(!): 200/98  Will give home norvasc [TD]   0107 Troponin, High Sensitivity(!): 23  Decreasing 35-20 [TD]   0138 CT chest abdomen  IMPRESSION:  No acute abnormality in the chest, abdomen, or pelvis.   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     Questionable left supraclinoid ICA microaneurysm.        Will speak with nev [TD]   0215 Informed by nursing that patient is now resting comfortably in bed.  Continues to have elevated blood pressures with diastolics over 100.  Will attempt dose of labetalol.  If her blood pressure continues to be significantly elevated will start Cardene [TD]   0237 BP: 139/83 [TD]   0312 Patient reevaluated at bedside.  Is now alert, awake, appropriate.  Patient states despite what her family says she feels she is only been confused today.  Believes that she is having syncopal episodes.  States they had thought potentially she was having seizure-like activity last time she was here however she is not currently on antiepileptics.  Does note that she has not eaten in many days as she has chronic issues with her stomach.  I do believe the patient is currently in starvation ketosis.  I am also concerned that she needs more of a workup for syncopal episodes [TD]   0341 Spoke with Krissy with [facility] who will admit the patient [TD]      ED Course User Index  [TD] Nerissa Molina DO          Skin Assessment:        Pain Score:  Pain Assessment  Pain Assessment:

## 2025-06-18 NOTE — CONSULTS
NEUROLOGY INPATIENT CONSULT NOTE          6/18/2025      Reason for consult: Encephalopathy  Requesting physician: Isela Rosenberg    Chief complaint: Syncope and collapse      HPI: I had the pleasure of seeing your patient in neurology consultation. As you would recall Lexie Mireles is a  54 y.o. female with H/O prior syncope & collapse, uncontrolled HTN, HLD, uncontrolled DM with polyneuropathy, gastroparesis, asthma, CKD, who was admitted on 6/17/2025 with Syncope and collapse [R55].  Son reported that patient was walking when he noted that she was about to pass out.  He was able to grab her to break the fall.  She was in and out of consciousness.  When she came to after almost 2 minutes, she asked about where the guests were?  Apparently they had a \"surprise party\" at their house on Father's Day.  Patient was unable to recall the details.  No seizure-like activity, tongue bite or incontinence was reported.  Family reported patient had a syncopal event the day prior to arrival also.  They also reported that patient has been progressively weak with diffuse pain with intermittent mild confusion.  Patient reported poor oral intake due to lack of sleep appetite, nausea and intermittent vomiting.  She was brought to [facility] ER on 6/17/2025 for evaluation.    Neurology was consulted on 6/18 for encephalopathy.  Apparently upon arrival to ED patient was alert but seemed confused and gave inappropriate answers.  Her mentation improved after blood pressure was controlled.  Patient is known to our service from her previous admission in 3/2025 when she presented with similar syncope and collapse with 1 min LOC & prolonged AMS.  At that time orthostatic blood pressures were normal, MRI brain and EEG were also normal.     No current facility-administered medications on file prior to encounter.     Current Outpatient Medications on File Prior to Encounter   Medication Sig Dispense Refill    famotidine (PEPCID) 20 MG tablet Take 1  (H) 11/06/2024     Lab Results   Component Value Date    TRIG 124 03/16/2025    TRIG 195 (H) 01/27/2025    TRIG 326 (H) 11/06/2024     Lab Results   Component Value Date    HDL 49 03/16/2025    HDL 41 01/27/2025    HDL 50 11/06/2024     Lab Results   Component Value Date     (H) 03/16/2025    LDL 98 01/27/2025     (H) 11/06/2024     Lab Results   Component Value Date    VLDL 25 03/16/2025    VLDL 39 (H) 01/27/2025    VLDL NOT REPORTED (H) 04/02/2021     Lab Results   Component Value Date    CHOLHDLRATIO 4.6 03/16/2025    CHOLHDLRATIO 4.3 01/27/2025    CHOLHDLRATIO 7.0 (H) 11/06/2024 06/18/25 12:12   Chol/HDL Ratio 6.5 (H)   Cholesterol, Total 258 (H)   HDL Cholesterol 40 (L)   LDL Cholesterol 173 (H)   Triglycerides 223 (H)   VLDL 45 (H)     Vit B12     967  Folate       11.0    CK  Myoglobin        Diagnostic Data:  CT HEAD (6/18/2025): No acute intracranial abnormality     CTA HEAD & NECK (6/18/2025):   1. R ICA, paraclinoid: severe focal stenosis.  2. L ICA, supraclinoid microaneurysm?      BRAIN MRI (6/18/2025): Minimal chronic microangiopathy    ECHO:    LTME: Ordered      ORTHOSTATIC BP:  Supine      144/71  Sitting       108/65  Standing   111/68                RECENT DATA:  ECHO (3/19/2025): EF 58%. Increased LV wall thickness consistent with moderate concentric hypertrophy. Mild AR. Trace TR.    EEG (3/17/2025): This EEG was normal. No focal or epileptiform abnormalities were seen.                     Impression:   Encephalopathy with diffuse pain & generalized weakness x 4-6 weeks    Syncopal event followed by confusion. NO HALLUCINATIONS. CT & MRI brain - negative; orthostatic hypotension (as above). We saw her in 3/2025 with similar orthostatic syncope with AMS; at that time MRI brain & EEG - negative    Concern for medication effects, i.e. Neurontin 300 mg TID specially with GABRIEL on CKD    CTA head/neck - R ICA, paraclinoid: severe focal stenosis & L ICA, supraclinoid microaneurysm?

## 2025-06-19 ENCOUNTER — APPOINTMENT (OUTPATIENT)
Age: 55
End: 2025-06-19
Payer: MEDICARE

## 2025-06-19 DIAGNOSIS — I72.9 ANEURYSM: Primary | ICD-10-CM

## 2025-06-19 PROBLEM — I67.2 INTRACRANIAL ATHEROSCLEROSIS: Status: ACTIVE | Noted: 2025-06-19

## 2025-06-19 PROBLEM — I95.1 ORTHOSTATIC HYPOTENSION: Status: ACTIVE | Noted: 2025-06-19

## 2025-06-19 PROBLEM — E87.6 HYPOKALEMIA: Status: ACTIVE | Noted: 2025-06-19

## 2025-06-19 LAB
ANION GAP SERPL CALCULATED.3IONS-SCNC: 14 MMOL/L (ref 9–16)
BASOPHILS # BLD: 0.06 K/UL (ref 0–0.2)
BASOPHILS NFR BLD: 1 % (ref 0–2)
BUN SERPL-MCNC: 21 MG/DL (ref 6–20)
CALCIUM SERPL-MCNC: 8.9 MG/DL (ref 8.6–10.4)
CHLORIDE SERPL-SCNC: 100 MMOL/L (ref 98–107)
CO2 SERPL-SCNC: 23 MMOL/L (ref 20–31)
CREAT SERPL-MCNC: 1.2 MG/DL (ref 0.6–0.9)
ECHO BSA: 1.46 M2
ECHO EST RA PRESSURE: 3 MMHG
ECHO LV EDV A2C: 39 ML
ECHO LV EDV A4C: 27 ML
ECHO LV EDV INDEX A4C: 19 ML/M2
ECHO LV EDV NDEX A2C: 27 ML/M2
ECHO LV EF PHYSICIAN: 56 %
ECHO LV EJECTION FRACTION A2C: 42 %
ECHO LV EJECTION FRACTION A4C: 66 %
ECHO LV EJECTION FRACTION BIPLANE: 56 % (ref 55–100)
ECHO LV ESV A2C: 23 ML
ECHO LV ESV A4C: 9 ML
ECHO LV ESV INDEX A2C: 16 ML/M2
ECHO LV ESV INDEX A4C: 6 ML/M2
EOSINOPHIL # BLD: 0.13 K/UL (ref 0–0.44)
EOSINOPHILS RELATIVE PERCENT: 2 % (ref 1–4)
ERYTHROCYTE [DISTWIDTH] IN BLOOD BY AUTOMATED COUNT: 15.1 % (ref 11.8–14.4)
EST. AVERAGE GLUCOSE BLD GHB EST-MCNC: 237 MG/DL
GFR, ESTIMATED: 54 ML/MIN/1.73M2
GLUCOSE BLD-MCNC: 116 MG/DL (ref 65–105)
GLUCOSE BLD-MCNC: 137 MG/DL (ref 65–105)
GLUCOSE BLD-MCNC: 180 MG/DL (ref 65–105)
GLUCOSE BLD-MCNC: 78 MG/DL (ref 65–105)
GLUCOSE SERPL-MCNC: 109 MG/DL (ref 74–99)
HBA1C MFR BLD: 9.9 % (ref 4–6)
HCT VFR BLD AUTO: 31.4 % (ref 36.3–47.1)
HGB BLD-MCNC: 10.2 G/DL (ref 11.9–15.1)
IMM GRANULOCYTES # BLD AUTO: <0.03 K/UL (ref 0–0.3)
IMM GRANULOCYTES NFR BLD: 0 %
LYMPHOCYTES NFR BLD: 2.34 K/UL (ref 1.1–3.7)
LYMPHOCYTES RELATIVE PERCENT: 31 % (ref 24–43)
MAGNESIUM SERPL-MCNC: 2.2 MG/DL (ref 1.6–2.6)
MCH RBC QN AUTO: 27.2 PG (ref 25.2–33.5)
MCHC RBC AUTO-ENTMCNC: 32.5 G/DL (ref 28.4–34.8)
MCV RBC AUTO: 83.7 FL (ref 82.6–102.9)
MICROORGANISM SPEC CULT: NORMAL
MONOCYTES NFR BLD: 0.41 K/UL (ref 0.1–1.2)
MONOCYTES NFR BLD: 5 % (ref 3–12)
NEUTROPHILS NFR BLD: 61 % (ref 36–65)
NEUTS SEG NFR BLD: 4.72 K/UL (ref 1.5–8.1)
NRBC BLD-RTO: 0 PER 100 WBC
PLATELET # BLD AUTO: 391 K/UL (ref 138–453)
PMV BLD AUTO: 10.1 FL (ref 8.1–13.5)
POTASSIUM SERPL-SCNC: 3.2 MMOL/L (ref 3.7–5.3)
PROCALCITONIN SERPL-MCNC: 0.09 NG/ML (ref 0–0.09)
RBC # BLD AUTO: 3.75 M/UL (ref 3.95–5.11)
RBC # BLD: ABNORMAL 10*6/UL
SODIUM SERPL-SCNC: 137 MMOL/L (ref 136–145)
SPECIMEN DESCRIPTION: NORMAL
TROPONIN I SERPL HS-MCNC: 49 NG/L (ref 0–14)
TROPONIN I SERPL HS-MCNC: NORMAL NG/L (ref 0–14)
WBC OTHER # BLD: 7.7 K/UL (ref 3.5–11.3)

## 2025-06-19 PROCEDURE — 80048 BASIC METABOLIC PNL TOTAL CA: CPT

## 2025-06-19 PROCEDURE — 6370000000 HC RX 637 (ALT 250 FOR IP)

## 2025-06-19 PROCEDURE — 99233 SBSQ HOSP IP/OBS HIGH 50: CPT | Performed by: PSYCHIATRY & NEUROLOGY

## 2025-06-19 PROCEDURE — 85025 COMPLETE CBC W/AUTO DIFF WBC: CPT

## 2025-06-19 PROCEDURE — 83735 ASSAY OF MAGNESIUM: CPT

## 2025-06-19 PROCEDURE — 94640 AIRWAY INHALATION TREATMENT: CPT

## 2025-06-19 PROCEDURE — 97110 THERAPEUTIC EXERCISES: CPT

## 2025-06-19 PROCEDURE — 2580000003 HC RX 258: Performed by: INTERNAL MEDICINE

## 2025-06-19 PROCEDURE — 97530 THERAPEUTIC ACTIVITIES: CPT

## 2025-06-19 PROCEDURE — 6370000000 HC RX 637 (ALT 250 FOR IP): Performed by: FAMILY MEDICINE

## 2025-06-19 PROCEDURE — 83036 HEMOGLOBIN GLYCOSYLATED A1C: CPT

## 2025-06-19 PROCEDURE — 6370000000 HC RX 637 (ALT 250 FOR IP): Performed by: NURSE PRACTITIONER

## 2025-06-19 PROCEDURE — 6370000000 HC RX 637 (ALT 250 FOR IP): Performed by: INTERNAL MEDICINE

## 2025-06-19 PROCEDURE — 36415 COLL VENOUS BLD VENIPUNCTURE: CPT

## 2025-06-19 PROCEDURE — 2060000000 HC ICU INTERMEDIATE R&B

## 2025-06-19 PROCEDURE — 99232 SBSQ HOSP IP/OBS MODERATE 35: CPT | Performed by: PSYCHIATRY & NEUROLOGY

## 2025-06-19 PROCEDURE — 82947 ASSAY GLUCOSE BLOOD QUANT: CPT

## 2025-06-19 PROCEDURE — APPSS30 APP SPLIT SHARED TIME 16-30 MINUTES: Performed by: NURSE PRACTITIONER

## 2025-06-19 PROCEDURE — 2500000003 HC RX 250 WO HCPCS

## 2025-06-19 PROCEDURE — 95711 VEEG 2-12 HR UNMONITORED: CPT

## 2025-06-19 PROCEDURE — 6360000002 HC RX W HCPCS

## 2025-06-19 PROCEDURE — 93308 TTE F-UP OR LMTD: CPT

## 2025-06-19 PROCEDURE — 94761 N-INVAS EAR/PLS OXIMETRY MLT: CPT

## 2025-06-19 PROCEDURE — 84145 PROCALCITONIN (PCT): CPT

## 2025-06-19 PROCEDURE — 99232 SBSQ HOSP IP/OBS MODERATE 35: CPT | Performed by: INTERNAL MEDICINE

## 2025-06-19 PROCEDURE — 99222 1ST HOSP IP/OBS MODERATE 55: CPT | Performed by: INTERNAL MEDICINE

## 2025-06-19 PROCEDURE — 84484 ASSAY OF TROPONIN QUANT: CPT

## 2025-06-19 RX ORDER — METOPROLOL TARTRATE 25 MG/1
25 TABLET, FILM COATED ORAL 2 TIMES DAILY
Status: DISCONTINUED | OUTPATIENT
Start: 2025-06-19 | End: 2025-06-20 | Stop reason: HOSPADM

## 2025-06-19 RX ORDER — SODIUM CHLORIDE 9 MG/ML
INJECTION, SOLUTION INTRAVENOUS CONTINUOUS
Status: DISCONTINUED | OUTPATIENT
Start: 2025-06-19 | End: 2025-06-20 | Stop reason: HOSPADM

## 2025-06-19 RX ORDER — AMLODIPINE BESYLATE 10 MG/1
10 TABLET ORAL DAILY
Status: DISCONTINUED | OUTPATIENT
Start: 2025-06-19 | End: 2025-06-20

## 2025-06-19 RX ORDER — METOCLOPRAMIDE 5 MG/1
5 TABLET ORAL
Status: DISCONTINUED | OUTPATIENT
Start: 2025-06-19 | End: 2025-06-20 | Stop reason: HOSPADM

## 2025-06-19 RX ORDER — METOPROLOL TARTRATE 25 MG/1
25 TABLET, FILM COATED ORAL 2 TIMES DAILY
Status: DISCONTINUED | OUTPATIENT
Start: 2025-06-19 | End: 2025-06-19

## 2025-06-19 RX ORDER — METOPROLOL TARTRATE 25 MG/1
12.5 TABLET, FILM COATED ORAL 2 TIMES DAILY
Status: DISCONTINUED | OUTPATIENT
Start: 2025-06-19 | End: 2025-06-19

## 2025-06-19 RX ORDER — INSULIN GLARGINE 100 [IU]/ML
12 INJECTION, SOLUTION SUBCUTANEOUS 2 TIMES DAILY
Status: DISCONTINUED | OUTPATIENT
Start: 2025-06-19 | End: 2025-06-20 | Stop reason: HOSPADM

## 2025-06-19 RX ORDER — FOLIC ACID 1 MG/1
1 TABLET ORAL DAILY
Status: DISCONTINUED | OUTPATIENT
Start: 2025-06-19 | End: 2025-06-20 | Stop reason: HOSPADM

## 2025-06-19 RX ORDER — GLIPIZIDE 5 MG/1
5 TABLET ORAL
Status: DISCONTINUED | OUTPATIENT
Start: 2025-06-19 | End: 2025-06-20 | Stop reason: HOSPADM

## 2025-06-19 RX ORDER — GABAPENTIN 300 MG/1
300 CAPSULE ORAL 3 TIMES DAILY
Status: DISCONTINUED | OUTPATIENT
Start: 2025-06-19 | End: 2025-06-20 | Stop reason: HOSPADM

## 2025-06-19 RX ORDER — INSULIN GLARGINE 100 [IU]/ML
15 INJECTION, SOLUTION SUBCUTANEOUS 2 TIMES DAILY
Status: DISCONTINUED | OUTPATIENT
Start: 2025-06-19 | End: 2025-06-19

## 2025-06-19 RX ADMIN — ATORVASTATIN CALCIUM 80 MG: 80 TABLET, FILM COATED ORAL at 20:54

## 2025-06-19 RX ADMIN — SODIUM CHLORIDE: 0.9 INJECTION, SOLUTION INTRAVENOUS at 14:05

## 2025-06-19 RX ADMIN — METOCLOPRAMIDE 5 MG: 5 TABLET ORAL at 21:34

## 2025-06-19 RX ADMIN — ASPIRIN 81 MG 81 MG: 81 TABLET ORAL at 08:10

## 2025-06-19 RX ADMIN — CARVEDILOL 3.12 MG: 3.12 TABLET, FILM COATED ORAL at 08:11

## 2025-06-19 RX ADMIN — GABAPENTIN 300 MG: 300 CAPSULE ORAL at 09:05

## 2025-06-19 RX ADMIN — ACETAMINOPHEN 650 MG: 325 TABLET ORAL at 08:12

## 2025-06-19 RX ADMIN — SODIUM CHLORIDE, PRESERVATIVE FREE 10 ML: 5 INJECTION INTRAVENOUS at 08:12

## 2025-06-19 RX ADMIN — GABAPENTIN 300 MG: 300 CAPSULE ORAL at 13:30

## 2025-06-19 RX ADMIN — METOCLOPRAMIDE 5 MG: 5 TABLET ORAL at 17:49

## 2025-06-19 RX ADMIN — HEPARIN SODIUM 5000 UNITS: 5000 INJECTION INTRAVENOUS; SUBCUTANEOUS at 06:51

## 2025-06-19 RX ADMIN — EMPAGLIFLOZIN 10 MG: 10 TABLET, FILM COATED ORAL at 08:11

## 2025-06-19 RX ADMIN — FOLIC ACID 1 MG: 1 TABLET ORAL at 14:07

## 2025-06-19 RX ADMIN — FAMOTIDINE 20 MG: 20 TABLET, FILM COATED ORAL at 08:11

## 2025-06-19 RX ADMIN — HEPARIN SODIUM 5000 UNITS: 5000 INJECTION INTRAVENOUS; SUBCUTANEOUS at 21:34

## 2025-06-19 RX ADMIN — FLUTICASONE PROPIONATE 2 PUFF: 110 AEROSOL, METERED RESPIRATORY (INHALATION) at 07:36

## 2025-06-19 RX ADMIN — SODIUM CHLORIDE, PRESERVATIVE FREE 10 ML: 5 INJECTION INTRAVENOUS at 20:56

## 2025-06-19 RX ADMIN — METOPROLOL TARTRATE 12.5 MG: 25 TABLET, FILM COATED ORAL at 12:24

## 2025-06-19 RX ADMIN — AMLODIPINE BESYLATE 10 MG: 10 TABLET ORAL at 09:05

## 2025-06-19 RX ADMIN — GABAPENTIN 300 MG: 300 CAPSULE ORAL at 20:54

## 2025-06-19 RX ADMIN — GLIPIZIDE 5 MG: 5 TABLET ORAL at 09:25

## 2025-06-19 RX ADMIN — FLUTICASONE PROPIONATE 2 PUFF: 110 AEROSOL, METERED RESPIRATORY (INHALATION) at 19:24

## 2025-06-19 RX ADMIN — GLIPIZIDE 5 MG: 5 TABLET ORAL at 17:50

## 2025-06-19 RX ADMIN — INSULIN LISPRO 1 UNITS: 100 INJECTION, SOLUTION INTRAVENOUS; SUBCUTANEOUS at 12:21

## 2025-06-19 RX ADMIN — INSULIN GLARGINE 12 UNITS: 100 INJECTION, SOLUTION SUBCUTANEOUS at 08:32

## 2025-06-19 RX ADMIN — HEPARIN SODIUM 5000 UNITS: 5000 INJECTION INTRAVENOUS; SUBCUTANEOUS at 13:30

## 2025-06-19 RX ADMIN — POTASSIUM CHLORIDE 40 MEQ: 1500 TABLET, EXTENDED RELEASE ORAL at 11:42

## 2025-06-19 ASSESSMENT — PAIN SCALES - GENERAL
PAINLEVEL_OUTOF10: 0
PAINLEVEL_OUTOF10: 1
PAINLEVEL_OUTOF10: 6

## 2025-06-19 ASSESSMENT — PAIN DESCRIPTION - LOCATION: LOCATION: BACK

## 2025-06-19 NOTE — PROGRESS NOTES
06/19/25 0737   Care Plan - Respiratory Goals   Achieves optimal ventilation and oxygenation Assess for changes in respiratory status;Assess for changes in mentation and behavior;Position to facilitate oxygenation and minimize respiratory effort;Oxygen supplementation based on oxygen saturation or arterial blood gases;Encourage broncho-pulmonary hygiene including cough, deep breathe, incentive spirometry;Assess the need for suctioning and aspirate as needed;Assess and instruct to report shortness of breath or any respiratory difficulty;Respiratory therapy support as indicated

## 2025-06-19 NOTE — PROGRESS NOTES
Neurology Nurse Practitioner Progress Note      INTERVAL HISTORY: This is a 54 y.o.  female admitted 6/17/2025 for syncope and collapse. This is a follow-up neurology progress note. The patient was examined and the chart was reviewed. Discussed with the pt & RN. There were no acute events overnight. No new motor, sensory, visual or bulbar symptoms. Today patient was much more alert and interactive. She denied any history of intermittent confusion. She reported painful neuropathy affecting b/l lower extremities for which she takes Neurontin.    HPI: Lexie Mireles is a 54 y.o. female with H/O prior syncope & collapse, uncontrolled HTN, HLD, uncontrolled DM with polyneuropathy, gastroparesis, asthma, CKD, who was admitted 6/17/2025 for syncope and collapse. Son reported that patient was walking when he noted that she was about to pass out.  He was able to grab her to break the fall.  She was in and out of consciousness.  When she came to after almost 2 minutes, she asked about where the guests were?  Apparently they had a \"surprise party\" at their house on Father's Day.  Patient was unable to recall the details.  No seizure-like activity, tongue bite or incontinence was reported.  Family reported patient had a syncopal event the day prior to arrival also.  They also reported that patient has been progressively weak with diffuse pain with intermittent mild confusion.  Patient reported poor oral intake due to lack of sleep appetite, nausea and intermittent vomiting.  She was brought to [facility] ER on 6/17/2025 for evaluation.     Neurology was consulted on 6/18 for encephalopathy.  Apparently upon arrival to ED patient was alert but seemed confused and gave inappropriate answers.  Her mentation improved after blood pressure was controlled.  Patient is known to our service from her previous admission in 3/2025 when she presented with similar syncope and collapse with 1 min LOC & prolonged AMS.  At that time orthostatic  100 06/19/2025    AMMONIA 24 03/15/2025    CREATININE 1.2 (H) 06/19/2025    BUN 21 (H) 06/19/2025    CO2 23 06/19/2025    TSH 1.56 06/17/2025    INR 0.9 03/15/2025    [id number] 967 06/18/2025    FOLATE 11.0 06/18/2025    LABA1C 8.9 05/21/2025     Lab Results   Component Value Date    CHOL 258 (H) 06/18/2025    CHOL 223 (H) 03/16/2025    CHOL 178 01/27/2025     Lab Results   Component Value Date    TRIG 223 (H) 06/18/2025    TRIG 124 03/16/2025    TRIG 195 (H) 01/27/2025     Lab Results   Component Value Date    HDL 40 (L) 06/18/2025    HDL 49 03/16/2025    HDL 41 01/27/2025     Lab Results   Component Value Date     (H) 06/18/2025     (H) 03/16/2025    LDL 98 01/27/2025     Lab Results   Component Value Date    VLDL 45 (H) 06/18/2025    VLDL 25 03/16/2025    VLDL 39 (H) 01/27/2025     Lab Results   Component Value Date    CHOLHDLRATIO 6.5 (H) 06/18/2025    CHOLHDLRATIO 4.6 03/16/2025    CHOLHDLRATIO 4.3 01/27/2025         DIAGNOSTIC DATA:  CT HEAD (6/18/2025): No acute intracranial abnormality      CTA HEAD & NECK (6/18/2025):   1. R ICA, paraclinoid: severe focal stenosis.  2. L ICA, supraclinoid microaneurysm?       BRAIN MRI (6/18/2025): Minimal chronic microangiopathy    LTME (6/18/2025): During this day of recording no events were recorded. The interictal EEG was normal.     ORTHOSTATIC BP:  Supine      144/71  Sitting       108/65  Standing   111/68         RECENT DATA:  ECHO (3/19/2025): EF 58%. Increased LV wall thickness consistent with moderate concentric hypertrophy. Mild AR. Trace TR.     EEG (3/17/2025): This EEG was normal. No focal or epileptiform abnormalities were seen.                              Impression:   Encephalopathy with diffuse pain & generalized weakness x 4-6 weeks, per family. Patient denied any history of intermittent confusion. She reported painful neuropathy affecting b/l lower extremities for which she takes Neurontin     Syncopal event followed by confusion. NO  HALLUCINATIONS. CT & MRI brain - negative; orthostatic hypotension (as above). We saw her in 3/2025 with similar orthostatic syncope with AMS; at that time MRI brain & EEG - negative     Concern for medication effects, i.e. Neurontin 300 mg TID specially with GABRIEL on CKD     CTA head/neck - R ICA, paraclinoid: severe focal stenosis & L ICA, supraclinoid microaneurysm? NEVS team on board      HTN urgency, came in with /109 mm Hg     GABRIEL on CKD     QTc 490     Comorbid conditions - HLD, uncontrolled DM with neuropathy, gastroparesis, asthma, GERD, cocaine abuse                 Plan:   D/C LTME    Continue ASA 81 mg QD & Lipitor 80 mg HS    Pt was educated about orthostatic hypotension. Compression stockings, hydration (as per her nephrologist) & leg exercises were suggested. Pt verbalized understanding & is in agreement. Jobst knee-high compression stockings 20-30 mm Hg - ordered for OP    Medication compliance was stressed upon for better control of HTN & DM    We recommend neuro-psych evaluation as OP    PT/OT    No further neurological testing is required at this time. We will sign off. Please, call with any questions or concerns. Thank you      Please note that this note was generated using a voice recognition dictation software. Although every effort was made to ensure the accuracy of this automated transcription, some errors in transcription may have occurred.

## 2025-06-19 NOTE — PROGRESS NOTES
[facility]  Office: [phone]  Flaco Santizo, DO, Vincenzo Mathur, DO, Mike Kurtz DO, Dany Pozo, DO, Jade Fuller MD, Reyna Rodriguez MD, Nolan Villanueva MD, Isela Rosenberg MD,  Colt Castelan MD, Akila Bustillos MD, Karissa Mckeon MD,  Ludwig Petersen DO, Juan Antonio Chowdhury MD, Emerson Mcadams MD, Celestino Santizo DO, Caridad Miles MD,  Placido Davila DO, Tamara Clinton MD, Vanessa Blake MD, Xochitl Cohn MD,  Connor Fulton MD, Sofie Allan MD, Vaibhav Raza MD, Velasquez Bowen MD, Darren Wood MD, Jyotsna Payne MD, Augustin Dominguez, DO, Gaby Bowens MD, Dariusz Neumann DO, Dov Bhatia MD, Ludwig Riley MD, Mohsin Reza, MD, Tony Parker MD, Shirley Waterhouse, CNP,  Evi Abraham, CNP, Augustin Peña, CNP,  Jo Magana, GRIS, Carmen Yates, CNP, Kayla Encarnacion, CNP, Bertha Ashby, CNP, Mae Bosch, CNP, Jessica Crawford, PA-C, Wendy Guy, CNP, Homer Watts, CNP,  Krissy Galeana, CNP, Annalisa Sullivan, CNP, Abdon Angulo, PA-C, Zahida Sharp, CNP,  Teresa Vital, CNS, Jennifer Lai, CNP, Jacqueline Gramajo, CNP,   Magalie Ojeda, CNP         [facility]   IN-PATIENT SERVICE   [facility]    Progress Note    6/19/2025    8:11 AM    Name:   Lexie Mireles  MRN:     [id number]     Acct:      [id number]   Room:   2004/2004-01  IP Day:  1  Admit Date:  6/17/2025 10:34 PM    PCP:   Viktor Fox APRN - CNP  Code Status:  Full Code    Subjective:     C/C:   Chief Complaint   Patient presents with    Altered Mental Status     Interval History Status: improved.     Pt had positive orthostatic BP today.  She's eating small amounts of food, sometimes she gets nauseated with eating.  No more syncope.  EEG negative for seizures.    Brief History:     Per the H & P:  \"This is a pleasant 54-year-old female with known bad medical history of diabetes, hypertension, hyperlipidemia, peripheral neuropathy, CKD 3A,who was brought to emergency department by EMS  --  45*  --   --   --   --    POCGLU  --  218*  --  195* 181* 234* 137*     ABG:  Lab Results   Component Value Date/Time    FIO2 INFORMATION NOT PROVIDED 06/17/2025 10:46 PM     Lab Results   Component Value Date/Time    SPECIAL rt hand 2ml 06/18/2025 11:56 AM     Lab Results   Component Value Date/Time    CULTURE NO GROWTH 12 HOURS 06/18/2025 12:04 PM       Radiology:  MRI BRAIN WO CONTRAST  Result Date: 6/18/2025  1. No acute intracranial abnormality. No acute infarct. 2. Minimal chronic microvascular ischemic changes.     US RENAL COMPLETE  Result Date: 6/18/2025  Increased cortical echogenicity of the kidneys suggestive of chronic medical renal disease. Normal bladder.     CT CHEST ABDOMEN PELVIS W CONTRAST Additional Contrast? None  Result Date: 6/18/2025  No acute abnormality in the chest, abdomen, or pelvis.     CT HEAD WO CONTRAST  Result Date: 6/18/2025  No acute intracranial identified. No large vessel occlusion detected within the head or neck. Severe focal stenosis of the right paraclinoid ICA. Questionable left supraclinoid ICA microaneurysm.     CTA HEAD NECK W CONTRAST  Result Date: 6/18/2025  No acute intracranial identified. No large vessel occlusion detected within the head or neck. Severe focal stenosis of the right paraclinoid ICA. Questionable left supraclinoid ICA microaneurysm.       Physical Examination:        General appearance:  alert, cooperative and no distress  Mental Status:  oriented to person, place and time and normal affect  Lungs:  clear to auscultation bilaterally, normal effort  Heart:  regular rate and rhythm, no murmur  Abdomen:  soft, nontender, nondistended, normal bowel sounds, no masses, hepatomegaly, splenomegaly  Extremities:  no edema, redness, tenderness in the calves  Skin:  no gross lesions, rashes, induration    Assessment:        Hospital Problems           Last Modified POA    * (Principal) Encephalopathy 6/18/2025 Yes    Hypertension, essential 11/13/2024 Yes

## 2025-06-19 NOTE — PROGRESS NOTES
Advance Care Planning     Advance Care Planning Inpatient Note  [facility] Department    Today's Date: 6/19/2025  Unit: [facility] CAR 2- STEPDOWN    Received request from HealthCare Provider.  Upon review of chart and communication with care team, patient's decision making abilities are not in question.. Patient was/were present in the room during visit.    Goals of ACP Conversation:  Complete POA documents    Health Care Decision Makers:     Primary Decision Maker: Pramod [phone]              Secondary Decision Maker- Saul [phone]  Summary:  Completed New Documents    Advance Care Planning Documents (Patient Wishes):  Healthcare Power of /Advance Directive Appointment of Health Care Agent     Assessment:  Upon entering room writer observed patient in hospital bed awake and alert, patient was orient and was calm and coping.    Interventions:  Assisted in the completion of documents according to patient's wishes at this time    Care Preferences Communicated:   No    Outcomes/Plan:  New advance directive completed.    Electronically signed by Chaplain LYSSA on 6/19/2025 at 10:26 AM

## 2025-06-19 NOTE — PROGRESS NOTES
Physical Therapy  Facility/Department: [facility] CAR 2- STEPDOWN   Physical Therapy Daily Treatment Note    Patient Name: Lexie Mireles        MRN: [id number]    : 1970    Date of Service: 2025    Chief Complaint   Patient presents with    Altered Mental Status     Past Medical History:  has a past medical history of Asthma, Chronic back pain, GERD (gastroesophageal reflux disease), HLD (hyperlipidemia), Hypertension, Irregular periods/menstrual cycles, Neuropathy, Obesity, Observed seizure-like activity (HCC), Type II or unspecified type diabetes mellitus without mention of complication, not stated as uncontrolled, and Wears glasses.  Past Surgical History:  has a past surgical history that includes Tubal ligation (); Endometrial ablation (2018); pr hysteroscopy endometrial ablation (N/A, 2018); Esophagogastroduodenoscopy (2025); Upper gastrointestinal endoscopy (N/A, 2025); and Colonoscopy (N/A, 2025).    Discharge Recommendations  Discharge Recommendations: Patient would benefit from continued therapy after discharge  PT Equipment Recommendations  Equipment Needed: No (Pt owns a RW.)    Assessment  Body Structures, Functions, Activity Limitations Requiring Skilled Therapeutic Intervention: Decreased functional mobility ;Decreased endurance;Decreased posture;Decreased strength;Decreased ROM;Decreased balance;Increased pain  Assessment: Pt required Florecita for bed mobility and CGA for transfers from EOB.  Pt sidestepped along EOB 8 ft HHA.  Pt had decreasing BP from supine>sit>standing w/o c/o dizziness or lightheadedness; RN present to assess BP vitals.  Pt on LTME t/o session.  Pt will require 24hr support upon discharge. Recommending continued skilled physical therapy to address these deficits and return pt to prior level of function.  Therapy Prognosis: Good  Activity Tolerance  Activity Tolerance: Patient tolerated treatment well  Safety Devices  Type of Devices: Nurse

## 2025-06-19 NOTE — CONSULTS
[facility]    Consult / H&P               Today's Date: 6/19/2025  Patient Name: Lexie Mireles  Date of admission: 6/17/2025 10:34 PM  Patient's age: 54 y.o., 1970  Admission Dx: Syncope and collapse [R55]  Ketosis (HCC) [E88.89]  Hypertensive urgency [I16.0]  Altered mental status, unspecified altered mental status type [R41.82]  Syncope, unspecified syncope type [R55]  Cerebrovascular accident (CVA) due to stenosis of vertebral artery, unspecified blood vessel laterality (HCC) [I63.219]    Requesting Physician: No admitting provider for patient encounter.    Cardiac Evaluation Reason:  Syncope    History Obtained From: patient and chart review     History of Present Illness:    This patient 54 y.o. years old with past medical history given below. Reportedly patient had been confused over the pats 4 weeks, not eating much. Brought in by family after she reportedly had 2 syncopal episodes yesterday. She remembers going downstairs to the kitchen, then next thing she remembers she woke up in the hospital. Family not here to provide history. She says this is similar to what happened back in March when she was evaluated here. Does not recall any prodromal symptoms.     Reportedly had similar episode back in 3/2025, at that time neurology and cardiac evaluation were unremarkable.     In the ED, troponin was 35 and repeat was 23. Baseline around 25-40. Cr was 1.2. K was 3.2.     Past Medical History:   has a past medical history of Asthma, Chronic back pain, GERD (gastroesophageal reflux disease), HLD (hyperlipidemia), Hypertension, Irregular periods/menstrual cycles, Neuropathy, Obesity, Observed seizure-like activity (HCC), Type II or unspecified type diabetes mellitus without mention of complication, not stated as uncontrolled, and Wears glasses.    Past Surgical History:   has a past surgical history that includes Tubal ligation (2008); Endometrial ablation (02/27/2018); pr hysteroscopy  endometrial ablation (N/A, 02/27/2018); Esophagogastroduodenoscopy (01/27/2025); Upper gastrointestinal endoscopy (N/A, 1/27/2025); and Colonoscopy (N/A, 1/28/2025).     Home Medications:    Prior to Admission medications    Medication Sig Start Date End Date Taking? Authorizing Provider   insulin glargine (LANTUS SOLOSTAR) 100 UNIT/ML injection pen Inject 18 Units into the skin 2 times daily 4/16/25   Viktor Fox APRN - CNP   gabapentin (NEURONTIN) 300 MG capsule Take 1 capsule by mouth 3 times daily for 60 days. Intended supply: 30 days 4/16/25 6/15/25  Viktor Fox APRN - CNP   tiZANidine (ZANAFLEX) 4 MG tablet take 1 tablet by mouth at bedtime 4/16/25   Viktor Fox APRN - CNP   Continuous Glucose Sensor (DEXCOM G7 SENSOR) MISC 1 each by Does not apply route every 10 days 4/16/25   Viktor Fox APRN - CNP   metoclopramide (REGLAN) 5 MG tablet Take 1 tablet by mouth 3 times daily as needed (nausea, before meals) 4/11/25   Beth Olea MD   carvedilol (COREG) 3.125 MG tablet Take 1 tablet by mouth 2 times daily (with meals) 3/24/25   Leonidas Weber DO   dapagliflozin (FARXIGA) 10 MG tablet Take 1 tablet by mouth every morning 2/5/25   Viktor Fox APRN - CNP   glipiZIDE (GLUCOTROL) 5 MG tablet Take 1 tablet by mouth 2 times daily (before meals) 2/5/25   Viktor Fox APRN - CNP   simvastatin (ZOCOR) 80 MG tablet Take 1 tablet by mouth nightly 2/5/25   Viktor Fox APRN - CNP   amLODIPine (NORVASC) 10 MG tablet Take 1 tablet by mouth daily 1/8/25   Viktor Fox APRN - CNP   albuterol (PROVENTIL) (2.5 MG/3ML) 0.083% nebulizer solution Take 3 mLs by nebulization 4 times daily as needed for Wheezing 7/29/24   Viktor Fox, KELLEN - CNP   albuterol sulfate HFA (PROVENTIL;VENTOLIN;PROAIR) 108 (90 Base) MCG/ACT inhaler Inhale 2 puffs into the lungs every 6 hours as needed for Wheezing or Shortness of Breath 7/24/24   Dominique

## 2025-06-19 NOTE — PROCEDURES
LONG-TERM EEG-VIDEO MONITORING   CLINICAL NEUROPHYSIOLOGY LABORATORY  DEPARTMENT OF NEUROLOGY  [facility]    Patient: Lexie Mireles  Age: 54 y.o.  MRN: [id number]    Referring Physician: No ref. provider found  History: The patient is a 54 y.o. female who presented breakthrough seizure/encephalopathy. This long-term video-EEG monitoring study was performed to determine the nature of the patient's clinical events. The patient is on neuroactive medications.   Lexie Mireles   Current Facility-Administered Medications   Medication Dose Route Frequency Provider Last Rate Last Admin    glucose chewable tablet 16 g  4 tablet Oral PRN Krissy Galeana APRN - CNP        dextrose bolus 10% 125 mL  125 mL IntraVENous PRN Krissy Galeana APRN - CNP        Or    dextrose bolus 10% 250 mL  250 mL IntraVENous PRN Krissy Galeana APRN - CNP        glucagon injection 1 mg  1 mg SubCUTAneous PRN Krissy Galeana APRN - CNP        dextrose 10 % infusion   IntraVENous Continuous PRN Krissy Galeana APRN - CNP        sulfur hexafluoride microspheres (LUMASON) 60.7-25 MG injection 2 mL  2 mL IntraVENous ONCE PRN Krissy Galeana APRN - CNP        famotidine (PEPCID) tablet 20 mg  20 mg Oral Daily Krissy Galeana APRN - CNP   20 mg at 06/18/25 1207    ondansetron (ZOFRAN-ODT) disintegrating tablet 4 mg  4 mg Oral Q8H PRN Krissy Galeana APRN - CNP        Or    ondansetron (ZOFRAN) injection 4 mg  4 mg IntraVENous Q6H PRN Krissy Galeana APRN - CNP        dextrose 5 % and 0.45 % NaCl with KCl 20 mEq infusion   IntraVENous Continuous Krissy Galeana APRN - CNP        sodium chloride flush 0.9 % injection 5-40 mL  5-40 mL IntraVENous 2 times per day Krissy Galeana APRN - CNP   10 mL at 06/18/25 0945    sodium chloride flush 0.9 % injection 5-40 mL  5-40 mL IntraVENous PRN Krissy Galeana APRN - CNP        0.9 % sodium chloride infusion   IntraVENous PRN Krissy Galeana APRN - CNP        potassium chloride  accordance with the 10-20 International System of Electrode Placement. Single lead EKG monitoring was included.    Baseline EEG Recording:  A formal baseline EEG recording was not obtained.     Day 1 - 6/18/25, starting at 16:24    Interictal EEG Samples:  In the alert state, the posterior background rhythm was a symmetric, well-modulated, 9-10 Hz, 20-40 uV rhythm which reacted symmetrically to eye opening and had a normal frequency-amplitude gradient with an age-appropriate mixture of frequencies. During drowsiness, there were bursts of diffuse slowing and waxing and waning of the posterior dominant rhythm. During stage II sleep symmetric V waves, K complexes, and sleep spindles were seen.  The appearance of diffuse delta activity was observed in slow wave sleep. Muscle atonia and frequent eye movement artifact was seen during periods of REM sleep.  No abnormalities were activated by sleep. The EKG channel revealed no abnormalities.    Ictal EEG Recording / Patient Events: During this period the patient had no events or seizures.    Summary: During this day of recording no events were recorded. The interictal EEG was normal. Monitoring was continued in order to record the patient's typical events. The EKG channel revealed no abnormalities.    JOAN MENDIETA MD  Diplomate, American Board of Psychiatry and Neurology  Diplomate, American Board of Clinical Neurophysiology  Diplomate, American Board of Epilepsy     Please note this is a preliminary report and updated daily. The final report will have a summary of behavior and electrographic findings with clinical correlation.

## 2025-06-19 NOTE — CARE COORDINATION
Case Management Assessment  Initial Evaluation    Date/Time of Evaluation: 6/19/2025 3:58 PM  Assessment Completed by: KAVON HESS    If patient is discharged prior to next notation, then this note serves as note for discharge by case management.    Patient Name: Lexie Mireles                   YOB: 1970  Diagnosis: Syncope and collapse [R55]  Ketosis (HCC) [E88.89]  Hypertensive urgency [I16.0]  Altered mental status, unspecified altered mental status type [R41.82]  Syncope, unspecified syncope type [R55]  Cerebrovascular accident (CVA) due to stenosis of vertebral artery, unspecified blood vessel laterality (HCC) [I63.219]                   Date / Time: 6/17/2025 10:34 PM    Patient Admission Status: Inpatient   Readmission Risk (Low < 19, Mod (19-27), High > 27): Readmission Risk Score: 18.3    Current PCP: Viktor Fox APRN - CNP  PCP verified by CM? (P) Yes    Chart Reviewed: Yes      History Provided by: (P) Patient  Patient Orientation: (P) Alert and Oriented, Person, Place, Situation, Self    Patient Cognition: (P) Alert    Hospitalization in the last 30 days (Readmission):  No    If yes, Readmission Assessment in CM Navigator will be completed.    Advance Directives:      Code Status: Full Code   Patient's Primary Decision Maker is: (P) Legal Next of Kin      Discharge Planning:    Patient lives with: (P) Spouse/Significant Other Type of Home: (P) House  Primary Care Giver: (P) Self  Patient Support Systems include: (P) Family Members, Spouse/Significant Other   Current Financial resources:    Current community resources:    Current services prior to admission: (P) Durable Medical Equipment            Current DME: (P) Walker            Type of Home Care services:  (P) None    ADLS  Prior functional level: (P) Independent in ADLs/IADLs  Current functional level: (P) Independent in ADLs/IADLs    PT AM-PAC: 17 /24  OT AM-PAC: 19 /24    Family can provide assistance at DC:  (P) No  Would you like Case Management to discuss the discharge plan with any other family members/significant others, and if so, who? (P) No  Plans to Return to Present Housing: (P) Yes  Other Identified Issues/Barriers to RETURNING to current housing: No  Potential Assistance needed at discharge: Prescription Assistance, Transportation, Meals On Wheels            Potential DME:    Patient expects to discharge to: (P) House  Plan for transportation at discharge: (P) Family    Financial    Payor: [place] [employer]ARE / Plan: [employer] [place] MEDICARE / Product Type: *No Product type* /     Does insurance require precert for SNF: Yes    Potential assistance Purchasing Medications: (P) No  Meds-to-Beds request: Yes      [facility] [address] [place] [address] (), [place] - [address] [place] [address] [phone] -  [phone]  [address] [place] [address] () [place] [address]  Phone: [phone] Fax: [phone]      Notes:    Factors facilitating achievement of predicted outcomes: Family support, Motivated, Cooperative, and Pleasant    Barriers to discharge: Lower extremity weakness and Medical complications    Additional Case Management Notes: Discussed the transition plan with the patient. She will return home with her . Educated on DME order for compression hose and need to be measured for them. Order and note provided. Plan is to discharge home tomorrow.    The Plan for Transition of Care is related to the following treatment goals of Syncope and collapse [R55]  Ketosis (HCC) [E88.89]  Hypertensive urgency [I16.0]  Altered mental status, unspecified altered mental status type [R41.82]  Syncope, unspecified syncope type [R55]  Cerebrovascular accident (CVA) due to stenosis of vertebral artery, unspecified blood vessel laterality (HCC) [I63.219]    IF APPLICABLE: The Patient and/or patient representative Lexie and her family were provided with a choice of provider and agrees with the discharge plan. Freedom of choice list with

## 2025-06-19 NOTE — PLAN OF CARE
Problem: Safety - Adult  Goal: Free from fall injury  6/19/2025 1108 by Sindy Balderas, RN  Outcome: Progressing     Problem: Pain  Goal: Verbalizes/displays adequate comfort level or baseline comfort level  6/19/2025 1108 by Sindy Balderas, RN  Outcome: Progressing

## 2025-06-19 NOTE — PLAN OF CARE
Problem: Safety - Adult  Goal: Free from fall injury  6/19/2025 0622 by Sammi Palacios RN  Outcome: Progressing  Flowsheets (Taken 6/18/2025 2156)  Free From Fall Injury:   Instruct family/caregiver on patient safety   Based on caregiver fall risk screen, instruct family/caregiver to ask for assistance with transferring infant if caregiver noted to have fall risk factors  6/19/2025 0621 by Sammi Palacios RN  Outcome: Progressing  Flowsheets (Taken 6/18/2025 2156)  Free From Fall Injury:   Instruct family/caregiver on patient safety   Based on caregiver fall risk screen, instruct family/caregiver to ask for assistance with transferring infant if caregiver noted to have fall risk factors     Problem: Chronic Conditions and Co-morbidities  Goal: Patient's chronic conditions and co-morbidity symptoms are monitored and maintained or improved  6/19/2025 0622 by Sammi Palacios RN  Outcome: Progressing  Flowsheets (Taken 6/18/2025 2230)  Care Plan - Patient's Chronic Conditions and Co-Morbidity Symptoms are Monitored and Maintained or Improved:   Monitor and assess patient's chronic conditions and comorbid symptoms for stability, deterioration, or improvement   Collaborate with multidisciplinary team to address chronic and comorbid conditions and prevent exacerbation or deterioration   Update acute care plan with appropriate goals if chronic or comorbid symptoms are exacerbated and prevent overall improvement and discharge  6/19/2025 0621 by Sammi Palacios RN  Outcome: Progressing  Flowsheets  Taken 6/18/2025 2230  Care Plan - Patient's Chronic Conditions and Co-Morbidity Symptoms are Monitored and Maintained or Improved:   Monitor and assess patient's chronic conditions and comorbid symptoms for stability, deterioration, or improvement   Collaborate with multidisciplinary team to address chronic and comorbid conditions and prevent exacerbation or deterioration   Update acute care plan with appropriate goals if  with appropriate resources:   Identify barriers to discharge with patient and caregiver   Arrange for needed discharge resources and transportation as appropriate   Identify discharge learning needs (meds, wound care, etc)     Problem: Seizure Precautions  Goal: Remains free of injury related to seizures activity  6/19/2025 0622 by Sammi Palacios, RN  Outcome: Progressing  Flowsheets (Taken 6/19/2025 0621)  Remains free of injury related to seizure activity:   Maintain airway, patient safety  and administer oxygen as ordered   Monitor patient for seizure activity, document and report duration and description of seizure to Licensed Independent Practitioner   If seizure occurs, turn patient to side and suction secretions as needed   Reorient patient post seizure  6/19/2025 0621 by Sammi Palacios, RN  Outcome: Progressing  Flowsheets (Taken 6/19/2025 0621)  Remains free of injury related to seizure activity:   Maintain airway, patient safety  and administer oxygen as ordered   Monitor patient for seizure activity, document and report duration and description of seizure to Licensed Independent Practitioner   If seizure occurs, turn patient to side and suction secretions as needed   Reorient patient post seizure     Problem: Pain  Goal: Verbalizes/displays adequate comfort level or baseline comfort level  Outcome: Progressing  Flowsheets (Taken 6/19/2025 0622)  Verbalizes/displays adequate comfort level or baseline comfort level:   Encourage patient to monitor pain and request assistance   Assess pain using appropriate pain scale   Administer analgesics based on type and severity of pain and evaluate response   Implement non-pharmacological measures as appropriate and evaluate response   Consider cultural and social influences on pain and pain management

## 2025-06-19 NOTE — PROGRESS NOTES
Comprehensive Nutrition Assessment    Type and Reason for Visit:  Initial, Positive nutrition screen    Nutrition Recommendations/Plan:   Continue current diet.  Modify ONS to high jayashree/high pro ONS (Ensure Plus) TID.  Encourage intakes.  Will monitor labs, weights, intake, GI status, and plan of care.     Malnutrition Assessment:  Malnutrition Status:  Severe malnutrition (06/19/25 1528)    Context:  Chronic Illness     Findings of the 6 clinical characteristics of malnutrition:  Energy Intake:  75% or less estimated energy requirements for 1 month or longer  Weight Loss:  Greater than 10% over 6 months     Body Fat Loss:  Mild body fat loss Triceps   Muscle Mass Loss:  Mild muscle mass loss Hand (interosseous), Temples (temporalis)  Fluid Accumulation:  No fluid accumulation     Strength:  Not Performed    Nutrition Assessment:    Pt admit encephalopathy. Seen for +nutrition screen of wt/appetite loss. PMH: DM, HTN, HLD, CKD 3. Per chart review pt has had significant wt loss of 15% x6 months. Pt states her appetite has been decreased since she had the flu last fall. Stating normal body wt around this time of 140#. Pt states she sometimes she is eating then unable to swallow, she states its not an issue with swallowing but more she is unable to make her muscles swallow - possibly related to the encephalopathy. Denies food getting stuck in throat or coughing when swallowing, or chewing. Pt does drink ONS at home when able agreeable to have ONS w/ trays.    Nutrition Related Findings:    No edema. Labs: K 3.2, Glu 109, A1C (5/21) 8.9%. Meds: lantus, humalog, reglan. Wound Type: None       Current Nutrition Intake & Therapies:    Average Meal Intake: %  Average Supplements Intake: None Ordered  ADULT DIET; Regular; 4 carb choices (60 gm/meal)  ADULT ORAL NUTRITION SUPPLEMENT; Breakfast, Lunch, Dinner; Standard High Calorie/High Protein Oral Supplement    Anthropometric Measures:  Height: 149.9 cm (4'

## 2025-06-19 NOTE — CONSULTS
Endovascular Neurosurgery Note    Pt Name: Lexie Mireles  MRN: [id number]  YOB: 1970  Date of evaluation: 6/19/2025  Primary Care Physician: Viktor Fox APRN - CNP  Patient evaluated at the request of  Dr. Cruz, Celestino RICK MD  Reason for evaluation: Severe focal stenosis right paraclinoid ICA    SUBJECTIVE:     NAEO from EVN perspective. No new focal neuro deficits.     HPI:     History of Chief Complaint:    Lexie Mireles is a 54-year-old female with a complex medical history including hypertension, insulin-dependent type 2 diabetes mellitus, hyperlipidemia (managed with Zocor 80 mg nightly), obesity, abnormal uterine bleeding, chronic cocaine use, diabetic peripheral neuropathy, anemia, and stage IIIa chronic kidney disease. She is also on aspirin 81 mg daily for vascular protection.  She was brought to the emergency department by EMS following a witnessed syncopal episode, during which she was reportedly unresponsive for approximately 2 minutes. There were no signs of seizure activity, such as tongue biting, urinary incontinence, or convulsions. Her family reported that over the past 4 to 6 weeks she had been increasingly confused and weak.   A CT head and CTA head and neck were performed in the ED this time and showed no acute intracranial abnormalities. However, imaging did report severe focal stenosis of the right paraclinoid internal carotid artery and a questionable microaneurysm in the left supraclinoid ICA  By compared to previous study which was stable.    On examination, the patient was alert, awake, and oriented to person, place, and time. Her neurological exam was non-focal, with motor strength rated 4+/5 in all extremities bilaterally, and no other deficits noted. NIH Stroke Scale score was 0.    She had a prior neurology admission in March 2025 for altered mental status and dizziness, which had also resulted in a fall. At that time, CT and CTA of the head and neck showed no  breath, wheezing    CARDIOVASCULAR:  negative for chest pain, palpitations, syncope, edema    GASTROINTESTINAL:  negative for nausea, vomiting    GENITOURINARY:  negative for incontinence    MUSCULOSKELETAL:  negative for neck or back pain    NEUROLOGICAL:  As above   PSYCHIATRIC:  negative for anxiety      Review of systems otherwise negative.      OBJECTIVE:   Vitals: BP (!) 174/92   Pulse 90   Temp 97.5 °F (36.4 °C) (Oral)   Resp 17   Ht 1.499 m (4' 11\")   Wt 51.3 kg (113 lb)   LMP 09/20/2023 (Exact Date)   SpO2 100%   BMI 22.82 kg/m²   General appearance: Lying in bed, NAD,  HEENT: Head: Normocephalic, no lesions, without obvious abnormality.  Neck: no adenopathy, no carotid bruit, no JVD, supple, symmetrical, trachea midline, and thyroid not enlarged, symmetric, no tenderness/mass/nodules  Lungs: clear to auscultation bilaterally  Heart: regular rate and rhythm, S1, S2 normal, no murmur, click, rub or gallop  Abdomen: soft, non-tender; nondistended, bowel sounds normal  Extremities: extremities normal, atraumatic, no cyanosis or edema  Groin: groin c/d/i  Neurologic: Mental status: Alert and oriented x 3, intact language, attention, knowledge  CN: Normal   MOTOR: 4+/5 bilateral upper and lower extremities  SENSORY: No abnormality  COORDINATION: No abnormality    LABS:     Recent Labs     06/17/25  2246 06/18/25  0144 06/18/25  1359 06/19/25  0724   WBC 11.5*  --  8.2 7.7   HGB 11.9  --  10.1* 10.2*   HCT 37.1  --  30.4* 31.4*     --  391 391   *  --  135* 137   K 3.2*  --  3.3* 3.2*   CL 94*  --  97* 100   CO2 20  --  22 23   BUN 25*  --  23* 21*   CREATININE 1.2*  --  1.2* 1.2*   MG 1.6  --  2.2 2.2   CALCIUM 9.7  --  8.9 8.9   AST 13  --   --   --    ALT 7*  --   --   --    BILITOT 0.7  --   --   --    NITRU  --  NEGATIVE  --   --    COLORU  --  Yellow  --   --    BACTERIA  --  None  --   --      Recent Labs     06/17/25  2246   ALKPHOS 93   ALT 7*   AST 13   BILITOT 0.7   LIPASE 27        RADIOLOGY:     CT and CTA was ordered and did not show any acute abnormality with reported severe focal stenosis of right paraclinoid ICA and questionable left supraclinoid ICA microaneurysm.        Quesionable L ICA supraclinoid microaneurysm mention on radiology notes I am unable to find on the images.    IMPRESSIONS:     # Initially p/w 2-minute syncope/LOC, NIH 0    # MRI/EEG all unremarkable, cardiac workup in progress    # Incidental R paraclinoid ICA stenosis, with MRI no evidence of stroke    # CTA read with possible left ICA microaneurysm that I'm not able to identify on imaging    # HTN, DM, Ck3a, +cocaine use    PLANS:     -- -140  -- Continue Aspirin 81 daily  -- Continue Statins  -- Cardiac/medical workup ongoing per primary team  -- No emergent indications for DSA.Of note, pt w/ notable rx factors and hx of noncompliant in past as well.  -- Rest of care per primary team    Discussed with Dr. Spain .    Please arrange follow-up with Dr. Hodges clinic in 8-12 weeks, and with Dr. Angel Spain in 6-8 months.    Bib Hodges MD, Pager [phone]  Electronically signed 06/19/25 at 11:08 AM  Stroke, Neurocritical Care & Neurointervention  [facility]  [facility] [place]

## 2025-06-19 NOTE — FLOWSHEET NOTE
Orthostatic vitals 6/19/25 1200  MD notified   Compression stockings on during evaluation   Denies dizziness or lightheadedness      06/19/25 1200   Vital Signs   Orthostatic B/P and Pulse? Yes   Blood Pressure Lying 167/99   Pulse Lying 95 PER MINUTE   Blood Pressure Sitting 125/80   Pulse Sitting 100 PER MINUTE   Blood Pressure Standing 96/68   Pulse Standing 103 PER MINUTE

## 2025-06-20 VITALS
HEART RATE: 91 BPM | BODY MASS INDEX: 22.78 KG/M2 | OXYGEN SATURATION: 100 % | TEMPERATURE: 97.9 F | DIASTOLIC BLOOD PRESSURE: 79 MMHG | WEIGHT: 113 LBS | RESPIRATION RATE: 19 BRPM | SYSTOLIC BLOOD PRESSURE: 119 MMHG | HEIGHT: 59 IN

## 2025-06-20 PROBLEM — I65.21 STENOSIS OF INTRACRANIAL PORTIONS OF RIGHT INTERNAL CAROTID ARTERY: Status: ACTIVE | Noted: 2025-06-20

## 2025-06-20 LAB
ANION GAP SERPL CALCULATED.3IONS-SCNC: 12 MMOL/L (ref 9–16)
BUN SERPL-MCNC: 24 MG/DL (ref 6–20)
CALCIUM SERPL-MCNC: 8.5 MG/DL (ref 8.6–10.4)
CHLORIDE SERPL-SCNC: 103 MMOL/L (ref 98–107)
CO2 SERPL-SCNC: 21 MMOL/L (ref 20–31)
CREAT SERPL-MCNC: 1.3 MG/DL (ref 0.6–0.9)
GFR, ESTIMATED: 49 ML/MIN/1.73M2
GLUCOSE BLD-MCNC: 107 MG/DL (ref 65–105)
GLUCOSE BLD-MCNC: 113 MG/DL (ref 65–105)
GLUCOSE BLD-MCNC: 143 MG/DL (ref 65–105)
GLUCOSE BLD-MCNC: 57 MG/DL (ref 65–105)
GLUCOSE SERPL-MCNC: 51 MG/DL (ref 74–99)
MAGNESIUM SERPL-MCNC: 2.2 MG/DL (ref 1.6–2.6)
POTASSIUM SERPL-SCNC: 3.3 MMOL/L (ref 3.7–5.3)
RENIN PLAS-CCNC: NORMAL NG/ML/H
SODIUM SERPL-SCNC: 136 MMOL/L (ref 136–145)

## 2025-06-20 PROCEDURE — 99233 SBSQ HOSP IP/OBS HIGH 50: CPT | Performed by: NURSE PRACTITIONER

## 2025-06-20 PROCEDURE — 82947 ASSAY GLUCOSE BLOOD QUANT: CPT

## 2025-06-20 PROCEDURE — 94761 N-INVAS EAR/PLS OXIMETRY MLT: CPT

## 2025-06-20 PROCEDURE — 99233 SBSQ HOSP IP/OBS HIGH 50: CPT | Performed by: PSYCHIATRY & NEUROLOGY

## 2025-06-20 PROCEDURE — 6370000000 HC RX 637 (ALT 250 FOR IP)

## 2025-06-20 PROCEDURE — 82088 ASSAY OF ALDOSTERONE: CPT

## 2025-06-20 PROCEDURE — 99239 HOSP IP/OBS DSCHRG MGMT >30: CPT | Performed by: INTERNAL MEDICINE

## 2025-06-20 PROCEDURE — 6360000002 HC RX W HCPCS

## 2025-06-20 PROCEDURE — 80048 BASIC METABOLIC PNL TOTAL CA: CPT

## 2025-06-20 PROCEDURE — 97535 SELF CARE MNGMENT TRAINING: CPT

## 2025-06-20 PROCEDURE — 6370000000 HC RX 637 (ALT 250 FOR IP): Performed by: FAMILY MEDICINE

## 2025-06-20 PROCEDURE — 36415 COLL VENOUS BLD VENIPUNCTURE: CPT

## 2025-06-20 PROCEDURE — 84244 ASSAY OF RENIN: CPT

## 2025-06-20 PROCEDURE — 94640 AIRWAY INHALATION TREATMENT: CPT

## 2025-06-20 PROCEDURE — 6370000000 HC RX 637 (ALT 250 FOR IP): Performed by: INTERNAL MEDICINE

## 2025-06-20 PROCEDURE — 83735 ASSAY OF MAGNESIUM: CPT

## 2025-06-20 RX ORDER — POTASSIUM CHLORIDE 1500 MG/1
20 TABLET, EXTENDED RELEASE ORAL
Status: DISCONTINUED | OUTPATIENT
Start: 2025-06-20 | End: 2025-06-20 | Stop reason: HOSPADM

## 2025-06-20 RX ORDER — METOPROLOL TARTRATE 25 MG/1
25 TABLET, FILM COATED ORAL 2 TIMES DAILY
Qty: 60 TABLET | Refills: 1 | Status: SHIPPED | OUTPATIENT
Start: 2025-06-20

## 2025-06-20 RX ORDER — FOLIC ACID 1 MG/1
1 TABLET ORAL DAILY
Qty: 30 TABLET | Refills: 1 | Status: SHIPPED | OUTPATIENT
Start: 2025-06-21

## 2025-06-20 RX ORDER — POTASSIUM CHLORIDE 1500 MG/1
20 TABLET, EXTENDED RELEASE ORAL
Qty: 30 TABLET | Refills: 1 | Status: SHIPPED | OUTPATIENT
Start: 2025-06-21

## 2025-06-20 RX ORDER — AMLODIPINE BESYLATE 5 MG/1
5 TABLET ORAL 2 TIMES DAILY
Status: DISCONTINUED | OUTPATIENT
Start: 2025-06-21 | End: 2025-06-20 | Stop reason: HOSPADM

## 2025-06-20 RX ORDER — ATORVASTATIN CALCIUM 80 MG/1
80 TABLET, FILM COATED ORAL NIGHTLY
Qty: 30 TABLET | Refills: 1 | Status: SHIPPED | OUTPATIENT
Start: 2025-06-20

## 2025-06-20 RX ORDER — AMLODIPINE BESYLATE 10 MG/1
5 TABLET ORAL 2 TIMES DAILY
Qty: 60 TABLET | Refills: 1 | Status: SHIPPED | OUTPATIENT
Start: 2025-06-20

## 2025-06-20 RX ADMIN — HEPARIN SODIUM 5000 UNITS: 5000 INJECTION INTRAVENOUS; SUBCUTANEOUS at 15:09

## 2025-06-20 RX ADMIN — AMLODIPINE BESYLATE 10 MG: 10 TABLET ORAL at 08:08

## 2025-06-20 RX ADMIN — EMPAGLIFLOZIN 10 MG: 10 TABLET, FILM COATED ORAL at 08:09

## 2025-06-20 RX ADMIN — METOPROLOL TARTRATE 25 MG: 25 TABLET, FILM COATED ORAL at 08:09

## 2025-06-20 RX ADMIN — GLIPIZIDE 5 MG: 5 TABLET ORAL at 08:09

## 2025-06-20 RX ADMIN — GABAPENTIN 300 MG: 300 CAPSULE ORAL at 08:09

## 2025-06-20 RX ADMIN — FAMOTIDINE 20 MG: 20 TABLET, FILM COATED ORAL at 08:09

## 2025-06-20 RX ADMIN — POTASSIUM CHLORIDE 40 MEQ: 1500 TABLET, EXTENDED RELEASE ORAL at 07:12

## 2025-06-20 RX ADMIN — HEPARIN SODIUM 5000 UNITS: 5000 INJECTION INTRAVENOUS; SUBCUTANEOUS at 06:06

## 2025-06-20 RX ADMIN — METOCLOPRAMIDE 5 MG: 5 TABLET ORAL at 08:09

## 2025-06-20 RX ADMIN — GABAPENTIN 300 MG: 300 CAPSULE ORAL at 15:09

## 2025-06-20 RX ADMIN — ASPIRIN 81 MG 81 MG: 81 TABLET ORAL at 08:08

## 2025-06-20 RX ADMIN — FOLIC ACID 1 MG: 1 TABLET ORAL at 08:09

## 2025-06-20 RX ADMIN — FLUTICASONE PROPIONATE 2 PUFF: 110 AEROSOL, METERED RESPIRATORY (INHALATION) at 08:45

## 2025-06-20 RX ADMIN — POTASSIUM CHLORIDE 20 MEQ: 1500 TABLET, EXTENDED RELEASE ORAL at 10:06

## 2025-06-20 RX ADMIN — METOCLOPRAMIDE 5 MG: 5 TABLET ORAL at 12:18

## 2025-06-20 NOTE — PLAN OF CARE
Problem: Safety - Adult  Goal: Free from fall injury  6/19/2025 2150 by Shabana Cardenas RN  Outcome: Progressing  6/19/2025 1108 by Sindy Balderas RN  Outcome: Progressing     Problem: Chronic Conditions and Co-morbidities  Goal: Patient's chronic conditions and co-morbidity symptoms are monitored and maintained or improved  Outcome: Progressing  Flowsheets (Taken 6/19/2025 0800 by Sindy Balderas, RN)  Care Plan - Patient's Chronic Conditions and Co-Morbidity Symptoms are Monitored and Maintained or Improved: Monitor and assess patient's chronic conditions and comorbid symptoms for stability, deterioration, or improvement     Problem: Discharge Planning  Goal: Discharge to home or other facility with appropriate resources  Outcome: Progressing  Flowsheets (Taken 6/19/2025 0800 by Sindy Balderas, RN)  Discharge to home or other facility with appropriate resources: Identify barriers to discharge with patient and caregiver     Problem: Seizure Precautions  Goal: Remains free of injury related to seizures activity  Outcome: Progressing     Problem: Pain  Goal: Verbalizes/displays adequate comfort level or baseline comfort level  6/19/2025 2150 by Shabana Cardenas RN  Outcome: Progressing  6/19/2025 1108 by Sindy Balderas RN  Outcome: Progressing     Problem: Respiratory - Adult  Goal: Achieves optimal ventilation and oxygenation  Outcome: Progressing  Flowsheets (Taken 6/19/2025 0800 by Sindy Balderas, RN)  Achieves optimal ventilation and oxygenation: Assess for changes in respiratory status     Problem: Nutrition Deficit:  Goal: Optimize nutritional status  Outcome: Progressing

## 2025-06-20 NOTE — PROGRESS NOTES
Endovascular Neurosurgery Note    Pt Name: Lexie Mireles  MRN: [id number]  YOB: 1970  Date of evaluation: 6/20/2025  Primary Care Physician: Viktor Fox APRN - CNP  Patient evaluated at the request of  Dr. Cruz, Celestino RICK MD  Reason for evaluation: Severe focal stenosis right paraclinoid ICA    SUBJECTIVE:     NAEO from EVN perspective. No new focal neuro deficits. SBP in goal.    HPI:     History of Chief Complaint:    Lexie Mireles is a 54-year-old female with a complex medical history including hypertension, insulin-dependent type 2 diabetes mellitus, hyperlipidemia (managed with Zocor 80 mg nightly), obesity, abnormal uterine bleeding, chronic cocaine use, diabetic peripheral neuropathy, anemia, and stage IIIa chronic kidney disease. She is also on aspirin 81 mg daily for vascular protection.  She was brought to the emergency department by EMS following a witnessed syncopal episode, during which she was reportedly unresponsive for approximately 2 minutes. There were no signs of seizure activity, such as tongue biting, urinary incontinence, or convulsions. Her family reported that over the past 4 to 6 weeks she had been increasingly confused and weak.   A CT head and CTA head and neck were performed in the ED this time and showed no acute intracranial abnormalities. However, imaging did report severe focal stenosis of the right paraclinoid internal carotid artery and a questionable microaneurysm in the left supraclinoid ICA  By compared to previous study which was stable.    On examination, the patient was alert, awake, and oriented to person, place, and time. Her neurological exam was non-focal, with motor strength rated 4+/5 in all extremities bilaterally, and no other deficits noted. NIH Stroke Scale score was 0.    She had a prior neurology admission in March 2025 for altered mental status and dizziness, which had also resulted in a fall. At that time, CT and CTA of the head and  Viktor Fox APRN - CNP   albuterol (PROVENTIL) (2.5 MG/3ML) 0.083% nebulizer solution Take 3 mLs by nebulization 4 times daily as needed for Wheezing 7/29/24   Viktor Fox APRN - CNP   albuterol sulfate HFA (PROVENTIL;VENTOLIN;PROAIR) 108 (90 Base) MCG/ACT inhaler Inhale 2 puffs into the lungs every 6 hours as needed for Wheezing or Shortness of Breath 7/24/24   Viktor Fox APRN - CNP   beclomethasone (QVAR REDIHALER) 80 MCG/ACT AERB inhaler Inhale 1 puff into the lungs in the morning and 1 puff in the evening. 7/24/24   Viktor Fox APRN - CNP   blood glucose test strips (TRUE METRIX BLOOD GLUCOSE TEST) strip Use to check blood glucose 4 times daily;  Before meals and bedtime, and as needed. 7/24/24   Viktor Fox APRN - CNP   Insulin Pen Needle (B-D ULTRAFINE III SHORT PEN) 31G X 8 MM MISC Inject 1 each into the skin daily May substitute with any brand 7/24/24   Viktor Fox APRN - CNP   Lancets MISC 1 each by Does not apply route 4 times daily 7/24/24   Viktor Fox APRN - CNP   aspirin (ASPIRIN LOW DOSE) 81 MG EC tablet Take 1 tablet by mouth daily 12/5/23   Viktor Fox APRN - CNP   Insulin Pen Needle ([employer] PEN NEEDLES 31G) 31G X 8 MM MISC 1 each by Does not apply route daily 3/29/23   Viktor Fox APRN - CNP   Handicap Placard MISC by Does not apply route Exp March 2028 3/29/23   Viktor Fox APRN - CNP   Blood Glucose Monitoring Suppl (TRUE METRIX METER) w/Device KIT Use as directed to check blood glucose. 6/27/22   Viktor Fox APRN - CNP    Scheduled Meds:   potassium chloride  20 mEq Oral Daily with breakfast    [START ON 6/21/2025] amLODIPine  5 mg Oral BID    gabapentin  300 mg Oral TID    glipiZIDE  5 mg Oral BID AC    [Held by provider] insulin glargine  12 Units SubCUTAneous BID    metoprolol tartrate  25 mg Oral BID    metoclopramide  5 mg Oral 4x Daily AC & HS    folic acid  1 mg Oral

## 2025-06-20 NOTE — DISCHARGE SUMMARY
[facility]  Office: [phone]  Flaco Santizo, DO, Vincenzo Mathur, DO, Mike Kurtz DO, Dany Pozo, DO, Jade Fuller MD, Reyna Rodriguez MD, Nolan Villanueva MD, Isela Rosenberg MD,  Colt Castelan MD, Akila Bustillos MD, Karissa Mckeon MD,  Ludwgi Petersen DO, Juan Antonio Chowdhury MD, Emerson Mcadams MD, Celestino Santizo DO, Caridad Miles MD,  Placido Davila DO, Tamara Clinton MD, Vanessa Blake MD, Xochitl Cohn MD,  Connor Fulton MD, Sofie Allan MD, Vaibhav Raza MD, Velasquez Bowen MD, Darren Wood MD, Jyotsna Payne MD, Augustin Dominguez, DO, Gaby Bowens MD, Dariusz Neumann DO, Dov Bhatia MD, Ludwig Riley MD, Mohsin Reza, MD, Tony Parker MD, Shirley Waterhouse, CNP,  Evi Abraham, CNP, Augustin Peña, CNP,  Jo Magana, GRIS, Carmen Yates, CNP, Kayla Encarnacion, CNP, Bertha Ashby, CNP, Mae Bosch, CNP, Jessica Crawford, PA-C, Wendy Guy, CNP, Homer Watts, CNP,  Krissy Galeana, CNP, Annalisa Sullivan, CNP, Abdon Angulo, PA-C, Prachi Bender, PA-C, Zahida Sharp, CNP,        Teresa Vital, CNS, Jennifer Lai, CNP, Jacqueline Gramajo, CNP         [facility]   IN-PATIENT SERVICE   [place]    Discharge Summary     Patient ID: Lexie Mireles  :  1970   MRN: [id number]     ACCOUNT:  [id number]   Patient's PCP: Viktor Fox APRN - CNP  Admit Date: 2025   Discharge Date: 2025    Length of Stay: 2  Code Status:  Full Code  Admitting Physician: No admitting provider for patient encounter.  Discharge Physician: Reyna Rodriguez MD     Active Discharge Diagnoses:     Hospital Problem Lists:  Principal Problem:    Encephalopathy  Active Problems:    Hypertension, essential    Diabetes mellitus type 2, insulin dependent (HCC)    Hypokalemia    GERD (gastroesophageal reflux disease)    Hypercholesteremia    Stage 3a chronic kidney disease (HCC)    Syncope and collapse    Hypertensive urgency    Orthostatic hypotension     June 21, 2025     metoprolol tartrate 25 MG tablet  Commonly known as: LOPRESSOR  Take 1 tablet by mouth 2 times daily     potassium chloride 20 MEQ extended release tablet  Commonly known as: KLOR-CON M  Take 1 tablet by mouth daily (with breakfast)  Start taking on: June 21, 2025            CHANGE how you take these medications      amLODIPine 10 MG tablet  Commonly known as: NORVASC  Take 0.5 tablets by mouth in the morning and at bedtime  What changed:   how much to take  when to take this            CONTINUE taking these medications      * albuterol sulfate  (90 Base) MCG/ACT inhaler  Commonly known as: PROVENTIL;VENTOLIN;PROAIR  Inhale 2 puffs into the lungs every 6 hours as needed for Wheezing or Shortness of Breath     * albuterol (2.5 MG/3ML) 0.083% nebulizer solution  Commonly known as: PROVENTIL  Take 3 mLs by nebulization 4 times daily as needed for Wheezing     aspirin 81 MG EC tablet  Commonly known as: Aspirin Low Dose  Take 1 tablet by mouth daily     beclomethasone 80 MCG/ACT Aerb inhaler  Commonly known as: Qvar RediHaler  Inhale 1 puff into the lungs in the morning and 1 puff in the evening.     dapagliflozin 10 MG tablet  Commonly known as: Farxiga  Take 1 tablet by mouth every morning     Dexcom G7 Sensor Misc  1 each by Does not apply route every 10 days     gabapentin 300 MG capsule  Commonly known as: NEURONTIN  Take 1 capsule by mouth 3 times daily for 60 days. Intended supply: 30 days     glipiZIDE 5 MG tablet  Commonly known as: GLUCOTROL  Take 1 tablet by mouth 2 times daily (before meals)     Handicap Placard Misc  by Does not apply route Exp March 2028     * [employer] Pen Needles 31G 31G X 8 MM Misc  Generic drug: Insulin Pen Needle  1 each by Does not apply route daily     * B-D ULTRAFINE III SHORT PEN 31G X 8 MM Misc  Generic drug: Insulin Pen Needle  Inject 1 each into the skin daily May substitute with any brand     Lancets Misc  1 each by Does not apply route 4 times daily

## 2025-06-20 NOTE — PROGRESS NOTES
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  Past Surgical History:  has a past surgical history that includes Tubal ligation (); Endometrial ablation (2018); pr hysteroscopy endometrial ablation (N/A, 2018); Esophagogastroduodenoscopy (2025); Upper gastrointestinal endoscopy (N/A, 2025); and Colonoscopy (N/A, 2025).    Discharge Recommendations  Discharge Recommendations: Patient would benefit from continued therapy after discharge  OT Equipment Recommendations  Equipment Needed: No    Assessment  Performance deficits / Impairments: Decreased functional mobility ;Decreased ADL status;Decreased endurance;Decreased balance;Decreased high-level IADLs;Decreased cognition;Decreased safe awareness  Assessment: Pt would continue to benefit from OT services to address deficits listed above and improve overall functional performance prior to discharge.  Prognosis: Good  Decision Making: Medium Complexity  REQUIRES OT FOLLOW-UP: Yes  Activity Tolerance  Activity Tolerance: Patient Tolerated treatment well  Safety Devices  Type of Devices: Nurse notified;Gait belt;Call light within reach;Chair alarm in place;Left in chair  Restraints  Restraints Initially in Place: No    AM-PAC  AM-PAC Daily Activity - Inpatient   How much help  while engaged in grooming activity.)    Transfers/Mobility            Transfers  Sit to stand: Stand by assistance  Stand to sit: Stand by assistance  Transfer Comments: EOB->stand->pivot to recliner->mobility->stood at sink->recliner->mobility->recliner. SBA. B shabnam hoes and abdominal binder on prior to movement. RW, cues fro hand placement- G carryover.         Functional Mobility: Contact guard assistance;Increased time to complete  Functional Mobility Skilled Clinical Factors: Slow moving, RW, to and from BR/room distances. Cues to keep upright posture vs looking down- F carryover. CGA              Patient Education  Patient Education  Education Given To: Patient  Education Provided: Role of Therapy;Plan of Care;ADL Adaptive Strategies;Transfer Training;Energy Conservation;Fall Prevention Strategies  Education Method: Demonstration;Verbal  Barriers to Learning: None  Education Outcome: Verbalized understanding;Demonstrated understanding    Goals  Short Term Goals  Time Frame for Short Term Goals: By discharge, pt will:  Short Term Goal 1: demo UB ADLs Independently.  Short Term Goal 2: demo LB ADLs with Mod IND, adaptive techniques PRN.  Short Term Goal 3: demo safe functional transfers/mobility with SUP, LRD PRN for engagement in ADLs.  Short Term Goal 4: demo dynamic standing during functional activities for 8+ mins with SUP, LRD PRN.  Short Term Goal 5: demo use of 2 EC/WS techniques throughout entire session independently with no vc's.    Plan  Occupational Therapy Plan  Times Per Week: 3x/wk  Current Treatment Recommendations: Balance training, Functional mobility training, Endurance training, Pain management, Cognitive reorientation, Safety education & training, Patient/Caregiver education & training, Equipment evaluation, education, & procurement, Self-Care / ADL, Cognitive/Perceptual training    Minutes  OT Individual Minutes  Time In: 1340  Time Out: 1428  Minutes: 48  Time Code Minutes   Timed Code

## 2025-06-20 NOTE — PLAN OF CARE
Problem: Safety - Adult  Goal: Free from fall injury  Outcome: Completed     Problem: Chronic Conditions and Co-morbidities  Goal: Patient's chronic conditions and co-morbidity symptoms are monitored and maintained or improved  Outcome: Completed  Flowsheets (Taken 6/20/2025 0800)  Care Plan - Patient's Chronic Conditions and Co-Morbidity Symptoms are Monitored and Maintained or Improved: Monitor and assess patient's chronic conditions and comorbid symptoms for stability, deterioration, or improvement     Problem: Discharge Planning  Goal: Discharge to home or other facility with appropriate resources  Outcome: Completed  Flowsheets (Taken 6/20/2025 0800)  Discharge to home or other facility with appropriate resources: Identify barriers to discharge with patient and caregiver     Problem: Seizure Precautions  Goal: Remains free of injury related to seizures activity  Outcome: Completed     Problem: Pain  Goal: Verbalizes/displays adequate comfort level or baseline comfort level  Outcome: Completed     Problem: Respiratory - Adult  Goal: Achieves optimal ventilation and oxygenation  Outcome: Completed  Flowsheets (Taken 6/20/2025 0800)  Achieves optimal ventilation and oxygenation: Assess for changes in respiratory status     Problem: Nutrition Deficit:  Goal: Optimize nutritional status  Outcome: Completed

## 2025-06-20 NOTE — PROGRESS NOTES
Endovascular Neurosurgery Note    Pt Name: Lexie Mireles  MRN: [id number]  YOB: 1970  Date of evaluation: 6/20/2025  Primary Care Physician: Viktor Fox APRN - CNP  Patient evaluated at the request of  Dr. Cruz, Celestino RICK MD  Reason for evaluation: Severe focal stenosis right paraclinoid ICA    SUBJECTIVE:     NAEO from EVN perspective. No new focal neuro deficits.     HPI:     History of Chief Complaint:    Lexie Mireles is a 54-year-old female with a complex medical history including hypertension, insulin-dependent type 2 diabetes mellitus, hyperlipidemia (managed with Zocor 80 mg nightly), obesity, abnormal uterine bleeding, chronic cocaine use, diabetic peripheral neuropathy, anemia, and stage IIIa chronic kidney disease. She is also on aspirin 81 mg daily for vascular protection.  She was brought to the emergency department by EMS following a witnessed syncopal episode, during which she was reportedly unresponsive for approximately 2 minutes. There were no signs of seizure activity, such as tongue biting, urinary incontinence, or convulsions. Her family reported that over the past 4 to 6 weeks she had been increasingly confused and weak.   A CT head and CTA head and neck were performed in the ED this time and showed no acute intracranial abnormalities. However, imaging did report severe focal stenosis of the right paraclinoid internal carotid artery and a questionable microaneurysm in the left supraclinoid ICA  By compared to previous study which was stable.    On examination, the patient was alert, awake, and oriented to person, place, and time. Her neurological exam was non-focal, with motor strength rated 4+/5 in all extremities bilaterally, and no other deficits noted. NIH Stroke Scale score was 0.    She had a prior neurology admission in March 2025 for altered mental status and dizziness, which had also resulted in a fall. At that time, CT and CTA of the head and neck showed no  KELLEN Valiente CNP   albuterol (PROVENTIL) (2.5 MG/3ML) 0.083% nebulizer solution Take 3 mLs by nebulization 4 times daily as needed for Wheezing 7/29/24   Viktor Fox APRN - CNP   albuterol sulfate HFA (PROVENTIL;VENTOLIN;PROAIR) 108 (90 Base) MCG/ACT inhaler Inhale 2 puffs into the lungs every 6 hours as needed for Wheezing or Shortness of Breath 7/24/24   Viktor Fox APRN - CNP   beclomethasone (QVAR REDIHALER) 80 MCG/ACT AERB inhaler Inhale 1 puff into the lungs in the morning and 1 puff in the evening. 7/24/24   Viktor Fox APRN - CNP   blood glucose test strips (TRUE METRIX BLOOD GLUCOSE TEST) strip Use to check blood glucose 4 times daily;  Before meals and bedtime, and as needed. 7/24/24   Viktor Fox APRN - CNP   Insulin Pen Needle (B-D ULTRAFINE III SHORT PEN) 31G X 8 MM MISC Inject 1 each into the skin daily May substitute with any brand 7/24/24   Viktor Fox APRN - CNP   Lancets MISC 1 each by Does not apply route 4 times daily 7/24/24   Viktor Fox APRN - CNP   aspirin (ASPIRIN LOW DOSE) 81 MG EC tablet Take 1 tablet by mouth daily 12/5/23   Viktor Fox APRN - CNP   Insulin Pen Needle ([employer] PEN NEEDLES 31G) 31G X 8 MM MISC 1 each by Does not apply route daily 3/29/23   Viktor Fox APRN - CNP   Handicap Placard MISC by Does not apply route Exp March 2028 3/29/23   Viktor Fox APRN - CNP   Blood Glucose Monitoring Suppl (TRUE METRIX METER) w/Device KIT Use as directed to check blood glucose. 6/27/22   Viktor Fox APRN - CNP    Scheduled Meds:   potassium chloride  20 mEq Oral Daily with breakfast    [START ON 6/21/2025] amLODIPine  5 mg Oral BID    gabapentin  300 mg Oral TID    glipiZIDE  5 mg Oral BID AC    [Held by provider] insulin glargine  12 Units SubCUTAneous BID    metoprolol tartrate  25 mg Oral BID    metoclopramide  5 mg Oral 4x Daily AC & HS    folic acid  1 mg Oral Daily

## 2025-06-20 NOTE — PROGRESS NOTES
Physician Progress Note      PATIENT:               TEJ EUGENE  CSN #:                  [id number]  :                       1970  ADMIT DATE:       2025 10:34 PM  DISCH DATE:  RESPONDING  PROVIDER #:        Reyna Rodriguez MD          QUERY TEXT:    Encephalopathy is documented in the medical record In the ED report/ H/P /   neurology consult notes  throu .  Please specify type:    The clinical indicators include:   ED Note: \" 54 y.o. female history of hypertension, GERD, diabetes, who   presents with concerns for altered mentation that is been going on for 4   weeks.  Patient presents with her  and her son who notes that the   patient has been confused\" \"altered mentation, encephalopathy.\" \" On exam she   is responding to stimuli, tachycardic, hypertensive, she has difficulty   following commands though she does most.\"    per H/P IM: \" Acute to subacute Encephalopathy:-Per documented report it has   been ongoing for a while, worsening, patient had similar admission to   neurology team in March.- Patient now seems to be improving, medication side   effect?\"    Per neurology attending statement : \"Although her events are infrequent   and perhaps more suggestive of syncope, will monitor on LTM overnight given   reported confusion.  She was hypertensive.  Does not check blood pressure at   home.?  Hypertensive encephalopathy.   She is on gabapentin and tizanidine.?    Polypharmacy.  Can consider lower dose gabapentin in light of her CKD. \"  \"   upon arrival to ED patient was alert but seemed confused and gave   inappropriate answers.  Her mentation improved after blood pressure was   controlled.\"    initial Vitals : >119, RR 28,  /98>207/119>191/114   orthostatics Lying 167/99 HR 95/ Sitting 125/80  / Standing 96/68     Labetalol 10 mg IV, magnesium sulfate 2000 mg IV, norvasc 10 mg tab, coreg   3.125mg tab, lopressor 25 mg tab,  Options  provided:  -- Hypertensive  -- Related to Neurontin and tizanidine  -- Toxic  -- Other - I will add my own diagnosis  -- Disagree - Not applicable / Not valid  -- Disagree - Clinically unable to determine / Unknown  -- Refer to Clinical Documentation Reviewer    PROVIDER RESPONSE TEXT:    Provider is clinically unable to determine a response to this query.  MRI brain negative, no signs of infection    Query created by: Norma Moy on 6/20/2025 11:28 AM      QUERY TEXT:    Please clarify the patient?s nutritional status:    The clinical indicators include:  Per dietary consult note 6/19: \"Malnutrition Assessment:  Malnutrition Status:  Severe malnutrition (06/19/25 1528)  Context:  Chronic Illness  Findings of the 6 clinical characteristics of malnutrition:  Energy Intake:  75% or less estimated energy requirements for 1 month or   longer  Weight Loss:  Greater than 10% over 6 months  Body Fat Loss:  Mild body fat loss Triceps  Muscle Mass Loss:  Mild muscle mass loss Hand (interosseous), Temples   (temporalis)  Fluid Accumulation:  No fluid accumulation  Nutrition Diagnosis: Severe malnutrition, in context of chronic illness   related to decreased appetite, lack of self-feeding ability as evidenced by   criteria as identified in malnutrition assessment, poor intake prior to   admission, weight loss, loss of subcutaneous fat, muscle loss  Continue Current Diet, Modify Oral Nutrition Supplement, Goals: Meet at least   75% of estimated needs, prior to discharge.\"  Options provided:  -- Protein calorie malnutrition severe  -- Other - I will add my own diagnosis  -- Disagree - Not applicable / Not valid  -- Disagree - Clinically unable to determine / Unknown  -- Refer to Clinical Documentation Reviewer    PROVIDER RESPONSE TEXT:    This patient has severe protein calorie malnutrition.    Query created by: Norma Moy on 6/20/2025 11:31 AM      Electronically signed by:  Reyna Rodriguez MD 6/20/2025 1:43

## 2025-06-20 NOTE — PROGRESS NOTES
[facility]  Office: [phone]  Flaco Santizo, DO, Vincenzo Mathur, DO, Mike Kurtz DO, Dany Pozo, DO, Jade Fuller MD, Reyna Rodriguez MD, Nolan Villanueva MD, Isela Rosenberg MD,  Colt Castelan MD, Akila Bustillos MD, Karissa Mckeon MD,  Ludwig Petersen DO, Juan Antonio Chowdhury MD, Emerson Mcadams MD, Celestino Santizo DO, Caridad Miles MD,  Placido Davila DO, Tamara Clinton MD, Vanessa Blake MD, Xochitl Cohn MD,  Connor Fulton MD, Sofei Allan MD, Vaibhav Raza MD, Velasquez Bowen MD, Darren Wood MD, Jyotsna Payne MD, Augustin Dominguez, DO, Gaby Bowens MD, Dariusz Neumann DO, Dov Bhatia MD, Ludwig iRley MD, Mohsin Reza, MD, Tony Parker MD, Shirley Waterhouse, CNP,  Evi Abraham, CNP, Augustin Peña, CNP,  Jo Magana, DNP, Carmen Yates, CNP, Kayla Encarnacion, CNP, Bertha Ashby, CNP, Mae Bosch, CNP, Jessica Crawford, PA-C, Wendy Guy, CNP, Homer Watts, CNP,  Krissy Galeana, CNP, Annalisa Sullivan, CNP, Abdon Angulo, PA-C, Zahida Sharp, CNP,  Teresa Vital, CNS, Jennifer Lai, CNP, Jacqueline Gramajo, CNP,   Magalie Ojeda, CNP         [facility]   IN-PATIENT SERVICE   [facility]    Progress Note    6/20/2025    1:34 PM    Name:   Lexie Mireles  MRN:     [id number]     Acct:      [id number]   Room:   2004/2004-01  IP Day:  2  Admit Date:  6/17/2025 10:34 PM    PCP:   Viktor Fox APRN - CNP  Code Status:  Full Code    Subjective:     C/C:   Chief Complaint   Patient presents with    Altered Mental Status     Interval History Status: improved.     Pt had low BS- 57 this morning, but better after eating.  BPs improved, no lightheadedness when standing.  She wants to go home.    Brief History:     Per the H & P:  \"This is a pleasant 54-year-old female with known bad medical history of diabetes, hypertension, hyperlipidemia, peripheral neuropathy, CKD 3A,who was brought to emergency department by EMS following a witnessed syncopal episode  06/18/25  1313 06/19/25  0724 06/19/25  0744 06/19/25  1605 06/19/25 2028 06/20/25  0713 06/20/25  0743 06/20/25  0832 06/20/25  1217   LABA1C  --   --   --   --  9.9*  --   --   --   --   --   --   --    TSH 1.56  --   --   --   --   --   --   --   --   --   --   --    AST 13  --   --   --   --   --   --   --   --   --   --   --    ALT 7*  --   --   --   --   --   --   --   --   --   --   --    ALKPHOS 93  --   --   --   --   --   --   --   --   --   --   --    BILITOT 0.7  --   --   --   --   --   --   --   --   --   --   --    LIPASE 27  --   --   --   --   --   --   --   --   --   --   --    CHOL  --   --  258*  --   --   --   --   --   --   --   --   --    HDL  --   --  40*  --   --   --   --   --   --   --   --   --    CHOLHDLRATIO  --   --  6.5*  --   --   --   --   --   --   --   --   --    TRIG  --   --  223*  --   --   --   --   --   --   --   --   --    VLDL  --   --  45*  --   --   --   --   --   --   --   --   --    POCGLU  --    < >  --    < >  --    < > 78 116* 57* 107* 113* 143*    < > = values in this interval not displayed.     ABG:  Lab Results   Component Value Date/Time    FIO2 INFORMATION NOT PROVIDED 06/17/2025 10:46 PM     Lab Results   Component Value Date/Time    SPECIAL rt hand 2ml 06/18/2025 11:56 AM     Lab Results   Component Value Date/Time    CULTURE NO GROWTH 2 DAYS 06/18/2025 12:04 PM       Radiology:  MRI BRAIN WO CONTRAST  Result Date: 6/18/2025  1. No acute intracranial abnormality. No acute infarct. 2. Minimal chronic microvascular ischemic changes.     US RENAL COMPLETE  Result Date: 6/18/2025  Increased cortical echogenicity of the kidneys suggestive of chronic medical renal disease. Normal bladder.     CT CHEST ABDOMEN PELVIS W CONTRAST Additional Contrast? None  Result Date: 6/18/2025  No acute abnormality in the chest, abdomen, or pelvis.     CT HEAD WO CONTRAST  Result Date: 6/18/2025  No acute intracranial identified. No large vessel occlusion detected within the head or  BID, cardiology started metoprolol 25 mg twice daily, stopped Coreg  DM2-uncontrolled, continue Lantus twice daily, SSI,   HbA1c 9.9  Hyperlipidemia-continue statin  CKD 3-avoid nephrotoxic agents, monitor  Chronic back pain-recommend decreasing Zanaflex dose since this can cause hypotension  Gastroparesis- resume Reglan 5mg TID before meals, f/u with GI outpatient  Hypokalemia- replace KCL 20meq daily  GI/DVT prophylaxis  PT/OT    Okay to discharge home today, f/u with PCP, Cardiology and GI in 1-2 weeks    Reyna Rodriguez MD  6/20/2025  1:34 PM

## 2025-06-20 NOTE — PROGRESS NOTES
[facility]  Progress Note                   Date:   6/20/2025  Patient name: Lexie Mireles  Date of admission:  6/17/2025 10:34 PM  MRN:   [id number]  YOB: 1970  PCP: Viktor Fox, KELLEN - CNP    Reason for Admission: Syncope and collapse [R55]  Ketosis (HCC) [E88.89]  Hypertensive urgency [I16.0]  Altered mental status, unspecified altered mental status type [R41.82]  Syncope, unspecified syncope type [R55]  Cerebrovascular accident (CVA) due to stenosis of vertebral artery, unspecified blood vessel laterality (HCC) [I63.219]    Subjective:       Clinical Changes /Abnormalities: Seen in room after discussion with RN. Hypotensive again last night and meds held, this AM then tachycardic and hypertensive. Continues to have some lightheadedness. No CP/pressure. Labs, vitals, & tele reviewed. Remains SR. K+ replaced today    Review of Systems    Medications:   Scheduled Meds:   potassium chloride  20 mEq Oral Daily with breakfast    amLODIPine  10 mg Oral Daily    gabapentin  300 mg Oral TID    glipiZIDE  5 mg Oral BID AC    [Held by provider] insulin glargine  12 Units SubCUTAneous BID    metoprolol tartrate  25 mg Oral BID    metoclopramide  5 mg Oral 4x Daily AC & HS    folic acid  1 mg Oral Daily    famotidine  20 mg Oral Daily    sodium chloride flush  5-40 mL IntraVENous 2 times per day    heparin (porcine)  5,000 Units SubCUTAneous 3 times per day    insulin lispro  0-4 Units SubCUTAneous 4x Daily AC & HS    aspirin  81 mg Oral Daily    atorvastatin  80 mg Oral Nightly    fluticasone  2 puff Inhalation BID RT    empagliflozin  10 mg Oral Daily     Continuous Infusions:   sodium chloride 100 mL/hr at 06/19/25 1405    dextrose      sodium chloride       CBC:   Recent Labs     06/17/25  2246 06/18/25  1359 06/19/25  0724   WBC 11.5* 8.2 7.7   HGB 11.9 10.1* 10.2*    391 391     BMP:    Recent Labs     06/18/25  1359 06/19/25  0724 06/20/25  0610   * 137 136   K

## 2025-06-22 LAB
ALDOST SERPL-MCNC: 5.6 NG/DL
MICROORGANISM SPEC CULT: NORMAL
MICROORGANISM SPEC CULT: NORMAL
SERVICE CMNT-IMP: NORMAL
SERVICE CMNT-IMP: NORMAL
SPECIMEN DESCRIPTION: NORMAL
SPECIMEN DESCRIPTION: NORMAL

## 2025-06-23 ENCOUNTER — TELEPHONE (OUTPATIENT)
Dept: NEUROLOGY | Age: 55
End: 2025-06-23

## 2025-06-23 ENCOUNTER — CARE COORDINATION (OUTPATIENT)
Dept: CARE COORDINATION | Age: 55
End: 2025-06-23

## 2025-06-23 LAB
MICROORGANISM SPEC CULT: NORMAL
MICROORGANISM SPEC CULT: NORMAL
RENIN PLAS-CCNC: 0.3 NG/ML/HR
SERVICE CMNT-IMP: NORMAL
SERVICE CMNT-IMP: NORMAL
SPECIMEN DESCRIPTION: NORMAL
SPECIMEN DESCRIPTION: NORMAL

## 2025-06-23 NOTE — CARE COORDINATION
Care Transitions Note    Initial Call - Call within 2 business days of discharge: Yes    Attempted to reach patient for transitions of care follow up. Unable to reach patient.    Outreach Attempts:   HIPAA compliant voicemail left for patient.     Patient: Lexie Mireles    Patient : 1970   MRN: [id number]    Reason for Admission: orthostatic hypotension  Discharge Date: 25  RURS: Readmission Risk Score: 18.1    Last Discharge Facility       Date Complaint Diagnosis Description Type Department Provider    25 Altered Mental Status Orthostatic hypotension ... ED to Hosp-Admission (Discharged) (ADMITTED) [facility] Reyna Rodriguez MD; Boston Cruz...            Was this an external facility discharge? No    Follow Up Appointment:   Patient does not have a follow up appointment scheduled at time of call. Routed to PCP  Future Appointments         Provider Specialty Dept Phone    2025 11:30 AM [facility] Radiology Special Procedures [phone]    7/15/2025 8:15 AM Beth Olea MD Gastroenterology [phone]    2025 1:00 PM Jairo Shin DO Neurology [phone]    2025 9:10 AM Ruy Carroll MD Nephrology [phone]    10/3/2025 9:30 AM Bib Hodges MD Neurology [phone]    2025 3:00 PM Siddhartha Spain MD Neurology [phone]            Plan for follow-up on next business day.      Yumi Kam RN

## 2025-06-24 ENCOUNTER — CARE COORDINATION (OUTPATIENT)
Dept: CARE COORDINATION | Age: 55
End: 2025-06-24

## 2025-06-24 ENCOUNTER — TELEPHONE (OUTPATIENT)
Dept: NEUROLOGY | Age: 55
End: 2025-06-24

## 2025-06-24 DIAGNOSIS — G93.40 ENCEPHALOPATHY, UNSPECIFIED TYPE: Primary | ICD-10-CM

## 2025-06-24 PROCEDURE — 1111F DSCHRG MED/CURRENT MED MERGE: CPT | Performed by: NURSE PRACTITIONER

## 2025-06-24 NOTE — CARE COORDINATION
Care Transitions Note    Initial Call - Call within 2 business days of discharge: Yes    Patient Current Location:  Home: [address] [place] [address]    Care Transition Nurse contacted the patient by telephone to perform post hospital discharge assessment, verified name and  as identifiers.  Provided introduction to self, and explanation of the Care Transition Nurse role.    Patient: Lexie Mireles    Patient : 1970   MRN: [id number]    Reason for Admission: orthostatic hypotension  Discharge Date: 25  RURS: Readmission Risk Score: 18.1      Last Discharge Facility       Date Complaint Diagnosis Description Type Department Provider    25 Altered Mental Status Orthostatic hypotension ... ED to Hosp-Admission (Discharged) (ADMITTED) [facility] Reyna Rodriguez MD; [facility]...            Was this an external facility discharge? No    Additional needs identified to be addressed with provider   High priority: Patient prefers to self schedule HFU, ticket sent             Method of communication with provider: chart routing.    Patients top risk factors for readmission: medical condition-orthostatic hypotension    Interventions to address risk factors:   Review of patient management of conditions/medications: symptoms, HFU appt, medications    Care Summary Note: Patient reached for OLVIN, states better after inpatient admit for AMS, syncope. She is home to self care, confirms AVS. Confirms all new and changed medications, med review completed. She is unable to schedule PCP HFU, agrees to My Chart ticket for self-scheduling. Reviewed attempted contact per neurology and cardiology, patient will review messages and call back for appts. She is scheduled for digital angiography , GI 7/15, neurology , neuroendo 10/3 . She is using compression stockings provided at hospital, confirms script for additional. Reviewed proper fluids, change position slowly to avoid syncope, 911 or return to ED for

## 2025-06-30 ENCOUNTER — CARE COORDINATION (OUTPATIENT)
Dept: CARE COORDINATION | Age: 55
End: 2025-06-30

## 2025-06-30 NOTE — CARE COORDINATION
Care Transitions Note    Follow Up Call     Attempted to reach patient for transitions of care follow up.  Unable to reach patient.      Outreach Attempts:   HIPAA compliant voicemail left for patient.     Care Summary Note: First attempt follow up for AMS, syncope, left VM    Follow Up Appointment:   Future Appointments         Provider Specialty Dept Phone    7/7/2025 11:30 AM [facility] Radiology Special Procedures [phone]    7/15/2025 8:15 AM Beth Olea MD Gastroenterology [phone]    7/23/2025 1:00 PM Jairo Shin DO Neurology [phone]    8/18/2025 9:10 AM Ruy Carroll MD Nephrology [phone]    10/3/2025 9:30 AM Bib Hodges MD Neurology [phone]    12/16/2025 3:00 PM Siddhartha Spain MD Neurology [phone]            Plan for follow-up call in 2-5 days based on severity of symptoms and risk factors. Plan for next call: PCP HFU and cardiology scheduled? Review upcoming appts, she has scan scheduled 7/7. Patient needs to call neurology re angiogram scheduled for 7/7    Yumi Kam RN

## 2025-07-01 ENCOUNTER — CARE COORDINATION (OUTPATIENT)
Dept: CARE COORDINATION | Age: 55
End: 2025-07-01

## 2025-07-01 NOTE — CARE COORDINATION
Care Transitions Note    Follow Up Call     Attempted to reach patient for transitions of care follow up.  Unable to reach patient.      Outreach Attempts:   Multiple attempts to contact patient at phone numbers on file.   HIPAA compliant voicemail left for patient.     Care Summary Note: Second attempt follow up admit for AMS, syncope, left VM. Closing for OLVIN if no return call.    Follow Up Appointment:   Future Appointments         Provider Specialty Dept Phone    7/7/2025 11:30 AM [facility] Radiology Special Procedures [phone]    7/15/2025 8:15 AM Beth Olae MD Gastroenterology [phone]    7/23/2025 1:00 PM Jairo Shin DO Neurology [phone]    8/18/2025 9:10 AM Ruy Carroll MD Nephrology [phone]    10/3/2025 9:30 AM Bib Hodges MD Neurology [phone]    12/16/2025 3:00 PM Siddhartha Spain MD Neurology [phone]            No further follow-up call indicated based on severity of symptoms and risk factors. Plan for next call: KISHA Kam RN

## 2025-07-15 ENCOUNTER — TELEPHONE (OUTPATIENT)
Dept: GASTROENTEROLOGY | Age: 55
End: 2025-07-15

## 2025-07-15 ENCOUNTER — OFFICE VISIT (OUTPATIENT)
Dept: GASTROENTEROLOGY | Age: 55
End: 2025-07-15
Payer: MEDICARE

## 2025-07-15 VITALS
HEART RATE: 100 BPM | HEIGHT: 59 IN | TEMPERATURE: 97.2 F | RESPIRATION RATE: 17 BRPM | DIASTOLIC BLOOD PRESSURE: 81 MMHG | WEIGHT: 110 LBS | BODY MASS INDEX: 22.18 KG/M2 | SYSTOLIC BLOOD PRESSURE: 133 MMHG

## 2025-07-15 DIAGNOSIS — R63.4 UNINTENTIONAL WEIGHT LOSS: ICD-10-CM

## 2025-07-15 DIAGNOSIS — R10.84 GENERALIZED ABDOMINAL PAIN: Primary | ICD-10-CM

## 2025-07-15 DIAGNOSIS — K59.00 CONSTIPATION, UNSPECIFIED CONSTIPATION TYPE: ICD-10-CM

## 2025-07-15 PROCEDURE — 3075F SYST BP GE 130 - 139MM HG: CPT | Performed by: INTERNAL MEDICINE

## 2025-07-15 PROCEDURE — 99214 OFFICE O/P EST MOD 30 MIN: CPT | Performed by: INTERNAL MEDICINE

## 2025-07-15 PROCEDURE — G2211 COMPLEX E/M VISIT ADD ON: HCPCS | Performed by: INTERNAL MEDICINE

## 2025-07-15 PROCEDURE — 3079F DIAST BP 80-89 MM HG: CPT | Performed by: INTERNAL MEDICINE

## 2025-07-15 RX ORDER — METOCLOPRAMIDE 5 MG/1
10 TABLET ORAL 3 TIMES DAILY PRN
Qty: 90 TABLET | Refills: 0 | Status: SHIPPED | OUTPATIENT
Start: 2025-07-15 | End: 2025-07-15

## 2025-07-15 RX ORDER — METOCLOPRAMIDE 5 MG/1
10 TABLET ORAL 3 TIMES DAILY
Qty: 90 TABLET | Refills: 0 | Status: SHIPPED | OUTPATIENT
Start: 2025-07-15 | End: 2025-10-13

## 2025-07-15 ASSESSMENT — ENCOUNTER SYMPTOMS
TROUBLE SWALLOWING: 0
CONSTIPATION: 0
ABDOMINAL DISTENTION: 0
ANAL BLEEDING: 0
CHOKING: 0
NAUSEA: 1
VOMITING: 0
COUGH: 0
ABDOMINAL PAIN: 1
WHEEZING: 0
VOICE CHANGE: 0
SORE THROAT: 0
DIARRHEA: 0
RECTAL PAIN: 0
BLOOD IN STOOL: 0

## 2025-07-15 NOTE — PROGRESS NOTES
Reason for Referral:       No referring provider defined for this encounter.    Chief Complaint   Patient presents with    Follow-Up from Hospital     EGD/Colon, Hx: N/V, GERD            HISTORY OF PRESENT ILLNESS: Ms.Yevette ASHOK Mireles is a 54 y.o. female with a past history remarkable for asthma, hyperlipidemia, hypertension, type 2 diabetes mellitus, who presents for follow-up.  The patient was previously seen for abdominal pain and underwent EGD and colonoscopy.  She was noted to have diverticulosis, hemorrhoids and gastritis.  She then underwent gastric emptying study that showed delayed emptying suggestive of gastroparesis.  She was started on Reglan 5 mg 3 times daily.  Currently she reports having abdominal discomfort that is mainly epigastric to periumbilical.  She has unintentional weight loss.  Her A1c is still 9.9.  She has occasional bloating and early satiety.  She does report having bowel movement every 2 to 3 days.      Previous Endoscopies    EGD plus colonoscopy 1/28/2025:  Endoscopic Impression:    Fair bowel prep.  Mild diverticulosis.  No inflammation, polyps and masses noted throughout the colon.  The terminal ileum was also normal.  Small internal/external hemorrhoids     Suspect that the patient symptoms were likely related to constipation as she has felt better after taking the bowel prep    Impression:    Mild reflux changes noted characterized as erythema in the distal esophagus, biopsies obtained  Mild erythematous gastropathy, biopsies obtained to rule out H. pylori.  Normal duodenum, biopsies obtained to rule out celiac disease  A. DUODENUM, BIOPSY:  Unremarkable duodenal mucosa. No significant   villous blunting or increased intraepithelial lymphocytes.      B. STOMACH, BIOPSY:  Unremarkable gastric mucosa. Immunostain for H.   pylori is negative (with appropriate control).      C. DISTAL ESOPHAGUS, BIOPSY:  Unremarkable squamous mucosa. No   intraepithelial eosinophils. Negative for

## 2025-07-15 NOTE — TELEPHONE ENCOUNTER
TBS for EGD/EUS/Long  Dx:  Epigastric pain  Referring:  Dr Olea    ----- Message from Dr. Ruy Alves MD sent at 7/15/2025  9:35 AM EDT -----  OK  ----- Message -----  From: Beth Olea MD  Sent: 7/15/2025   8:14 AM EDT  To: Meredith Morales; Ruy Avles MD    Please schedule EGD plus EUS with Dr. Alves.    Writer will obtain medical clearance due to hx of chest pain and A1C being elevated to 9.9  Writer lvm for patient advising will reach out to schedule once medical clearance is received.

## 2025-07-18 NOTE — PROGRESS NOTES
Physician Progress Note      PATIENT:               TEJ EUGENE  CSN #:                  [id number]  :                       1970  ADMIT DATE:       2025 10:34 PM  DISCH DATE:        2025 4:13 PM  RESPONDING  PROVIDER #:        Reyna Rodriguez MD          QUERY TEXT:    Encephalopathy is documented in the medical record In the ED report/ H/P /   neurology consult notes  throu .  Please specify type:    The clinical indicators include:   ED Note: \" 54 y.o. female history of hypertension, GERD, diabetes, who   presents with concerns for altered mentation that is been going on for 4   weeks.  Patient presents with her  and her son who notes that the   patient has been confused\" \"altered mentation, encephalopathy.\" \" On exam she   is responding to stimuli, tachycardic, hypertensive, she has difficulty   following commands though she does most.\"    per H/P IM: \" Acute to subacute Encephalopathy:-Per documented report it has   been ongoing for a while, worsening, patient had similar admission to   neurology team in March.- Patient now seems to be improving, medication side   effect?\"    Per neurology attending statement : \"Although her events are infrequent   and perhaps more suggestive of syncope, will monitor on LTM overnight given   reported confusion.  She was hypertensive.  Does not check blood pressure at   home.?  Hypertensive encephalopathy.   She is on gabapentin and tizanidine.?    Polypharmacy.  Can consider lower dose gabapentin in light of her CKD. \"  \"   upon arrival to ED patient was alert but seemed confused and gave   inappropriate answers.  Her mentation improved after blood pressure was   controlled.\"    initial Vitals : >119, RR 28,  /98>207/119>191/114   orthostatics Lying 167/99 HR 95/ Sitting 125/80  / Standing 96/68     Labetalol 10 mg IV, magnesium sulfate 2000 mg IV, norvasc 10 mg tab, coreg   3.125mg tab, lopressor 25 mg

## 2025-07-23 ENCOUNTER — OFFICE VISIT (OUTPATIENT)
Dept: NEUROLOGY | Age: 55
End: 2025-07-23
Payer: MEDICARE

## 2025-07-23 VITALS
HEIGHT: 59 IN | BODY MASS INDEX: 22.16 KG/M2 | HEART RATE: 116 BPM | WEIGHT: 109.9 LBS | DIASTOLIC BLOOD PRESSURE: 109 MMHG | SYSTOLIC BLOOD PRESSURE: 170 MMHG

## 2025-07-23 DIAGNOSIS — R55 SYNCOPE AND COLLAPSE: Primary | ICD-10-CM

## 2025-07-23 DIAGNOSIS — E11.42 DIABETIC PERIPHERAL NEUROPATHY (HCC): ICD-10-CM

## 2025-07-23 PROCEDURE — 99214 OFFICE O/P EST MOD 30 MIN: CPT | Performed by: STUDENT IN AN ORGANIZED HEALTH CARE EDUCATION/TRAINING PROGRAM

## 2025-07-23 PROCEDURE — 3080F DIAST BP >= 90 MM HG: CPT | Performed by: STUDENT IN AN ORGANIZED HEALTH CARE EDUCATION/TRAINING PROGRAM

## 2025-07-23 PROCEDURE — 3077F SYST BP >= 140 MM HG: CPT | Performed by: STUDENT IN AN ORGANIZED HEALTH CARE EDUCATION/TRAINING PROGRAM

## 2025-07-23 PROCEDURE — 3046F HEMOGLOBIN A1C LEVEL >9.0%: CPT | Performed by: STUDENT IN AN ORGANIZED HEALTH CARE EDUCATION/TRAINING PROGRAM

## 2025-07-23 NOTE — PROGRESS NOTES
[address] [facility], [facility] [address]  [place] [address]  P: [phone]  F: [phone]    NEUROLOGY CLINIC NOTE     PATIENT NAME: Lexie Mireles  PATIENT MRN: [id number]  PRIMARY CARE PHYSICIAN: Beatriz Hickman, KELLEN - CNP    HPI:      Lexie Mireles is a 54 y.o.   female with PMH significant for HTN, T2DM, HLD on Zocor 80mg qHs, obesity, chronic cocaine use, diabetic peripheral neuropathy, anemia, CKD3a. Takes ASA 81mg for vascular protection.     Presents as post-hospital ED follow up for syncope. History obtained from patient and EMR.     Documented presentation to ED in mid June 2025 with reported 4-6 weeks of of increasing confusion and generalized weakness at that time. She had a witnessed syncopal event witnessed by family. Reported unresponsive for 2 minutes without associated seizure like activity, incontinence, lateral tongue bite. She was confused after the event but it is unclear if this was close to her baseline or not. Imaging obtained in ED include CTH w/o contrast which was negative and CTA head & neck demonstrating severe focal stenosis of the right paraclinoid ICA and questionable microaneurysm in the left supraclinoid ICA.     Further had prior neurology admission in March 2025 for AMS, dizziness which resulted in fall. At that time CTH and CTA head & neck showed no acute process per documentation. Brain MRI w/o contrast and routine EEG were further obtained and also unremarkable. Patient has scheduled neuro-endovascular follow up set for October 3rd 2025.     During this initial appointment:  Patient and family strongly deny any cocaine use. One son laughed for a full ~3 minutes at the suggestion.     No interval syncopal, pre-syncopal, fall or near-fall events during period between discharge and presentation today. Son who witnessed the most recent syncopal event states the patient was sitting on the couch prior to getting up to

## 2025-08-11 PROBLEM — R10.13 EPIGASTRIC PAIN: Status: ACTIVE | Noted: 2025-08-11

## 2025-08-12 ENCOUNTER — HOSPITAL ENCOUNTER (OUTPATIENT)
Age: 55
Discharge: HOME | End: 2025-08-14
Payer: MEDICARE

## 2025-08-12 DIAGNOSIS — R55 SYNCOPE AND COLLAPSE: ICD-10-CM

## 2025-08-12 PROCEDURE — 93246 EXT ECG>7D<15D RECORDING: CPT

## 2025-09-03 ENCOUNTER — HOSPITAL ENCOUNTER (OUTPATIENT)
Age: 55
Setting detail: OUTPATIENT SURGERY
Discharge: HOME | End: 2025-09-03
Attending: INTERNAL MEDICINE | Admitting: INTERNAL MEDICINE
Payer: MEDICARE

## 2025-09-03 ENCOUNTER — APPOINTMENT (OUTPATIENT)
Dept: ULTRASOUND IMAGING | Age: 55
End: 2025-09-03
Attending: INTERNAL MEDICINE
Payer: MEDICARE

## 2025-09-03 ENCOUNTER — ANESTHESIA (OUTPATIENT)
Dept: OPERATING ROOM | Age: 55
End: 2025-09-03
Payer: MEDICARE

## 2025-09-03 ENCOUNTER — ANESTHESIA EVENT (OUTPATIENT)
Dept: OPERATING ROOM | Age: 55
End: 2025-09-03
Payer: MEDICARE

## 2025-09-03 VITALS
RESPIRATION RATE: 20 BRPM | OXYGEN SATURATION: 100 % | TEMPERATURE: 97.3 F | WEIGHT: 109 LBS | SYSTOLIC BLOOD PRESSURE: 129 MMHG | BODY MASS INDEX: 21.97 KG/M2 | DIASTOLIC BLOOD PRESSURE: 95 MMHG | HEIGHT: 59 IN | HEART RATE: 79 BPM

## 2025-09-03 DIAGNOSIS — R10.13 EPIGASTRIC PAIN: Primary | ICD-10-CM

## 2025-09-03 LAB
GLUCOSE BLD-MCNC: 136 MG/DL (ref 65–105)
GLUCOSE BLD-MCNC: 76 MG/DL (ref 65–105)

## 2025-09-03 PROCEDURE — 3609012400 HC EGD TRANSORAL BIOPSY SINGLE/MULTIPLE: Performed by: INTERNAL MEDICINE

## 2025-09-03 PROCEDURE — 3609018500 HC EGD US SCOPE W/ADJACENT STRUCTURES: Performed by: INTERNAL MEDICINE

## 2025-09-03 PROCEDURE — 6360000002 HC RX W HCPCS

## 2025-09-03 PROCEDURE — 3700000000 HC ANESTHESIA ATTENDED CARE: Performed by: INTERNAL MEDICINE

## 2025-09-03 PROCEDURE — 7100000011 HC PHASE II RECOVERY - ADDTL 15 MIN: Performed by: INTERNAL MEDICINE

## 2025-09-03 PROCEDURE — 82947 ASSAY GLUCOSE BLOOD QUANT: CPT

## 2025-09-03 PROCEDURE — 3700000001 HC ADD 15 MINUTES (ANESTHESIA): Performed by: INTERNAL MEDICINE

## 2025-09-03 PROCEDURE — 2580000003 HC RX 258: Performed by: NURSE ANESTHETIST, CERTIFIED REGISTERED

## 2025-09-03 PROCEDURE — 2709999900 HC NON-CHARGEABLE SUPPLY: Performed by: INTERNAL MEDICINE

## 2025-09-03 PROCEDURE — 2580000003 HC RX 258: Performed by: ANESTHESIOLOGY

## 2025-09-03 PROCEDURE — 2500000003 HC RX 250 WO HCPCS: Performed by: ANESTHESIOLOGY

## 2025-09-03 PROCEDURE — 6360000002 HC RX W HCPCS: Performed by: NURSE ANESTHETIST, CERTIFIED REGISTERED

## 2025-09-03 PROCEDURE — 7100000010 HC PHASE II RECOVERY - FIRST 15 MIN: Performed by: INTERNAL MEDICINE

## 2025-09-03 PROCEDURE — 6360000002 HC RX W HCPCS: Performed by: ANESTHESIOLOGY

## 2025-09-03 PROCEDURE — 76975 GI ENDOSCOPIC ULTRASOUND: CPT | Performed by: INTERNAL MEDICINE

## 2025-09-03 RX ORDER — SODIUM CHLORIDE, SODIUM LACTATE, POTASSIUM CHLORIDE, CALCIUM CHLORIDE 600; 310; 30; 20 MG/100ML; MG/100ML; MG/100ML; MG/100ML
INJECTION, SOLUTION INTRAVENOUS
Status: DISCONTINUED | OUTPATIENT
Start: 2025-09-03 | End: 2025-09-03 | Stop reason: SDUPTHER

## 2025-09-03 RX ORDER — DEXTROSE MONOHYDRATE 25 G/50ML
12.5 INJECTION, SOLUTION INTRAVENOUS ONCE
Status: COMPLETED | OUTPATIENT
Start: 2025-09-03 | End: 2025-09-03

## 2025-09-03 RX ORDER — LABETALOL HYDROCHLORIDE 5 MG/ML
10 INJECTION, SOLUTION INTRAVENOUS ONCE
Status: COMPLETED | OUTPATIENT
Start: 2025-09-03 | End: 2025-09-03

## 2025-09-03 RX ORDER — LOSARTAN POTASSIUM 25 MG/1
25 TABLET ORAL EVERY MORNING
COMMUNITY
Start: 2025-08-20

## 2025-09-03 RX ORDER — SODIUM CHLORIDE 9 MG/ML
INJECTION, SOLUTION INTRAVENOUS PRN
Status: DISCONTINUED | OUTPATIENT
Start: 2025-09-03 | End: 2025-09-03 | Stop reason: HOSPADM

## 2025-09-03 RX ORDER — PROPOFOL 10 MG/ML
INJECTION, EMULSION INTRAVENOUS
Status: DISCONTINUED | OUTPATIENT
Start: 2025-09-03 | End: 2025-09-03 | Stop reason: SDUPTHER

## 2025-09-03 RX ORDER — SODIUM CHLORIDE, SODIUM LACTATE, POTASSIUM CHLORIDE, CALCIUM CHLORIDE 600; 310; 30; 20 MG/100ML; MG/100ML; MG/100ML; MG/100ML
INJECTION, SOLUTION INTRAVENOUS CONTINUOUS
Status: DISCONTINUED | OUTPATIENT
Start: 2025-09-03 | End: 2025-09-03 | Stop reason: HOSPADM

## 2025-09-03 RX ORDER — SODIUM CHLORIDE 9 MG/ML
INJECTION, SOLUTION INTRAVENOUS CONTINUOUS
Status: DISCONTINUED | OUTPATIENT
Start: 2025-09-03 | End: 2025-09-03 | Stop reason: HOSPADM

## 2025-09-03 RX ORDER — SODIUM CHLORIDE 0.9 % (FLUSH) 0.9 %
5-40 SYRINGE (ML) INJECTION EVERY 12 HOURS SCHEDULED
Status: DISCONTINUED | OUTPATIENT
Start: 2025-09-03 | End: 2025-09-03 | Stop reason: HOSPADM

## 2025-09-03 RX ORDER — LABETALOL HYDROCHLORIDE 5 MG/ML
INJECTION, SOLUTION INTRAVENOUS
Status: COMPLETED
Start: 2025-09-03 | End: 2025-09-03

## 2025-09-03 RX ORDER — LIDOCAINE HYDROCHLORIDE 20 MG/ML
INJECTION, SOLUTION EPIDURAL; INFILTRATION; INTRACAUDAL; PERINEURAL
Status: DISCONTINUED | OUTPATIENT
Start: 2025-09-03 | End: 2025-09-03 | Stop reason: SDUPTHER

## 2025-09-03 RX ORDER — LIDOCAINE HYDROCHLORIDE 10 MG/ML
1 INJECTION, SOLUTION EPIDURAL; INFILTRATION; INTRACAUDAL; PERINEURAL
Status: DISCONTINUED | OUTPATIENT
Start: 2025-09-03 | End: 2025-09-03 | Stop reason: HOSPADM

## 2025-09-03 RX ORDER — SODIUM CHLORIDE 0.9 % (FLUSH) 0.9 %
5-40 SYRINGE (ML) INJECTION PRN
Status: DISCONTINUED | OUTPATIENT
Start: 2025-09-03 | End: 2025-09-03 | Stop reason: HOSPADM

## 2025-09-03 RX ADMIN — PROPOFOL 20 MG: 10 INJECTION, EMULSION INTRAVENOUS at 14:18

## 2025-09-03 RX ADMIN — DEXTROSE MONOHYDRATE 12.5 G: 25 INJECTION, SOLUTION INTRAVENOUS at 12:42

## 2025-09-03 RX ADMIN — PROPOFOL 20 MG: 10 INJECTION, EMULSION INTRAVENOUS at 14:21

## 2025-09-03 RX ADMIN — LIDOCAINE HYDROCHLORIDE 60 MG: 20 INJECTION, SOLUTION EPIDURAL; INFILTRATION; INTRACAUDAL; PERINEURAL at 14:16

## 2025-09-03 RX ADMIN — PROPOFOL 20 MG: 10 INJECTION, EMULSION INTRAVENOUS at 14:24

## 2025-09-03 RX ADMIN — SODIUM CHLORIDE, POTASSIUM CHLORIDE, SODIUM LACTATE AND CALCIUM CHLORIDE: 600; 310; 30; 20 INJECTION, SOLUTION INTRAVENOUS at 14:13

## 2025-09-03 RX ADMIN — LABETALOL HYDROCHLORIDE 10 MG: 5 INJECTION INTRAVENOUS at 15:29

## 2025-09-03 RX ADMIN — LABETALOL HYDROCHLORIDE 10 MG: 5 INJECTION INTRAVENOUS at 15:48

## 2025-09-03 RX ADMIN — LABETALOL HYDROCHLORIDE 10 MG: 5 INJECTION, SOLUTION INTRAVENOUS at 15:29

## 2025-09-03 RX ADMIN — PROPOFOL 80 MG: 10 INJECTION, EMULSION INTRAVENOUS at 14:16

## 2025-09-03 RX ADMIN — PROPOFOL 20 MG: 10 INJECTION, EMULSION INTRAVENOUS at 14:27

## 2025-09-03 RX ADMIN — SODIUM CHLORIDE, SODIUM LACTATE, POTASSIUM CHLORIDE, AND CALCIUM CHLORIDE: .6; .31; .03; .02 INJECTION, SOLUTION INTRAVENOUS at 12:32

## 2025-09-03 ASSESSMENT — PAIN SCALES - GENERAL
PAINLEVEL_OUTOF10: 0

## 2025-09-03 ASSESSMENT — PAIN - FUNCTIONAL ASSESSMENT
PAIN_FUNCTIONAL_ASSESSMENT: 0-10
PAIN_FUNCTIONAL_ASSESSMENT: FACE, LEGS, ACTIVITY, CRY, AND CONSOLABILITY (FLACC)

## (undated) DEVICE — SOLUTION SURG PREP POV IOD 7.5% 4 OZ

## (undated) DEVICE — FORCEPS BX L240CM WRK CHN 2.8MM STD CAP W/ NDL MIC MESH

## (undated) DEVICE — MANIFOLD SUCT 4 PRT 2 CANSTR FLTR DISP NEPTUNE 2

## (undated) DEVICE — DRAPE,REIN 53X77,STERILE: Brand: MEDLINE

## (undated) DEVICE — Z DISCONTINUED USE 2305833 HANDPIECE ELECSURG THERM ABLATN DISP

## (undated) DEVICE — ENDOSCOPIC KIT 1.1+ 10 FT DE 2 GWN AAMI LEVEL 3

## (undated) DEVICE — SVMMC GYN MIN PK

## (undated) DEVICE — GAUZE,SPONGE,FLUFF,6"X6.75",STRL,5/TRAY: Brand: MEDLINE

## (undated) DEVICE — CUP MED 60ML 2OZ CLR GRAD DISP PLAS NO LID

## (undated) DEVICE — SINGLE-USE BIOPSY FORCEPS: Brand: RADIAL JAW 4

## (undated) DEVICE — BITE BLOCK ENDOSCP AD 60 FR W/ ADJ STRP PLAS GRN BLOX

## (undated) DEVICE — UNDERPAD HOSP W30XL36IN GRN POLYPR HI ABSRB DISP

## (undated) DEVICE — PREP SOL PVP IODINE 4%  4 OZ/BTL

## (undated) DEVICE — GLOVE SURG SZ 65 THK91MIL LTX FREE SYN POLYISOPRENE

## (undated) DEVICE — GLOVE ORANGE PI 7 1/2   MSG9075

## (undated) DEVICE — KENDALL SCD EXPRESS SLEEVES, KNEE LENGTH, MEDIUM: Brand: KENDALL SCD